# Patient Record
Sex: MALE | Race: OTHER | Employment: UNEMPLOYED | URBAN - METROPOLITAN AREA
[De-identification: names, ages, dates, MRNs, and addresses within clinical notes are randomized per-mention and may not be internally consistent; named-entity substitution may affect disease eponyms.]

---

## 2019-09-03 ENCOUNTER — PATIENT OUTREACH (OUTPATIENT)
Dept: ADMINISTRATIVE | Facility: HOSPITAL | Age: 84
End: 2019-09-03

## 2019-09-03 NOTE — PROGRESS NOTES
Pre-visit chart review completed. Pt eligible/ due for   Lipid Panel     TETANUS VACCINE     Pneumococcal Vaccine (65+ Low/Medium Risk) (2 of 2 - PPSV23)

## 2019-09-17 ENCOUNTER — OFFICE VISIT (OUTPATIENT)
Dept: PRIMARY CARE CLINIC | Facility: CLINIC | Age: 84
End: 2019-09-17
Payer: MEDICARE

## 2019-09-17 VITALS
BODY MASS INDEX: 22.28 KG/M2 | HEART RATE: 58 BPM | HEIGHT: 64 IN | OXYGEN SATURATION: 97 % | WEIGHT: 130.5 LBS | TEMPERATURE: 98 F | SYSTOLIC BLOOD PRESSURE: 120 MMHG | DIASTOLIC BLOOD PRESSURE: 60 MMHG

## 2019-09-17 DIAGNOSIS — R79.9 ABNORMAL FINDING OF BLOOD CHEMISTRY: ICD-10-CM

## 2019-09-17 DIAGNOSIS — Z00.00 ROUTINE GENERAL MEDICAL EXAMINATION AT A HEALTH CARE FACILITY: Primary | ICD-10-CM

## 2019-09-17 DIAGNOSIS — N40.0 BENIGN PROSTATIC HYPERPLASIA, UNSPECIFIED WHETHER LOWER URINARY TRACT SYMPTOMS PRESENT: ICD-10-CM

## 2019-09-17 DIAGNOSIS — E79.0 HYPERURICEMIA: ICD-10-CM

## 2019-09-17 DIAGNOSIS — G20.A1 PARKINSONS: ICD-10-CM

## 2019-09-17 PROCEDURE — 99202 PR OFFICE/OUTPT VISIT, NEW, LEVL II, 15-29 MIN: ICD-10-PCS | Mod: S$PBB,ICN,, | Performed by: FAMILY MEDICINE

## 2019-09-17 PROCEDURE — 99202 OFFICE O/P NEW SF 15 MIN: CPT | Mod: S$PBB,ICN,, | Performed by: FAMILY MEDICINE

## 2019-09-17 PROCEDURE — 99999 PR PBB SHADOW E&M-EST. PATIENT-LVL III: CPT | Mod: PBBFAC,,, | Performed by: FAMILY MEDICINE

## 2019-09-17 PROCEDURE — 99999 PR PBB SHADOW E&M-EST. PATIENT-LVL III: ICD-10-PCS | Mod: PBBFAC,,, | Performed by: FAMILY MEDICINE

## 2019-09-17 PROCEDURE — 99213 OFFICE O/P EST LOW 20 MIN: CPT | Mod: PBBFAC,PN | Performed by: FAMILY MEDICINE

## 2019-09-17 RX ORDER — CARBIDOPA AND LEVODOPA 25; 100 MG/1; MG/1
1 TABLET ORAL 4 TIMES DAILY
Refills: 5 | COMMUNITY
Start: 2019-09-10 | End: 2021-04-27 | Stop reason: DRUGHIGH

## 2019-09-17 RX ORDER — FINASTERIDE 5 MG
1 TABLET ORAL DAILY
Refills: 0 | Status: ON HOLD | COMMUNITY
Start: 2019-08-23 | End: 2022-05-11

## 2019-09-17 RX ORDER — LOPERAMIDE HCL 2 MG
2 TABLET ORAL 4 TIMES DAILY
Qty: 360 TABLET | Refills: 3 | Status: SHIPPED | OUTPATIENT
Start: 2019-09-17 | End: 2019-10-17

## 2019-09-17 RX ORDER — LOPERAMIDE HYDROCHLORIDE 2 MG/1
CAPSULE ORAL
Status: ON HOLD | COMMUNITY
Start: 2019-06-21 | End: 2022-05-12 | Stop reason: HOSPADM

## 2019-09-18 ENCOUNTER — LAB VISIT (OUTPATIENT)
Dept: LAB | Facility: HOSPITAL | Age: 84
End: 2019-09-18
Attending: FAMILY MEDICINE
Payer: MEDICARE

## 2019-09-18 DIAGNOSIS — Z00.00 ROUTINE GENERAL MEDICAL EXAMINATION AT A HEALTH CARE FACILITY: ICD-10-CM

## 2019-09-18 DIAGNOSIS — R73.03 PREDIABETES: ICD-10-CM

## 2019-09-18 DIAGNOSIS — R79.9 ABNORMAL FINDING OF BLOOD CHEMISTRY: ICD-10-CM

## 2019-09-18 DIAGNOSIS — E79.0 HYPERURICEMIA: ICD-10-CM

## 2019-09-18 LAB
ALBUMIN SERPL BCP-MCNC: 3.6 G/DL (ref 3.5–5.2)
ALP SERPL-CCNC: 71 U/L (ref 55–135)
ALT SERPL W/O P-5'-P-CCNC: 6 U/L (ref 10–44)
ANION GAP SERPL CALC-SCNC: 7 MMOL/L (ref 8–16)
AST SERPL-CCNC: 18 U/L (ref 10–40)
BASOPHILS # BLD AUTO: 0.08 K/UL (ref 0–0.2)
BASOPHILS NFR BLD: 0.9 % (ref 0–1.9)
BILIRUB SERPL-MCNC: 0.8 MG/DL (ref 0.1–1)
BUN SERPL-MCNC: 20 MG/DL (ref 8–23)
CALCIUM SERPL-MCNC: 9.2 MG/DL (ref 8.7–10.5)
CHLORIDE SERPL-SCNC: 104 MMOL/L (ref 95–110)
CHOLEST SERPL-MCNC: 166 MG/DL (ref 120–199)
CHOLEST/HDLC SERPL: 2.4 {RATIO} (ref 2–5)
CO2 SERPL-SCNC: 28 MMOL/L (ref 23–29)
CREAT SERPL-MCNC: 1.1 MG/DL (ref 0.5–1.4)
DIFFERENTIAL METHOD: ABNORMAL
EOSINOPHIL # BLD AUTO: 0.3 K/UL (ref 0–0.5)
EOSINOPHIL NFR BLD: 3.1 % (ref 0–8)
ERYTHROCYTE [DISTWIDTH] IN BLOOD BY AUTOMATED COUNT: 14.8 % (ref 11.5–14.5)
EST. GFR  (AFRICAN AMERICAN): >60 ML/MIN/1.73 M^2
EST. GFR  (NON AFRICAN AMERICAN): >60 ML/MIN/1.73 M^2
GLUCOSE SERPL-MCNC: 91 MG/DL (ref 70–110)
HCT VFR BLD AUTO: 41.7 % (ref 40–54)
HDLC SERPL-MCNC: 68 MG/DL (ref 40–75)
HDLC SERPL: 41 % (ref 20–50)
HGB BLD-MCNC: 12.9 G/DL (ref 14–18)
IMM GRANULOCYTES # BLD AUTO: 0.03 K/UL (ref 0–0.04)
IMM GRANULOCYTES NFR BLD AUTO: 0.3 % (ref 0–0.5)
LDLC SERPL CALC-MCNC: 87.8 MG/DL (ref 63–159)
LYMPHOCYTES # BLD AUTO: 3.2 K/UL (ref 1–4.8)
LYMPHOCYTES NFR BLD: 36.9 % (ref 18–48)
MCH RBC QN AUTO: 29.9 PG (ref 27–31)
MCHC RBC AUTO-ENTMCNC: 30.9 G/DL (ref 32–36)
MCV RBC AUTO: 97 FL (ref 82–98)
MONOCYTES # BLD AUTO: 0.9 K/UL (ref 0.3–1)
MONOCYTES NFR BLD: 9.9 % (ref 4–15)
NEUTROPHILS # BLD AUTO: 4.2 K/UL (ref 1.8–7.7)
NEUTROPHILS NFR BLD: 48.9 % (ref 38–73)
NONHDLC SERPL-MCNC: 98 MG/DL
NRBC BLD-RTO: 0 /100 WBC
PLATELET # BLD AUTO: 190 K/UL (ref 150–350)
PMV BLD AUTO: 10.4 FL (ref 9.2–12.9)
POTASSIUM SERPL-SCNC: 4.2 MMOL/L (ref 3.5–5.1)
PROT SERPL-MCNC: 7.7 G/DL (ref 6–8.4)
RBC # BLD AUTO: 4.32 M/UL (ref 4.6–6.2)
SODIUM SERPL-SCNC: 139 MMOL/L (ref 136–145)
TRIGL SERPL-MCNC: 51 MG/DL (ref 30–150)
TSH SERPL DL<=0.005 MIU/L-ACNC: 1.41 UIU/ML (ref 0.4–4)
URATE SERPL-MCNC: 7.3 MG/DL (ref 3.4–7)
WBC # BLD AUTO: 8.58 K/UL (ref 3.9–12.7)

## 2019-09-18 PROCEDURE — 80053 COMPREHEN METABOLIC PANEL: CPT

## 2019-09-18 PROCEDURE — 84550 ASSAY OF BLOOD/URIC ACID: CPT

## 2019-09-18 PROCEDURE — 85025 COMPLETE CBC W/AUTO DIFF WBC: CPT

## 2019-09-18 PROCEDURE — 80061 LIPID PANEL: CPT

## 2019-09-18 PROCEDURE — 36415 COLL VENOUS BLD VENIPUNCTURE: CPT

## 2019-09-18 PROCEDURE — 84443 ASSAY THYROID STIM HORMONE: CPT

## 2019-09-20 ENCOUNTER — TELEPHONE (OUTPATIENT)
Dept: PRIMARY CARE CLINIC | Facility: CLINIC | Age: 84
End: 2019-09-20

## 2019-09-20 NOTE — TELEPHONE ENCOUNTER
----- Message from Tl Torres MD sent at 9/20/2019 12:31 PM CDT -----  Pt's uric acid is high. Ask him to follow a low-purine diet (he is a retired ) and to hydrate well. The uric acid isn't super high; we could start a Rx med (allopurinol) now to help lower it and decrease the risk of gout, or we could repeat the test in 3-6 mo. The med will not affect the kidneys. His kidney fx, liver fx etc good. His lipid panel is outstanding! Thank you

## 2019-09-20 NOTE — TELEPHONE ENCOUNTER
Spoke with wife, informed of lab results and recommendations.  She relayed information to  while on the phone with me.  He would like to think about the recommendation to start allopurinol or repeat lab in 3-6 months.  They ask if labs with message to be mailed to them for review.  Sent to POB per patient.

## 2019-09-22 PROBLEM — G20.A1 PARKINSONS: Status: ACTIVE | Noted: 2019-09-22

## 2019-09-22 PROBLEM — E79.0 HYPERURICEMIA: Status: ACTIVE | Noted: 2019-09-22

## 2019-09-22 PROBLEM — N40.0 BENIGN PROSTATIC HYPERPLASIA: Status: ACTIVE | Noted: 2019-09-22

## 2019-09-23 NOTE — PROGRESS NOTES
Subjective:   Patient ID: Giselle Lemus is a 86 y.o. male.    Chief Complaint: Annual Exam and Establish Care      HPI  81 yo here for annual    Need to get old records hernando ro  Retired  from [employer]    Patient queried and denies any further complaints    PREVENTIVE MED  Diet  Exercise  Colorectal Ca  Alcohol use  Tobacco  BP  Depression  Type 2 DM  Hep C  STD  Vision  ALL REVIEWED      PAST MEDICAL HISTORY:  History reviewed. No pertinent past medical history.    PAST SURGICAL HISTORY:  History reviewed. No pertinent surgical history.    SOCIAL HISTORY:  Social History     Socioeconomic History    Marital status:      Spouse name: Not on file    Number of children: Not on file    Years of education: Not on file    Highest education level: Not on file   Occupational History    Not on file   Social Needs    Financial resource strain: Not on file    Food insecurity:     Worry: Not on file     Inability: Not on file    Transportation needs:     Medical: Not on file     Non-medical: Not on file   Tobacco Use    Smoking status: Never Smoker   Substance and Sexual Activity    Alcohol use: Not on file    Drug use: Not on file    Sexual activity: Not on file   Lifestyle    Physical activity:     Days per week: Not on file     Minutes per session: Not on file    Stress: Not on file   Relationships    Social connections:     Talks on phone: Not on file     Gets together: Not on file     Attends Oriental orthodox service: Not on file     Active member of club or organization: Not on file     Attends meetings of clubs or organizations: Not on file     Relationship status: Not on file   Other Topics Concern    Not on file   Social History Narrative    Not on file       FAMILY HISTORY:  History reviewed. No pertinent family history.    ALLERGIES AND MEDICATIONS: updated and reviewed.  Review of patient's allergies indicates:  No Known Allergies    Current Outpatient Medications:     carbidopa-levodopa  mg  (SINEMET)  mg per tablet, Take 1 tablet by mouth 3 (three) times daily., Disp: , Rfl: 5    loperamide (IMODIUM) 2 mg capsule, Take 2 mg by mouth., Disp: , Rfl:     PROSCAR 5 mg tablet, Take 1 tablet by mouth once daily., Disp: , Rfl: 0    loperamide (IMODIUM A-D) 2 mg Tab, Take 1 tablet (2 mg total) by mouth 4 (four) times daily. Dx [employer] if available per pt request, Disp: 360 tablet, Rfl: 3    Review of Systems   Constitutional: Negative for activity change, appetite change, chills, diaphoresis, fatigue, fever and unexpected weight change.   HENT: Negative for congestion, ear discharge, ear pain, facial swelling, hearing loss, nosebleeds, postnasal drip, rhinorrhea, sinus pressure, sneezing, sore throat, tinnitus, trouble swallowing and voice change.    Eyes: Negative for photophobia, pain, discharge, redness, itching and visual disturbance.   Respiratory: Negative for cough, chest tightness, shortness of breath and wheezing.    Cardiovascular: Negative for chest pain, palpitations and leg swelling.   Gastrointestinal: Negative for abdominal distention, abdominal pain, anal bleeding, blood in stool, constipation, diarrhea, nausea, rectal pain and vomiting.   Endocrine: Negative for cold intolerance, heat intolerance, polydipsia, polyphagia and polyuria.   Genitourinary: Negative for difficulty urinating, dysuria and flank pain.   Musculoskeletal: Negative for arthralgias, back pain, joint swelling, myalgias and neck pain.   Skin: Negative for rash.   Neurological: Negative for dizziness, tremors, seizures, syncope, speech difficulty, weakness, light-headedness, numbness and headaches.   Psychiatric/Behavioral: Negative for behavioral problems, confusion, decreased concentration, dysphoric mood, sleep disturbance and suicidal ideas. The patient is not nervous/anxious and is not hyperactive.        Objective:     Vitals:    09/17/19 1408   BP: 120/60   Pulse: (!) 58   Temp: 97.8 °F (36.6 °C)  "  TempSrc: Oral   SpO2: 97%   Weight: 59.2 kg (130 lb 8.2 oz)   Height: 5' 4" (1.626 m)   PainSc: 0-No pain     Body mass index is 22.4 kg/m².    Physical Exam   Constitutional: He is oriented to person, place, and time. He appears well-developed and well-nourished. He is cooperative. He does not have a sickly appearance. No distress.   HENT:   Head: Normocephalic and atraumatic.   Right Ear: Hearing, tympanic membrane, external ear and ear canal normal. No tenderness.   Left Ear: Hearing, tympanic membrane, external ear and ear canal normal. No tenderness.   Nose: Nose normal.   Mouth/Throat: Oropharynx is clear and moist.   Eyes: Pupils are equal, round, and reactive to light. Conjunctivae and lids are normal. Right eye exhibits no discharge. Left eye exhibits no discharge. Right conjunctiva is not injected. Left conjunctiva is not injected. No scleral icterus. Right eye exhibits normal extraocular motion. Left eye exhibits normal extraocular motion.   Neck: Normal range of motion. Neck supple. No JVD present. Carotid bruit is not present. No tracheal deviation and no edema present. No thyromegaly present.   Cardiovascular: Normal rate, regular rhythm, normal heart sounds and normal pulses. Exam reveals no friction rub.   No murmur heard.  Pulmonary/Chest: Effort normal and breath sounds normal. No accessory muscle usage. No respiratory distress. He has no wheezes. He has no rhonchi. He has no rales.   Abdominal: Soft. Bowel sounds are normal. He exhibits no distension, no abdominal bruit, no pulsatile midline mass and no mass. There is no hepatosplenomegaly. There is no tenderness. There is no rebound, no guarding, no CVA tenderness, no tenderness at McBurney's point and negative Hernandez's sign.   Musculoskeletal: He exhibits no edema.   Lymphadenopathy:        Head (right side): No submandibular, no preauricular and no posterior auricular adenopathy present.        Head (left side): No submandibular, no " preauricular and no posterior auricular adenopathy present.     He has no cervical adenopathy.   Neurological: He is alert and oriented to person, place, and time. GCS eye subscore is 4. GCS verbal subscore is 5. GCS motor subscore is 6.   Skin: Skin is warm and dry. No ecchymosis and no rash noted. Rash is not maculopapular and not urticarial. He is not diaphoretic. No cyanosis or erythema. Nails show no clubbing.   Psychiatric: He has a normal mood and affect. His speech is normal and behavior is normal. Thought content normal. His mood appears not anxious. His affect is not angry and not inappropriate. He does not exhibit a depressed mood.   Nursing note and vitals reviewed.      Assessment and Plan:   Eng was seen today for annual exam and establish care.    Diagnoses and all orders for this visit:    Routine general medical examination at a health care facility  -     CBC auto differential; Future  -     Comprehensive metabolic panel; Future  -     Cancel: Hemoglobin A1c; Future  -     Lipid panel; Future  -     TSH; Future    Parkinsons    Hyperuricemia  -     Uric acid; Future    Abnormal finding of blood chemistry   -     CBC auto differential; Future  -     Cancel: Hemoglobin A1c; Future  -     Lipid panel; Future    Benign prostatic hyperplasia, unspecified whether lower urinary tract symptoms present    Other orders  -     loperamide (IMODIUM A-D) 2 mg Tab; Take 1 tablet (2 mg total) by mouth 4 (four) times daily. Dx [employer] if available per pt request        Follow up in about 6 months (around 3/17/2020).        THIS NOTE WILL BE SHARED WITH THE PATIENT.

## 2020-03-27 ENCOUNTER — PATIENT OUTREACH (OUTPATIENT)
Dept: ADMINISTRATIVE | Facility: HOSPITAL | Age: 85
End: 2020-03-27

## 2021-01-10 ENCOUNTER — IMMUNIZATION (OUTPATIENT)
Dept: INTERNAL MEDICINE | Facility: CLINIC | Age: 86
End: 2021-01-10
Payer: MEDICARE

## 2021-01-10 DIAGNOSIS — Z23 NEED FOR VACCINATION: ICD-10-CM

## 2021-01-10 PROCEDURE — 91300 COVID-19, MRNA, LNP-S, PF, 30 MCG/0.3 ML DOSE VACCINE: CPT | Mod: PBBFAC

## 2021-01-31 ENCOUNTER — IMMUNIZATION (OUTPATIENT)
Dept: INTERNAL MEDICINE | Facility: CLINIC | Age: 86
End: 2021-01-31
Payer: MEDICARE

## 2021-01-31 DIAGNOSIS — Z23 NEED FOR VACCINATION: Primary | ICD-10-CM

## 2021-01-31 PROCEDURE — 0002A PR IMMUNIZ ADMIN, SARS-COV-2 COVID-19 VACC, 30MCG/0.3ML, 2ND DOSE: CPT | Mod: PBBFAC

## 2021-01-31 PROCEDURE — 91300 PR SARS-COV- 2 COVID-19 VACCINE, NO PRSV, 30MCG/0.3ML, IM: CPT | Mod: PBBFAC

## 2021-01-31 RX ADMIN — RNA INGREDIENT BNT-162B2 0.3 ML: 0.23 INJECTION, SUSPENSION INTRAMUSCULAR at 01:01

## 2021-02-20 ENCOUNTER — OFFICE VISIT (OUTPATIENT)
Dept: URGENT CARE | Facility: CLINIC | Age: 86
End: 2021-02-20
Payer: MEDICARE

## 2021-02-20 VITALS
SYSTOLIC BLOOD PRESSURE: 119 MMHG | DIASTOLIC BLOOD PRESSURE: 59 MMHG | WEIGHT: 130 LBS | HEART RATE: 87 BPM | OXYGEN SATURATION: 93 % | BODY MASS INDEX: 22.2 KG/M2 | RESPIRATION RATE: 16 BRPM | HEIGHT: 64 IN | TEMPERATURE: 100 F

## 2021-02-20 DIAGNOSIS — M25.552 LEFT HIP PAIN: ICD-10-CM

## 2021-02-20 DIAGNOSIS — Z87.898 HISTORY OF CHRONIC COUGH: ICD-10-CM
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
## 2021-02-20 DIAGNOSIS — B96.89 ACUTE BACTERIAL BRONCHITIS: Primary | ICD-10-CM

## 2021-02-20 DIAGNOSIS — J20.8 ACUTE BACTERIAL BRONCHITIS: Primary | ICD-10-CM

## 2021-02-20 LAB
CTP QC/QA: YES
SARS-COV-2 RDRP RESP QL NAA+PROBE: NEGATIVE

## 2021-02-20 PROCEDURE — 73502 XR HIP 2 VIEW LEFT: ICD-10-PCS | Mod: FY,LT,S$GLB, | Performed by: RADIOLOGY

## 2021-02-20 PROCEDURE — 99214 OFFICE O/P EST MOD 30 MIN: CPT | Mod: 25,S$GLB,CS, | Performed by: PHYSICIAN ASSISTANT

## 2021-02-20 PROCEDURE — 99214 PR OFFICE/OUTPT VISIT, EST, LEVL IV, 30-39 MIN: ICD-10-PCS | Mod: 25,S$GLB,CS, | Performed by: PHYSICIAN ASSISTANT

## 2021-02-20 PROCEDURE — 96372 PR INJECTION,THERAP/PROPH/DIAG2ST, IM OR SUBCUT: ICD-10-PCS | Mod: S$GLB,,, | Performed by: EMERGENCY MEDICINE

## 2021-02-20 PROCEDURE — 71046 XR CHEST PA AND LATERAL: ICD-10-PCS | Mod: FY,S$GLB,, | Performed by: RADIOLOGY

## 2021-02-20 PROCEDURE — 96372 THER/PROPH/DIAG INJ SC/IM: CPT | Mod: S$GLB,,, | Performed by: EMERGENCY MEDICINE

## 2021-02-20 PROCEDURE — 71046 X-RAY EXAM CHEST 2 VIEWS: CPT | Mod: FY,S$GLB,, | Performed by: RADIOLOGY

## 2021-02-20 PROCEDURE — 73502 X-RAY EXAM HIP UNI 2-3 VIEWS: CPT | Mod: FY,LT,S$GLB, | Performed by: RADIOLOGY

## 2021-02-20 PROCEDURE — U0002 COVID-19 LAB TEST NON-CDC: HCPCS | Mod: QW,CR,S$GLB, | Performed by: PHYSICIAN ASSISTANT

## 2021-02-20 PROCEDURE — U0002: ICD-10-PCS | Mod: QW,CR,S$GLB, | Performed by: PHYSICIAN ASSISTANT

## 2021-02-20 RX ORDER — DOXYCYCLINE 100 MG/1
100 CAPSULE ORAL 2 TIMES DAILY
Qty: 14 CAPSULE | Refills: 0 | Status: SHIPPED | OUTPATIENT
Start: 2021-02-20 | End: 2021-02-27

## 2021-02-20 RX ORDER — ALBUTEROL SULFATE 90 UG/1
2 AEROSOL, METERED RESPIRATORY (INHALATION) EVERY 6 HOURS PRN
Qty: 18 G | Refills: 0 | Status: SHIPPED | OUTPATIENT
Start: 2021-02-20 | End: 2021-03-13

## 2021-02-20 RX ORDER — CEFTRIAXONE 1 G/1
1 INJECTION, POWDER, FOR SOLUTION INTRAMUSCULAR; INTRAVENOUS
Status: COMPLETED | OUTPATIENT
Start: 2021-02-20 | End: 2021-02-20

## 2021-02-20 RX ORDER — GUAIFENESIN AND DEXTROMETHORPHAN HYDROBROMIDE 600; 30 MG/1; MG/1
1 TABLET, EXTENDED RELEASE ORAL 2 TIMES DAILY PRN
COMMUNITY
Start: 2021-02-20 | End: 2021-03-02

## 2021-02-20 RX ADMIN — CEFTRIAXONE 1 G: 1 INJECTION, POWDER, FOR SOLUTION INTRAMUSCULAR; INTRAVENOUS at 01:02

## 2021-02-24 ENCOUNTER — LAB VISIT (OUTPATIENT)
Dept: LAB | Facility: HOSPITAL | Age: 86
End: 2021-02-24
Attending: STUDENT IN AN ORGANIZED HEALTH CARE EDUCATION/TRAINING PROGRAM
Payer: MEDICARE

## 2021-02-24 ENCOUNTER — OFFICE VISIT (OUTPATIENT)
Dept: INTERNAL MEDICINE | Facility: CLINIC | Age: 86
End: 2021-02-24
Payer: MEDICARE

## 2021-02-24 ENCOUNTER — TELEPHONE (OUTPATIENT)
Dept: INTERNAL MEDICINE | Facility: CLINIC | Age: 86
End: 2021-02-24

## 2021-02-24 VITALS
HEIGHT: 64 IN | WEIGHT: 125.88 LBS | TEMPERATURE: 98 F | SYSTOLIC BLOOD PRESSURE: 154 MMHG | OXYGEN SATURATION: 96 % | RESPIRATION RATE: 12 BRPM | DIASTOLIC BLOOD PRESSURE: 66 MMHG | BODY MASS INDEX: 21.49 KG/M2 | HEART RATE: 98 BPM

## 2021-02-24 DIAGNOSIS — R93.89 ABNORMAL CHEST X-RAY: ICD-10-CM

## 2021-02-24 DIAGNOSIS — R60.0 BILATERAL LOWER EXTREMITY EDEMA: Primary | ICD-10-CM

## 2021-02-24 DIAGNOSIS — R01.1 CARDIAC MURMUR: ICD-10-CM

## 2021-02-24 DIAGNOSIS — R60.0 BILATERAL LOWER EXTREMITY EDEMA: ICD-10-CM

## 2021-02-24 LAB
BASOPHILS # BLD AUTO: 0.05 K/UL (ref 0–0.2)
BASOPHILS NFR BLD: 0.5 % (ref 0–1.9)
DIFFERENTIAL METHOD: ABNORMAL
EOSINOPHIL # BLD AUTO: 0.2 K/UL (ref 0–0.5)
EOSINOPHIL NFR BLD: 1.9 % (ref 0–8)
ERYTHROCYTE [DISTWIDTH] IN BLOOD BY AUTOMATED COUNT: 15.3 % (ref 11.5–14.5)
HCT VFR BLD AUTO: 37.2 % (ref 40–54)
HGB BLD-MCNC: 11.5 G/DL (ref 14–18)
IMM GRANULOCYTES # BLD AUTO: 0.06 K/UL (ref 0–0.04)
IMM GRANULOCYTES NFR BLD AUTO: 0.6 % (ref 0–0.5)
LYMPHOCYTES # BLD AUTO: 1.9 K/UL (ref 1–4.8)
LYMPHOCYTES NFR BLD: 20.2 % (ref 18–48)
MCH RBC QN AUTO: 30.6 PG (ref 27–31)
MCHC RBC AUTO-ENTMCNC: 30.9 G/DL (ref 32–36)
MCV RBC AUTO: 99 FL (ref 82–98)
MONOCYTES # BLD AUTO: 1.2 K/UL (ref 0.3–1)
MONOCYTES NFR BLD: 12.5 % (ref 4–15)
NEUTROPHILS # BLD AUTO: 6.1 K/UL (ref 1.8–7.7)
NEUTROPHILS NFR BLD: 64.3 % (ref 38–73)
NRBC BLD-RTO: 0 /100 WBC
PLATELET # BLD AUTO: 236 K/UL (ref 150–350)
PMV BLD AUTO: 10.9 FL (ref 9.2–12.9)
RBC # BLD AUTO: 3.76 M/UL (ref 4.6–6.2)
WBC # BLD AUTO: 9.45 K/UL (ref 3.9–12.7)

## 2021-02-24 PROCEDURE — 83880 ASSAY OF NATRIURETIC PEPTIDE: CPT

## 2021-02-24 PROCEDURE — 99999 PR PBB SHADOW E&M-EST. PATIENT-LVL IV: ICD-10-PCS | Mod: PBBFAC,,, | Performed by: STUDENT IN AN ORGANIZED HEALTH CARE EDUCATION/TRAINING PROGRAM

## 2021-02-24 PROCEDURE — 99214 PR OFFICE/OUTPT VISIT, EST, LEVL IV, 30-39 MIN: ICD-10-PCS | Mod: S$PBB,,, | Performed by: STUDENT IN AN ORGANIZED HEALTH CARE EDUCATION/TRAINING PROGRAM

## 2021-02-24 PROCEDURE — 85025 COMPLETE CBC W/AUTO DIFF WBC: CPT

## 2021-02-24 PROCEDURE — 36415 COLL VENOUS BLD VENIPUNCTURE: CPT | Mod: PO

## 2021-02-24 PROCEDURE — 99214 OFFICE O/P EST MOD 30 MIN: CPT | Mod: PBBFAC,PO | Performed by: STUDENT IN AN ORGANIZED HEALTH CARE EDUCATION/TRAINING PROGRAM

## 2021-02-24 PROCEDURE — 99999 PR PBB SHADOW E&M-EST. PATIENT-LVL IV: CPT | Mod: PBBFAC,,, | Performed by: STUDENT IN AN ORGANIZED HEALTH CARE EDUCATION/TRAINING PROGRAM

## 2021-02-24 PROCEDURE — 80053 COMPREHEN METABOLIC PANEL: CPT

## 2021-02-24 PROCEDURE — 99214 OFFICE O/P EST MOD 30 MIN: CPT | Mod: S$PBB,,, | Performed by: STUDENT IN AN ORGANIZED HEALTH CARE EDUCATION/TRAINING PROGRAM

## 2021-02-24 RX ORDER — BUMETANIDE 0.5 MG/1
2 TABLET ORAL DAILY PRN
Qty: 30 TABLET | Refills: 3 | Status: ON HOLD | OUTPATIENT
Start: 2021-02-24 | End: 2022-05-11

## 2021-02-25 ENCOUNTER — SPECIALTY PHARMACY (OUTPATIENT)
Dept: PHARMACY | Facility: CLINIC | Age: 86
End: 2021-02-25

## 2021-02-25 ENCOUNTER — TELEPHONE (OUTPATIENT)
Dept: INTERNAL MEDICINE | Facility: CLINIC | Age: 86
End: 2021-02-25

## 2021-02-25 DIAGNOSIS — N17.9 AKI (ACUTE KIDNEY INJURY): ICD-10-CM

## 2021-02-25 DIAGNOSIS — D64.9 ANEMIA, UNSPECIFIED TYPE: ICD-10-CM

## 2021-02-25 DIAGNOSIS — E87.5 HYPERKALEMIA: Primary | ICD-10-CM

## 2021-02-25 LAB
ALBUMIN SERPL BCP-MCNC: 3.2 G/DL (ref 3.5–5.2)
ALP SERPL-CCNC: 77 U/L (ref 55–135)
ALT SERPL W/O P-5'-P-CCNC: 8 U/L (ref 10–44)
ANION GAP SERPL CALC-SCNC: 5 MMOL/L (ref 8–16)
AST SERPL-CCNC: 23 U/L (ref 10–40)
BILIRUB SERPL-MCNC: 0.3 MG/DL (ref 0.1–1)
BNP SERPL-MCNC: 102 PG/ML (ref 0–99)
BUN SERPL-MCNC: 35 MG/DL (ref 8–23)
CALCIUM SERPL-MCNC: 8.7 MG/DL (ref 8.7–10.5)
CHLORIDE SERPL-SCNC: 105 MMOL/L (ref 95–110)
CO2 SERPL-SCNC: 26 MMOL/L (ref 23–29)
CREAT SERPL-MCNC: 1.5 MG/DL (ref 0.5–1.4)
EST. GFR  (AFRICAN AMERICAN): 47.7 ML/MIN/1.73 M^2
EST. GFR  (NON AFRICAN AMERICAN): 41.3 ML/MIN/1.73 M^2
GLUCOSE SERPL-MCNC: 131 MG/DL (ref 70–110)
POTASSIUM SERPL-SCNC: 5.5 MMOL/L (ref 3.5–5.1)
PROT SERPL-MCNC: 7.3 G/DL (ref 6–8.4)
SODIUM SERPL-SCNC: 136 MMOL/L (ref 136–145)

## 2021-02-26 ENCOUNTER — TELEPHONE (OUTPATIENT)
Dept: INTERNAL MEDICINE | Facility: CLINIC | Age: 86
End: 2021-02-26

## 2021-02-26 ENCOUNTER — TELEPHONE (OUTPATIENT)
Dept: PRIMARY CARE CLINIC | Facility: CLINIC | Age: 86
End: 2021-02-26

## 2021-02-26 DIAGNOSIS — M10.9 URIC ACID ARTHROPATHY: Primary | ICD-10-CM

## 2021-03-01 ENCOUNTER — TELEPHONE (OUTPATIENT)
Dept: INTERNAL MEDICINE | Facility: CLINIC | Age: 86
End: 2021-03-01

## 2021-03-02 ENCOUNTER — LAB VISIT (OUTPATIENT)
Dept: LAB | Facility: HOSPITAL | Age: 86
End: 2021-03-02
Attending: STUDENT IN AN ORGANIZED HEALTH CARE EDUCATION/TRAINING PROGRAM
Payer: MEDICARE

## 2021-03-02 DIAGNOSIS — M10.9 URIC ACID ARTHROPATHY: ICD-10-CM

## 2021-03-02 DIAGNOSIS — E87.5 HYPERKALEMIA: ICD-10-CM

## 2021-03-02 DIAGNOSIS — D64.9 ANEMIA, UNSPECIFIED TYPE: ICD-10-CM

## 2021-03-02 DIAGNOSIS — N17.9 AKI (ACUTE KIDNEY INJURY): ICD-10-CM

## 2021-03-02 LAB
ANION GAP SERPL CALC-SCNC: 5 MMOL/L (ref 8–16)
BASOPHILS # BLD AUTO: 0.09 K/UL (ref 0–0.2)
BASOPHILS NFR BLD: 1 % (ref 0–1.9)
BUN SERPL-MCNC: 27 MG/DL (ref 8–23)
CALCIUM SERPL-MCNC: 8.8 MG/DL (ref 8.7–10.5)
CHLORIDE SERPL-SCNC: 106 MMOL/L (ref 95–110)
CO2 SERPL-SCNC: 29 MMOL/L (ref 23–29)
CREAT SERPL-MCNC: 1.2 MG/DL (ref 0.5–1.4)
DIFFERENTIAL METHOD: ABNORMAL
EOSINOPHIL # BLD AUTO: 0.2 K/UL (ref 0–0.5)
EOSINOPHIL NFR BLD: 2.6 % (ref 0–8)
ERYTHROCYTE [DISTWIDTH] IN BLOOD BY AUTOMATED COUNT: 15 % (ref 11.5–14.5)
EST. GFR  (AFRICAN AMERICAN): >60 ML/MIN/1.73 M^2
EST. GFR  (NON AFRICAN AMERICAN): 54 ML/MIN/1.73 M^2
GLUCOSE SERPL-MCNC: 92 MG/DL (ref 70–110)
HCT VFR BLD AUTO: 36.8 % (ref 40–54)
HGB BLD-MCNC: 11.5 G/DL (ref 14–18)
IMM GRANULOCYTES # BLD AUTO: 0.04 K/UL (ref 0–0.04)
IMM GRANULOCYTES NFR BLD AUTO: 0.4 % (ref 0–0.5)
LYMPHOCYTES # BLD AUTO: 1.9 K/UL (ref 1–4.8)
LYMPHOCYTES NFR BLD: 20.8 % (ref 18–48)
MCH RBC QN AUTO: 30.6 PG (ref 27–31)
MCHC RBC AUTO-ENTMCNC: 31.3 G/DL (ref 32–36)
MCV RBC AUTO: 98 FL (ref 82–98)
MONOCYTES # BLD AUTO: 0.9 K/UL (ref 0.3–1)
MONOCYTES NFR BLD: 9.7 % (ref 4–15)
NEUTROPHILS # BLD AUTO: 5.9 K/UL (ref 1.8–7.7)
NEUTROPHILS NFR BLD: 65.5 % (ref 38–73)
NRBC BLD-RTO: 0 /100 WBC
PLATELET # BLD AUTO: 265 K/UL (ref 150–350)
PMV BLD AUTO: 10.3 FL (ref 9.2–12.9)
POTASSIUM SERPL-SCNC: 4.9 MMOL/L (ref 3.5–5.1)
RBC # BLD AUTO: 3.76 M/UL (ref 4.6–6.2)
SODIUM SERPL-SCNC: 140 MMOL/L (ref 136–145)
URATE SERPL-MCNC: 7.4 MG/DL (ref 3.4–7)
WBC # BLD AUTO: 8.95 K/UL (ref 3.9–12.7)

## 2021-03-02 PROCEDURE — 85025 COMPLETE CBC W/AUTO DIFF WBC: CPT

## 2021-03-02 PROCEDURE — 80048 BASIC METABOLIC PNL TOTAL CA: CPT

## 2021-03-02 PROCEDURE — 36415 COLL VENOUS BLD VENIPUNCTURE: CPT | Mod: PO

## 2021-03-02 PROCEDURE — 84550 ASSAY OF BLOOD/URIC ACID: CPT

## 2021-03-04 ENCOUNTER — PATIENT OUTREACH (OUTPATIENT)
Dept: ADMINISTRATIVE | Facility: OTHER | Age: 86
End: 2021-03-04

## 2021-03-04 ENCOUNTER — OFFICE VISIT (OUTPATIENT)
Dept: INTERNAL MEDICINE | Facility: CLINIC | Age: 86
End: 2021-03-04
Payer: MEDICARE

## 2021-03-04 ENCOUNTER — TELEPHONE (OUTPATIENT)
Dept: INTERNAL MEDICINE | Facility: CLINIC | Age: 86
End: 2021-03-04

## 2021-03-04 VITALS
BODY MASS INDEX: 20.66 KG/M2 | HEIGHT: 64 IN | OXYGEN SATURATION: 98 % | TEMPERATURE: 98 F | WEIGHT: 121 LBS | RESPIRATION RATE: 16 BRPM | SYSTOLIC BLOOD PRESSURE: 132 MMHG | DIASTOLIC BLOOD PRESSURE: 70 MMHG | HEART RATE: 82 BPM

## 2021-03-04 DIAGNOSIS — N17.9 AKI (ACUTE KIDNEY INJURY): ICD-10-CM

## 2021-03-04 DIAGNOSIS — R60.0 BILATERAL LOWER EXTREMITY EDEMA: Primary | ICD-10-CM

## 2021-03-04 DIAGNOSIS — E87.5 HYPERKALEMIA: ICD-10-CM

## 2021-03-04 PROCEDURE — 99213 OFFICE O/P EST LOW 20 MIN: CPT | Mod: PBBFAC,PO | Performed by: STUDENT IN AN ORGANIZED HEALTH CARE EDUCATION/TRAINING PROGRAM

## 2021-03-04 PROCEDURE — 99999 PR PBB SHADOW E&M-EST. PATIENT-LVL III: ICD-10-PCS | Mod: PBBFAC,,, | Performed by: STUDENT IN AN ORGANIZED HEALTH CARE EDUCATION/TRAINING PROGRAM

## 2021-03-04 PROCEDURE — 99214 OFFICE O/P EST MOD 30 MIN: CPT | Mod: S$PBB,,, | Performed by: STUDENT IN AN ORGANIZED HEALTH CARE EDUCATION/TRAINING PROGRAM

## 2021-03-04 PROCEDURE — 99214 PR OFFICE/OUTPT VISIT, EST, LEVL IV, 30-39 MIN: ICD-10-PCS | Mod: S$PBB,,, | Performed by: STUDENT IN AN ORGANIZED HEALTH CARE EDUCATION/TRAINING PROGRAM

## 2021-03-04 PROCEDURE — 99999 PR PBB SHADOW E&M-EST. PATIENT-LVL III: CPT | Mod: PBBFAC,,, | Performed by: STUDENT IN AN ORGANIZED HEALTH CARE EDUCATION/TRAINING PROGRAM

## 2021-03-05 ENCOUNTER — SPECIALTY PHARMACY (OUTPATIENT)
Dept: PHARMACY | Facility: CLINIC | Age: 86
End: 2021-03-05

## 2021-03-05 ENCOUNTER — TELEPHONE (OUTPATIENT)
Dept: CARDIOLOGY | Facility: CLINIC | Age: 86
End: 2021-03-05

## 2021-03-05 DIAGNOSIS — R06.02 SOB (SHORTNESS OF BREATH) ON EXERTION: Primary | ICD-10-CM

## 2021-03-08 ENCOUNTER — HOSPITAL ENCOUNTER (OUTPATIENT)
Dept: CARDIOLOGY | Facility: CLINIC | Age: 86
Discharge: HOME | End: 2021-03-08
Payer: MEDICARE

## 2021-03-08 ENCOUNTER — OFFICE VISIT (OUTPATIENT)
Dept: CARDIOLOGY | Facility: CLINIC | Age: 86
End: 2021-03-08
Payer: MEDICARE

## 2021-03-08 VITALS
SYSTOLIC BLOOD PRESSURE: 145 MMHG | DIASTOLIC BLOOD PRESSURE: 65 MMHG | BODY MASS INDEX: 20.46 KG/M2 | HEIGHT: 65 IN | HEART RATE: 82 BPM | WEIGHT: 122.81 LBS

## 2021-03-08 DIAGNOSIS — R60.0 BILATERAL LOWER EXTREMITY EDEMA: ICD-10-CM

## 2021-03-08 DIAGNOSIS — I45.10 RBBB: ICD-10-CM

## 2021-03-08 DIAGNOSIS — R06.02 SOB (SHORTNESS OF BREATH) ON EXERTION: ICD-10-CM

## 2021-03-08 DIAGNOSIS — R60.0 PERIPHERAL EDEMA: ICD-10-CM

## 2021-03-08 PROCEDURE — 93005 ELECTROCARDIOGRAM TRACING: CPT | Mod: PBBFAC | Performed by: INTERNAL MEDICINE

## 2021-03-08 PROCEDURE — 99999 PR PBB SHADOW E&M-EST. PATIENT-LVL IV: CPT | Mod: PBBFAC,GC,, | Performed by: STUDENT IN AN ORGANIZED HEALTH CARE EDUCATION/TRAINING PROGRAM

## 2021-03-08 PROCEDURE — 99203 PR OFFICE/OUTPT VISIT, NEW, LEVL III, 30-44 MIN: ICD-10-PCS | Mod: S$PBB,25,GC, | Performed by: STUDENT IN AN ORGANIZED HEALTH CARE EDUCATION/TRAINING PROGRAM

## 2021-03-08 PROCEDURE — 99214 OFFICE O/P EST MOD 30 MIN: CPT | Mod: PBBFAC,25 | Performed by: STUDENT IN AN ORGANIZED HEALTH CARE EDUCATION/TRAINING PROGRAM

## 2021-03-08 PROCEDURE — 99203 OFFICE O/P NEW LOW 30 MIN: CPT | Mod: S$PBB,25,GC, | Performed by: STUDENT IN AN ORGANIZED HEALTH CARE EDUCATION/TRAINING PROGRAM

## 2021-03-08 PROCEDURE — 93010 ELECTROCARDIOGRAM REPORT: CPT | Mod: S$PBB,,, | Performed by: INTERNAL MEDICINE

## 2021-03-08 PROCEDURE — 93010 EKG 12-LEAD: ICD-10-PCS | Mod: S$PBB,,, | Performed by: INTERNAL MEDICINE

## 2021-03-08 PROCEDURE — 99999 PR PBB SHADOW E&M-EST. PATIENT-LVL IV: ICD-10-PCS | Mod: PBBFAC,GC,, | Performed by: STUDENT IN AN ORGANIZED HEALTH CARE EDUCATION/TRAINING PROGRAM

## 2021-03-18 ENCOUNTER — HOSPITAL ENCOUNTER (OUTPATIENT)
Dept: CARDIOLOGY | Facility: HOSPITAL | Age: 86
Discharge: HOME | End: 2021-03-18
Attending: STUDENT IN AN ORGANIZED HEALTH CARE EDUCATION/TRAINING PROGRAM
Payer: MEDICARE

## 2021-03-18 VITALS
DIASTOLIC BLOOD PRESSURE: 60 MMHG | HEART RATE: 62 BPM | SYSTOLIC BLOOD PRESSURE: 118 MMHG | WEIGHT: 122 LBS | BODY MASS INDEX: 20.33 KG/M2 | HEIGHT: 65 IN

## 2021-03-18 DIAGNOSIS — R60.0 BILATERAL LOWER EXTREMITY EDEMA: ICD-10-CM

## 2021-03-18 LAB
ASCENDING AORTA: 3.19 CM
AV INDEX (PROSTH): 0.75
AV MEAN GRADIENT: 6 MMHG
AV PEAK GRADIENT: 10 MMHG
AV VALVE AREA: 2.13 CM2
AV VELOCITY RATIO: 0.7
BSA FOR ECHO PROCEDURE: 1.59 M2
CV ECHO LV RWT: 0.39 CM
DOP CALC AO PEAK VEL: 1.55 M/S
DOP CALC AO VTI: 37.36 CM
DOP CALC LVOT AREA: 2.8 CM2
DOP CALC LVOT DIAMETER: 1.9 CM
DOP CALC LVOT PEAK VEL: 1.09 M/S
DOP CALC LVOT STROKE VOLUME: 79.43 CM3
DOP CALCLVOT PEAK VEL VTI: 28.03 CM
E WAVE DECELERATION TIME: 267.67 MSEC
E/A RATIO: 0.99
E/E' RATIO: 8.78 M/S
ECHO LV POSTERIOR WALL: 0.83 CM (ref 0.6–1.1)
FRACTIONAL SHORTENING: 46 % (ref 28–44)
INTERVENTRICULAR SEPTUM: 0.93 CM (ref 0.6–1.1)
IVRT: 105.61 MSEC
LA MAJOR: 5.05 CM
LA MINOR: 4.81 CM
LA WIDTH: 3.57 CM
LEFT ATRIUM SIZE: 3.42 CM
LEFT ATRIUM VOLUME INDEX MOD: 26.5 ML/M2
LEFT ATRIUM VOLUME INDEX: 32 ML/M2
LEFT ATRIUM VOLUME MOD: 42.37 CM3
LEFT ATRIUM VOLUME: 51.13 CM3
LEFT INTERNAL DIMENSION IN SYSTOLE: 2.33 CM (ref 2.1–4)
LEFT VENTRICLE DIASTOLIC VOLUME INDEX: 51.73 ML/M2
LEFT VENTRICLE DIASTOLIC VOLUME: 82.76 ML
LEFT VENTRICLE MASS INDEX: 74 G/M2
LEFT VENTRICLE SYSTOLIC VOLUME INDEX: 11.7 ML/M2
LEFT VENTRICLE SYSTOLIC VOLUME: 18.75 ML
LEFT VENTRICULAR INTERNAL DIMENSION IN DIASTOLE: 4.29 CM (ref 3.5–6)
LEFT VENTRICULAR MASS: 119.15 G
LV LATERAL E/E' RATIO: 8.78 M/S
LV SEPTAL E/E' RATIO: 8.78 M/S
MV PEAK A VEL: 0.8 M/S
MV PEAK E VEL: 0.79 M/S
MV STENOSIS PRESSURE HALF TIME: 77.62 MS
MV VALVE AREA P 1/2 METHOD: 2.83 CM2
PISA TR MAX VEL: 2.43 M/S
PULM VEIN S/D RATIO: 1.07
PV PEAK D VEL: 0.56 M/S
PV PEAK S VEL: 0.6 M/S
RA MAJOR: 5.05 CM
RA PRESSURE: 8 MMHG
RA WIDTH: 3.42 CM
RIGHT VENTRICULAR END-DIASTOLIC DIMENSION: 3.42 CM
SINUS: 3.12 CM
STJ: 2.79 CM
TDI LATERAL: 0.09 M/S
TDI SEPTAL: 0.09 M/S
TDI: 0.09 M/S
TR MAX PG: 24 MMHG
TRICUSPID ANNULAR PLANE SYSTOLIC EXCURSION: 2.36 CM
TV REST PULMONARY ARTERY PRESSURE: 32 MMHG

## 2021-03-18 PROCEDURE — 93306 TTE W/DOPPLER COMPLETE: CPT

## 2021-03-18 PROCEDURE — 93306 TTE W/DOPPLER COMPLETE: CPT | Mod: 26,,, | Performed by: INTERNAL MEDICINE

## 2021-03-18 PROCEDURE — 93306 ECHO (CUPID ONLY): ICD-10-PCS | Mod: 26,,, | Performed by: INTERNAL MEDICINE

## 2021-04-06 ENCOUNTER — TELEPHONE (OUTPATIENT)
Dept: NEUROLOGY | Facility: CLINIC | Age: 86
End: 2021-04-06

## 2021-04-07 ENCOUNTER — TELEPHONE (OUTPATIENT)
Dept: NEUROLOGY | Facility: CLINIC | Age: 86
End: 2021-04-07

## 2021-04-27 ENCOUNTER — OFFICE VISIT (OUTPATIENT)
Dept: NEUROLOGY | Facility: CLINIC | Age: 86
End: 2021-04-27
Payer: MEDICARE

## 2021-04-27 VITALS
SYSTOLIC BLOOD PRESSURE: 120 MMHG | DIASTOLIC BLOOD PRESSURE: 67 MMHG | HEART RATE: 64 BPM | BODY MASS INDEX: 20.29 KG/M2 | WEIGHT: 121.94 LBS

## 2021-04-27 DIAGNOSIS — G20.A1 PARKINSONS: Primary | ICD-10-CM

## 2021-04-27 PROCEDURE — 99999 PR PBB SHADOW E&M-EST. PATIENT-LVL III: CPT | Mod: PBBFAC,,, | Performed by: PHYSICIAN ASSISTANT

## 2021-04-27 PROCEDURE — 99215 PR OFFICE/OUTPT VISIT, EST, LEVL V, 40-54 MIN: ICD-10-PCS | Mod: S$PBB,,, | Performed by: PHYSICIAN ASSISTANT

## 2021-04-27 PROCEDURE — 99999 PR PBB SHADOW E&M-EST. PATIENT-LVL III: ICD-10-PCS | Mod: PBBFAC,,, | Performed by: PHYSICIAN ASSISTANT

## 2021-04-27 PROCEDURE — 99213 OFFICE O/P EST LOW 20 MIN: CPT | Mod: PBBFAC | Performed by: PHYSICIAN ASSISTANT

## 2021-04-27 PROCEDURE — 99215 OFFICE O/P EST HI 40 MIN: CPT | Mod: S$PBB,,, | Performed by: PHYSICIAN ASSISTANT

## 2021-04-27 RX ORDER — CARBIDOPA AND LEVODOPA 25; 100 MG/1; MG/1
1.5 TABLET ORAL 4 TIMES DAILY
Qty: 540 TABLET | Refills: 3 | Status: ON HOLD | OUTPATIENT
Start: 2021-04-27 | End: 2022-05-12 | Stop reason: HOSPADM

## 2021-06-18 ENCOUNTER — TELEPHONE (OUTPATIENT)
Dept: NEUROLOGY | Facility: CLINIC | Age: 86
End: 2021-06-18

## 2021-06-22 ENCOUNTER — OFFICE VISIT (OUTPATIENT)
Dept: NEUROLOGY | Facility: CLINIC | Age: 86
End: 2021-06-22
Payer: MEDICARE

## 2021-06-22 DIAGNOSIS — Z71.89 COUNSELING REGARDING GOALS OF CARE: ICD-10-CM

## 2021-06-22 DIAGNOSIS — G20.A1 PARKINSONS: Primary | ICD-10-CM

## 2021-06-22 PROCEDURE — 99215 PR OFFICE/OUTPT VISIT, EST, LEVL V, 40-54 MIN: ICD-10-PCS | Mod: S$PBB,,, | Performed by: PHYSICIAN ASSISTANT

## 2021-06-22 PROCEDURE — 99999 PR PBB SHADOW E&M-EST. PATIENT-LVL II: ICD-10-PCS | Mod: PBBFAC,,, | Performed by: PHYSICIAN ASSISTANT

## 2021-06-22 PROCEDURE — 99215 OFFICE O/P EST HI 40 MIN: CPT | Mod: S$PBB,,, | Performed by: PHYSICIAN ASSISTANT

## 2021-06-22 PROCEDURE — 99999 PR PBB SHADOW E&M-EST. PATIENT-LVL II: CPT | Mod: PBBFAC,,, | Performed by: PHYSICIAN ASSISTANT

## 2021-06-22 PROCEDURE — 99212 OFFICE O/P EST SF 10 MIN: CPT | Mod: PBBFAC | Performed by: PHYSICIAN ASSISTANT

## 2021-08-25 ENCOUNTER — OFFICE VISIT (OUTPATIENT)
Dept: NEUROLOGY | Facility: CLINIC | Age: 86
End: 2021-08-25
Payer: MEDICARE

## 2021-08-25 VITALS
DIASTOLIC BLOOD PRESSURE: 66 MMHG | WEIGHT: 125.31 LBS | HEART RATE: 63 BPM | SYSTOLIC BLOOD PRESSURE: 158 MMHG | HEIGHT: 65 IN | BODY MASS INDEX: 20.88 KG/M2

## 2021-08-25 DIAGNOSIS — Z71.89 COUNSELING REGARDING GOALS OF CARE: ICD-10-CM

## 2021-08-25 DIAGNOSIS — G20.A1 PARKINSONS: Primary | ICD-10-CM

## 2021-08-25 PROCEDURE — 99999 PR PBB SHADOW E&M-EST. PATIENT-LVL II: ICD-10-PCS | Mod: PBBFAC,,, | Performed by: PSYCHIATRY & NEUROLOGY

## 2021-08-25 PROCEDURE — 99212 OFFICE O/P EST SF 10 MIN: CPT | Mod: PBBFAC | Performed by: PSYCHIATRY & NEUROLOGY

## 2021-08-25 PROCEDURE — 99999 PR PBB SHADOW E&M-EST. PATIENT-LVL II: CPT | Mod: PBBFAC,,, | Performed by: PSYCHIATRY & NEUROLOGY

## 2021-08-25 PROCEDURE — 99215 OFFICE O/P EST HI 40 MIN: CPT | Mod: S$PBB,,, | Performed by: PSYCHIATRY & NEUROLOGY

## 2021-08-25 PROCEDURE — 99215 PR OFFICE/OUTPT VISIT, EST, LEVL V, 40-54 MIN: ICD-10-PCS | Mod: S$PBB,,, | Performed by: PSYCHIATRY & NEUROLOGY

## 2021-11-29 ENCOUNTER — LAB VISIT (OUTPATIENT)
Dept: LAB | Facility: HOSPITAL | Age: 86
End: 2021-11-29
Payer: MEDICARE

## 2021-11-29 ENCOUNTER — OFFICE VISIT (OUTPATIENT)
Dept: NEUROLOGY | Facility: CLINIC | Age: 86
End: 2021-11-29
Payer: MEDICARE

## 2021-11-29 VITALS
SYSTOLIC BLOOD PRESSURE: 148 MMHG | HEIGHT: 65 IN | WEIGHT: 125.25 LBS | DIASTOLIC BLOOD PRESSURE: 72 MMHG | HEART RATE: 56 BPM | BODY MASS INDEX: 20.87 KG/M2

## 2021-11-29 DIAGNOSIS — R41.3 MEMORY CHANGE: ICD-10-CM

## 2021-11-29 DIAGNOSIS — G60.9 HEREDITARY AND IDIOPATHIC NEUROPATHY, UNSPECIFIED: ICD-10-CM

## 2021-11-29 DIAGNOSIS — R41.89 COGNITIVE CHANGE: ICD-10-CM

## 2021-11-29 DIAGNOSIS — R41.89 COGNITIVE CHANGE: Primary | ICD-10-CM

## 2021-11-29 LAB
FOLATE SERPL-MCNC: 12.6 NG/ML (ref 4–24)
TSH SERPL DL<=0.005 MIU/L-ACNC: 2.02 UIU/ML (ref 0.4–4)
VIT B12 SERPL-MCNC: 912 PG/ML (ref 210–950)

## 2021-11-29 PROCEDURE — 82746 ASSAY OF FOLIC ACID SERUM: CPT | Performed by: PHYSICIAN ASSISTANT

## 2021-11-29 PROCEDURE — 84207 ASSAY OF VITAMIN B-6: CPT | Performed by: PHYSICIAN ASSISTANT

## 2021-11-29 PROCEDURE — 99215 PR OFFICE/OUTPT VISIT, EST, LEVL V, 40-54 MIN: ICD-10-PCS | Mod: S$PBB,,, | Performed by: PHYSICIAN ASSISTANT

## 2021-11-29 PROCEDURE — 84443 ASSAY THYROID STIM HORMONE: CPT | Performed by: PHYSICIAN ASSISTANT

## 2021-11-29 PROCEDURE — 99215 OFFICE O/P EST HI 40 MIN: CPT | Mod: S$PBB,,, | Performed by: PHYSICIAN ASSISTANT

## 2021-11-29 PROCEDURE — 99999 PR PBB SHADOW E&M-EST. PATIENT-LVL III: CPT | Mod: PBBFAC,,, | Performed by: PHYSICIAN ASSISTANT

## 2021-11-29 PROCEDURE — 84425 ASSAY OF VITAMIN B-1: CPT | Performed by: PHYSICIAN ASSISTANT

## 2021-11-29 PROCEDURE — 82607 VITAMIN B-12: CPT | Performed by: PHYSICIAN ASSISTANT

## 2021-11-29 PROCEDURE — 36415 COLL VENOUS BLD VENIPUNCTURE: CPT | Performed by: PHYSICIAN ASSISTANT

## 2021-11-29 PROCEDURE — 99999 PR PBB SHADOW E&M-EST. PATIENT-LVL III: ICD-10-PCS | Mod: PBBFAC,,, | Performed by: PHYSICIAN ASSISTANT

## 2021-11-29 PROCEDURE — 86592 SYPHILIS TEST NON-TREP QUAL: CPT | Performed by: PHYSICIAN ASSISTANT

## 2021-11-29 PROCEDURE — 99213 OFFICE O/P EST LOW 20 MIN: CPT | Mod: PBBFAC | Performed by: PHYSICIAN ASSISTANT

## 2021-11-30 LAB — RPR SER QL: NORMAL

## 2021-12-03 LAB
PYRIDOXAL SERPL-MCNC: 3 UG/L (ref 5–50)
VIT B1 BLD-MCNC: 89 UG/L (ref 38–122)

## 2022-01-01 ENCOUNTER — TELEPHONE (OUTPATIENT)
Dept: PRIMARY CARE CLINIC | Facility: CLINIC | Age: 87
End: 2022-01-01

## 2022-01-01 ENCOUNTER — EXTERNAL HOME HEALTH (OUTPATIENT)
Dept: HOME HEALTH SERVICES | Facility: HOSPITAL | Age: 87
End: 2022-01-01
Payer: MEDICARE

## 2022-01-01 ENCOUNTER — OFFICE VISIT (OUTPATIENT)
Dept: NEUROLOGY | Facility: CLINIC | Age: 87
End: 2022-01-01
Payer: MEDICARE

## 2022-01-01 ENCOUNTER — DOCUMENT SCAN (OUTPATIENT)
Dept: HOME HEALTH SERVICES | Facility: HOSPITAL | Age: 87
End: 2022-01-01
Payer: MEDICARE

## 2022-01-01 ENCOUNTER — LAB VISIT (OUTPATIENT)
Dept: LAB | Facility: HOSPITAL | Age: 87
End: 2022-01-01
Attending: FAMILY MEDICINE
Payer: MEDICARE

## 2022-01-01 ENCOUNTER — PATIENT OUTREACH (OUTPATIENT)
Dept: HOME HEALTH SERVICES | Facility: HOSPITAL | Age: 87
End: 2022-01-01
Payer: MEDICARE

## 2022-01-01 ENCOUNTER — OFFICE VISIT (OUTPATIENT)
Dept: PRIMARY CARE CLINIC | Facility: CLINIC | Age: 87
End: 2022-01-01
Payer: MEDICARE

## 2022-01-01 ENCOUNTER — TELEPHONE (OUTPATIENT)
Dept: NEUROLOGY | Facility: CLINIC | Age: 87
End: 2022-01-01
Payer: MEDICARE

## 2022-01-01 VITALS
TEMPERATURE: 98 F | WEIGHT: 123.44 LBS | HEIGHT: 65 IN | BODY MASS INDEX: 20.57 KG/M2 | DIASTOLIC BLOOD PRESSURE: 58 MMHG | OXYGEN SATURATION: 96 % | SYSTOLIC BLOOD PRESSURE: 110 MMHG | HEART RATE: 70 BPM

## 2022-01-01 VITALS
HEART RATE: 60 BPM | BODY MASS INDEX: 20.46 KG/M2 | WEIGHT: 122.81 LBS | DIASTOLIC BLOOD PRESSURE: 56 MMHG | SYSTOLIC BLOOD PRESSURE: 130 MMHG | HEIGHT: 65 IN

## 2022-01-01 DIAGNOSIS — G20.A1 PARKINSONS: Primary | ICD-10-CM

## 2022-01-01 DIAGNOSIS — R29.6 FREQUENT FALLS: ICD-10-CM

## 2022-01-01 DIAGNOSIS — D64.9 ANEMIA, UNSPECIFIED TYPE: ICD-10-CM

## 2022-01-01 DIAGNOSIS — R53.1 GENERAL WEAKNESS: ICD-10-CM

## 2022-01-01 DIAGNOSIS — R27.9 UNSPECIFIED LACK OF COORDINATION: ICD-10-CM

## 2022-01-01 DIAGNOSIS — R26.81 GAIT INSTABILITY: ICD-10-CM

## 2022-01-01 DIAGNOSIS — Z71.89 COUNSELING REGARDING GOALS OF CARE: ICD-10-CM

## 2022-01-01 LAB
ERYTHROCYTE [DISTWIDTH] IN BLOOD BY AUTOMATED COUNT: 16.7 % (ref 11.5–14.5)
HCT VFR BLD AUTO: 36 % (ref 40–54)
HGB BLD-MCNC: 11.5 G/DL (ref 14–18)
MCH RBC QN AUTO: 30.7 PG (ref 27–31)
MCHC RBC AUTO-ENTMCNC: 31.9 G/DL (ref 32–36)
MCV RBC AUTO: 96 FL (ref 82–98)
PLATELET # BLD AUTO: 195 K/UL (ref 150–450)
PMV BLD AUTO: 10.5 FL (ref 9.2–12.9)
RBC # BLD AUTO: 3.75 M/UL (ref 4.6–6.2)
WBC # BLD AUTO: 10.3 K/UL (ref 3.9–12.7)

## 2022-01-01 PROCEDURE — 99214 OFFICE O/P EST MOD 30 MIN: CPT | Mod: PBBFAC,PN | Performed by: FAMILY MEDICINE

## 2022-01-01 PROCEDURE — 99999 PR PBB SHADOW E&M-EST. PATIENT-LVL IV: CPT | Mod: PBBFAC,,, | Performed by: FAMILY MEDICINE

## 2022-01-01 PROCEDURE — 99999 PR PBB SHADOW E&M-EST. PATIENT-LVL III: CPT | Mod: PBBFAC,,, | Performed by: PSYCHIATRY & NEUROLOGY

## 2022-01-01 PROCEDURE — 99214 PR OFFICE/OUTPT VISIT, EST, LEVL IV, 30-39 MIN: ICD-10-PCS | Mod: S$PBB,,, | Performed by: FAMILY MEDICINE

## 2022-01-01 PROCEDURE — 99213 OFFICE O/P EST LOW 20 MIN: CPT | Mod: PBBFAC | Performed by: PSYCHIATRY & NEUROLOGY

## 2022-01-01 PROCEDURE — 99214 OFFICE O/P EST MOD 30 MIN: CPT | Mod: S$PBB,,, | Performed by: FAMILY MEDICINE

## 2022-01-01 PROCEDURE — 99999 PR PBB SHADOW E&M-EST. PATIENT-LVL III: ICD-10-PCS | Mod: PBBFAC,,, | Performed by: PSYCHIATRY & NEUROLOGY

## 2022-01-01 PROCEDURE — 85027 COMPLETE CBC AUTOMATED: CPT | Performed by: FAMILY MEDICINE

## 2022-01-01 PROCEDURE — 36415 COLL VENOUS BLD VENIPUNCTURE: CPT | Mod: PN | Performed by: FAMILY MEDICINE

## 2022-01-01 PROCEDURE — 99999 PR PBB SHADOW E&M-EST. PATIENT-LVL IV: ICD-10-PCS | Mod: PBBFAC,,, | Performed by: FAMILY MEDICINE

## 2022-01-01 PROCEDURE — 99214 PR OFFICE/OUTPT VISIT, EST, LEVL IV, 30-39 MIN: ICD-10-PCS | Mod: S$PBB,,, | Performed by: PSYCHIATRY & NEUROLOGY

## 2022-01-01 PROCEDURE — 99214 OFFICE O/P EST MOD 30 MIN: CPT | Mod: S$PBB,,, | Performed by: PSYCHIATRY & NEUROLOGY

## 2022-01-01 PROCEDURE — G0179 PR HOME HEALTH MD RECERTIFICATION: ICD-10-PCS | Mod: ,,, | Performed by: FAMILY MEDICINE

## 2022-01-01 PROCEDURE — G0179 MD RECERTIFICATION HHA PT: HCPCS | Mod: ,,, | Performed by: FAMILY MEDICINE

## 2022-01-01 RX ORDER — CARBIDOPA AND LEVODOPA 25; 100 MG/1; MG/1
1 TABLET ORAL 4 TIMES DAILY
Qty: 120 TABLET | Refills: 11
Start: 2022-01-01 | End: 2023-09-26

## 2022-01-01 RX ORDER — LEVOTHYROXINE SODIUM 0.12 MG/1
1 TABLET ORAL 4 TIMES DAILY
Status: ON HOLD | COMMUNITY
Start: 2022-01-01 | End: 2023-01-01 | Stop reason: HOSPADM

## 2022-01-03 ENCOUNTER — TELEPHONE (OUTPATIENT)
Dept: NEUROLOGY | Facility: CLINIC | Age: 87
End: 2022-01-03
Payer: MEDICARE

## 2022-01-03 NOTE — TELEPHONE ENCOUNTER
----- Message from Janina Richardson PA-C sent at 1/3/2022  1:02 PM CST -----  Can you call this patient and let him know to start taking an oral b-complex (b1-b12-b6)?

## 2022-01-03 NOTE — TELEPHONE ENCOUNTER
Spoke to pt's wife. Advised to take a b complex daily.   Verbalizes understanding.   Asked for labs to be faxed to: [phone].   Ovidio brother in law.     Labs sent by right fax.

## 2022-03-12 ENCOUNTER — OFFICE VISIT (OUTPATIENT)
Dept: URGENT CARE | Facility: CLINIC | Age: 87
End: 2022-03-12
Payer: MEDICARE

## 2022-03-12 ENCOUNTER — NURSE TRIAGE (OUTPATIENT)
Dept: ADMINISTRATIVE | Facility: CLINIC | Age: 87
End: 2022-03-12
Payer: MEDICARE

## 2022-03-12 VITALS
OXYGEN SATURATION: 95 % | DIASTOLIC BLOOD PRESSURE: 65 MMHG | RESPIRATION RATE: 17 BRPM | BODY MASS INDEX: 20.83 KG/M2 | SYSTOLIC BLOOD PRESSURE: 119 MMHG | TEMPERATURE: 98 F | WEIGHT: 125 LBS | HEIGHT: 65 IN | HEART RATE: 74 BPM

## 2022-03-12 DIAGNOSIS — L02.31 CELLULITIS AND ABSCESS OF BUTTOCK: Primary | ICD-10-CM

## 2022-03-12 DIAGNOSIS — R60.0 PERIPHERAL EDEMA: ICD-10-CM

## 2022-03-12 DIAGNOSIS — L03.317 CELLULITIS AND ABSCESS OF BUTTOCK: Primary | ICD-10-CM

## 2022-03-12 PROCEDURE — 99213 OFFICE O/P EST LOW 20 MIN: CPT | Mod: S$GLB,,,

## 2022-03-12 PROCEDURE — 99213 PR OFFICE/OUTPT VISIT, EST, LEVL III, 20-29 MIN: ICD-10-PCS | Mod: S$GLB,,,

## 2022-03-12 RX ORDER — DOXYCYCLINE 100 MG/1
100 CAPSULE ORAL EVERY 12 HOURS
Qty: 14 CAPSULE | Refills: 0 | Status: SHIPPED | OUTPATIENT
Start: 2022-03-12 | End: 2022-03-19

## 2022-03-12 RX ORDER — MUPIROCIN 20 MG/G
OINTMENT TOPICAL 3 TIMES DAILY
Qty: 22 G | Refills: 0 | Status: SHIPPED | OUTPATIENT
Start: 2022-03-12 | End: 2022-03-22

## 2022-03-12 NOTE — TELEPHONE ENCOUNTER
Wife, Obi states pt has levar anal ulcer. Pain 5/10 for 4-5 days. White pus noticed last evening. Has been taking tylenol for 4-5 days, but she does not feel it has been effective. Unable to assess temp at this time.   Colon sx 10 years ago.   Wife states she feels pt needs to go to ED.   Care advice provided per protocol, with recommendation for pt to be seen within 24 hrs of call. Wife verbalizes understanding. Call completed, but it seems as though caller forgot to hang up phone. LoÃ­za speaking to pt in the background. Attempt to get caller's attention unsuccessful. Call terminated at that time.     Reason for Disposition   MODERATE-SEVERE rectal pain (i.e., interferes with school, work, or sleep)    Additional Information   Negative: Sexual assault   Negative: Injury to rectum   Negative: Large mass protruding out of rectum   Negative: Patient sounds very sick or weak to the triager   Negative: SEVERE rectal pain (e.g., excruciating, unable to have a bowel movement)   Negative: [1] Rectal pain or redness AND [2] fever   Negative: [1] Sudden onset rectal pain AND [2] constipated (straining, rectal pressure or fullness) AND [3] NOT better after SITZ bath, suppository or enema    Protocols used: RECTAL SYMPTOMS-A-AH

## 2022-03-12 NOTE — PATIENT INSTRUCTIONS
- Wear compression stockings daily.   - Make sure to clean rectum after bowel movements with warm water and mild soap.  - Take antibiotic to completion. Take probiotic with antibiotic to prevent stomach upset. Wait 1-2 hours apart before taking probiotic and antibiotic.   - apply antibiotic ointment to affected area 2 x per day.  - Make sure to switch positions often when laying down.   - Follow up with primary care to make sure wound is healing appropriately.     - Rest.    - Drink plenty of fluids.    - Acetaminophen (tylenol) or Ibuprofen (advil,motrin) as directed as needed for fever/pain. Avoid tylenol if you have a history of liver disease. Do not take ibuprofen if you have a history of GI bleeding, kidney disease, or if you take blood thinners.     - You must understand that you have received an Urgent Care treatment only and that you may be released before all of your medical problems are known or treated.   - You, the patient, will arrange for follow up care as instructed.   - If your condition worsens or fails to improve we recommend that you receive another evaluation at the ER immediately or contact your PCP to discuss your concerns or return here.   - Follow up with your PCP or specialty clinic as directed in the next 1-2 weeks if not improved or as needed.  You can call [phone] to schedule an appointment with the appropriate provider.    If your symptoms do not improve or worsen, go to the emergency room immediately.

## 2022-03-12 NOTE — PROGRESS NOTES
"Subjective:       Patient ID: Giselle Lemus is a 88 y.o. male.    Vitals:  height is 5' 5" (1.651 m) and weight is 56.7 kg (125 lb). His temperature is 97.9 °F (36.6 °C). His blood pressure is 119/65 and his pulse is 74. His respiration is 17 and oxygen saturation is 95%.     Chief Complaint: Rectal Pain    This is a 88 y.o. male who presents today with a chief complaint of   Pt has a nodule near rectum that he first noticed 3 weeks ago. He has been using Vaseline to the area. Painful to touch and when he sitting down. Pain worse with bowel movement. Denies straining with bowel movements. Patient has hx of colon cancer 40 years ago. Last colonoscopy was 3 years ago, it was normal.     Also c/o swollen feet 2 years ago when he was diagnosed with parkinsons and started taking medication for it. Patient was seen by cardiology for chronic leg swelling and was diagnosed with venous insufficiency. Patient denies wearing compression stockings.     Other  This is a new problem. The current episode started 1 to 4 weeks ago. The problem occurs intermittently. The problem has been waxing and waning. Pertinent negatives include no abdominal pain, arthralgias, chills, congestion, diaphoresis, fatigue, fever, headaches, nausea, neck pain, rash or vomiting. Exacerbated by: sitting  He has tried nothing for the symptoms. The treatment provided no relief.       Constitution: Negative for chills, sweating, fatigue and fever.   HENT: Negative for ear pain and congestion.    Neck: Negative for neck pain and neck stiffness.   Eyes: Negative for eye pain and eye redness.   Gastrointestinal: Positive for history of abdominal surgery, rectal bleeding and rectal pain. Negative for abdominal trauma, abdominal pain, abdominal bloating, nausea, vomiting, constipation, diarrhea, bright red blood in stool and dark colored stools.   Musculoskeletal: Positive for pain. Negative for trauma and joint pain.   Skin: Positive for wound. Negative for pale, " rash and erythema.   Allergic/Immunologic: Negative for itching and sneezing.   Neurological: Negative for headaches.       Objective:      Physical Exam   Constitutional: He is oriented to person, place, and time. He appears well-developed.   HENT:   Head: Normocephalic and atraumatic. Head is without abrasion, without contusion and without laceration.   Ears:   Right Ear: External ear normal.   Left Ear: External ear normal.   Nose: Nose normal.   Mouth/Throat: Oropharynx is clear and moist and mucous membranes are normal.   Eyes: Conjunctivae, EOM and lids are normal. Pupils are equal, round, and reactive to light.   Neck: Trachea normal and phonation normal. Neck supple.   Cardiovascular: Normal rate, regular rhythm and normal heart sounds.  No extrasystoles are present. PMI is not displaced.   No murmur heard.Exam reveals no gallop, no distant heart sounds and no friction rub. No thrill  Pulmonary/Chest: Effort normal and breath sounds normal. No stridor. No respiratory distress.   Genitourinary: Rectum:      Tenderness present.      No rectal mass, anal fissure, external hemorrhoid, internal hemorrhoid or abnormal anal tone.                 Comments: Ulcer noted in the area above. There was a moderate amount of stool over the rectum and over the ulcer. There is redness and tenderness over the ulcer. There is no fluctuant mass present. No anal fissures or hemorrhoids noted.      Musculoskeletal: Normal range of motion.         General: Normal range of motion.      Right lower le+ Pitting Edema present.      Left lower le+ Pitting Edema present.   Neurological: He is alert and oriented to person, place, and time.   Skin: Skin is warm, dry, intact and no rash. Capillary refill takes less than 2 seconds. No abrasion, No burn, No bruising, No erythema and No ecchymosis   Psychiatric: His speech is normal and behavior is normal. Judgment and thought content normal.   Nursing note and vitals  reviewed.chaperone present           Assessment:       1. Cellulitis and abscess of buttock    2. Peripheral edema          Plan:         Cellulitis and abscess of buttock  -     doxycycline (MONODOX) 100 MG capsule; Take 1 capsule (100 mg total) by mouth every 12 (twelve) hours. for 7 days  Dispense: 14 capsule; Refill: 0  -     mupirocin (BACTROBAN) 2 % ointment; Apply topically 3 (three) times daily. for 10 days  Dispense: 22 g; Refill: 0    Peripheral edema  -     Cancel: COMPRESSION STOCKINGS                 Patient Instructions   - Wear compression stockings daily.   - Make sure to clean rectum after bowel movements with warm water and mild soap.  - Take antibiotic to completion. Take probiotic with antibiotic to prevent stomach upset. Wait 1-2 hours apart before taking probiotic and antibiotic.   - apply antibiotic ointment to affected area 2 x per day.  - Make sure to switch positions often when laying down.   - Follow up with primary care to make sure wound is healing appropriately.     - Rest.    - Drink plenty of fluids.    - Acetaminophen (tylenol) or Ibuprofen (advil,motrin) as directed as needed for fever/pain. Avoid tylenol if you have a history of liver disease. Do not take ibuprofen if you have a history of GI bleeding, kidney disease, or if you take blood thinners.     - You must understand that you have received an Urgent Care treatment only and that you may be released before all of your medical problems are known or treated.   - You, the patient, will arrange for follow up care as instructed.   - If your condition worsens or fails to improve we recommend that you receive another evaluation at the ER immediately or contact your PCP to discuss your concerns or return here.   - Follow up with your PCP or specialty clinic as directed in the next 1-2 weeks if not improved or as needed.  You can call [phone] to schedule an appointment with the appropriate provider.    If your symptoms do not  improve or worsen, go to the emergency room immediately.

## 2022-03-30 ENCOUNTER — OFFICE VISIT (OUTPATIENT)
Dept: NEUROLOGY | Facility: CLINIC | Age: 87
End: 2022-03-30
Payer: MEDICARE

## 2022-03-30 VITALS
BODY MASS INDEX: 20.83 KG/M2 | HEIGHT: 65 IN | WEIGHT: 125 LBS | DIASTOLIC BLOOD PRESSURE: 70 MMHG | SYSTOLIC BLOOD PRESSURE: 156 MMHG | HEART RATE: 59 BPM

## 2022-03-30 DIAGNOSIS — G20.A1 PARKINSONS: Primary | ICD-10-CM

## 2022-03-30 PROCEDURE — 99214 OFFICE O/P EST MOD 30 MIN: CPT | Mod: S$PBB,,, | Performed by: PSYCHIATRY & NEUROLOGY

## 2022-03-30 PROCEDURE — 99999 PR PBB SHADOW E&M-EST. PATIENT-LVL III: ICD-10-PCS | Mod: PBBFAC,,, | Performed by: PSYCHIATRY & NEUROLOGY

## 2022-03-30 PROCEDURE — 99213 OFFICE O/P EST LOW 20 MIN: CPT | Mod: PBBFAC | Performed by: PSYCHIATRY & NEUROLOGY

## 2022-03-30 PROCEDURE — 99214 PR OFFICE/OUTPT VISIT, EST, LEVL IV, 30-39 MIN: ICD-10-PCS | Mod: S$PBB,,, | Performed by: PSYCHIATRY & NEUROLOGY

## 2022-03-30 PROCEDURE — 99999 PR PBB SHADOW E&M-EST. PATIENT-LVL III: CPT | Mod: PBBFAC,,, | Performed by: PSYCHIATRY & NEUROLOGY

## 2022-03-30 RX ORDER — RASAGILINE 1 MG/1
1 TABLET ORAL DAILY
Qty: 90 TABLET | Refills: 3 | Status: SHIPPED | OUTPATIENT
Start: 2022-03-30 | End: 2023-01-01

## 2022-03-30 NOTE — PROGRESS NOTES
Name: Giselle Lemus  MRN: [id number]   CSN: [id number]      Date: 03/30/2022    Referring physician:  No referring provider defined for this encounter.    Chief Complaint: PD     Interval History:  - not better or worse, but notes the medication is not acting completely  - if he delays the medication, he notices wearing off with ldopa  - an average day, takes first dose at 7a, 1p, 6p, bedtime  - but also having dose and meal-related sleepiness, also still leg swelling which is not better - swelling of legs is the same to worse  - willing to take once daily, or change ldopa (or both)  - feels slower on his left side, but evidence more on the R side  - dysarhtria is improved, but still some swallow issues with solids and quick thin liquids, no reported clinical aspiration    From Nov 2021 with [facility]:  - hearing aids not working well today, accompanied by brother in law today   - tremor is stable   - main issue is the swelling in the bilateral LE, not on amantadine or dopamine agonist  - currently on cd/ld 1.5 tab QID   - feels like he has some loss of sensation in his toes   - did not have leg swelling prior to starting cd/ld  - last dose prior to office visit, takes doses 4-6 hours apart   - some issues with short term memory   - some mild issues with dysphagia     From August 2021  - last seen by [facility], new to me   - diagnosed with PD by dr. gerber in 2019  - tremor and gait are his major issues.  - taking cd/ld 1.5 tab 4-6 times a day; prescribed QID but takes the extra doses when his tremors are worse. Tolerating the medication well  - has been more 'shaky'. Rarely needs assitive devices around the house. Needs a walker when he leaves his house.  - last fall was a few months ago. Happened when he 'turned too soon'.  - helps take care of his wife.   - mood, sleep and appetite are good.  - swelling of both his lower extremities. Cardiac workup has been unremarkable.      From June 2021  - increased cd/ld to 1.5 tab QID last  visit   - feels tired   - right shoulder feels stiff   - more edema in b/l LE   - saw cardiologist, did echo   - increase is helping a little bit   - sometimes shakes more than other times  - tremor has improved some   - feels like gait is about the same   - no falls   - accompanied by son in law   - last dose was at 7 am   - falls asleep more easily now, sits down to watch TV and falls asleep  - having some double vision, told son-in-law on the way here, also asks if he can drive   - has not scheduled to follow up with ophthalmologist   - waiting to get prescription filled   - most falls happened previously with turns   - has to take his time when getting up   - has to get one of his hearing aids fixed        From 4/2021: Giselle Lemus is a R HANDED 88 y.o. male with a medical issues significant for PD and BPH who presents to establish care for PD. Previously followed by Dr. Angeles at . Accompanied by brother in law. First noticed tremor 3-4 years ago in the R hand. Not much tremor in the L hand. In 2019, diagnosed by Dr. Angeles for parkinsons. Wants to establish PD care with Dr. Britton. Started him on cd/ld 25/100 1 tab TID, now on 1 tab 4 times a day (10 am, 2pm, 6p, 10p). Thinks that he responds to the medicine. Last dose of cd/ld was at 10 am. Balance issues more when he turns. Starting using a walker 3-4 months ago. Several months ago, had two falls and then started using the walker. + Shuffling. No hallucinations.   Slow gets up because endorses some dizziness/feeling lightheaded -- very seldom.   Did big and loud therapy at . Patient plans to do exercises at home.     Originally from [facility]. Retired .       Family History: no family history of PD or tremors that he knows of     Neuroleptic Exposure: none     Nonmotor/Premotor ROS:  Anosmia: poor   Dysarthria/Hypophonia: yes hypophonia   Dysphagia/Sialorrhea: occasional -- no choking   Cognitive slowing: long term issues   Hallucinations: none    Urinary changes: none  Constipation: none   Orthostasis: very seldom   Falls: as above   Micrographia: yes    Sleep issues:  -LEONEL: none  -RBD: none   Vision Changes:       Review of Systems:   Review of Systems   Constitutional: Negative for chills, fever and malaise/fatigue.   HENT: Negative for hearing loss.    Eyes: Negative for blurred vision and double vision.   Respiratory: Negative for cough, shortness of breath and stridor.    Cardiovascular: Negative for chest pain and leg swelling.   Gastrointestinal: Negative for constipation, diarrhea and nausea.   Genitourinary: Negative for frequency and urgency.   Musculoskeletal: Positive for falls.   Skin: Negative for itching and rash.   Neurological: Positive for tremors. Negative for dizziness, loss of consciousness and weakness.   Psychiatric/Behavioral: Negative for hallucinations and memory loss.           Past Medical History: The patient  has no past medical history on file.    Social History: The patient  reports that he has never smoked. He has never used smokeless tobacco. He reports that he does not drink alcohol.    Family History: Their family history is not on file.    Allergies: Patient has no known allergies.     Meds:   Current Outpatient Medications on File Prior to Visit   Medication Sig Dispense Refill    albuterol (PROVENTIL/VENTOLIN HFA) 90 mcg/actuation inhaler INHALE 2 PUFFS INTO THE LUNGS EVERY 6 (SIX) HOURS AS NEEDED FOR WHEEZING OR SHORTNESS OF BREATH. 18 g 0    carbidopa-levodopa  mg (SINEMET)  mg per tablet Take 1.5 tablets by mouth 4 (four) times daily. 540 tablet 3    loperamide (IMODIUM) 2 mg capsule Take 2 mg by mouth.      PROSCAR 5 mg tablet Take 1 tablet by mouth once daily.  0    bumetanide (BUMEX) 0.5 MG Tab Take 4 tablets (2 mg total) by mouth daily as needed (Lower extremity edema). 30 tablet 3     No current facility-administered medications on file prior to visit.       Exam:  BP (!) 156/70   Pulse (!)  "59   Ht 5' 5" (1.651 m)   Wt 56.7 kg (125 lb)   BMI 20.80 kg/m²     Constitutional  Well-developed, well-nourished, appears stated age   Ophthalmoscopic  No papilledema with no hemorrhages or exudates bilaterally   Cardiovascular  Radial pulses 2+ and symmetric   Pitting LE edema bilaterally+   Neurological    * Mental status  MOCA =      - Orientation  Oriented to person, place, time, and situation     - Memory   Intact recent and remote     - Attention/concentration  Attentive, vigilant during exam     - Language  Naming & repetition intact, +2-step commands     - Fund of knowledge  Aware of current events     - Executive  Well-organized thoughts     - Other     * Cranial nerves       - CN II  PERRL, visual fields full to confrontation     - CN III, IV, VI  Extraocular movements full, normal pursuits and saccades     - CN V  Sensation V1 - V3 intact     - CN VII  Face strong and symmetric bilaterally     - CN VIII  Hearing intact bilaterally     - CN IX, X  Palate raises midline and symmetric     - CN XI  SCM and trapezius 5/5 bilaterally     - CN XII  Tongue midline   * Motor  Muscle bulk normal, strength 5/5 throughout   * Sensory   Intact to temperature and vibration throughout   * Coordination  No dysmetria with finger-to-nose or heel-to-shin   * Gait  See below.   * Deep tendon reflexes  NOt tested today   Babinski downgoing bilaterally   * Specialized movement exam  + hypophonic speech.    + facial masking.   R > L ++ cogwheel rigidity.     R > L bradykinesia.   resting tremor of R hand, regular and during most the exam   No other dystonia, chorea, athetosis, myoclonus, or tics.   No motor impersistence.   narrow-based, shuffling gait.   ++ shortened stride length.   Able to walk without a wqalker, but has sig PI.   moderate anterior stoop      Laboratory/Radiological:  - Results:  No visits with results within 3 Month(s) from this visit.   Latest known visit with results is:   Lab Visit on 11/29/2021 "   Component Date Value Ref Range Status    Vitamin B-12 2021 912  210 - 950 pg/mL Final    Thiamine 2021 89  38 - 122 ug/L Final    TSH 2021 2.024  0.400 - 4.000 uIU/mL Final    RPR 2021 Non-reactive  Non-reactive Final    Folate 2021 12.6  4.0 - 24.0 ng/mL Final    Vitamin B6 2021 3 (A) 5 - 50 ug/L Final       - Independent review of images: none new, but reviewed cxr results, no aspiration    Diagnoses:          1. PD, HY 3.  2. Cognitive change, stable   3. Gait instability 2/ #1  4. Dysphagia, stable, but will watch closely    Medical Decision Makin) Keep cd/ld to 1 tab QID, but more off time coupled with likely swelling worse  2) ADD RASAGILINE 1 MG NOW.  3) ADDING PT/OT NOW  4) watch swallow, stable for now, counseled on double-swallow          Toro Britton MD, MPH  Division of Movement and Memory Disorders  [facility]

## 2022-04-06 ENCOUNTER — TELEPHONE (OUTPATIENT)
Dept: NEUROLOGY | Facility: CLINIC | Age: 87
End: 2022-04-06
Payer: MEDICARE

## 2022-04-06 DIAGNOSIS — R60.0 PERIPHERAL EDEMA: ICD-10-CM

## 2022-04-06 DIAGNOSIS — G20.A1 PARKINSONS: Primary | ICD-10-CM

## 2022-04-08 PROCEDURE — G0180 MD CERTIFICATION HHA PATIENT: HCPCS | Mod: ,,, | Performed by: PSYCHIATRY & NEUROLOGY

## 2022-04-08 PROCEDURE — G0180 PR HOME HEALTH MD CERTIFICATION: ICD-10-PCS | Mod: ,,, | Performed by: PSYCHIATRY & NEUROLOGY

## 2022-05-02 ENCOUNTER — EXTERNAL HOME HEALTH (OUTPATIENT)
Dept: HOME HEALTH SERVICES | Facility: HOSPITAL | Age: 87
End: 2022-05-02
Payer: MEDICARE

## 2022-05-07 ENCOUNTER — NURSE TRIAGE (OUTPATIENT)
Dept: ADMINISTRATIVE | Facility: CLINIC | Age: 87
End: 2022-05-07
Payer: MEDICARE

## 2022-05-08 ENCOUNTER — HOSPITAL ENCOUNTER (INPATIENT)
Facility: HOSPITAL | Age: 87
LOS: 10 days | Discharge: NURSING, REHAB OR LONG-TERM CARE | DRG: 389 | End: 2022-05-18
Attending: EMERGENCY MEDICINE | Admitting: SURGERY
Payer: MEDICARE

## 2022-05-08 DIAGNOSIS — K56.600 PARTIAL SMALL BOWEL OBSTRUCTION: Primary | ICD-10-CM

## 2022-05-08 DIAGNOSIS — R11.0 NAUSEA: ICD-10-CM

## 2022-05-08 DIAGNOSIS — L25.8 INCONTINENCE ASSOCIATED DERMATITIS: ICD-10-CM

## 2022-05-08 DIAGNOSIS — G20.A1 PARKINSONS: ICD-10-CM

## 2022-05-08 DIAGNOSIS — R32 INCONTINENCE ASSOCIATED DERMATITIS: ICD-10-CM

## 2022-05-08 DIAGNOSIS — R00.1 BRADYCARDIA: ICD-10-CM

## 2022-05-08 DIAGNOSIS — J90 PLEURAL EFFUSION: ICD-10-CM

## 2022-05-08 PROBLEM — K56.609 SBO (SMALL BOWEL OBSTRUCTION): Status: ACTIVE | Noted: 2022-05-08

## 2022-05-08 LAB
ALBUMIN SERPL BCP-MCNC: 3.4 G/DL (ref 3.5–5.2)
ALP SERPL-CCNC: 74 U/L (ref 55–135)
ALT SERPL W/O P-5'-P-CCNC: 5 U/L (ref 10–44)
ANION GAP SERPL CALC-SCNC: 10 MMOL/L (ref 8–16)
ANION GAP SERPL CALC-SCNC: 8 MMOL/L (ref 8–16)
ANISOCYTOSIS BLD QL SMEAR: SLIGHT
AST SERPL-CCNC: 16 U/L (ref 10–40)
BASOPHILS # BLD AUTO: 0.03 K/UL (ref 0–0.2)
BASOPHILS # BLD AUTO: 0.04 K/UL (ref 0–0.2)
BASOPHILS NFR BLD: 0.1 % (ref 0–1.9)
BASOPHILS NFR BLD: 0.2 % (ref 0–1.9)
BILIRUB SERPL-MCNC: 0.6 MG/DL (ref 0.1–1)
BNP SERPL-MCNC: 132 PG/ML (ref 0–99)
BUN SERPL-MCNC: 17 MG/DL (ref 8–23)
BUN SERPL-MCNC: 26 MG/DL (ref 8–23)
CALCIUM SERPL-MCNC: 5.7 MG/DL (ref 8.7–10.5)
CALCIUM SERPL-MCNC: 9.2 MG/DL (ref 8.7–10.5)
CHLORIDE SERPL-SCNC: 103 MMOL/L (ref 95–110)
CHLORIDE SERPL-SCNC: 118 MMOL/L (ref 95–110)
CO2 SERPL-SCNC: 21 MMOL/L (ref 23–29)
CO2 SERPL-SCNC: 26 MMOL/L (ref 23–29)
CREAT SERPL-MCNC: 0.8 MG/DL (ref 0.5–1.4)
CREAT SERPL-MCNC: 1.4 MG/DL (ref 0.5–1.4)
CTP QC/QA: YES
CTP QC/QA: YES
DIFFERENTIAL METHOD: ABNORMAL
DIFFERENTIAL METHOD: ABNORMAL
EOSINOPHIL # BLD AUTO: 0 K/UL (ref 0–0.5)
EOSINOPHIL # BLD AUTO: 0.1 K/UL (ref 0–0.5)
EOSINOPHIL NFR BLD: 0 % (ref 0–8)
EOSINOPHIL NFR BLD: 0.5 % (ref 0–8)
ERYTHROCYTE [DISTWIDTH] IN BLOOD BY AUTOMATED COUNT: 15.7 % (ref 11.5–14.5)
ERYTHROCYTE [DISTWIDTH] IN BLOOD BY AUTOMATED COUNT: 16.2 % (ref 11.5–14.5)
EST. GFR  (AFRICAN AMERICAN): 51.5 ML/MIN/1.73 M^2
EST. GFR  (AFRICAN AMERICAN): >60 ML/MIN/1.73 M^2
EST. GFR  (NON AFRICAN AMERICAN): 44.5 ML/MIN/1.73 M^2
EST. GFR  (NON AFRICAN AMERICAN): >60 ML/MIN/1.73 M^2
GLUCOSE SERPL-MCNC: 134 MG/DL (ref 70–110)
GLUCOSE SERPL-MCNC: 60 MG/DL (ref 70–110)
HCT VFR BLD AUTO: 23.5 % (ref 40–54)
HCT VFR BLD AUTO: 34.9 % (ref 40–54)
HGB BLD-MCNC: 10.7 G/DL (ref 14–18)
HGB BLD-MCNC: 7.2 G/DL (ref 14–18)
HYPOCHROMIA BLD QL SMEAR: ABNORMAL
IMM GRANULOCYTES # BLD AUTO: 0.06 K/UL (ref 0–0.04)
IMM GRANULOCYTES # BLD AUTO: 0.15 K/UL (ref 0–0.04)
IMM GRANULOCYTES NFR BLD AUTO: 0.5 % (ref 0–0.5)
IMM GRANULOCYTES NFR BLD AUTO: 0.6 % (ref 0–0.5)
INR PPP: 1 (ref 0.8–1.2)
LACTATE SERPL-SCNC: 0.8 MMOL/L (ref 0.5–2.2)
LACTATE SERPL-SCNC: 1.8 MMOL/L (ref 0.5–2.2)
LIPASE SERPL-CCNC: 12 U/L (ref 4–60)
LYMPHOCYTES # BLD AUTO: 0.7 K/UL (ref 1–4.8)
LYMPHOCYTES # BLD AUTO: 0.9 K/UL (ref 1–4.8)
LYMPHOCYTES NFR BLD: 2.5 % (ref 18–48)
LYMPHOCYTES NFR BLD: 6.9 % (ref 18–48)
MAGNESIUM SERPL-MCNC: 2.1 MG/DL (ref 1.6–2.6)
MCH RBC QN AUTO: 30.1 PG (ref 27–31)
MCH RBC QN AUTO: 30.6 PG (ref 27–31)
MCHC RBC AUTO-ENTMCNC: 30.6 G/DL (ref 32–36)
MCHC RBC AUTO-ENTMCNC: 30.7 G/DL (ref 32–36)
MCV RBC AUTO: 100 FL (ref 82–98)
MCV RBC AUTO: 98 FL (ref 82–98)
MONOCYTES # BLD AUTO: 1.2 K/UL (ref 0.3–1)
MONOCYTES # BLD AUTO: 1.4 K/UL (ref 0.3–1)
MONOCYTES NFR BLD: 5.1 % (ref 4–15)
MONOCYTES NFR BLD: 9.3 % (ref 4–15)
NEUTROPHILS # BLD AUTO: 10.8 K/UL (ref 1.8–7.7)
NEUTROPHILS # BLD AUTO: 24.7 K/UL (ref 1.8–7.7)
NEUTROPHILS NFR BLD: 82.6 % (ref 38–73)
NEUTROPHILS NFR BLD: 91.7 % (ref 38–73)
NRBC BLD-RTO: 0 /100 WBC
NRBC BLD-RTO: 0 /100 WBC
PLATELET # BLD AUTO: 128 K/UL (ref 150–450)
PLATELET # BLD AUTO: 207 K/UL (ref 150–450)
PLATELET BLD QL SMEAR: ABNORMAL
PMV BLD AUTO: 10.1 FL (ref 9.2–12.9)
PMV BLD AUTO: 10.6 FL (ref 9.2–12.9)
POC MOLECULAR INFLUENZA A AGN: NEGATIVE
POC MOLECULAR INFLUENZA B AGN: NEGATIVE
POTASSIUM SERPL-SCNC: 3.4 MMOL/L (ref 3.5–5.1)
POTASSIUM SERPL-SCNC: 5.2 MMOL/L (ref 3.5–5.1)
PROT SERPL-MCNC: 7.4 G/DL (ref 6–8.4)
PROTHROMBIN TIME: 10.6 SEC (ref 9–12.5)
RBC # BLD AUTO: 2.35 M/UL (ref 4.6–6.2)
RBC # BLD AUTO: 3.56 M/UL (ref 4.6–6.2)
SARS-COV-2 RDRP RESP QL NAA+PROBE: NEGATIVE
SODIUM SERPL-SCNC: 139 MMOL/L (ref 136–145)
SODIUM SERPL-SCNC: 147 MMOL/L (ref 136–145)
TROPONIN I SERPL DL<=0.01 NG/ML-MCNC: <0.006 NG/ML (ref 0–0.03)
WBC # BLD AUTO: 13.02 K/UL (ref 3.9–12.7)
WBC # BLD AUTO: 26.97 K/UL (ref 3.9–12.7)

## 2022-05-08 PROCEDURE — 84484 ASSAY OF TROPONIN QUANT: CPT | Performed by: STUDENT IN AN ORGANIZED HEALTH CARE EDUCATION/TRAINING PROGRAM

## 2022-05-08 PROCEDURE — 99285 EMERGENCY DEPT VISIT HI MDM: CPT | Mod: 25

## 2022-05-08 PROCEDURE — 93010 ELECTROCARDIOGRAM REPORT: CPT | Mod: ,,, | Performed by: INTERNAL MEDICINE

## 2022-05-08 PROCEDURE — 25000003 PHARM REV CODE 250: Performed by: STUDENT IN AN ORGANIZED HEALTH CARE EDUCATION/TRAINING PROGRAM

## 2022-05-08 PROCEDURE — 80048 BASIC METABOLIC PNL TOTAL CA: CPT | Mod: XB | Performed by: STUDENT IN AN ORGANIZED HEALTH CARE EDUCATION/TRAINING PROGRAM

## 2022-05-08 PROCEDURE — U0002 COVID-19 LAB TEST NON-CDC: HCPCS | Performed by: STUDENT IN AN ORGANIZED HEALTH CARE EDUCATION/TRAINING PROGRAM

## 2022-05-08 PROCEDURE — 83690 ASSAY OF LIPASE: CPT | Performed by: STUDENT IN AN ORGANIZED HEALTH CARE EDUCATION/TRAINING PROGRAM

## 2022-05-08 PROCEDURE — 83735 ASSAY OF MAGNESIUM: CPT | Performed by: STUDENT IN AN ORGANIZED HEALTH CARE EDUCATION/TRAINING PROGRAM

## 2022-05-08 PROCEDURE — 85025 COMPLETE CBC W/AUTO DIFF WBC: CPT | Performed by: STUDENT IN AN ORGANIZED HEALTH CARE EDUCATION/TRAINING PROGRAM

## 2022-05-08 PROCEDURE — 63600175 PHARM REV CODE 636 W HCPCS: Performed by: STUDENT IN AN ORGANIZED HEALTH CARE EDUCATION/TRAINING PROGRAM

## 2022-05-08 PROCEDURE — 83605 ASSAY OF LACTIC ACID: CPT | Mod: 91 | Performed by: STUDENT IN AN ORGANIZED HEALTH CARE EDUCATION/TRAINING PROGRAM

## 2022-05-08 PROCEDURE — 83605 ASSAY OF LACTIC ACID: CPT | Performed by: EMERGENCY MEDICINE

## 2022-05-08 PROCEDURE — 85610 PROTHROMBIN TIME: CPT | Performed by: STUDENT IN AN ORGANIZED HEALTH CARE EDUCATION/TRAINING PROGRAM

## 2022-05-08 PROCEDURE — 27000221 HC OXYGEN, UP TO 24 HOURS

## 2022-05-08 PROCEDURE — 25000003 PHARM REV CODE 250

## 2022-05-08 PROCEDURE — 93005 ELECTROCARDIOGRAM TRACING: CPT

## 2022-05-08 PROCEDURE — 99285 EMERGENCY DEPT VISIT HI MDM: CPT | Mod: CS,,, | Performed by: EMERGENCY MEDICINE

## 2022-05-08 PROCEDURE — 63600175 PHARM REV CODE 636 W HCPCS

## 2022-05-08 PROCEDURE — 83880 ASSAY OF NATRIURETIC PEPTIDE: CPT | Performed by: STUDENT IN AN ORGANIZED HEALTH CARE EDUCATION/TRAINING PROGRAM

## 2022-05-08 PROCEDURE — 96374 THER/PROPH/DIAG INJ IV PUSH: CPT

## 2022-05-08 PROCEDURE — 96361 HYDRATE IV INFUSION ADD-ON: CPT

## 2022-05-08 PROCEDURE — 85025 COMPLETE CBC W/AUTO DIFF WBC: CPT | Mod: 91 | Performed by: STUDENT IN AN ORGANIZED HEALTH CARE EDUCATION/TRAINING PROGRAM

## 2022-05-08 PROCEDURE — 25500020 PHARM REV CODE 255: Performed by: EMERGENCY MEDICINE

## 2022-05-08 PROCEDURE — 36415 COLL VENOUS BLD VENIPUNCTURE: CPT | Performed by: STUDENT IN AN ORGANIZED HEALTH CARE EDUCATION/TRAINING PROGRAM

## 2022-05-08 PROCEDURE — 11000001 HC ACUTE MED/SURG PRIVATE ROOM

## 2022-05-08 PROCEDURE — 93010 EKG 12-LEAD: ICD-10-PCS | Mod: ,,, | Performed by: INTERNAL MEDICINE

## 2022-05-08 PROCEDURE — 99285 PR EMERGENCY DEPT VISIT,LEVEL V: ICD-10-PCS | Mod: CS,,, | Performed by: EMERGENCY MEDICINE

## 2022-05-08 PROCEDURE — 80053 COMPREHEN METABOLIC PANEL: CPT | Performed by: STUDENT IN AN ORGANIZED HEALTH CARE EDUCATION/TRAINING PROGRAM

## 2022-05-08 PROCEDURE — 94761 N-INVAS EAR/PLS OXIMETRY MLT: CPT

## 2022-05-08 PROCEDURE — C9113 INJ PANTOPRAZOLE SODIUM, VIA: HCPCS | Performed by: STUDENT IN AN ORGANIZED HEALTH CARE EDUCATION/TRAINING PROGRAM

## 2022-05-08 RX ORDER — SODIUM CHLORIDE 9 MG/ML
INJECTION, SOLUTION INTRAVENOUS CONTINUOUS
Status: DISCONTINUED | OUTPATIENT
Start: 2022-05-08 | End: 2022-05-10

## 2022-05-08 RX ORDER — NALOXONE HCL 0.4 MG/ML
0.2 VIAL (ML) INJECTION ONCE
Status: COMPLETED | OUTPATIENT
Start: 2022-05-08 | End: 2022-05-08

## 2022-05-08 RX ORDER — SODIUM CHLORIDE 0.9 % (FLUSH) 0.9 %
10 SYRINGE (ML) INJECTION
Status: DISCONTINUED | OUTPATIENT
Start: 2022-05-08 | End: 2022-05-18 | Stop reason: HOSPADM

## 2022-05-08 RX ORDER — PANTOPRAZOLE SODIUM 40 MG/10ML
40 INJECTION, POWDER, LYOPHILIZED, FOR SOLUTION INTRAVENOUS DAILY
Status: DISCONTINUED | OUTPATIENT
Start: 2022-05-08 | End: 2022-05-11

## 2022-05-08 RX ORDER — ONDANSETRON 2 MG/ML
8 INJECTION INTRAMUSCULAR; INTRAVENOUS
Status: COMPLETED | OUTPATIENT
Start: 2022-05-08 | End: 2022-05-08

## 2022-05-08 RX ORDER — NALOXONE HCL 0.4 MG/ML
VIAL (ML) INJECTION
Status: COMPLETED
Start: 2022-05-08 | End: 2022-05-08

## 2022-05-08 RX ORDER — LIDOCAINE HYDROCHLORIDE 10 MG/ML
1 INJECTION, SOLUTION EPIDURAL; INFILTRATION; INTRACAUDAL; PERINEURAL ONCE
Status: DISCONTINUED | OUTPATIENT
Start: 2022-05-08 | End: 2022-05-11

## 2022-05-08 RX ORDER — IPRATROPIUM BROMIDE AND ALBUTEROL SULFATE 2.5; .5 MG/3ML; MG/3ML
3 SOLUTION RESPIRATORY (INHALATION) EVERY 6 HOURS PRN
Status: DISCONTINUED | OUTPATIENT
Start: 2022-05-08 | End: 2022-05-18 | Stop reason: HOSPADM

## 2022-05-08 RX ORDER — HYDROMORPHONE HYDROCHLORIDE 1 MG/ML
0.5 INJECTION, SOLUTION INTRAMUSCULAR; INTRAVENOUS; SUBCUTANEOUS EVERY 6 HOURS PRN
Status: DISCONTINUED | OUTPATIENT
Start: 2022-05-08 | End: 2022-05-08

## 2022-05-08 RX ORDER — CALCIUM GLUCONATE 20 MG/ML
1 INJECTION, SOLUTION INTRAVENOUS ONCE
Status: COMPLETED | OUTPATIENT
Start: 2022-05-08 | End: 2022-05-08

## 2022-05-08 RX ORDER — MORPHINE SULFATE 2 MG/ML
2 INJECTION, SOLUTION INTRAMUSCULAR; INTRAVENOUS EVERY 4 HOURS PRN
Status: DISCONTINUED | OUTPATIENT
Start: 2022-05-08 | End: 2022-05-09

## 2022-05-08 RX ORDER — HEPARIN SODIUM 5000 [USP'U]/ML
5000 INJECTION, SOLUTION INTRAVENOUS; SUBCUTANEOUS EVERY 8 HOURS
Status: DISCONTINUED | OUTPATIENT
Start: 2022-05-08 | End: 2022-05-13

## 2022-05-08 RX ORDER — ONDANSETRON 2 MG/ML
4 INJECTION INTRAMUSCULAR; INTRAVENOUS EVERY 6 HOURS PRN
Status: DISCONTINUED | OUTPATIENT
Start: 2022-05-08 | End: 2022-05-18 | Stop reason: HOSPADM

## 2022-05-08 RX ORDER — HYDROMORPHONE HYDROCHLORIDE 1 MG/ML
0.5 INJECTION, SOLUTION INTRAMUSCULAR; INTRAVENOUS; SUBCUTANEOUS
Status: COMPLETED | OUTPATIENT
Start: 2022-05-08 | End: 2022-05-08

## 2022-05-08 RX ADMIN — PIPERACILLIN SODIUM AND TAZOBACTAM SODIUM 4.5 G: 4; .5 INJECTION, POWDER, LYOPHILIZED, FOR SOLUTION INTRAVENOUS at 05:05

## 2022-05-08 RX ADMIN — ONDANSETRON 8 MG: 2 INJECTION INTRAMUSCULAR; INTRAVENOUS at 02:05

## 2022-05-08 RX ADMIN — CALCIUM GLUCONATE 1 G: 20 INJECTION, SOLUTION INTRAVENOUS at 05:05

## 2022-05-08 RX ADMIN — HEPARIN SODIUM 5000 UNITS: 5000 INJECTION INTRAVENOUS; SUBCUTANEOUS at 09:05

## 2022-05-08 RX ADMIN — PIPERACILLIN SODIUM AND TAZOBACTAM SODIUM 4.5 G: 4; .5 INJECTION, POWDER, LYOPHILIZED, FOR SOLUTION INTRAVENOUS at 12:05

## 2022-05-08 RX ADMIN — SODIUM CHLORIDE: 0.9 INJECTION, SOLUTION INTRAVENOUS at 05:05

## 2022-05-08 RX ADMIN — HEPARIN SODIUM 5000 UNITS: 5000 INJECTION INTRAVENOUS; SUBCUTANEOUS at 05:05

## 2022-05-08 RX ADMIN — SODIUM CHLORIDE, SODIUM LACTATE, POTASSIUM CHLORIDE, AND CALCIUM CHLORIDE 1000 ML: .6; .31; .03; .02 INJECTION, SOLUTION INTRAVENOUS at 08:05

## 2022-05-08 RX ADMIN — IOHEXOL 75 ML: 350 INJECTION, SOLUTION INTRAVENOUS at 02:05

## 2022-05-08 RX ADMIN — SODIUM CHLORIDE: 0.9 INJECTION, SOLUTION INTRAVENOUS at 09:05

## 2022-05-08 RX ADMIN — SODIUM CHLORIDE, SODIUM LACTATE, POTASSIUM CHLORIDE, AND CALCIUM CHLORIDE 1000 ML: .6; .31; .03; .02 INJECTION, SOLUTION INTRAVENOUS at 03:05

## 2022-05-08 RX ADMIN — NALXONE HYDROCHLORIDE 0.2 MG: 0.4 INJECTION INTRAMUSCULAR; INTRAVENOUS; SUBCUTANEOUS at 10:05

## 2022-05-08 RX ADMIN — HYDROMORPHONE HYDROCHLORIDE 0.5 MG: 1 INJECTION, SOLUTION INTRAMUSCULAR; INTRAVENOUS; SUBCUTANEOUS at 05:05

## 2022-05-08 RX ADMIN — PANTOPRAZOLE SODIUM 40 MG: 40 INJECTION, POWDER, FOR SOLUTION INTRAVENOUS at 09:05

## 2022-05-08 RX ADMIN — PIPERACILLIN SODIUM AND TAZOBACTAM SODIUM 4.5 G: 4; .5 INJECTION, POWDER, LYOPHILIZED, FOR SOLUTION INTRAVENOUS at 08:05

## 2022-05-08 NOTE — NURSING
Spoke with Dr. Daigle regarding order to use NG tube. Also made MD aware that patient has been difficult to arouse and bradycardic in the 50s since admit. Respiratory rate 16 with oxygen 96%. MD states to continue to monitor patient and notify if patient does not improve. Telemetry monitor remains in place. VSS. Will continue to monitor.

## 2022-05-08 NOTE — HPI
88M with history of total colectomy for UC in 2013 presents with SBO. He had mild abdominal pain and bloating. Last BM morning before admit. No noted prior episodes. Vomited in the ED.    In the ED, he had NGT placed with large emesis during initial attempts and then about 250 cc thick brown output in the can. Looks like gumbo. He is not having significant pain or tenderness.    He is AF, HDS.    Leukocytosis to 27 with left shift. Lactic is 1.8.    K is 5.2. Cr 1.4 (stable from about a year ago).    He has additional history of hearing loss, Parkinson's disease on multiple meds, BPH on proscar, chronic bronchitis.     EF on echo in 2021 was 60%. He has been seen by cardiology for lower extremity edema, left worse than right.    He is a retired . From [employer]. Understands and speaks English. Very engaged intellectually / sharp. Asks good questions if given time.

## 2022-05-08 NOTE — PLAN OF CARE
Problem: Fall Injury Risk  Goal: Absence of Fall and Fall-Related Injury  Outcome: Ongoing, Progressing     Problem: Skin Injury Risk Increased  Goal: Skin Health and Integrity  Outcome: Ongoing, Progressing     Problem: Fluid Deficit (Intestinal Obstruction)  Goal: Fluid Balance  Outcome: Ongoing, Progressing     Patient arousable to voice. NG tube in place to LIWS, 500 cc out today. Telemetry monitor  in place, patient HR 49-60s. Patient given Narcan today with good effect. CA 5.7, calcium gluconate infusing. IV fluids continued. Patient with no complaints. Bed in lowest locked position, side rails up x2. Bed alarm active. Will give report to oncoming nurse.

## 2022-05-08 NOTE — ED PROVIDER NOTES
Encounter Date: 5/8/2022       History     Chief Complaint   Patient presents with    Bloated     Pt c/o abdominal bloating and constipation that started today. PMH of Parkinson's and ulcerative colitis. Pt is extremely hard of hearing majority of information provided by brother in law.      The history is provided by the patient and medical records. No  was used.        Mr. Giselle Lemus is an 87-year-old man with Parkinson's, BPH, hyperuricemia, ulcerative colitis, ulcerative proctitis s/p surgical (colonic) resction, chronic mastoiditis, diplopia, and chronic bronchitis presenting with left-sided abdominal pain, nausea, bloating for 1 day.  Patient is very hard of hearing and needs to have questions written down to answer.  He states he has not had any blood in his stool.  Last bowel movement was this morning, denies diarrhea or constipation.  Endorses pain is 6/10. He has been urinating normally.  Denies shortness of breath, chest pain, vomiting, back pain or fevers.     Review of patient's allergies indicates:  No Known Allergies  No past medical history on file.  No past surgical history on file.  No family history on file.  Social History     Tobacco Use    Smoking status: Never Smoker    Smokeless tobacco: Never Used   Substance Use Topics    Alcohol use: Never     Review of Systems   Constitutional: Negative for fever.   HENT: Negative for sore throat.    Respiratory: Negative for shortness of breath.    Cardiovascular: Positive for leg swelling. Negative for chest pain and palpitations.   Gastrointestinal: Positive for abdominal pain and nausea. Negative for blood in stool, constipation, diarrhea and vomiting.   Genitourinary: Negative for dysuria.   Musculoskeletal: Negative for back pain.   Skin: Negative for rash and wound.   Neurological: Negative for dizziness and weakness.   Hematological: Does not bruise/bleed easily.       Physical Exam     Initial Vitals [05/08/22 0102]   BP  Pulse Resp Temp SpO2   (!) 152/64 (!) 50 16 98.1 °F (36.7 °C) 100 %      MAP       --         Physical Exam    Nursing note and vitals reviewed.  Constitutional: He appears well-developed.   Thin, frail male   HENT:   Head: Normocephalic and atraumatic.   Eyes: EOM are normal. Pupils are equal, round, and reactive to light.   Neck:   Normal range of motion.  Cardiovascular: Regular rhythm, normal heart sounds and intact distal pulses.   Bradycardic   Pulmonary/Chest: Breath sounds normal. No respiratory distress. He has no wheezes. He has no rales.   Abdominal: Abdomen is soft. Bowel sounds are normal. He exhibits distension. There is abdominal tenderness.   Musculoskeletal:         General: Normal range of motion.      Cervical back: Normal range of motion.      Comments: 2+ pitting edema bilaterally     Neurological: He is alert.   Skin: Skin is warm. Capillary refill takes less than 2 seconds.   Psychiatric: He has a normal mood and affect. Thought content normal.         ED Course   Procedures  Labs Reviewed   CBC W/ AUTO DIFFERENTIAL - Abnormal; Notable for the following components:       Result Value    WBC 26.97 (*)     RBC 3.56 (*)     Hemoglobin 10.7 (*)     Hematocrit 34.9 (*)     MCHC 30.7 (*)     RDW 15.7 (*)     Immature Granulocytes 0.6 (*)     Gran # (ANC) 24.7 (*)     Immature Grans (Abs) 0.15 (*)     Lymph # 0.7 (*)     Mono # 1.4 (*)     Gran % 91.7 (*)     Lymph % 2.5 (*)     All other components within normal limits   COMPREHENSIVE METABOLIC PANEL - Abnormal; Notable for the following components:    Potassium 5.2 (*)     Glucose 134 (*)     BUN 26 (*)     Albumin 3.4 (*)     ALT 5 (*)     eGFR if  51.5 (*)     eGFR if non  44.5 (*)     All other components within normal limits   B-TYPE NATRIURETIC PEPTIDE - Abnormal; Notable for the following components:     (*)     All other components within normal limits   INFLUENZA A & B BY MOLECULAR   TROPONIN I    PROTIME-INR   MAGNESIUM   LIPASE   LACTIC ACID, PLASMA   LACTIC ACID, PLASMA   URINALYSIS, REFLEX TO URINE CULTURE   SARS-COV-2 RDRP GENE   POCT INFLUENZA A/B MOLECULAR     EKG Readings: (Independently Interpreted)   Initial Reading: No STEMI. Rhythm: Sinus Bradycardia. Heart Rate: 50. Ectopy: No Ectopy. Conduction: RBBB. Clinical Impression: Sinus Bradycardia       Imaging Results           CT Abdomen Pelvis With Contrast (Final result)  Result time 05/08/22 03:49:23    Final result by Godwin Fishman MD (05/08/22 03:49:23)                 Impression:      Findings consistent with partial small bowel obstruction with possible transition point left lower quadrant.  Exact zone of transition is not identified.  Findings consistent with prior abdominal bowel surgeries including apparent total colectomy.  Consider possibly of adhesions.  No free intraperitoneal air.    Moderate left pleural effusion with associated atelectasis resulting in mild leftward shift of the mediastinum.    6.1 mm solid nodule in the right lower lobe.  For multiple solid nodules with any 6 mm or greater, Fleischner Society guidelines recommend follow up with non-contrast chest CT at 3-6 months and 18-24 months after discovery.    Intra and extrahepatic biliary ductal dilatation.    Numerous bilateral renal cysts.  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  Correlate clinically.    Severe prostatomegaly.  Suggest correlation with PSA.    Bilateral ankylosis of the SI joints.    Additional findings as above.    This report was flagged in Epic as abnormal.    Electronically signed by resident: Janina Goodman  Date:    05/08/2022  Time:    02:52    Electronically signed by: Godwin Fishman MD  Date:    05/08/2022  Time:    03:49             Narrative:    EXAMINATION:  CT ABDOMEN PELVIS WITH CONTRAST    CLINICAL HISTORY:  Abdominal pain, acute, nonlocalized;with hx of UC s/p resection;    TECHNIQUE:  Low dose axial images, sagittal and coronal  reformations were obtained from the lung bases to the pubic symphysis following the IV administration of 75 mL of Omnipaque 350.  Oral contrast was not administered.    COMPARISON:  Chest radiograph 05/08/2022.    FINDINGS:  Heart: Mediastinum is displaced to the left, likely due to volume loss in the left hemithorax.    Lung Bases: Moderate left pleural effusion with overlying atelectasis.  There is a 6.1 mm solid nodule in the anterior aspect of the right lower lobe (axial series 2, image 7).    Liver: No hepatomegaly.  No focal hepatic lesions    Gallbladder: No calcified gallstones.    Bile Ducts: Mild intrahepatic biliary ductal dilatation.  Common bile duct is dilated measuring up to 1.0 cm (axial series 2, image 60).    Pancreas: Fatty atrophy of the pancreatic head neck.  No peripancreatic stranding, ductal dilatation, or focal mass.    Spleen: Unremarkable    Adrenals: Nonspecific nodular thickening of the left adrenal gland.  Right adrenal is unremarkable.    Kidneys/Ureters: Numerous bilateral renal cysts.  Additional subcentimeter hypodensities in the bilateral kidneys, too small to characterize.  Prominent left peripelvic cyst.    Bladder: Mild circumferential bladder wall thickening, likely due to incomplete distension.    Reproductive organs: Prostate is severely enlarged measuring 7.1 cm with internal calcifications.    GI Tract/Mesentery: Small fluid containing hiatal hernia.  Stomach is significantly distended with air-fluid levels.  Multiple dilated loops of small bowel with air-fluid levels.  Probable transition point in the left lower quadrant.  No free intraperitoneal air.  Postsurgical changes of consistent with prior bowel surgeries including apparent total colectomy..    Peritoneal Space: Mild diffuse mesenteric stranding.  No free air.  Small volume free fluid layering dependently in the pelvis.    Retroperitoneum: No significant adenopathy.    Abdominal wall: Unremarkable    Vasculature:  No aneurysm.  Mild atherosclerosis of the descending aorta and its branches.    Bones: Severe degenerative changes throughout the osseous structures.  No acute fracture or dislocation.  Bilateral ankylosis of the SI joints.                               X-Ray Chest AP Portable (Final result)  Result time 05/08/22 02:55:12    Final result by Godwin Fishman MD (05/08/22 02:55:12)                 Impression:      Mild interval increase in left pleural effusion and associated atelectasis at the left lung base.    Electronically signed by resident: Janina Goodman  Date:    05/08/2022  Time:    02:17    Electronically signed by: Godwin Fishman MD  Date:    05/08/2022  Time:    02:55             Narrative:    EXAMINATION:  XR CHEST AP PORTABLE    CLINICAL HISTORY:  . Nausea    TECHNIQUE:  Single frontal portable view of the chest was performed.    COMPARISON:  Chest radiograph 02/02/2021.    FINDINGS:  Support devices: None    Mild interval increase in left pleural effusion and associated left basilar atelectasis.No pneumothorax.    The cardiac silhouette is normal in size.  Calcific atherosclerosis of the aortic arch.  Right lung appears unchanged.  No effusion on the right.    Bones are unchanged.                                 Medications   piperacillin-tazobactam 4.5 g in sodium chloride 0.9% 100 mL IVPB (ready to mix system) (has no administration in time range)   HYDROmorphone injection 0.5 mg (has no administration in time range)   ondansetron injection 8 mg (8 mg Intravenous Given 5/8/22 0224)   iohexoL (OMNIPAQUE 350) injection 75 mL (75 mLs Intravenous Given 5/8/22 0240)   lactated ringers bolus 1,000 mL (1,000 mLs Intravenous New Bag 5/8/22 9338)     Medical Decision Making:   History:   Old Medical Records: I decided to obtain old medical records.  Independently Interpreted Test(s):   I have ordered and independently interpreted EKG Reading(s) - see prior notes  Clinical Tests:   Lab Tests: Reviewed and  "Ordered  Radiological Study: Reviewed and Ordered  Medical Tests: Reviewed and Ordered  Sepsis Perfusion Assessment: "I attest a sepsis perfusion exam was performed within 6 hours of sepsis, severe sepsis, or septic shock presentation, following fluid resuscitation."  Other:   I have discussed this case with another health care provider.            Attending Attestation:   Physician Attestation Statement for Resident:  As the supervising MD   Physician Attestation Statement: I have personally seen and examined this patient.   I agree with the above history. -: 88-year-old male presenting with abdominal pain, distension, constipation.  Also endorses nausea.  States he fell asleep with the right side of his abdomen laying against the arm of a chair.   As the supervising MD I agree with the above PE.   -: No acute distress  Abdomen distended  Mild diffuse tenderness  Hypoactive bowel sounds  Diminished breath sounds LLL  RRR   As the supervising MD I agree with the above treatment, course, plan, and disposition.   -: Labs notable for leukocytosis.  Ordered for IV fluids.  SIRS:  Leukocytosis, not septic at this time.   CT with partial small-bowel obstruction.  Discussed with General Surgery.  NG tube, NPO.  Admitted to General Surgery service for further management.   I have reviewed and agree with the residents interpretation of the following: lab data, x-rays and EKG.  I have reviewed the following: old records at this facility.                ED Course as of 05/08/22 0457   Sun May 08, 2022   0229 WBC(!): 26.97 [NP]   0229 WBC(!): 26.97  Leukocytosis  [AB]   0242 Chest x-ray with left-sided pleural effusion [NP]   0451 CT Abdomen Pelvis With Contrast(!)  Impression:     Findings consistent with partial small bowel obstruction with possible transition point left lower quadrant.  Exact zone of transition is not identified.  Findings consistent with prior abdominal bowel surgeries including apparent total colectomy.  " Consider possibly of adhesions.  No free intraperitoneal air.     Moderate left pleural effusion with associated atelectasis resulting in mild leftward shift of the mediastinum.     6.1 mm solid nodule in the right lower lobe.  For multiple solid nodules with any 6 mm or greater, Fleischner Society guidelines recommend follow up with non-contrast chest CT at 3-6 months and 18-24 months after discovery.     Intra and extrahepatic biliary ductal dilatation.     Numerous bilateral renal cysts.  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  Correlate clinically.     Severe prostatomegaly.  Suggest correlation with PSA.     Bilateral ankylosis of the SI joints. [NP]   0452 NG tube placed by surgery [NP]      ED Course User Index  [AB] Pedro Luis Whittington MD  [NP] Emily Izquierdo MD             Mr. Giselle Lemus is an 87-year-old man with Parkinson's, BPH, hyperuricemia, ulcerative colitis, ulcerative proctitis s/p surgical (colonic) resction, chronic mastoiditis, diplopia, and chronic bronchitis presenting with left-sided abdominal pain, nausea, bloating for 1 day.  Vital signs hypertensive 152/64, heart rate is 50, satting 100% on room air, non tachypneic 16, heart rate 98.1.  Patient is tender on exam.  Differential diagnosis includes small-bowel obstruction, peptic ulcer disease, gastritis, duodenitis, colitis, GERD, pancreatitis, new heart failure with exacerbation, pleural effusion with radiating pain, pneumonia.  Ordered CT abdomen pelvis, EKG, chest x-ray, COVID screening, flu screening, lactate, UA, electrolytes, troponin and BNP.  Zofran and 0.5 mg Dilaudid for pain.  Dispo pending workup and imaging.    Emily Lehman MD PGY4  [facility]  1:56 AM 05/08/2022    Update:  CT shows small bowel obstruction partial.  Surgery called and they evaluated patient at bedside.  NG placed by surgery.  Patient also has a left-sided pleural effusion.  Given his volume overload status, will not order  additional fluids.  Currently satting 94-96% on room air.  He appears more comfortable at this time.  Ordered chest and abdomen x-rays.  Patient to be admitted to surgery.    Emily Lehman MD PGY4  [facility]  4:57 AM 05/08/2022                  Clinical Impression:   Final diagnoses:  [R11.0] Nausea  [J90] Pleural effusion  [K56.600] Partial small bowel obstruction (Primary)          ED Disposition Condition    Observation               Emily Izquierdo MD  Resident  05/08/22 0457       Pedro Luis Whittington MD  05/08/22 0594

## 2022-05-08 NOTE — CARE UPDATE
RAPID RESPONSE NURSE PROACTIVE ROUNDING NOTE       Time of Visit: 09:33    Admit Date: 2022  LOS: 0  Code Status: Full Code   Date of Visit: 2022  : 1933  Age: 88 y.o.  Sex: male  Race: Other  Bed: [address] [place] [address] A:   MRN: [id number]  Was the patient discharged from an ICU this admission? No   Was the patient discharged from a PACU within last 24 hours? No   Did the patient receive conscious sedation/general anesthesia in last 24 hours? No  Was the patient in the ED within the past 24 hours? Yes  Was the patient on NIPPV within the past 24 hours? No   Attending Physician: Justin Chauhan MD  Primary Service: General Surgery   Time spent at the bedside: < 15 min    SITUATION    Notified by charge RN during rounding.  Reason for alert: somnolent  Called to evaluate the patient for Neuro    BACKGROUND     Why is the patient in the hospital?: <principal problem not specified>    Patient has a past medical history of BPH (benign prostatic hyperplasia), Parkinson's disease, Ulcerative colitis, and Unspecified chronic bronchitis.    Last Vitals:  Temp: 97.5 °F (36.4 °C) ( 0751)  Pulse: 66 ( 1017)  Resp: 14 ( 1017)  BP: 117/61 ( 1017)  SpO2: 95 % ( 1017)    24 Hours Vitals Range:  Temp:  [97.5 °F (36.4 °C)-98.2 °F (36.8 °C)]   Pulse:  [50-80]   Resp:  [7-18]   BP: ()/(52-65)   SpO2:  [83 %-100 %]     Labs:  Recent Labs     22  0201   WBC 26.97*   HGB 10.7*   HCT 34.9*          Recent Labs     22  0201      K 5.2*      CO2 26   CREATININE 1.4   *   MG 2.1        No results for input(s): PH, PCO2, PO2, HCO3, POCSATURATED, BE in the last 72 hours.     ASSESSMENT    Physical Exam  Vitals and nursing note reviewed.   Cardiovascular:      Rate and Rhythm: Normal rate and regular rhythm.   Neurological:      Mental Status: He is lethargic.      GCS: GCS eye subscore is 2. GCS verbal subscore is 1. GCS motor subscore is 5.     Patient seen on proactive  rounds this morning. CN updated that patient more somnolent than what was reported from ED. RR 8-10. Received 0.5mg dilaudid, requiring difficultly to arouse, needing sternal rub. Dr. Daigle notified, 0.2 narcan ordered and administered with good response. Patient now with eyes open and following commands. VS: HR 61, BP 99/52, SpO2 95% room air. NGT to low intermittent suction, brown output noted. Abd firm but no guarding noted. VSS.     INTERVENTIONS    The patient was seen for a Neurological problem. Staff concerns included decreased responsiveness. The following interventions were performed: Naloxone 0.2 mg/ ml given IV.    RECOMMENDATIONS    Continue monitoring per unit protocol.   Discontinue narcotics or reduce dosing     PROVIDER ESCALATION    Yes/No  yes    Orders received and case discussed with Dr. Daigle per CN .    Disposition: Remain in room 522    FOLLOW-UP    Charge Pedro Luis [facility] updated on plan of care. Instructed to call the Rapid Response Nurse, Lucero Pathak RN at [phone] for additional questions or concerns.

## 2022-05-08 NOTE — ED NOTES
Telemetry Verification   Patient placed on Telemetry Box  Verified with War Room  Box # [id number]   Monitor Tech    Rate 59   Rhythm Sinus Farhan

## 2022-05-08 NOTE — TELEPHONE ENCOUNTER
"Severe abdominal pain above the naval. Nausea present as well  Reason for Disposition   Pain is mainly in upper abdomen  (if needed ask: "is it mainly above the belly button?")   [1] SEVERE pain (e.g., excruciating) AND [2] present > 1 hour    Additional Information   Negative: Shock suspected (e.g., cold/pale/clammy skin, too weak to stand, low BP, rapid pulse)   Negative: Difficult to awaken or acting confused (e.g., disoriented, slurred speech)   Negative: Passed out (i.e., lost consciousness, collapsed and was not responding)   Negative: Sounds like a life-threatening emergency to the triager   Negative: Chest pain   Negative: SEVERE difficulty breathing (e.g., struggling for each breath, speaks in single words)   Negative: Shock suspected (e.g., cold/pale/clammy skin, too weak to stand, low BP, rapid pulse)   Negative: Passed out (i.e., lost consciousness, collapsed and was not responding)   Negative: Difficult to awaken or acting confused (e.g., disoriented, slurred speech)   Negative: Visible sweat on face or sweat dripping down face   Negative: Sounds like a life-threatening emergency to the triager   Negative: Followed an abdomen (stomach) injury   Negative: Chest pain    Protocols used: ABDOMINAL PAIN - MALE-A-AH, ABDOMINAL PAIN - UPPER-A-AH    "

## 2022-05-08 NOTE — NURSING
Pt very difficult to arouse and will only respond to pain. VSS but bradycardic in the 50's since arriving on the floor. DR. Daigle and Rapid Response on the floor to evaluate pt. Order given for Narcan 0.2mg IVP. Narcan given and pt now awake and alert.

## 2022-05-08 NOTE — ED TRIAGE NOTES
Giselle Lemus, an 88 y.o. male presents to the ED with complaints of abdominal bloating pain, nausea and gas. Pt states he had BM this morning. Pt denies diarrhea and constipation. Pt also states he has increased swelling in both legs.     Review of patient's allergies indicates:  No Known Allergies  Chief Complaint   Patient presents with    Bloated     Pt c/o abdominal bloating and constipation that started today. PMH of Parkinson's and ulcerative colitis. Pt is extremely hard of hearing majority of information provided by brother in law.      No past medical history on file.

## 2022-05-08 NOTE — AI DETERIORATION ALERT
"RAPID RESPONSE NURSE AI ALERT       AI alert received.    Chart Reviewed: 05/08/2022, 2:48 PM    MRN: [id number]  Bed: 522/522 A    Dx: <principal problem not specified>    Giselle Lemus has a past medical history of BPH (benign prostatic hyperplasia), Parkinson's disease, Ulcerative colitis, and Unspecified chronic bronchitis.    Last VS: /61   Pulse 61   Temp 97.5 °F (36.4 °C) (Axillary)   Resp 12   Ht 5' 5" (1.651 m)   Wt 65.5 kg (144 lb 6.4 oz)   SpO2 95%   BMI 24.03 kg/m²     24H Vital Sign Range:  Temp:  [97.5 °F (36.4 °C)-98.2 °F (36.8 °C)]   Pulse:  [50-80]   Resp:  [7-18]   BP: ()/(52-65)   SpO2:  [83 %-100 %]     Level of Consciousness (AVPU): responds to pain    Recent Labs     05/08/22  0201   WBC 26.97*   HGB 10.7*   HCT 34.9*          Recent Labs     05/08/22  0201      K 5.2*      CO2 26   CREATININE 1.4   *   MG 2.1        No results for input(s): PH, PCO2, PO2, HCO3, POCSATURATED, BE in the last 72 hours.     OXYGEN:        O2 Device (Oxygen Therapy): room air    MEWS score: 2    bedside RN, Ish contacted. No concerns verbalized at this time. Instructed to call [phone] for further concerns or assistance.    Kush Malhotra RN        "

## 2022-05-08 NOTE — SUBJECTIVE & OBJECTIVE
No current facility-administered medications on file prior to encounter.     Current Outpatient Medications on File Prior to Encounter   Medication Sig    albuterol (PROVENTIL/VENTOLIN HFA) 90 mcg/actuation inhaler INHALE 2 PUFFS INTO THE LUNGS EVERY 6 (SIX) HOURS AS NEEDED FOR WHEEZING OR SHORTNESS OF BREATH.    bumetanide (BUMEX) 0.5 MG Tab Take 4 tablets (2 mg total) by mouth daily as needed (Lower extremity edema).    carbidopa-levodopa  mg (SINEMET)  mg per tablet Take 1.5 tablets by mouth 4 (four) times daily.    loperamide (IMODIUM) 2 mg capsule Take 2 mg by mouth.    PROSCAR 5 mg tablet Take 1 tablet by mouth once daily.    rasagiline (AZILECT) 1 mg Tab Take 1 tablet (1 mg total) by mouth once daily at 6am.       Review of patient's allergies indicates:  No Known Allergies    Past Medical History:   Diagnosis Date    BPH (benign prostatic hyperplasia)     Parkinson's disease     Ulcerative colitis     Unspecified chronic bronchitis      Past Surgical History:   Procedure Laterality Date    TOTAL COLECTOMY       Family History    None       Tobacco Use    Smoking status: Never Smoker    Smokeless tobacco: Never Used   Substance and Sexual Activity    Alcohol use: Never    Drug use: Not on file    Sexual activity: Not on file     Review of Systems   Constitutional:  Positive for appetite change and fatigue. Negative for fever.   Eyes: Negative.    Respiratory: Negative.     Cardiovascular:  Positive for leg swelling.   Gastrointestinal:  Positive for abdominal distention, abdominal pain, constipation, nausea and vomiting. Negative for diarrhea.   Endocrine: Negative.    Genitourinary: Negative.    Musculoskeletal: Negative.    Skin: Negative.    Allergic/Immunologic: Negative.    Neurological:  Positive for tremors, speech difficulty and weakness.   Hematological: Negative.    Psychiatric/Behavioral: Negative.     Objective:     Vital Signs (Most Recent):  Temp: 98.2 °F (36.8 °C) (05/08/22  0338)  Pulse: 80 (05/08/22 0457)  Resp: 18 (05/08/22 0457)  BP: (!) 146/65 (05/08/22 0457)  SpO2: 95 % (05/08/22 0457)   Vital Signs (24h Range):  Temp:  [98.1 °F (36.7 °C)-98.2 °F (36.8 °C)] 98.2 °F (36.8 °C)  Pulse:  [50-80] 80  Resp:  [16-18] 18  SpO2:  [95 %-100 %] 95 %  BP: (127-152)/(60-65) 146/65     Weight: 56.7 kg (125 lb)  Body mass index is 20.8 kg/m².    Physical Exam  Vitals and nursing note reviewed.   Constitutional:       General: He is not in acute distress.     Appearance: He is not ill-appearing or toxic-appearing.      Comments: frail   Cardiovascular:      Rate and Rhythm: Normal rate and regular rhythm.   Pulmonary:      Effort: Pulmonary effort is normal.   Abdominal:      Comments: Moderate distension  No significant tenderness appreciated  NGT in place   Musculoskeletal:      Left lower leg: Edema present.   Skin:     General: Skin is warm and dry.   Neurological:      Mental Status: He is alert and oriented to person, place, and time.      Motor: Weakness present.      Gait: Gait abnormal.       Significant Labs:  I have reviewed all pertinent lab results within the past 24 hours.  CBC:   Recent Labs   Lab 05/08/22 0201   WBC 26.97*   RBC 3.56*   HGB 10.7*   HCT 34.9*      MCV 98   MCH 30.1   MCHC 30.7*     CMP:   Recent Labs   Lab 05/08/22 0201   *   CALCIUM 9.2   ALBUMIN 3.4*   PROT 7.4      K 5.2*   CO2 26      BUN 26*   CREATININE 1.4   ALKPHOS 74   ALT 5*   AST 16   BILITOT 0.6       Significant Diagnostics:  I have reviewed all pertinent imaging results/findings within the past 24 hours.  CT: I have reviewed all pertinent results/findings within the past 24 hours and my personal findings are:  large volume of fluid in small bowel and stomach

## 2022-05-08 NOTE — PROGRESS NOTES
05/08/22 0751   Vital Signs   Temp 97.5 °F (36.4 °C)   Temp src Axillary   Pulse (!) 50   Heart Rate Source Continuous   Resp 16   SpO2 (!) 94 %   O2 Device (Oxygen Therapy) room air   BP (!) 100/52   MAP (mmHg) 73   Height and Weight   Weight 62.5 kg (137 lb 12.6 oz)   Weight Method Bed Scale   Assessments (Pre/Post)   Level of Consciousness (AVPU) responds to pain       Patient up to floor from ED. Patient difficult to arouse, arousable to pain. Vital signs as listed above. Telemetry monitor in place. NG tube noted to right nare with brown/green output and connected to LIWS. Unable to completed admission at this time. Bed in lowest locked position, side rails up x2, and bed alarm active. Will continue to monitor.

## 2022-05-08 NOTE — CONSULTS
[facility] - Emergency Dept  General Surgery  Consult Note    Patient Name: Giselle Lemus  MRN: [id number]  Code Status: Full Code  Admission Date: 5/8/2022  Hospital Length of Stay: 0 days  Attending Physician: Pedro Luis Whittington MD  Primary Care Provider: Tl Torres MD    Patient information was obtained from patient, past medical records and ER records.     Inpatient consult to General surgery  Consult performed by: BOWEN Yoder MD  Consult ordered by: Pedro Luis Whittington MD  Reason for consult: SBO        Subjective:     Principal Problem: <principal problem not specified>    History of Present Illness: 88M with history of total colectomy for UC in 2013 presents with SBO. He had mild abdominal pain and bloating. Last BM morning before admit. No noted prior episodes. Vomited in the ED.    In the ED, he had NGT placed with large emesis during initial attempts and then about 250 cc thick brown output in the can. Looks like gumbo. He is not having significant pain or tenderness.    He is AF, HDS.    Leukocytosis to 27 with left shift. Lactic is 1.8.    K is 5.2. Cr 1.4 (stable from about a year ago).    He has additional history of hearing loss, Parkinson's disease on multiple meds, BPH on proscar, chronic bronchitis.     EF on echo in 2021 was 60%. He has been seen by cardiology for lower extremity edema, left worse than right.    He is a retired . From [employer]. Understands and speaks English. Very engaged intellectually / sharp. Asks good questions if given time.      No current facility-administered medications on file prior to encounter.     Current Outpatient Medications on File Prior to Encounter   Medication Sig    albuterol (PROVENTIL/VENTOLIN HFA) 90 mcg/actuation inhaler INHALE 2 PUFFS INTO THE LUNGS EVERY 6 (SIX) HOURS AS NEEDED FOR WHEEZING OR SHORTNESS OF BREATH.    bumetanide (BUMEX) 0.5 MG Tab Take 4 tablets (2 mg total) by mouth daily as needed (Lower extremity edema).    carbidopa-levodopa   mg (SINEMET)  mg per tablet Take 1.5 tablets by mouth 4 (four) times daily.    loperamide (IMODIUM) 2 mg capsule Take 2 mg by mouth.    PROSCAR 5 mg tablet Take 1 tablet by mouth once daily.    rasagiline (AZILECT) 1 mg Tab Take 1 tablet (1 mg total) by mouth once daily at 6am.       Review of patient's allergies indicates:  No Known Allergies    Past Medical History:   Diagnosis Date    BPH (benign prostatic hyperplasia)     Parkinson's disease     Ulcerative colitis     Unspecified chronic bronchitis      Past Surgical History:   Procedure Laterality Date    TOTAL COLECTOMY       Family History    None       Tobacco Use    Smoking status: Never Smoker    Smokeless tobacco: Never Used   Substance and Sexual Activity    Alcohol use: Never    Drug use: Not on file    Sexual activity: Not on file     Review of Systems   Constitutional:  Positive for appetite change and fatigue. Negative for fever.   Eyes: Negative.    Respiratory: Negative.     Cardiovascular:  Positive for leg swelling.   Gastrointestinal:  Positive for abdominal distention, abdominal pain, constipation, nausea and vomiting. Negative for diarrhea.   Endocrine: Negative.    Genitourinary: Negative.    Musculoskeletal: Negative.    Skin: Negative.    Allergic/Immunologic: Negative.    Neurological:  Positive for tremors, speech difficulty and weakness.   Hematological: Negative.    Psychiatric/Behavioral: Negative.     Objective:     Vital Signs (Most Recent):  Temp: 98.2 °F (36.8 °C) (05/08/22 0338)  Pulse: 80 (05/08/22 0457)  Resp: 18 (05/08/22 0457)  BP: (!) 146/65 (05/08/22 0457)  SpO2: 95 % (05/08/22 0457)   Vital Signs (24h Range):  Temp:  [98.1 °F (36.7 °C)-98.2 °F (36.8 °C)] 98.2 °F (36.8 °C)  Pulse:  [50-80] 80  Resp:  [16-18] 18  SpO2:  [95 %-100 %] 95 %  BP: (127-152)/(60-65) 146/65     Weight: 56.7 kg (125 lb)  Body mass index is 20.8 kg/m².    Physical Exam  Vitals and nursing note reviewed.   Constitutional:        General: He is not in acute distress.     Appearance: He is not ill-appearing or toxic-appearing.      Comments: frail   Cardiovascular:      Rate and Rhythm: Normal rate and regular rhythm.   Pulmonary:      Effort: Pulmonary effort is normal.   Abdominal:      Comments: Moderate distension  No significant tenderness appreciated  NGT in place   Musculoskeletal:      Left lower leg: Edema present.   Skin:     General: Skin is warm and dry.   Neurological:      Mental Status: He is alert and oriented to person, place, and time.      Motor: Weakness present.      Gait: Gait abnormal.       Significant Labs:  I have reviewed all pertinent lab results within the past 24 hours.  CBC:   Recent Labs   Lab 05/08/22 0201   WBC 26.97*   RBC 3.56*   HGB 10.7*   HCT 34.9*      MCV 98   MCH 30.1   MCHC 30.7*     CMP:   Recent Labs   Lab 05/08/22 0201   *   CALCIUM 9.2   ALBUMIN 3.4*   PROT 7.4      K 5.2*   CO2 26      BUN 26*   CREATININE 1.4   ALKPHOS 74   ALT 5*   AST 16   BILITOT 0.6       Significant Diagnostics:  I have reviewed all pertinent imaging results/findings within the past 24 hours.  CT: I have reviewed all pertinent results/findings within the past 24 hours and my personal findings are:  large volume of fluid in small bowel and stomach      Assessment/Plan:     SBO (small bowel obstruction)  88M with Parkinsons, BPH, and history of total colectomy in 2013 for UC presents with SBO. Has leukocytosis to 27, but lactic is normal, exam is benign and no systemic signs of sepsis. I think there is relatively low likelihood of ischemic bowel and will resuscitate with serial abdominal exams. Would prefer to avoid surgery in this frail patient with Parkinsons.    - NPO  - NGT  - 2L LR in ED and then  / hr  - zosyn for leukocytosis; low threshold for discontinuation if leukocytosis improves with resuscitation  - holding Parkinsons meds and BPH med until can tolerate PO  - repeat labs this  afternoon for lactic and WBC  - decompress for 24 hours and then gastrograffin challenge    Dispo: pending return of bowel function vs OR          VTE Risk Mitigation (From admission, onward)         Ordered     heparin (porcine) injection 5,000 Units  Every 8 hours         05/08/22 0512     IP VTE HIGH RISK PATIENT  Once         05/08/22 0512     Place sequential compression device  Until discontinued         05/08/22 0512                Thank you for your consult. I will follow-up with patient. Please contact us if you have any additional questions.    YESENIA Yoder MD  General Surgery  [facility] - Emergency Dept

## 2022-05-08 NOTE — ASSESSMENT & PLAN NOTE
88M with Parkinsons, BPH, and history of total colectomy in 2013 for UC presents with SBO. Has leukocytosis to 27, but lactic is normal, exam is benign and no systemic signs of sepsis. I think there is relatively low likelihood of ischemic bowel and will resuscitate with serial abdominal exams. Would prefer to avoid surgery in this frail patient with Parkinsons.    - NPO  - NGT  - 2L LR in ED and then  / hr  - zosyn for leukocytosis; low threshold for discontinuation if leukocytosis improves with resuscitation  - holding Parkinsons meds and BPH med until can tolerate PO  - repeat labs this afternoon for lactic and WBC  - decompress for 24 hours and then gastrograffin challenge    Dispo: pending return of bowel function vs OR

## 2022-05-08 NOTE — CARE UPDATE
RAPID RESPONSE NURSE FOLLOW-UP NOTE       Followed up with patient for proactive rounding.  No acute issues at this time. VSS.   More alert this afternoon with periods of lethargy.  Reviewed plan of care with bedside RNIsh.   Team will continue to follow.  Please call Rapid Response RN, Lucero Pathak RN with any questions or concerns at [phone].

## 2022-05-09 LAB
ALBUMIN SERPL BCP-MCNC: 2.3 G/DL (ref 3.5–5.2)
ALP SERPL-CCNC: 55 U/L (ref 55–135)
ALT SERPL W/O P-5'-P-CCNC: 8 U/L (ref 10–44)
ANION GAP SERPL CALC-SCNC: 6 MMOL/L (ref 8–16)
AST SERPL-CCNC: 17 U/L (ref 10–40)
BACTERIA #/AREA URNS AUTO: NORMAL /HPF
BASOPHILS # BLD AUTO: 0.06 K/UL (ref 0–0.2)
BASOPHILS NFR BLD: 0.4 % (ref 0–1.9)
BILIRUB SERPL-MCNC: 0.7 MG/DL (ref 0.1–1)
BILIRUB UR QL STRIP: NEGATIVE
BUN SERPL-MCNC: 24 MG/DL (ref 8–23)
CALCIUM SERPL-MCNC: 7.9 MG/DL (ref 8.7–10.5)
CHLORIDE SERPL-SCNC: 111 MMOL/L (ref 95–110)
CLARITY UR REFRACT.AUTO: CLEAR
CO2 SERPL-SCNC: 24 MMOL/L (ref 23–29)
COLOR UR AUTO: YELLOW
CREAT SERPL-MCNC: 1.2 MG/DL (ref 0.5–1.4)
DIFFERENTIAL METHOD: ABNORMAL
EOSINOPHIL # BLD AUTO: 0.2 K/UL (ref 0–0.5)
EOSINOPHIL NFR BLD: 1.2 % (ref 0–8)
ERYTHROCYTE [DISTWIDTH] IN BLOOD BY AUTOMATED COUNT: 15.9 % (ref 11.5–14.5)
EST. GFR  (AFRICAN AMERICAN): >60 ML/MIN/1.73 M^2
EST. GFR  (NON AFRICAN AMERICAN): 53.7 ML/MIN/1.73 M^2
GLUCOSE SERPL-MCNC: 68 MG/DL (ref 70–110)
GLUCOSE UR QL STRIP: NEGATIVE
HCT VFR BLD AUTO: 30.6 % (ref 40–54)
HGB BLD-MCNC: 9.5 G/DL (ref 14–18)
HGB UR QL STRIP: NEGATIVE
IMM GRANULOCYTES # BLD AUTO: 0.15 K/UL (ref 0–0.04)
IMM GRANULOCYTES NFR BLD AUTO: 1 % (ref 0–0.5)
KETONES UR QL STRIP: ABNORMAL
LEUKOCYTE ESTERASE UR QL STRIP: ABNORMAL
LYMPHOCYTES # BLD AUTO: 1.3 K/UL (ref 1–4.8)
LYMPHOCYTES NFR BLD: 8.1 % (ref 18–48)
MAGNESIUM SERPL-MCNC: 1.9 MG/DL (ref 1.6–2.6)
MCH RBC QN AUTO: 30 PG (ref 27–31)
MCHC RBC AUTO-ENTMCNC: 31 G/DL (ref 32–36)
MCV RBC AUTO: 97 FL (ref 82–98)
MICROSCOPIC COMMENT: NORMAL
MONOCYTES # BLD AUTO: 0.9 K/UL (ref 0.3–1)
MONOCYTES NFR BLD: 5.8 % (ref 4–15)
NEUTROPHILS # BLD AUTO: 13 K/UL (ref 1.8–7.7)
NEUTROPHILS NFR BLD: 83.5 % (ref 38–73)
NITRITE UR QL STRIP: NEGATIVE
NRBC BLD-RTO: 0 /100 WBC
PH UR STRIP: 5 [PH] (ref 5–8)
PHOSPHATE SERPL-MCNC: 2.9 MG/DL (ref 2.7–4.5)
PLATELET # BLD AUTO: 156 K/UL (ref 150–450)
PMV BLD AUTO: 11.1 FL (ref 9.2–12.9)
POCT GLUCOSE: 69 MG/DL (ref 70–110)
POCT GLUCOSE: 91 MG/DL (ref 70–110)
POTASSIUM SERPL-SCNC: 4.6 MMOL/L (ref 3.5–5.1)
PROT SERPL-MCNC: 5.3 G/DL (ref 6–8.4)
PROT UR QL STRIP: NEGATIVE
RBC # BLD AUTO: 3.17 M/UL (ref 4.6–6.2)
RBC #/AREA URNS AUTO: 2 /HPF (ref 0–4)
SODIUM SERPL-SCNC: 141 MMOL/L (ref 136–145)
SP GR UR STRIP: 1.03 (ref 1–1.03)
SQUAMOUS #/AREA URNS AUTO: 0 /HPF
URN SPEC COLLECT METH UR: ABNORMAL
WBC # BLD AUTO: 15.61 K/UL (ref 3.9–12.7)
WBC #/AREA URNS AUTO: 5 /HPF (ref 0–5)

## 2022-05-09 PROCEDURE — 93005 ELECTROCARDIOGRAM TRACING: CPT

## 2022-05-09 PROCEDURE — 36415 COLL VENOUS BLD VENIPUNCTURE: CPT | Performed by: STUDENT IN AN ORGANIZED HEALTH CARE EDUCATION/TRAINING PROGRAM

## 2022-05-09 PROCEDURE — C9113 INJ PANTOPRAZOLE SODIUM, VIA: HCPCS | Performed by: STUDENT IN AN ORGANIZED HEALTH CARE EDUCATION/TRAINING PROGRAM

## 2022-05-09 PROCEDURE — 25000003 PHARM REV CODE 250: Performed by: STUDENT IN AN ORGANIZED HEALTH CARE EDUCATION/TRAINING PROGRAM

## 2022-05-09 PROCEDURE — 94761 N-INVAS EAR/PLS OXIMETRY MLT: CPT

## 2022-05-09 PROCEDURE — 63600175 PHARM REV CODE 636 W HCPCS: Performed by: STUDENT IN AN ORGANIZED HEALTH CARE EDUCATION/TRAINING PROGRAM

## 2022-05-09 PROCEDURE — 11000001 HC ACUTE MED/SURG PRIVATE ROOM

## 2022-05-09 PROCEDURE — 93010 EKG 12-LEAD: ICD-10-PCS | Mod: ,,, | Performed by: INTERNAL MEDICINE

## 2022-05-09 PROCEDURE — 81001 URINALYSIS AUTO W/SCOPE: CPT | Performed by: STUDENT IN AN ORGANIZED HEALTH CARE EDUCATION/TRAINING PROGRAM

## 2022-05-09 PROCEDURE — 85025 COMPLETE CBC W/AUTO DIFF WBC: CPT | Performed by: STUDENT IN AN ORGANIZED HEALTH CARE EDUCATION/TRAINING PROGRAM

## 2022-05-09 PROCEDURE — 83735 ASSAY OF MAGNESIUM: CPT | Performed by: STUDENT IN AN ORGANIZED HEALTH CARE EDUCATION/TRAINING PROGRAM

## 2022-05-09 PROCEDURE — 80053 COMPREHEN METABOLIC PANEL: CPT | Performed by: STUDENT IN AN ORGANIZED HEALTH CARE EDUCATION/TRAINING PROGRAM

## 2022-05-09 PROCEDURE — 84100 ASSAY OF PHOSPHORUS: CPT | Performed by: STUDENT IN AN ORGANIZED HEALTH CARE EDUCATION/TRAINING PROGRAM

## 2022-05-09 PROCEDURE — 93010 ELECTROCARDIOGRAM REPORT: CPT | Mod: ,,, | Performed by: INTERNAL MEDICINE

## 2022-05-09 RX ORDER — IBUPROFEN 200 MG
24 TABLET ORAL
Status: DISCONTINUED | OUTPATIENT
Start: 2022-05-09 | End: 2022-05-18 | Stop reason: HOSPADM

## 2022-05-09 RX ORDER — IBUPROFEN 200 MG
16 TABLET ORAL
Status: DISCONTINUED | OUTPATIENT
Start: 2022-05-09 | End: 2022-05-18 | Stop reason: HOSPADM

## 2022-05-09 RX ORDER — GLUCAGON 1 MG
1 KIT INJECTION
Status: DISCONTINUED | OUTPATIENT
Start: 2022-05-09 | End: 2022-05-18 | Stop reason: HOSPADM

## 2022-05-09 RX ADMIN — SODIUM CHLORIDE: 0.9 INJECTION, SOLUTION INTRAVENOUS at 04:05

## 2022-05-09 RX ADMIN — PIPERACILLIN SODIUM AND TAZOBACTAM SODIUM 4.5 G: 4; .5 INJECTION, POWDER, LYOPHILIZED, FOR SOLUTION INTRAVENOUS at 01:05

## 2022-05-09 RX ADMIN — HEPARIN SODIUM 5000 UNITS: 5000 INJECTION INTRAVENOUS; SUBCUTANEOUS at 07:05

## 2022-05-09 RX ADMIN — DEXTROSE 125 ML: 10 SOLUTION INTRAVENOUS at 09:05

## 2022-05-09 RX ADMIN — PIPERACILLIN SODIUM AND TAZOBACTAM SODIUM 4.5 G: 4; .5 INJECTION, POWDER, LYOPHILIZED, FOR SOLUTION INTRAVENOUS at 04:05

## 2022-05-09 RX ADMIN — HEPARIN SODIUM 5000 UNITS: 5000 INJECTION INTRAVENOUS; SUBCUTANEOUS at 09:05

## 2022-05-09 RX ADMIN — PANTOPRAZOLE SODIUM 40 MG: 40 INJECTION, POWDER, FOR SOLUTION INTRAVENOUS at 09:05

## 2022-05-09 RX ADMIN — PIPERACILLIN SODIUM AND TAZOBACTAM SODIUM 4.5 G: 4; .5 INJECTION, POWDER, LYOPHILIZED, FOR SOLUTION INTRAVENOUS at 08:05

## 2022-05-09 RX ADMIN — SODIUM CHLORIDE: 0.9 INJECTION, SOLUTION INTRAVENOUS at 08:05

## 2022-05-09 RX ADMIN — HEPARIN SODIUM 5000 UNITS: 5000 INJECTION INTRAVENOUS; SUBCUTANEOUS at 12:05

## 2022-05-09 NOTE — PLAN OF CARE
[facility] - Surgery  Initial Discharge Assessment       Primary Care Provider: Tl Torres MD    Admission Diagnosis: Nausea [R11.0]  Pleural effusion [J90]  Partial small bowel obstruction [K56.600]    Admission Date: 5/8/2022  Expected Discharge Date: 5/11/2022         Payor: MEDICARE / Plan: MEDICARE PART A & B / Product Type: Government /     Extended Emergency Contact Information  Primary Emergency Contact: Obi Lemus  Mobile Phone: [phone]  Relation: Spouse  Preferred language: English   needed? No    Discharge Plan A: Home with family, Home Health  Discharge Plan B: Skilled Nursing Facility      [id number] - [place] - [address]  [address]  [place] [address]  Phone: [phone] Fax: [phone]      Initial Assessment (most recent)     Adult Discharge Assessment - 05/09/22 1211        Discharge Assessment    Assessment Type Discharge Planning Assessment     Confirmed/corrected address, phone number and insurance Yes     Confirmed Demographics Correct on Facesheet     Source of Information family   Spouse- Obi    Communicated JOHAN with patient/caregiver Yes     Lives With spouse     Do you expect to return to your current living situation? Yes     Do you have help at home or someone to help you manage your care at home? No     Prior to hospitilization cognitive status: Alert/Oriented     Current cognitive status: Alert/Oriented     Walking or Climbing Stairs Difficulty none     Dressing/Bathing Difficulty none     Equipment Currently Used at Home walker, rolling     Readmission within 30 days? No     Patient currently being followed by outpatient case management? No     Do you currently have service(s) that help you manage your care at home? No     Do you have prescription coverage? Yes     Coverage medicare     How do you get to doctors appointments? family or friend will provide     Are you on dialysis? No     Do you take coumadin? No     Discharge Plan A  Home with family;Home Health     Discharge Plan B Skilled Nursing Facility     DME Needed Upon Discharge  none     Discharge Plan discussed with: Spouse/sig other                  SW completed discharge planning assessment with the patient's Spouse Obi via phone call. SW verified demographic information listed on the pt.'s Face sheet. Pt's wife speaks english, requires slow speaking and repeated sentences. Pt lives with his spouse. Pt utilizes a walker. Pt's wife reports having stable transport. Pt's wife agreeable to HH/SNF upon discharge.      Connie Currie LMSW  Case Management   [facility]   Ext. [phone]

## 2022-05-09 NOTE — CARE UPDATE
RAPID RESPONSE NURSE ROUND       Rounding completed with charge Lindsay PACE. No concerns verbalized at this time. Charge to recheck POCT glucose. Instructed to call [phone] for further concerns or assistance.

## 2022-05-09 NOTE — SUBJECTIVE & OBJECTIVE
Interval History: Narcan yesterday morning after receiving dilaudid. Pulled NGT out overnight. Somnolent this morning during rounds. Does arouse but appears altered. No narcotics since yesterday AM. Stat ABG.     Medications:  Continuous Infusions:   sodium chloride 0.9% 125 mL/hr at 05/09/22 0447     Scheduled Meds:   heparin (porcine)  5,000 Units Subcutaneous Q8H    LIDOcaine (PF) 10 mg/ml (1%)  1 mL Other Once    pantoprazole  40 mg Intravenous Daily    piperacillin-tazobactam (ZOSYN) IVPB  4.5 g Intravenous Q8H     PRN Meds:albuterol-ipratropium, morphine, ondansetron, sodium chloride 0.9%     Review of patient's allergies indicates:  No Known Allergies  Objective:     Vital Signs (Most Recent):  Temp: 97.4 °F (36.3 °C) (05/09/22 0707)  Pulse: (!) 44 (05/09/22 0707)  Resp: 18 (05/09/22 0707)  BP: (!) 108/52 (05/09/22 0707)  SpO2: 97 % (05/09/22 0707)   Vital Signs (24h Range):  Temp:  [96.7 °F (35.9 °C)-97.7 °F (36.5 °C)] 97.4 °F (36.3 °C)  Pulse:  [44-67] 44  Resp:  [7-18] 18  SpO2:  [83 %-99 %] 97 %  BP: ()/(50-61) 108/52     Weight: 65.5 kg (144 lb 6.4 oz)  Body mass index is 24.03 kg/m².    Intake/Output - Last 3 Shifts         05/07 0700 05/08 0659 05/08 0700 05/09 0659 05/09 0700  05/10 0659    P.O.  0     I.V. (mL/kg)  962.8 (14.7)     NG/GT  50     IV Piggyback 1000 1000     Total Intake(mL/kg) 1000 (17.6) 2012.8 (30.7)     Urine (mL/kg/hr)  0 (0)     Drains 250 550     Stool  0     Total Output 250 550     Net +750 +1462.8            Urine Occurrence  5 x     Stool Occurrence  2 x             Physical Exam  Vitals and nursing note reviewed.   Constitutional:       General: He is sleeping. He is not in acute distress.     Appearance: Normal appearance. He is not ill-appearing or toxic-appearing.      Comments: Frail  Not easily aroused   Cardiovascular:      Rate and Rhythm: Normal rate and regular rhythm.   Pulmonary:      Effort: Pulmonary effort is normal.   Abdominal:      Palpations:  Abdomen is soft.      Comments: Moderate distension  No significant tenderness appreciated   Musculoskeletal:      Left lower leg: Edema present.   Skin:     General: Skin is warm and dry.      Capillary Refill: Capillary refill takes less than 2 seconds.   Neurological:      Mental Status: He is oriented to person, place, and time.      Motor: Weakness present.      Gait: Gait abnormal.       Significant Labs:  I have reviewed all pertinent lab results within the past 24 hours.  CBC:   Recent Labs   Lab 05/09/22 0434   WBC 15.61*   RBC 3.17*   HGB 9.5*   HCT 30.6*      MCV 97   MCH 30.0   MCHC 31.0*     CMP:   Recent Labs   Lab 05/09/22  0434   GLU 68*   CALCIUM 7.9*   ALBUMIN 2.3*   PROT 5.3*      K 4.6   CO2 24   *   BUN 24*   CREATININE 1.2   ALKPHOS 55   ALT 8*   AST 17   BILITOT 0.7       Significant Diagnostics:  I have reviewed all pertinent imaging results/findings within the past 24 hours.

## 2022-05-09 NOTE — NURSING
Patient remains difficult to arouse, HR 42. Spoke with Dr. Oviedo. MD states to put in STAT EKG.    Respiratory called regarding STAT ABG orders and STAT EKG. Respiratory to come to bedside

## 2022-05-09 NOTE — PLAN OF CARE
Patient oriented to self, place and situation. Patient somnolent, ABGs performed x2. UA and urine culture performed. VSS, HR 40s-50s. Telemetry monitor in place. EKG performed showed SB with RBBB. NG tube remains out, no episodes of vomiting. Multiple bowel movements noted throughout shift. Abd and CXR performed. LR continued at 125 cc/hr. IV antibiotics continued. Bed in lowest locked position, call bell in reach and bed alarm active. Will continue to monitor.

## 2022-05-09 NOTE — ASSESSMENT & PLAN NOTE
88M with Parkinsons, BPH, and history of total colectomy in 2013 for UC presents with SBO. Has leukocytosis to 27, but lactic is normal, exam is benign and no systemic signs of sepsis. I think there is relatively low likelihood of ischemic bowel and will resuscitate with serial abdominal exams. Would prefer to avoid surgery in this frail patient with Parkinsons.    - Having bowel function  - NPO  - AMS this AM, f/u ABG, CXR, upright KUB, UA  - Continue Zosyn, still with leukocytosis of 15  - holding Parkinsons meds and BPH med until can tolerate PO  - no need to GGC, having bowel function    Dispo: w/u AMS and leukocystosis.

## 2022-05-09 NOTE — NURSING
Dr. Oviedo notified of glucose. Patient awake and talking but goes back to sleep. MD states to wait for ABG results and will reassess whether patient okay for diet today.

## 2022-05-09 NOTE — PROGRESS NOTES
[facility] - [facility]  General Surgery  Progress Note    Subjective:     History of Present Illness:  88M with history of total colectomy for UC in 2013 presents with SBO. He had mild abdominal pain and bloating. Last BM morning before admit. No noted prior episodes. Vomited in the ED.    In the ED, he had NGT placed with large emesis during initial attempts and then about 250 cc thick brown output in the can. Looks like gumbo. He is not having significant pain or tenderness.    He is AF, HDS.    Leukocytosis to 27 with left shift. Lactic is 1.8.    K is 5.2. Cr 1.4 (stable from about a year ago).    He has additional history of hearing loss, Parkinson's disease on multiple meds, BPH on proscar, chronic bronchitis.     EF on echo in 2021 was 60%. He has been seen by cardiology for lower extremity edema, left worse than right.    He is a retired . From [employer]. Understands and speaks English. Very engaged intellectually / sharp. Asks good questions if given time.      Post-Op Info:  * No surgery found *         Interval History: Narcan yesterday morning after receiving dilaudid. Pulled NGT out overnight. Somnolent this morning during rounds. Does arouse but appears altered. No narcotics since yesterday AM. Stat ABG.     Medications:  Continuous Infusions:   sodium chloride 0.9% 125 mL/hr at 05/09/22 0447     Scheduled Meds:   heparin (porcine)  5,000 Units Subcutaneous Q8H    LIDOcaine (PF) 10 mg/ml (1%)  1 mL Other Once    pantoprazole  40 mg Intravenous Daily    piperacillin-tazobactam (ZOSYN) IVPB  4.5 g Intravenous Q8H     PRN Meds:albuterol-ipratropium, morphine, ondansetron, sodium chloride 0.9%     Review of patient's allergies indicates:  No Known Allergies  Objective:     Vital Signs (Most Recent):  Temp: 97.4 °F (36.3 °C) (05/09/22 0707)  Pulse: (!) 44 (05/09/22 0707)  Resp: 18 (05/09/22 0707)  BP: (!) 108/52 (05/09/22 0707)  SpO2: 97 % (05/09/22 0707)   Vital Signs (24h Range):  Temp:  [96.7  °F (35.9 °C)-97.7 °F (36.5 °C)] 97.4 °F (36.3 °C)  Pulse:  [44-67] 44  Resp:  [7-18] 18  SpO2:  [83 %-99 %] 97 %  BP: ()/(50-61) 108/52     Weight: 65.5 kg (144 lb 6.4 oz)  Body mass index is 24.03 kg/m².    Intake/Output - Last 3 Shifts         05/07 0700 05/08 0659 05/08 0700  05/09 0659 05/09 0700  05/10 0659    P.O.  0     I.V. (mL/kg)  962.8 (14.7)     NG/GT  50     IV Piggyback 1000 1000     Total Intake(mL/kg) 1000 (17.6) 2012.8 (30.7)     Urine (mL/kg/hr)  0 (0)     Drains 250 550     Stool  0     Total Output 250 550     Net +750 +1462.8            Urine Occurrence  5 x     Stool Occurrence  2 x             Physical Exam  Vitals and nursing note reviewed.   Constitutional:       General: He is sleeping. He is not in acute distress.     Appearance: Normal appearance. He is not ill-appearing or toxic-appearing.      Comments: Frail  Not easily aroused   Cardiovascular:      Rate and Rhythm: Normal rate and regular rhythm.   Pulmonary:      Effort: Pulmonary effort is normal.   Abdominal:      Palpations: Abdomen is soft.      Comments: Moderate distension  No significant tenderness appreciated   Musculoskeletal:      Left lower leg: Edema present.   Skin:     General: Skin is warm and dry.      Capillary Refill: Capillary refill takes less than 2 seconds.   Neurological:      Mental Status: He is oriented to person, place, and time.      Motor: Weakness present.      Gait: Gait abnormal.       Significant Labs:  I have reviewed all pertinent lab results within the past 24 hours.  CBC:   Recent Labs   Lab 05/09/22  0434   WBC 15.61*   RBC 3.17*   HGB 9.5*   HCT 30.6*      MCV 97   MCH 30.0   MCHC 31.0*     CMP:   Recent Labs   Lab 05/09/22  0434   GLU 68*   CALCIUM 7.9*   ALBUMIN 2.3*   PROT 5.3*      K 4.6   CO2 24   *   BUN 24*   CREATININE 1.2   ALKPHOS 55   ALT 8*   AST 17   BILITOT 0.7       Significant Diagnostics:  I have reviewed all pertinent imaging results/findings within  the past 24 hours.    Assessment/Plan:     * SBO (small bowel obstruction)  88M with Parkinsons, BPH, and history of total colectomy in 2013 for UC presents with SBO. Has leukocytosis to 27, but lactic is normal, exam is benign and no systemic signs of sepsis. I think there is relatively low likelihood of ischemic bowel and will resuscitate with serial abdominal exams. Would prefer to avoid surgery in this frail patient with Parkinsons.    - Having bowel function  - NPO  - AMS this AM, f/u ABG, CXR, upright KUB, UA  - Continue Zosyn, still with leukocytosis of 15  - holding Parkinsons meds and BPH med until can tolerate PO  - no need to GGC, having bowel function    Dispo: w/u AMS and leukocystosis.            Kelsie Oviedo MD  General Surgery  [facility] - Surgery

## 2022-05-09 NOTE — NURSING
1900 Handoff report received. Patient in bed sleeping.    2100 Patient noted to have a BM, did not call for assistance, sleepy but easily arouse. Incontinent care done.    2340 Patient had another large loose BM, incontinent care done.    0300 received call from tele room, leads are off. In room noted patient pulled out NG tube, IV, leads and gown. Small hematoma noted to IV site and bleeding, area cleaned and dressing applied. Clean gown, sheet and blanket placed. New IV inserted. ON call resident notified, stated to not reinsert NG tube will discuss with team in AM, but to notify him if patient's vomits. Patient is awake and alert, stated he was sleeping and was unaware of what he was doing.

## 2022-05-09 NOTE — NURSING
Rapid rounded and chart review on patient. Pt's glucose on BMP was 60. Rechecked pt's glucose at bedside was 91.

## 2022-05-10 PROBLEM — R00.1 SINUS BRADYCARDIA: Status: ACTIVE | Noted: 2022-05-10

## 2022-05-10 PROBLEM — E83.39 HYPOPHOSPHATEMIA: Status: ACTIVE | Noted: 2022-05-10

## 2022-05-10 PROBLEM — J90 PLEURAL EFFUSION: Status: ACTIVE | Noted: 2022-05-10

## 2022-05-10 LAB
ANION GAP SERPL CALC-SCNC: 5 MMOL/L (ref 8–16)
APPEARANCE FLD: NORMAL
BASOPHILS # BLD AUTO: 0.04 K/UL (ref 0–0.2)
BASOPHILS NFR BLD: 0.3 % (ref 0–1.9)
BNP SERPL-MCNC: 1008 PG/ML (ref 0–99)
BODY FLD TYPE: NORMAL
BUN SERPL-MCNC: 19 MG/DL (ref 8–23)
CALCIUM SERPL-MCNC: 7.7 MG/DL (ref 8.7–10.5)
CHLORIDE SERPL-SCNC: 112 MMOL/L (ref 95–110)
CO2 SERPL-SCNC: 25 MMOL/L (ref 23–29)
COLOR FLD: YELLOW
CREAT SERPL-MCNC: 1.1 MG/DL (ref 0.5–1.4)
DIFFERENTIAL METHOD: ABNORMAL
EOSINOPHIL # BLD AUTO: 0.3 K/UL (ref 0–0.5)
EOSINOPHIL NFR BLD: 2.8 % (ref 0–8)
ERYTHROCYTE [DISTWIDTH] IN BLOOD BY AUTOMATED COUNT: 16.3 % (ref 11.5–14.5)
EST. GFR  (AFRICAN AMERICAN): >60 ML/MIN/1.73 M^2
EST. GFR  (NON AFRICAN AMERICAN): 59.6 ML/MIN/1.73 M^2
GLUCOSE SERPL-MCNC: 86 MG/DL (ref 70–110)
GRAM STN SPEC: NORMAL
GRAM STN SPEC: NORMAL
HCT VFR BLD AUTO: 29.4 % (ref 40–54)
HGB BLD-MCNC: 9.1 G/DL (ref 14–18)
IMM GRANULOCYTES # BLD AUTO: 0.04 K/UL (ref 0–0.04)
IMM GRANULOCYTES NFR BLD AUTO: 0.3 % (ref 0–0.5)
LYMPHOCYTES # BLD AUTO: 1.3 K/UL (ref 1–4.8)
LYMPHOCYTES NFR BLD: 11.3 % (ref 18–48)
LYMPHOCYTES NFR FLD MANUAL: 90 %
MAGNESIUM SERPL-MCNC: 1.8 MG/DL (ref 1.6–2.6)
MCH RBC QN AUTO: 29.9 PG (ref 27–31)
MCHC RBC AUTO-ENTMCNC: 31 G/DL (ref 32–36)
MCV RBC AUTO: 97 FL (ref 82–98)
MONOCYTES # BLD AUTO: 1.1 K/UL (ref 0.3–1)
MONOCYTES NFR BLD: 9.9 % (ref 4–15)
MONOS+MACROS NFR FLD MANUAL: 3 %
NEUTROPHILS # BLD AUTO: 8.7 K/UL (ref 1.8–7.7)
NEUTROPHILS NFR BLD: 75.4 % (ref 38–73)
NEUTROPHILS NFR FLD MANUAL: 7 %
NRBC BLD-RTO: 0 /100 WBC
PHOSPHATE SERPL-MCNC: 1.9 MG/DL (ref 2.7–4.5)
PLATELET # BLD AUTO: 156 K/UL (ref 150–450)
PMV BLD AUTO: 11.3 FL (ref 9.2–12.9)
POCT GLUCOSE: 116 MG/DL (ref 70–110)
POCT GLUCOSE: 85 MG/DL (ref 70–110)
POCT GLUCOSE: 96 MG/DL (ref 70–110)
POCT GLUCOSE: 96 MG/DL (ref 70–110)
POTASSIUM SERPL-SCNC: 3.9 MMOL/L (ref 3.5–5.1)
RBC # BLD AUTO: 3.04 M/UL (ref 4.6–6.2)
SODIUM SERPL-SCNC: 142 MMOL/L (ref 136–145)
WBC # BLD AUTO: 11.53 K/UL (ref 3.9–12.7)
WBC # FLD: 433 /CU MM

## 2022-05-10 PROCEDURE — 84157 ASSAY OF PROTEIN OTHER: CPT | Performed by: SURGERY

## 2022-05-10 PROCEDURE — 25000003 PHARM REV CODE 250: Performed by: STUDENT IN AN ORGANIZED HEALTH CARE EDUCATION/TRAINING PROGRAM

## 2022-05-10 PROCEDURE — 93010 ELECTROCARDIOGRAM REPORT: CPT | Mod: ,,, | Performed by: INTERNAL MEDICINE

## 2022-05-10 PROCEDURE — 84100 ASSAY OF PHOSPHORUS: CPT | Performed by: STUDENT IN AN ORGANIZED HEALTH CARE EDUCATION/TRAINING PROGRAM

## 2022-05-10 PROCEDURE — 93005 ELECTROCARDIOGRAM TRACING: CPT

## 2022-05-10 PROCEDURE — 94761 N-INVAS EAR/PLS OXIMETRY MLT: CPT

## 2022-05-10 PROCEDURE — 36415 COLL VENOUS BLD VENIPUNCTURE: CPT | Performed by: STUDENT IN AN ORGANIZED HEALTH CARE EDUCATION/TRAINING PROGRAM

## 2022-05-10 PROCEDURE — 11000001 HC ACUTE MED/SURG PRIVATE ROOM

## 2022-05-10 PROCEDURE — 87102 FUNGUS ISOLATION CULTURE: CPT | Performed by: SURGERY

## 2022-05-10 PROCEDURE — 83880 ASSAY OF NATRIURETIC PEPTIDE: CPT | Performed by: STUDENT IN AN ORGANIZED HEALTH CARE EDUCATION/TRAINING PROGRAM

## 2022-05-10 PROCEDURE — 63600175 PHARM REV CODE 636 W HCPCS: Performed by: STUDENT IN AN ORGANIZED HEALTH CARE EDUCATION/TRAINING PROGRAM

## 2022-05-10 PROCEDURE — 25000003 PHARM REV CODE 250: Performed by: RADIOLOGY

## 2022-05-10 PROCEDURE — 85025 COMPLETE CBC W/AUTO DIFF WBC: CPT | Performed by: STUDENT IN AN ORGANIZED HEALTH CARE EDUCATION/TRAINING PROGRAM

## 2022-05-10 PROCEDURE — 87075 CULTR BACTERIA EXCEPT BLOOD: CPT | Performed by: SURGERY

## 2022-05-10 PROCEDURE — C9113 INJ PANTOPRAZOLE SODIUM, VIA: HCPCS | Performed by: STUDENT IN AN ORGANIZED HEALTH CARE EDUCATION/TRAINING PROGRAM

## 2022-05-10 PROCEDURE — 99223 PR INITIAL HOSPITAL CARE,LEVL III: ICD-10-PCS | Mod: GC,,, | Performed by: HOSPITALIST

## 2022-05-10 PROCEDURE — 80048 BASIC METABOLIC PNL TOTAL CA: CPT | Performed by: STUDENT IN AN ORGANIZED HEALTH CARE EDUCATION/TRAINING PROGRAM

## 2022-05-10 PROCEDURE — 99223 1ST HOSP IP/OBS HIGH 75: CPT | Mod: GC,,, | Performed by: HOSPITALIST

## 2022-05-10 PROCEDURE — 89051 BODY FLUID CELL COUNT: CPT | Performed by: SURGERY

## 2022-05-10 PROCEDURE — 87205 SMEAR GRAM STAIN: CPT | Performed by: SURGERY

## 2022-05-10 PROCEDURE — 93010 EKG 12-LEAD: ICD-10-PCS | Mod: ,,, | Performed by: INTERNAL MEDICINE

## 2022-05-10 PROCEDURE — 87070 CULTURE OTHR SPECIMN AEROBIC: CPT | Performed by: SURGERY

## 2022-05-10 PROCEDURE — 83735 ASSAY OF MAGNESIUM: CPT | Performed by: STUDENT IN AN ORGANIZED HEALTH CARE EDUCATION/TRAINING PROGRAM

## 2022-05-10 PROCEDURE — 27000221 HC OXYGEN, UP TO 24 HOURS

## 2022-05-10 PROCEDURE — 83615 LACTATE (LD) (LDH) ENZYME: CPT | Performed by: SURGERY

## 2022-05-10 RX ORDER — LIDOCAINE HYDROCHLORIDE 10 MG/ML
INJECTION INFILTRATION; PERINEURAL
Status: DISPENSED
Start: 2022-05-10 | End: 2022-05-11

## 2022-05-10 RX ORDER — ATROPINE SULFATE 1 MG/ML
0.5 INJECTION, SOLUTION INTRAMUSCULAR; INTRAVENOUS; SUBCUTANEOUS EVERY 8 HOURS PRN
Status: DISCONTINUED | OUTPATIENT
Start: 2022-05-10 | End: 2022-05-11

## 2022-05-10 RX ORDER — LIDOCAINE HYDROCHLORIDE 10 MG/ML
INJECTION INFILTRATION; PERINEURAL CODE/TRAUMA/SEDATION MEDICATION
Status: COMPLETED | OUTPATIENT
Start: 2022-05-10 | End: 2022-05-10

## 2022-05-10 RX ADMIN — PIPERACILLIN SODIUM AND TAZOBACTAM SODIUM 4.5 G: 4; .5 INJECTION, POWDER, LYOPHILIZED, FOR SOLUTION INTRAVENOUS at 02:05

## 2022-05-10 RX ADMIN — HEPARIN SODIUM 5000 UNITS: 5000 INJECTION INTRAVENOUS; SUBCUTANEOUS at 01:05

## 2022-05-10 RX ADMIN — SODIUM CHLORIDE: 0.9 INJECTION, SOLUTION INTRAVENOUS at 04:05

## 2022-05-10 RX ADMIN — PIPERACILLIN SODIUM AND TAZOBACTAM SODIUM 4.5 G: 4; .5 INJECTION, POWDER, LYOPHILIZED, FOR SOLUTION INTRAVENOUS at 04:05

## 2022-05-10 RX ADMIN — PANTOPRAZOLE SODIUM 40 MG: 40 INJECTION, POWDER, FOR SOLUTION INTRAVENOUS at 09:05

## 2022-05-10 RX ADMIN — SODIUM PHOSPHATE, MONOBASIC, MONOHYDRATE 30 MMOL: 276; 142 INJECTION, SOLUTION INTRAVENOUS at 09:05

## 2022-05-10 RX ADMIN — HEPARIN SODIUM 5000 UNITS: 5000 INJECTION INTRAVENOUS; SUBCUTANEOUS at 07:05

## 2022-05-10 RX ADMIN — PIPERACILLIN SODIUM AND TAZOBACTAM SODIUM 4.5 G: 4; .5 INJECTION, POWDER, LYOPHILIZED, FOR SOLUTION INTRAVENOUS at 09:05

## 2022-05-10 RX ADMIN — LIDOCAINE HYDROCHLORIDE 5 ML: 10 INJECTION, SOLUTION INFILTRATION; PERINEURAL at 04:05

## 2022-05-10 NOTE — PLAN OF CARE
Patient tolerated thoracentesis well. 800 ml serous fluid drained. Specimens sent for analysis. Chest xray pending.

## 2022-05-10 NOTE — SUBJECTIVE & OBJECTIVE
Interval History:   NAEO  Bradycardic to 38 overnight.  More interactive this morning that yesterday  WBC downtrending  GEE lemus planned for today pending availability   BM x2    Medications:  Continuous Infusions:   sodium chloride 0.9% 75 mL/hr at 05/10/22 0511     Scheduled Meds:   heparin (porcine)  5,000 Units Subcutaneous Q8H    LIDOcaine (PF) 10 mg/ml (1%)  1 mL Other Once    pantoprazole  40 mg Intravenous Daily    piperacillin-tazobactam (ZOSYN) IVPB  4.5 g Intravenous Q8H     PRN Meds:albuterol-ipratropium, dextrose 10%, dextrose 10%, glucagon (human recombinant), glucose, glucose, ondansetron, sodium chloride 0.9%     Review of patient's allergies indicates:  No Known Allergies  Objective:     Vital Signs (Most Recent):  Temp: 96 °F (35.6 °C) (05/10/22 0751)  Pulse: (!) 38 (05/10/22 0751)  Resp: 18 (05/10/22 0530)  BP: (!) 117/58 (05/10/22 0751)  SpO2: 95 % (05/10/22 0751)   Vital Signs (24h Range):  Temp:  [96 °F (35.6 °C)-98.2 °F (36.8 °C)] 96 °F (35.6 °C)  Pulse:  [38-59] 38  Resp:  [14-18] 18  SpO2:  [92 %-97 %] 95 %  BP: (102-127)/(51-74) 117/58     Weight: 65.5 kg (144 lb 6.4 oz)  Body mass index is 24.03 kg/m².    Intake/Output - Last 3 Shifts         05/08 0700  05/09 0659 05/09 0700  05/10 0659 05/10 0700 05/11 0659    P.O. 0 240     I.V. (mL/kg) 962.8 (14.7)      NG/GT 50      IV Piggyback 1000      Total Intake(mL/kg) 2012.8 (30.7) 240 (3.7)     Urine (mL/kg/hr) 0 (0) 200 (0.1)     Drains 550      Stool 0 0     Total Output 550 200     Net +1462.8 +40            Urine Occurrence 5 x 4 x     Stool Occurrence 2 x 2 x             Physical Exam  Vitals and nursing note reviewed.   Constitutional:       General: He is sleeping. He is not in acute distress.     Appearance: Normal appearance. He is not ill-appearing or toxic-appearing.      Comments: Frail  Not easily aroused   Cardiovascular:      Rate and Rhythm: Normal rate and regular rhythm.   Pulmonary:      Effort: Pulmonary effort is normal.  yes   Abdominal:      Palpations: Abdomen is soft.      Comments: Moderate distension  No significant tenderness appreciated   Musculoskeletal:      Left lower leg: Edema present.   Skin:     General: Skin is warm and dry.      Capillary Refill: Capillary refill takes less than 2 seconds.   Neurological:      Mental Status: He is oriented to person, place, and time.      Motor: Weakness present.      Gait: Gait abnormal.       Significant Labs:  I have reviewed all pertinent lab results within the past 24 hours.  CBC:   Recent Labs   Lab 05/10/22  0513   WBC 11.53   RBC 3.04*   HGB 9.1*   HCT 29.4*      MCV 97   MCH 29.9   MCHC 31.0*     BMP:   Recent Labs   Lab 05/10/22  0513   GLU 86      K 3.9   *   CO2 25   BUN 19   CREATININE 1.1   CALCIUM 7.7*   MG 1.8       Significant Diagnostics:  I have reviewed all pertinent imaging results/findings within the past 24 hours.

## 2022-05-10 NOTE — ASSESSMENT & PLAN NOTE
-- SB with rate 30-40s.  -- Unclear if symptomatic. Patient extremely hard of hearing and unable to hear /answer interview  -- Suspect possible SSS  -- Cardiology consulted. F/u recs

## 2022-05-10 NOTE — PLAN OF CARE
Chest xray without evidence of pneumothorax. Okay for patient to return to room per Dr. Wiggins. Report called to [facility].

## 2022-05-10 NOTE — ASSESSMENT & PLAN NOTE
-- Presents with SBO  -- Hx of UC s/p total colectomy  -- NGT was placed initially and since d/c. Patient with + BM. Per general surgery, SBO resolved.  -- Mgmt per general surgery  -- Avoid loperamide home med

## 2022-05-10 NOTE — PLAN OF CARE
Patient awakens easily, no distress noted, respirations even and unlabored. Allergies reviewed. Waiting for eval and consent.  Vitals:    05/10/22 1528   BP: (!) 144/80   Pulse: (!) 40   Resp: 15   Temp:

## 2022-05-10 NOTE — CARE UPDATE
RAPID RESPONSE NURSE PROACTIVE ROUNDING NOTE       Time of Visit: 1215    Admit Date: 2022  LOS: 2  Code Status: Full Code   Date of Visit: 05/10/2022  : 1933  Age: 88 y.o.  Sex: male  Race: Other  Bed: [address] [place] [address] A:   MRN: [id number]  Was the patient discharged from an ICU this admission? No   Was the patient discharged from a PACU within last 24 hours? No   Did the patient receive conscious sedation/general anesthesia in last 24 hours? No  Was the patient in the ED within the past 24 hours? No  Was the patient on NIPPV within the past 24 hours? No   Attending Physician: Justin Chauhan MD  Primary Service: General Surgery   Time spent at the bedside: < 15 min    SITUATION    Notified by bedside RN via phone call.  Reason for alert: Bradycardia  Called to evaluate the patient for Dysrythmia    BACKGROUND     Why is the patient in the hospital?: SBO (small bowel obstruction)    Patient has a past medical history of BPH (benign prostatic hyperplasia), Parkinson's disease, Ulcerative colitis, and Unspecified chronic bronchitis.    Last Vitals:  Temp: 97.1 °F (36.2 °C) (05/10 1135)  Pulse: 45 (05/10 1205)  Resp: 17 (05/10 1205)  BP: 137/64 (05/10 1153)  SpO2: 94 % (05/10 1205)    24 Hours Vitals Range:  Temp:  [96 °F (35.6 °C)-98.2 °F (36.8 °C)]   Pulse:  [38-52]   Resp:  [15-18]   BP: (104-137)/(51-74)   SpO2:  [92 %-95 %]     Labs:  Recent Labs     22  1549 22  0434 05/10/22  0513   WBC 13.02* 15.61* 11.53   HGB 7.2* 9.5* 9.1*   HCT 23.5* 30.6* 29.4*   * 156 156       Recent Labs     22  0201 22  1549 22  0434 05/10/22  0513    147* 141 142   K 5.2* 3.4* 4.6 3.9    118* 111* 112*   CO2 26 21* 24 25   CREATININE 1.4 0.8 1.2 1.1   * 60* 68* 86   PHOS  --   --  2.9 1.9*   MG 2.1  --  1.9 1.8        No results for input(s): PH, PCO2, PO2, HCO3, POCSATURATED, BE in the last 72 hours.     ASSESSMENT    Physical Exam  Cardiovascular:      Rate and Rhythm:  Bradycardia present.   Neurological:      Mental Status: He is alert and oriented to person, place, and time.     Received a call from bedside RN with concerns for Bradycardia. Pt is asymptomatic lying in bed. He is Alert and oriented on exam. Vital signs obtained HR 38 bpm, , O2 94% on 2L NC.    INTERVENTIONS    Cardiology Consult placed.     RECOMMENDATIONS    Cardiology Consult.  Monitor HR and VS closely.       PROVIDER ESCALATION    Yes/No  yes    Orders received and case discussed with Dr Hernandez    Disposition: Remain in room 522.    FOLLOW-UP    Bedside RNIsh updated on plan of care. Instructed to call the Rapid Response Nurse, Daiana Milan, RN at [phone] for additional questions or concerns.

## 2022-05-10 NOTE — NURSING
Mr. Rodriguezg aware of pending thoracentesis and prefers not to have residents perform thoracentesis scheduled for today. IR made aware and states that regular practitioners will be here tomorrow to perform. Dr. Mauro with general surgery made aware.

## 2022-05-10 NOTE — SUBJECTIVE & OBJECTIVE
Past Medical History:   Diagnosis Date    BPH (benign prostatic hyperplasia)     Parkinson's disease     Ulcerative colitis     Unspecified chronic bronchitis        Past Surgical History:   Procedure Laterality Date    TOTAL COLECTOMY         Review of patient's allergies indicates:  No Known Allergies    No current facility-administered medications on file prior to encounter.     Current Outpatient Medications on File Prior to Encounter   Medication Sig    albuterol (PROVENTIL/VENTOLIN HFA) 90 mcg/actuation inhaler INHALE 2 PUFFS INTO THE LUNGS EVERY 6 (SIX) HOURS AS NEEDED FOR WHEEZING OR SHORTNESS OF BREATH.    bumetanide (BUMEX) 0.5 MG Tab Take 4 tablets (2 mg total) by mouth daily as needed (Lower extremity edema).    carbidopa-levodopa  mg (SINEMET)  mg per tablet Take 1.5 tablets by mouth 4 (four) times daily.    loperamide (IMODIUM) 2 mg capsule Take 2 mg by mouth.    PROSCAR 5 mg tablet Take 1 tablet by mouth once daily.    rasagiline (AZILECT) 1 mg Tab Take 1 tablet (1 mg total) by mouth once daily at 6am.     Family History    None       Tobacco Use    Smoking status: Never Smoker    Smokeless tobacco: Never Used   Substance and Sexual Activity    Alcohol use: Never    Drug use: Not on file    Sexual activity: Not on file     Review of Systems   Unable to perform ROS: Other (Patient extremely hard of hearing, unable to hear/ answer ROS)   Objective:     Vital Signs (Most Recent):  Temp: 97.1 °F (36.2 °C) (05/10/22 1135)  Pulse: (!) 44 (05/10/22 1300)  Resp: 17 (05/10/22 1205)  BP: 137/64 (05/10/22 1153)  SpO2: 95 % (05/10/22 1300)   Vital Signs (24h Range):  Temp:  [96 °F (35.6 °C)-98.2 °F (36.8 °C)] 97.1 °F (36.2 °C)  Pulse:  [38-52] 44  Resp:  [15-18] 17  SpO2:  [92 %-95 %] 95 %  BP: (104-137)/(51-74) 137/64     Weight: 65.5 kg (144 lb 6.4 oz)  Body mass index is 24.03 kg/m².    Physical Exam  Constitutional:       Appearance: Normal appearance.   HENT:      Head: Normocephalic and  atraumatic.      Mouth/Throat:      Mouth: Mucous membranes are moist.      Pharynx: Oropharynx is clear.   Eyes:      Extraocular Movements: Extraocular movements intact.      Pupils: Pupils are equal, round, and reactive to light.   Cardiovascular:      Rate and Rhythm: Regular rhythm. Bradycardia present.      Pulses: Normal pulses.      Heart sounds: Normal heart sounds.   Pulmonary:      Effort: Pulmonary effort is normal. No respiratory distress.      Breath sounds: Normal breath sounds.      Comments: 2L O2 via NC  Abdominal:      General: Abdomen is flat. Bowel sounds are normal.      Palpations: Abdomen is soft.   Musculoskeletal:         General: Swelling (Mild BLE edema) present.      Cervical back: Neck supple.   Skin:     General: Skin is warm and dry.      Capillary Refill: Capillary refill takes less than 2 seconds.   Neurological:      General: No focal deficit present.      Mental Status: He is alert and oriented to person, place, and time. Mental status is at baseline.   Psychiatric:         Mood and Affect: Mood normal.         Behavior: Behavior normal.         Thought Content: Thought content normal.         Judgment: Judgment normal.       Significant Labs: All pertinent labs within the past 24 hours have been reviewed.  BMP:   Recent Labs   Lab 05/10/22  0513   GLU 86      K 3.9   *   CO2 25   BUN 19   CREATININE 1.1   CALCIUM 7.7*   MG 1.8     CBC:   Recent Labs   Lab 05/08/22  1549 05/09/22  0434 05/10/22  0513   WBC 13.02* 15.61* 11.53   HGB 7.2* 9.5* 9.1*   HCT 23.5* 30.6* 29.4*   * 156 156     CMP:   Recent Labs   Lab 05/08/22  1549 05/09/22  0434 05/10/22  0513   * 141 142   K 3.4* 4.6 3.9   * 111* 112*   CO2 21* 24 25   GLU 60* 68* 86   BUN 17 24* 19   CREATININE 0.8 1.2 1.1   CALCIUM 5.7* 7.9* 7.7*   PROT  --  5.3*  --    ALBUMIN  --  2.3*  --    BILITOT  --  0.7  --    ALKPHOS  --  55  --    AST  --  17  --    ALT  --  8*  --    ANIONGAP 8 6* 5*    EGFRNONAA >60.0 53.7* 59.6*     Lactic Acid:   Recent Labs   Lab 05/08/22  1549   LACTATE 0.8     Urine Culture: No results for input(s): LABURIN in the last 48 hours.  Urine Studies:   Recent Labs   Lab 05/09/22  0939   COLORU Yellow   APPEARANCEUA Clear   PHUR 5.0   SPECGRAV 1.030   PROTEINUA Negative   GLUCUA Negative   KETONESU Trace*   BILIRUBINUA Negative   OCCULTUA Negative   NITRITE Negative   LEUKOCYTESUR Trace*   RBCUA 2   WBCUA 5   BACTERIA Occasional   SQUAMEPITHEL 0     Recent Lab Results         05/10/22  1301   05/10/22  0513   05/10/22  0020        Anion Gap   5         Baso #   0.04         Basophil %   0.3         BUN   19         Calcium   7.7         Chloride   112         CO2   25         Creatinine   1.1         Differential Method   Automated         eGFR if    >60.0         eGFR if non    59.6  Comment: Calculation used to obtain the estimated glomerular filtration  rate (eGFR) is the CKD-EPI equation.            Eos #   0.3         Eosinophil %   2.8         Glucose   86         Gran # (ANC)   8.7         Gran %   75.4         Hematocrit   29.4         Hemoglobin   9.1         Immature Grans (Abs)   0.04  Comment: Mild elevation in immature granulocytes is non specific and   can be seen in a variety of conditions including stress response,   acute inflammation, trauma and pregnancy. Correlation with other   laboratory and clinical findings is essential.           Immature Granulocytes   0.3         Lymph #   1.3         Lymph %   11.3         Magnesium   1.8         MCH   29.9         MCHC   31.0         MCV   97         Mono #   1.1         Mono %   9.9         MPV   11.3         nRBC   0         Phosphorus   1.9         Platelets   156         POCT Glucose 96     96       Potassium   3.9         RBC   3.04         RDW   16.3         Sodium   142         WBC   11.53                 Significant Imaging:   EXAMINATION:  XR CHEST AP PORTABLE     CLINICAL  HISTORY:  Leukocytosis;     TECHNIQUE:  Single frontal view of the chest was performed.     COMPARISON:  05/08/2022.     FINDINGS:  There are moderate left and small right pleural effusion with adjacent atelectasis or consolidation.  There is perihilar interstitial prominence, similar to prior.  There is no pneumothorax.     The cardiac silhouette is partially obscured.     Visualized osseous structures demonstrate osteopenia and degenerative changes.     Impression:     Moderate left small right pleural effusion with adjacent atelectasis or consolidations.        Electronically signed by: Antonio Valderrama  Date:                                            05/09/2022  Time:                                           08:59

## 2022-05-10 NOTE — ASSESSMENT & PLAN NOTE
88M with Parkinsons, BPH, and history of total colectomy in 2013 for UC presents with SBO. Has leukocytosis to 27, but lactic is normal, exam is benign and no systemic signs of sepsis. I think there is relatively low likelihood of ischemic bowel and will resuscitate with serial abdominal exams. Would prefer to avoid surgery in this frail patient with Parkinsons.    - IR consulted for L effusion with consolidation and leukocytosis. Planning on thora today pending availability   - Having bowel function  - NPO   - Continue Zosyn  - holding Parkinsons meds and BPH med until can tolerate PO  - no need to GGC, having bowel function    Dispo: w/u AMS and leukocystosis.

## 2022-05-10 NOTE — HPI
Giselle Lemus is an 88 y.o. M. With history of Parkinson's, BPH, UC s/p total colectomy, and chronic bronchitis who presents with abdominal pain. Found to have SBO. NGT was placed initially and since d/c. Patient with + BM. Per general surgery, SBO resolved. Noted leukocytosis 27 on presentation, since improved to 12 today. Normotensive, afebrile, and normal LA. CXR revealed small to moderate left pleural effusion. Zosyn initialed. IR consulted and plan for thoracentesis. NPO now with IV fluids. Oxygenation slightly worsening. Currently on 2L O2 via NC. Developed SB with rate 30-40s. Cardiology consulted and recs currently pending. Medicine consulted for possible transfer to medicine given resolved SBO without surgical intervention, pleural effusion, and bradycardia.

## 2022-05-10 NOTE — ASSESSMENT & PLAN NOTE
-- CXR revealed small to moderate left pleural effusion.   -- Suspect likely transudative. Worsening oxygenation with continued IV fluids. Some BLE edema, prescribed bumex prn, and noted elevated CVP on TTE last year concerning for volume overload  -- Less likely infectious. Noted leukocytosis 27 on presentation, since improved to 12 today. Otherwise, normotensive, afebrile, and normal LA.   -- IR consulted and plan for thoracentesis. F/u results  -- IV fluids d/c  -- Recommend d/c Zosyn and monitor  -- Repeat TTE to evaluate for possible ADHF. BNP ordered  -- Transfer to medicine primary team 5/11 0700

## 2022-05-10 NOTE — PROGRESS NOTES
[facility]  General Surgery  Progress Note    Subjective:     History of Present Illness:  88M with history of total colectomy for UC in 2013 presents with SBO. He had mild abdominal pain and bloating. Last BM morning before admit. No noted prior episodes. Vomited in the ED.    In the ED, he had NGT placed with large emesis during initial attempts and then about 250 cc thick brown output in the can. Looks like gumbo. He is not having significant pain or tenderness.    He is AF, HDS.    Leukocytosis to 27 with left shift. Lactic is 1.8.    K is 5.2. Cr 1.4 (stable from about a year ago).    He has additional history of hearing loss, Parkinson's disease on multiple meds, BPH on proscar, chronic bronchitis.     EF on echo in 2021 was 60%. He has been seen by cardiology for lower extremity edema, left worse than right.    He is a retired . From [employer]. Understands and speaks English. Very engaged intellectually / sharp. Asks good questions if given time.      Post-Op Info:  * No surgery found *         Interval History:   NAEO  Bradycardic to 38 overnight.  More interactive this morning that yesterday  WBC downtrending  IR hannaha planned for today pending availability   BM x2    Medications:  Continuous Infusions:   sodium chloride 0.9% 75 mL/hr at 05/10/22 0511     Scheduled Meds:   heparin (porcine)  5,000 Units Subcutaneous Q8H    LIDOcaine (PF) 10 mg/ml (1%)  1 mL Other Once    pantoprazole  40 mg Intravenous Daily    piperacillin-tazobactam (ZOSYN) IVPB  4.5 g Intravenous Q8H     PRN Meds:albuterol-ipratropium, dextrose 10%, dextrose 10%, glucagon (human recombinant), glucose, glucose, ondansetron, sodium chloride 0.9%     Review of patient's allergies indicates:  No Known Allergies  Objective:     Vital Signs (Most Recent):  Temp: 96 °F (35.6 °C) (05/10/22 0751)  Pulse: (!) 38 (05/10/22 0751)  Resp: 18 (05/10/22 0530)  BP: (!) 117/58 (05/10/22 0751)  SpO2: 95 % (05/10/22 0751)   Vital Signs  (24h Range):  Temp:  [96 °F (35.6 °C)-98.2 °F (36.8 °C)] 96 °F (35.6 °C)  Pulse:  [38-59] 38  Resp:  [14-18] 18  SpO2:  [92 %-97 %] 95 %  BP: (102-127)/(51-74) 117/58     Weight: 65.5 kg (144 lb 6.4 oz)  Body mass index is 24.03 kg/m².    Intake/Output - Last 3 Shifts         05/08 0700  05/09 0659 05/09 0700  05/10 0659 05/10 0700 05/11 0659    P.O. 0 240     I.V. (mL/kg) 962.8 (14.7)      NG/GT 50      IV Piggyback 1000      Total Intake(mL/kg) 2012.8 (30.7) 240 (3.7)     Urine (mL/kg/hr) 0 (0) 200 (0.1)     Drains 550      Stool 0 0     Total Output 550 200     Net +1462.8 +40            Urine Occurrence 5 x 4 x     Stool Occurrence 2 x 2 x             Physical Exam  Vitals and nursing note reviewed.   Constitutional:       General: He is sleeping. He is not in acute distress.     Appearance: Normal appearance. He is not ill-appearing or toxic-appearing.      Comments: Frail  Not easily aroused   Cardiovascular:      Rate and Rhythm: Normal rate and regular rhythm.   Pulmonary:      Effort: Pulmonary effort is normal.   Abdominal:      Palpations: Abdomen is soft.      Comments: Moderate distension  No significant tenderness appreciated   Musculoskeletal:      Left lower leg: Edema present.   Skin:     General: Skin is warm and dry.      Capillary Refill: Capillary refill takes less than 2 seconds.   Neurological:      Mental Status: He is oriented to person, place, and time.      Motor: Weakness present.      Gait: Gait abnormal.       Significant Labs:  I have reviewed all pertinent lab results within the past 24 hours.  CBC:   Recent Labs   Lab 05/10/22  0513   WBC 11.53   RBC 3.04*   HGB 9.1*   HCT 29.4*      MCV 97   MCH 29.9   MCHC 31.0*     BMP:   Recent Labs   Lab 05/10/22  0513   GLU 86      K 3.9   *   CO2 25   BUN 19   CREATININE 1.1   CALCIUM 7.7*   MG 1.8       Significant Diagnostics:  I have reviewed all pertinent imaging results/findings within the past 24  hours.    Assessment/Plan:     * SBO (small bowel obstruction)  88M with Parkinsons, BPH, and history of total colectomy in 2013 for UC presents with SBO. Has leukocytosis to 27, but lactic is normal, exam is benign and no systemic signs of sepsis. I think there is relatively low likelihood of ischemic bowel and will resuscitate with serial abdominal exams. Would prefer to avoid surgery in this frail patient with Parkinsons.    - IR consulted for L effusion with consolidation and leukocytosis. Planning on thora today pending availability   - Having bowel function  - NPO   - Continue Zosyn  - holding Parkinsons meds and BPH med until can tolerate PO  - no need to GGC, having bowel function    Dispo: w/u AMS and leukocystosis.            Patrick Mauro MD  General Surgery  [facility]

## 2022-05-10 NOTE — PHYSICIAN QUERY
PT Name: Giselle Lemus  MR #: [id number]     DOCUMENTATION CLARIFICATION     CDS: Dorie CHRISTIAN RN  Contact information: [email or web address]    This form is a permanent document in the medical record.     Query Date: May 10, 2022    By submitting this query, we are merely seeking further clarification of documentation to reflect the severity of illness of your patient. Please utilize your independent clinical judgment when addressing the question(s) below.    The medical record reflects the following:     Indicators   Supporting Clinical Findings Location in Medical Record   X Bowel obstruction, intestinal obstruction, LBO or SBO documented CT shows small bowel obstruction partial.  Surgery called and they evaluated patient at bedside.  NG placed by surgery.      SBO (small bowel obstruction)  88M with Parkinsons, BPH, and history of total colectomy in 2013 for UC presents with SBO. Has leukocytosis to 27, but lactic is normal, exam is benign and no systemic signs of sepsis. I think there is relatively low likelihood of ischemic bowel and will resuscitate with serial abdominal exams. Would prefer to avoid surgery in this frail patient with Parkinsons. ED Prov Notes 5/8/2022      Consults Gen Surg 5/8/2022   X Radiology findings Findings consistent with partial small bowel obstruction with possible transition point left lower quadrant.  Exact zone of transition is not identified.  Findings consistent with prior abdominal bowel surgeries including apparent total colectomy.  Consider possibly of adhesions.  No free intraperitoneal air. CT Abd Pelvis 5/8/2022   X Treatment/Medication - NPO  - NGT  - 2L LR in ED and then  / hr  - decompress for 24 hours and then gastrograffin challenge Consults Gen Surg 5/8/2022    Procedure/Surgery      Other       Provider, please further specify the bowel obstruction diagnosis:    [ x  ] Partial or incomplete intestinal obstruction, due to adhesions   [   ] Partial or  incomplete intestinal obstruction, due to other (please specify): ____________   [   ] Partial or incomplete intestinal obstruction, unknown or unspecified etiology   [   ] Other intestinal condition (please specify): _____________________   [   ]  Clinically Undetermined         Please document in your progress notes daily for the duration of treatment until resolved, and include in your discharge summary.

## 2022-05-10 NOTE — NURSING
Notified by IR that regular practitioner is willing to perform thoracentesis today. Mr. Lemus made aware and okay to proceed with procedure.

## 2022-05-10 NOTE — NURSING
Patient currently on telemetry with HR 31-42. Patient remains AAOX4, asymptomatic. Other VSS. Notified Dr. Mauro. Order placed for Atropine q8h. Notified Rapid Response nurse KATELYNN Ahmadi. States she will place cardiology consult before Atropine to be given and will notify the team.

## 2022-05-10 NOTE — CONSULTS
[facility] - Surgery  [facility] Medicine  Consult Note    Patient Name: Giselle Lemus  MRN: [id number]  Admission Date: 5/8/2022  Hospital Length of Stay: 2 days  Attending Physician: Justin Chauhan MD   Primary Care Provider: Tl Torres MD           Patient information was obtained from past medical records and ER records.     Inpatient consult to Hospital Medicine-General  Consult performed by: Joanna Zambrano DO  Consult ordered by: Saeed Villa PA-C        Subjective:     Principal Problem: SBO (small bowel obstruction)    Chief Complaint:   Chief Complaint   Patient presents with    Bloated     Pt c/o abdominal bloating and constipation that started today. PMH of Parkinson's and ulcerative colitis. Pt is extremely hard of hearing majority of information provided by brother in law.         HPI: Giselle Lemus is an 88 y.o. M. With history of Parkinson's, BPH, UC s/p total colectomy, and chronic bronchitis who presents with abdominal pain. Found to have SBO. NGT was placed initially and since d/c. Patient with + BM. Per general surgery, SBO resolved. Noted leukocytosis 27 on presentation, since improved to 12 today. Normotensive, afebrile, and normal LA. CXR revealed small to moderate left pleural effusion. Zosyn initialed. IR consulted and plan for thoracentesis. NPO now with IV fluids. Oxygenation slightly worsening. Currently on 2L O2 via NC. Developed SB with rate 30-40s. Cardiology consulted and recs currently pending. Medicine consulted for possible transfer to medicine given resolved SBO without surgical intervention, pleural effusion, and bradycardia.      Past Medical History:   Diagnosis Date    BPH (benign prostatic hyperplasia)     Parkinson's disease     Ulcerative colitis     Unspecified chronic bronchitis        Past Surgical History:   Procedure Laterality Date    TOTAL COLECTOMY         Review of patient's allergies indicates:  No Known Allergies    No current facility-administered medications  on file prior to encounter.     Current Outpatient Medications on File Prior to Encounter   Medication Sig    albuterol (PROVENTIL/VENTOLIN HFA) 90 mcg/actuation inhaler INHALE 2 PUFFS INTO THE LUNGS EVERY 6 (SIX) HOURS AS NEEDED FOR WHEEZING OR SHORTNESS OF BREATH.    bumetanide (BUMEX) 0.5 MG Tab Take 4 tablets (2 mg total) by mouth daily as needed (Lower extremity edema).    carbidopa-levodopa  mg (SINEMET)  mg per tablet Take 1.5 tablets by mouth 4 (four) times daily.    loperamide (IMODIUM) 2 mg capsule Take 2 mg by mouth.    PROSCAR 5 mg tablet Take 1 tablet by mouth once daily.    rasagiline (AZILECT) 1 mg Tab Take 1 tablet (1 mg total) by mouth once daily at 6am.     Family History    None       Tobacco Use    Smoking status: Never Smoker    Smokeless tobacco: Never Used   Substance and Sexual Activity    Alcohol use: Never    Drug use: Not on file    Sexual activity: Not on file     Review of Systems   Unable to perform ROS: Other (Patient extremely hard of hearing, unable to hear/ answer ROS)   Objective:     Vital Signs (Most Recent):  Temp: 97.1 °F (36.2 °C) (05/10/22 1135)  Pulse: (!) 44 (05/10/22 1300)  Resp: 17 (05/10/22 1205)  BP: 137/64 (05/10/22 1153)  SpO2: 95 % (05/10/22 1300)   Vital Signs (24h Range):  Temp:  [96 °F (35.6 °C)-98.2 °F (36.8 °C)] 97.1 °F (36.2 °C)  Pulse:  [38-52] 44  Resp:  [15-18] 17  SpO2:  [92 %-95 %] 95 %  BP: (104-137)/(51-74) 137/64     Weight: 65.5 kg (144 lb 6.4 oz)  Body mass index is 24.03 kg/m².    Physical Exam  Constitutional:       Appearance: Normal appearance.   HENT:      Head: Normocephalic and atraumatic.      Mouth/Throat:      Mouth: Mucous membranes are moist.      Pharynx: Oropharynx is clear.   Eyes:      Extraocular Movements: Extraocular movements intact.      Pupils: Pupils are equal, round, and reactive to light.   Cardiovascular:      Rate and Rhythm: Regular rhythm. Bradycardia present.      Pulses: Normal pulses.      Heart  sounds: Normal heart sounds.   Pulmonary:      Effort: Pulmonary effort is normal. No respiratory distress.      Breath sounds: Normal breath sounds.      Comments: 2L O2 via NC  Abdominal:      General: Abdomen is flat. Bowel sounds are normal.      Palpations: Abdomen is soft.   Musculoskeletal:         General: Swelling (Mild BLE edema) present.      Cervical back: Neck supple.   Skin:     General: Skin is warm and dry.      Capillary Refill: Capillary refill takes less than 2 seconds.   Neurological:      General: No focal deficit present.      Mental Status: He is alert and oriented to person, place, and time. Mental status is at baseline.   Psychiatric:         Mood and Affect: Mood normal.         Behavior: Behavior normal.         Thought Content: Thought content normal.         Judgment: Judgment normal.       Significant Labs: All pertinent labs within the past 24 hours have been reviewed.  BMP:   Recent Labs   Lab 05/10/22  0513   GLU 86      K 3.9   *   CO2 25   BUN 19   CREATININE 1.1   CALCIUM 7.7*   MG 1.8     CBC:   Recent Labs   Lab 05/08/22  1549 05/09/22  0434 05/10/22  0513   WBC 13.02* 15.61* 11.53   HGB 7.2* 9.5* 9.1*   HCT 23.5* 30.6* 29.4*   * 156 156     CMP:   Recent Labs   Lab 05/08/22  1549 05/09/22  0434 05/10/22  0513   * 141 142   K 3.4* 4.6 3.9   * 111* 112*   CO2 21* 24 25   GLU 60* 68* 86   BUN 17 24* 19   CREATININE 0.8 1.2 1.1   CALCIUM 5.7* 7.9* 7.7*   PROT  --  5.3*  --    ALBUMIN  --  2.3*  --    BILITOT  --  0.7  --    ALKPHOS  --  55  --    AST  --  17  --    ALT  --  8*  --    ANIONGAP 8 6* 5*   EGFRNONAA >60.0 53.7* 59.6*     Lactic Acid:   Recent Labs   Lab 05/08/22  1549   LACTATE 0.8     Urine Culture: No results for input(s): LABURIN in the last 48 hours.  Urine Studies:   Recent Labs   Lab 05/09/22  0939   COLORU Yellow   APPEARANCEUA Clear   PHUR 5.0   SPECGRAV 1.030   PROTEINUA Negative   GLUCUA Negative   KETONESU Trace*   BILIRUBINUA  Negative   OCCULTUA Negative   NITRITE Negative   LEUKOCYTESUR Trace*   RBCUA 2   WBCUA 5   BACTERIA Occasional   SQUAMEPITHEL 0     Recent Lab Results         05/10/22  1301   05/10/22  0513   05/10/22  0020        Anion Gap   5         Baso #   0.04         Basophil %   0.3         BUN   19         Calcium   7.7         Chloride   112         CO2   25         Creatinine   1.1         Differential Method   Automated         eGFR if    >60.0         eGFR if non    59.6  Comment: Calculation used to obtain the estimated glomerular filtration  rate (eGFR) is the CKD-EPI equation.            Eos #   0.3         Eosinophil %   2.8         Glucose   86         Gran # (ANC)   8.7         Gran %   75.4         Hematocrit   29.4         Hemoglobin   9.1         Immature Grans (Abs)   0.04  Comment: Mild elevation in immature granulocytes is non specific and   can be seen in a variety of conditions including stress response,   acute inflammation, trauma and pregnancy. Correlation with other   laboratory and clinical findings is essential.           Immature Granulocytes   0.3         Lymph #   1.3         Lymph %   11.3         Magnesium   1.8         MCH   29.9         MCHC   31.0         MCV   97         Mono #   1.1         Mono %   9.9         MPV   11.3         nRBC   0         Phosphorus   1.9         Platelets   156         POCT Glucose 96     96       Potassium   3.9         RBC   3.04         RDW   16.3         Sodium   142         WBC   11.53                 Significant Imaging:   EXAMINATION:  XR CHEST AP PORTABLE     CLINICAL HISTORY:  Leukocytosis;     TECHNIQUE:  Single frontal view of the chest was performed.     COMPARISON:  05/08/2022.     FINDINGS:  There are moderate left and small right pleural effusion with adjacent atelectasis or consolidation.  There is perihilar interstitial prominence, similar to prior.  There is no pneumothorax.     The cardiac silhouette is partially  obscured.     Visualized osseous structures demonstrate osteopenia and degenerative changes.     Impression:     Moderate left small right pleural effusion with adjacent atelectasis or consolidations.        Electronically signed by: Antonio Valderrama  Date:                                            05/09/2022  Time:                                           08:59    Assessment/Plan:     * SBO (small bowel obstruction)  -- Presents with SBO  -- Hx of UC s/p total colectomy  -- NGT was placed initially and since d/c. Patient with + BM. Per general surgery, SBO resolved.  -- Mgmt per general surgery  -- Avoid loperamide home med      Hypophosphatemia  -- PO4 1.9  -- Replete as indicated      Sinus bradycardia  -- SB with rate 30-40s.  -- Unclear if symptomatic. Patient extremely hard of hearing and unable to hear /answer interview  -- Suspect possible SSS  -- Cardiology consulted. F/u recs    Pleural effusion  -- CXR revealed small to moderate left pleural effusion.   -- Suspect likely transudative. Worsening oxygenation with continued IV fluids. Some BLE edema, prescribed bumex prn, and noted elevated CVP on TTE last year concerning for volume overload  -- Less likely infectious. Noted leukocytosis 27 on presentation, since improved to 12 today. Otherwise, normotensive, afebrile, and normal LA.   -- IR consulted and plan for thoracentesis. F/u results  -- IV fluids d/c  -- Recommend d/c Zosyn and monitor  -- Repeat TTE to evaluate for possible ADHF. BNP ordered  -- Transfer to medicine primary team 5/11 0700        Benign prostatic hyperplasia  -- Resume home proscar when not NPO      Parkinsons  -- Holding home sinemet and rasagiline while NPO  -- Resume when diet resumed        VTE Risk Mitigation (From admission, onward)         Ordered     heparin (porcine) injection 5,000 Units  Every 8 hours         05/08/22 0512     IP VTE HIGH RISK PATIENT  Once         05/08/22 0512     Place sequential compression device   Until discontinued         05/08/22 0512                    Thank you for your consult. I will follow-up with patient. Please contact us if you have any additional questions.    Joanna Zambrano DO  Department of Hospital Medicine   [facility] - Surgery

## 2022-05-10 NOTE — NURSING
Patient transported to IR via stretcher. AAOX4, 2 L per NC and telemetry monitor in place. War room made aware.

## 2022-05-10 NOTE — NURSING
1900 Patient alert and awake in bed, family at bedside. No acute distress noted.    2100 Patient informed nurse that he is soiled, incontinent care rendered, is positioned on his side with the use of a wedge and fell asleep right after.    0628 Patient slept, HR will remains maycol. Safety maintained, call bell within reach, also wants phone within reach. Oxygen in use, no respiratory distress noted.

## 2022-05-10 NOTE — CARE UPDATE
RAPID RESPONSE NURSE ROUND       Rounding completed with charge RNGerardo. No concerns verbalized at this time. Instructed to call [phone] for further concerns or assistance.

## 2022-05-11 LAB
ALBUMIN SERPL BCP-MCNC: 2.1 G/DL (ref 3.5–5.2)
ALBUMIN SERPL BCP-MCNC: 2.1 G/DL (ref 3.5–5.2)
ALP SERPL-CCNC: 57 U/L (ref 55–135)
ALP SERPL-CCNC: 65 U/L (ref 55–135)
ALT SERPL W/O P-5'-P-CCNC: 11 U/L (ref 10–44)
ALT SERPL W/O P-5'-P-CCNC: 9 U/L (ref 10–44)
ANION GAP SERPL CALC-SCNC: 5 MMOL/L (ref 8–16)
ANION GAP SERPL CALC-SCNC: 6 MMOL/L (ref 8–16)
AST SERPL-CCNC: 20 U/L (ref 10–40)
AST SERPL-CCNC: 22 U/L (ref 10–40)
AV INDEX (PROSTH): 0.9
AV MEAN GRADIENT: 3 MMHG
AV PEAK GRADIENT: 6 MMHG
AV VALVE AREA: 2.86 CM2
AV VELOCITY RATIO: 0.93
BASOPHILS # BLD AUTO: 0.04 K/UL (ref 0–0.2)
BASOPHILS NFR BLD: 0.3 % (ref 0–1.9)
BILIRUB SERPL-MCNC: 0.4 MG/DL (ref 0.1–1)
BILIRUB SERPL-MCNC: 0.4 MG/DL (ref 0.1–1)
BODY FLUID SOURCE, LDH: NORMAL
BSA FOR ECHO PROCEDURE: 1.73 M2
BUN SERPL-MCNC: 18 MG/DL (ref 8–23)
BUN SERPL-MCNC: 19 MG/DL (ref 8–23)
CALCIUM SERPL-MCNC: 7.7 MG/DL (ref 8.7–10.5)
CALCIUM SERPL-MCNC: 7.8 MG/DL (ref 8.7–10.5)
CHLORIDE SERPL-SCNC: 113 MMOL/L (ref 95–110)
CHLORIDE SERPL-SCNC: 113 MMOL/L (ref 95–110)
CO2 SERPL-SCNC: 22 MMOL/L (ref 23–29)
CO2 SERPL-SCNC: 24 MMOL/L (ref 23–29)
CREAT SERPL-MCNC: 1 MG/DL (ref 0.5–1.4)
CREAT SERPL-MCNC: 1 MG/DL (ref 0.5–1.4)
CV ECHO LV RWT: 0.26 CM
DIFFERENTIAL METHOD: ABNORMAL
DOP CALC AO PEAK VEL: 1.23 M/S
DOP CALC AO VTI: 32.52 CM
DOP CALC LVOT AREA: 3.2 CM2
DOP CALC LVOT DIAMETER: 2.01 CM
DOP CALC LVOT PEAK VEL: 1.14 M/S
DOP CALC LVOT STROKE VOLUME: 92.86 CM3
DOP CALCLVOT PEAK VEL VTI: 29.28 CM
E WAVE DECELERATION TIME: 227.22 MSEC
E/A RATIO: 1.48
E/E' RATIO: 11.18 M/S
ECHO LV POSTERIOR WALL: 0.7 CM (ref 0.6–1.1)
EJECTION FRACTION: 65 %
EOSINOPHIL # BLD AUTO: 0.2 K/UL (ref 0–0.5)
EOSINOPHIL NFR BLD: 1.8 % (ref 0–8)
ERYTHROCYTE [DISTWIDTH] IN BLOOD BY AUTOMATED COUNT: 16.1 % (ref 11.5–14.5)
EST. GFR  (AFRICAN AMERICAN): >60 ML/MIN/1.73 M^2
EST. GFR  (AFRICAN AMERICAN): >60 ML/MIN/1.73 M^2
EST. GFR  (NON AFRICAN AMERICAN): >60 ML/MIN/1.73 M^2
EST. GFR  (NON AFRICAN AMERICAN): >60 ML/MIN/1.73 M^2
FRACTIONAL SHORTENING: 35 % (ref 28–44)
GLUCOSE SERPL-MCNC: 98 MG/DL (ref 70–110)
GLUCOSE SERPL-MCNC: 98 MG/DL (ref 70–110)
HCT VFR BLD AUTO: 29.8 % (ref 40–54)
HGB BLD-MCNC: 9.5 G/DL (ref 14–18)
IMM GRANULOCYTES # BLD AUTO: 0.06 K/UL (ref 0–0.04)
IMM GRANULOCYTES NFR BLD AUTO: 0.5 % (ref 0–0.5)
INTERVENTRICULAR SEPTUM: 0.68 CM (ref 0.6–1.1)
LA MAJOR: 4.86 CM
LA MINOR: 4.85 CM
LA WIDTH: 4.09 CM
LDH FLD L TO P-CCNC: 104 U/L
LEFT ATRIUM SIZE: 3.58 CM
LEFT ATRIUM VOLUME INDEX MOD: 29.5 ML/M2
LEFT ATRIUM VOLUME INDEX: 35.1 ML/M2
LEFT ATRIUM VOLUME MOD: 50.77 CM3
LEFT ATRIUM VOLUME: 60.42 CM3
LEFT INTERNAL DIMENSION IN SYSTOLE: 3.54 CM (ref 2.1–4)
LEFT VENTRICLE DIASTOLIC VOLUME INDEX: 84.8 ML/M2
LEFT VENTRICLE DIASTOLIC VOLUME: 145.86 ML
LEFT VENTRICLE MASS INDEX: 77 G/M2
LEFT VENTRICLE SYSTOLIC VOLUME INDEX: 30.5 ML/M2
LEFT VENTRICLE SYSTOLIC VOLUME: 52.43 ML
LEFT VENTRICULAR INTERNAL DIMENSION IN DIASTOLE: 5.47 CM (ref 3.5–6)
LEFT VENTRICULAR MASS: 131.85 G
LV LATERAL E/E' RATIO: 9.5 M/S
LV SEPTAL E/E' RATIO: 13.57 M/S
LYMPHOCYTES # BLD AUTO: 1.2 K/UL (ref 1–4.8)
LYMPHOCYTES NFR BLD: 9.1 % (ref 18–48)
MAGNESIUM SERPL-MCNC: 1.7 MG/DL (ref 1.6–2.6)
MCH RBC QN AUTO: 30.6 PG (ref 27–31)
MCHC RBC AUTO-ENTMCNC: 31.9 G/DL (ref 32–36)
MCV RBC AUTO: 96 FL (ref 82–98)
MONOCYTES # BLD AUTO: 1.3 K/UL (ref 0.3–1)
MONOCYTES NFR BLD: 10.1 % (ref 4–15)
MV A" WAVE DURATION": 7.14 MSEC
MV PEAK A VEL: 0.64 M/S
MV PEAK E VEL: 0.95 M/S
MV STENOSIS PRESSURE HALF TIME: 65.89 MS
MV VALVE AREA P 1/2 METHOD: 3.34 CM2
NEUTROPHILS # BLD AUTO: 10.3 K/UL (ref 1.8–7.7)
NEUTROPHILS NFR BLD: 78.2 % (ref 38–73)
NRBC BLD-RTO: 0 /100 WBC
PHOSPHATE SERPL-MCNC: 1.8 MG/DL (ref 2.7–4.5)
PISA TR MAX VEL: 2.67 M/S
PLATELET # BLD AUTO: 156 K/UL (ref 150–450)
PMV BLD AUTO: 11.8 FL (ref 9.2–12.9)
POCT GLUCOSE: 100 MG/DL (ref 70–110)
POCT GLUCOSE: 114 MG/DL (ref 70–110)
POCT GLUCOSE: 116 MG/DL (ref 70–110)
POCT GLUCOSE: 127 MG/DL (ref 70–110)
POTASSIUM SERPL-SCNC: 3.7 MMOL/L (ref 3.5–5.1)
POTASSIUM SERPL-SCNC: 3.7 MMOL/L (ref 3.5–5.1)
PROT FLD-MCNC: 2.2 G/DL
PROT SERPL-MCNC: 5 G/DL (ref 6–8.4)
PROT SERPL-MCNC: 5 G/DL (ref 6–8.4)
PULM VEIN S/D RATIO: 0.95
PV PEAK D VEL: 0.42 M/S
PV PEAK S VEL: 0.4 M/S
RA MAJOR: 4.87 CM
RA PRESSURE: 3 MMHG
RA WIDTH: 4.29 CM
RBC # BLD AUTO: 3.1 M/UL (ref 4.6–6.2)
RIGHT VENTRICULAR END-DIASTOLIC DIMENSION: 3.67 CM
RV TISSUE DOPPLER FREE WALL SYSTOLIC VELOCITY 1 (APICAL 4 CHAMBER VIEW): 13.33 CM/S
SINUS: 3.39 CM
SODIUM SERPL-SCNC: 141 MMOL/L (ref 136–145)
SODIUM SERPL-SCNC: 142 MMOL/L (ref 136–145)
SPECIMEN SOURCE: NORMAL
STJ: 2.61 CM
TDI LATERAL: 0.1 M/S
TDI SEPTAL: 0.07 M/S
TDI: 0.09 M/S
TR MAX PG: 29 MMHG
TRICUSPID ANNULAR PLANE SYSTOLIC EXCURSION: 1.93 CM
TV REST PULMONARY ARTERY PRESSURE: 32 MMHG
WBC # BLD AUTO: 13.12 K/UL (ref 3.9–12.7)

## 2022-05-11 PROCEDURE — 11000001 HC ACUTE MED/SURG PRIVATE ROOM

## 2022-05-11 PROCEDURE — 99233 SBSQ HOSP IP/OBS HIGH 50: CPT | Mod: GC,,, | Performed by: HOSPITALIST

## 2022-05-11 PROCEDURE — 63600175 PHARM REV CODE 636 W HCPCS: Performed by: HOSPITALIST

## 2022-05-11 PROCEDURE — 80053 COMPREHEN METABOLIC PANEL: CPT | Mod: 91 | Performed by: SURGERY

## 2022-05-11 PROCEDURE — 36415 COLL VENOUS BLD VENIPUNCTURE: CPT | Performed by: SURGERY

## 2022-05-11 PROCEDURE — 25000003 PHARM REV CODE 250: Performed by: STUDENT IN AN ORGANIZED HEALTH CARE EDUCATION/TRAINING PROGRAM

## 2022-05-11 PROCEDURE — C9113 INJ PANTOPRAZOLE SODIUM, VIA: HCPCS | Performed by: STUDENT IN AN ORGANIZED HEALTH CARE EDUCATION/TRAINING PROGRAM

## 2022-05-11 PROCEDURE — 84100 ASSAY OF PHOSPHORUS: CPT | Performed by: STUDENT IN AN ORGANIZED HEALTH CARE EDUCATION/TRAINING PROGRAM

## 2022-05-11 PROCEDURE — 63600175 PHARM REV CODE 636 W HCPCS: Performed by: STUDENT IN AN ORGANIZED HEALTH CARE EDUCATION/TRAINING PROGRAM

## 2022-05-11 PROCEDURE — 91305 PHARM REV CODE 636 W HCPCS: CPT | Performed by: HOSPITALIST

## 2022-05-11 PROCEDURE — 0054A HC IMMUNIZ ADMIN, SARS-COV-2 COVID-19 VACC, TRI SUCROSE, 30MCG/0.3ML, BOOSTER DOSE: CPT | Performed by: HOSPITALIST

## 2022-05-11 PROCEDURE — 83735 ASSAY OF MAGNESIUM: CPT | Performed by: STUDENT IN AN ORGANIZED HEALTH CARE EDUCATION/TRAINING PROGRAM

## 2022-05-11 PROCEDURE — 99233 PR SUBSEQUENT HOSPITAL CARE,LEVL III: ICD-10-PCS | Mod: GC,,, | Performed by: HOSPITALIST

## 2022-05-11 PROCEDURE — 85025 COMPLETE CBC W/AUTO DIFF WBC: CPT | Performed by: STUDENT IN AN ORGANIZED HEALTH CARE EDUCATION/TRAINING PROGRAM

## 2022-05-11 PROCEDURE — 27000221 HC OXYGEN, UP TO 24 HOURS

## 2022-05-11 RX ORDER — PANTOPRAZOLE SODIUM 40 MG/1
40 TABLET, DELAYED RELEASE ORAL DAILY
Status: DISCONTINUED | OUTPATIENT
Start: 2022-05-12 | End: 2022-05-13

## 2022-05-11 RX ORDER — CARBIDOPA AND LEVODOPA 25; 100 MG/1; MG/1
1 TABLET ORAL 4 TIMES DAILY
Status: DISCONTINUED | OUTPATIENT
Start: 2022-05-11 | End: 2022-05-18 | Stop reason: HOSPADM

## 2022-05-11 RX ORDER — CARBIDOPA AND LEVODOPA 25; 100 MG/1; MG/1
1 TABLET ORAL 4 TIMES DAILY
Status: CANCELLED | OUTPATIENT
Start: 2022-05-11

## 2022-05-11 RX ORDER — RASAGILINE 1 MG/1
1 TABLET ORAL DAILY
Status: DISCONTINUED | OUTPATIENT
Start: 2022-05-12 | End: 2022-05-18 | Stop reason: HOSPADM

## 2022-05-11 RX ORDER — ACETAMINOPHEN 325 MG/1
650 TABLET ORAL EVERY 6 HOURS PRN
COMMUNITY
End: 2022-06-21

## 2022-05-11 RX ORDER — FINASTERIDE 5 MG/1
5 TABLET, FILM COATED ORAL DAILY
COMMUNITY
End: 2023-01-01 | Stop reason: SDUPTHER

## 2022-05-11 RX ADMIN — HEPARIN SODIUM 5000 UNITS: 5000 INJECTION INTRAVENOUS; SUBCUTANEOUS at 05:05

## 2022-05-11 RX ADMIN — POTASSIUM PHOSPHATE, MONOBASIC AND POTASSIUM PHOSPHATE, DIBASIC 15 MMOL: 224; 236 INJECTION, SOLUTION, CONCENTRATE INTRAVENOUS at 10:05

## 2022-05-11 RX ADMIN — HEPARIN SODIUM 5000 UNITS: 5000 INJECTION INTRAVENOUS; SUBCUTANEOUS at 02:05

## 2022-05-11 RX ADMIN — PIPERACILLIN SODIUM AND TAZOBACTAM SODIUM 4.5 G: 4; .5 INJECTION, POWDER, LYOPHILIZED, FOR SOLUTION INTRAVENOUS at 05:05

## 2022-05-11 RX ADMIN — BNT162B2 0.3 ML: 0.23 INJECTION, SUSPENSION INTRAMUSCULAR at 02:05

## 2022-05-11 RX ADMIN — HEPARIN SODIUM 5000 UNITS: 5000 INJECTION INTRAVENOUS; SUBCUTANEOUS at 08:05

## 2022-05-11 RX ADMIN — CARBIDOPA AND LEVODOPA 1 TABLET: 25; 100 TABLET ORAL at 05:05

## 2022-05-11 RX ADMIN — PANTOPRAZOLE SODIUM 40 MG: 40 INJECTION, POWDER, FOR SOLUTION INTRAVENOUS at 08:05

## 2022-05-11 RX ADMIN — CARBIDOPA AND LEVODOPA 1 TABLET: 25; 100 TABLET ORAL at 08:05

## 2022-05-11 RX ADMIN — CARBIDOPA AND LEVODOPA 1 TABLET: 25; 100 TABLET ORAL at 02:05

## 2022-05-11 NOTE — PLAN OF CARE
Pt resting in bed comfortably. PIV line intact and free of infection and irritation. Fall precautions maintained, no falls noted. Call light within reach, bed locked and in lowest position. Patient instructed to call for assistance. Frequent weight shift assistance and incontinence care provided. Voiding without difficulty, pt had BM this shift. No complaints of pain or N/V. Tolerating prescribed diet. Pt remains sinus maycol on monitor, but asymptomatic. Progressing towards goals. Will continue to monitor and follow plan of care.   '

## 2022-05-11 NOTE — ASSESSMENT & PLAN NOTE
EKG reviewed w/ sinus bradycardia with rate 40s.  Asymptomatic  Hemodynamically stable  Suspect possible SSS    -- Cardiology reviewed EKG, no recommendations/interventions for asymptomatic sinus bradycardia  -- Avoid daniel blocking agents/medications

## 2022-05-11 NOTE — PLAN OF CARE
Pt is Aox4 with intermittent confusion, VSS. Pain assessed and managed. Communicates with writing notes to pt due to hard of hearing. Pt is progressing towards goals. Telemetry in place, pt running bradycardia. SCD's in place with frequent checks for skin breakdown. Fall precautions in place, no reported falls. IV site CDI. Incontinence care complete.  Safety precautions in place bed in lowest position,wheels locked, call light within reach, bed alarm on, id band on and clutter free environment.

## 2022-05-11 NOTE — ASSESSMENT & PLAN NOTE
Presents with SBO  Hx of UC s/p total colectomy  NGT was placed initially and since d/c. Patient with + BM. Per general surgery, SBO resolved.    -- Avoid loperamide home med  -- Start regular diet  -- Monitor for bowel movements, abdominal pain

## 2022-05-11 NOTE — PHARMACY MED REC
"Admission Medication History     The home medication history was taken by Diamond Paula.    You may go to "Admission" then "Reconcile Home Medications" tabs to review and/or act upon these items.      The home medication list has been updated by the Pharmacy department.    Please read ALL comments highlighted in yellow.    Please address this information as you see fit.     Feel free to contact us if you have any questions or require assistance.      The medications listed below were removed from the home medication list. Please reorder if appropriate:  Patient reports no longer taking the following medication(s):   ALBUTEROL 90MCG/ACTUATION   BUMETANIDE 0.5MG    Medications listed below were obtained from: Patient/family  PTA Medications   Medication Sig    acetaminophen (TYLENOL) 325 MG tablet   Take 650 mg by mouth every 6 (six) hours as needed for Pain.    carbidopa-levodopa  mg (SINEMET)  mg per tablet   Take 1.5 tablets by mouth 4 (four) times daily.    finasteride (PROSCAR) 5 mg tablet   Take 5 mg by mouth once daily.    loperamide (IMODIUM) 2 mg capsule   Take 1 capsule by mouth 3-4 times daily as needed for diarrhea    rasagiline (AZILECT) 1 mg Tab Take 1 tablet (1 mg total) by mouth once daily at 6am.           Diamond Paula  EXT [id number]              .          "

## 2022-05-11 NOTE — ASSESSMENT & PLAN NOTE
CXR revealed small to moderate left pleural effusion.   Suspect likely transudative. Worsening oxygenation with continued IV fluids. Some BLE edema, prescribed bumex prn, and noted elevated CVP on TTE last year concerning for volume overload  Less likely infectious. Noted leukocytosis 27 on presentation, since improved to 12 today. Otherwise, normotensive, afebrile, and normal LA.   TTE nml EF, unremarkable  S/p thoracentesis w/ IR    -- F/u lab results from thora  -- Discontinued Zosyn

## 2022-05-11 NOTE — NURSING
Patient back to floor via stretcher. Patient AAOX4 in NAD. VSS, telemetry monitor in place. Tray set up for patient to eat dinner. Bed in lowest locked position, call bell in reach, bed alarm active. Will continue to monitor.

## 2022-05-11 NOTE — CARE UPDATE
RAPID RESPONSE NURSE ROUND       Rounding completed with charge RN, Luci. No concerns verbalized at this time. Confirmed patient on remote telemetry monitoring. Instructed to call [phone] for further concerns or assistance.

## 2022-05-11 NOTE — SUBJECTIVE & OBJECTIVE
Interval History: NAEON. HDS. Bradycardia continues, asymptomatic. Transferred from surgery primary team to medicine. SBO resolved, advancing diet. TTE done w/ normal EF.    Review of Systems   Unable to perform ROS: Other (Patient extremely hard of hearing, unable to hear/ answer ROS)   Objective:     Vital Signs (Most Recent):  Temp: 97.3 °F (36.3 °C) (05/11/22 1216)  Pulse: (!) 49 (05/11/22 1216)  Resp: 18 (05/11/22 1216)  BP: 135/61 (05/11/22 1216)  SpO2: (!) 92 % (05/11/22 1216)   Vital Signs (24h Range):  Temp:  [96.8 °F (36 °C)-98.6 °F (37 °C)] 97.3 °F (36.3 °C)  Pulse:  [37-85] 49  Resp:  [15-21] 18  SpO2:  [92 %-97 %] 92 %  BP: (117-146)/(56-80) 135/61     Weight: 65.3 kg (144 lb)  Body mass index is 23.96 kg/m².    Intake/Output Summary (Last 24 hours) at 5/11/2022 1430  Last data filed at 5/11/2022 1230  Gross per 24 hour   Intake 640 ml   Output 800 ml   Net -160 ml      Physical Exam  Vitals and nursing note reviewed.   Constitutional:       General: He is not in acute distress.     Appearance: Normal appearance. He is ill-appearing. He is not toxic-appearing.   HENT:      Head: Normocephalic and atraumatic.      Mouth/Throat:      Mouth: Mucous membranes are moist.      Pharynx: Oropharynx is clear.   Eyes:      Extraocular Movements: Extraocular movements intact.   Cardiovascular:      Rate and Rhythm: Regular rhythm. Bradycardia present.      Pulses: Normal pulses.      Heart sounds: Normal heart sounds.   Pulmonary:      Effort: Pulmonary effort is normal. No respiratory distress.      Breath sounds: Normal breath sounds.      Comments: 2L O2 via NC  Abdominal:      General: Abdomen is flat. Bowel sounds are normal. There is no distension.      Palpations: Abdomen is soft.   Musculoskeletal:         General: No swelling, tenderness or deformity.   Skin:     General: Skin is warm and dry.      Capillary Refill: Capillary refill takes less than 2 seconds.   Neurological:      Mental Status: He is  alert and oriented to person, place, and time.      GCS: GCS eye subscore is 4. GCS verbal subscore is 5. GCS motor subscore is 6.      Motor: No weakness.      Comments: Hard of hearing   Psychiatric:         Mood and Affect: Mood normal.         Behavior: Behavior normal. Behavior is not agitated or aggressive. Behavior is cooperative.       Significant Labs: All pertinent labs within the past 24 hours have been reviewed.  CBC:   Recent Labs   Lab 05/10/22  0513 05/11/22  0353   WBC 11.53 13.12*   HGB 9.1* 9.5*   HCT 29.4* 29.8*    156     CMP:   Recent Labs   Lab 05/10/22  0513 05/11/22  0352 05/11/22  0353    141 142   K 3.9 3.7 3.7   * 113* 113*   CO2 25 22* 24   GLU 86 98 98   BUN 19 18 19   CREATININE 1.1 1.0 1.0   CALCIUM 7.7* 7.8* 7.7*   PROT  --  5.0* 5.0*   ALBUMIN  --  2.1* 2.1*   BILITOT  --  0.4 0.4   ALKPHOS  --  57 65   AST  --  20 22   ALT  --  9* 11   ANIONGAP 5* 6* 5*   EGFRNONAA 59.6* >60.0 >60.0     Magnesium:   Recent Labs   Lab 05/10/22  0513 05/11/22  0352   MG 1.8 1.7     POCT Glucose:   Recent Labs   Lab 05/10/22  1846 05/10/22  2357 05/11/22  0615   POCTGLUCOSE 116* 114* 100       Significant Imaging: I have reviewed all pertinent imaging results/findings within the past 24 hours.

## 2022-05-11 NOTE — PROGRESS NOTES
[facility] Medicine  Progress Note    Patient Name: Giselle Lemus  MRN: [id number]  Patient Class: IP- Inpatient   Admission Date: 5/8/2022  Length of Stay: 3 days  Attending Physician: Suzy Nunez MD  Primary Care Provider: Tl Torres MD        Subjective:     Principal Problem:SBO (small bowel obstruction)        HPI:  Giselle Lemus is an 88 y.o. M. With history of Parkinson's, BPH, UC s/p total colectomy, and chronic bronchitis who presents with abdominal pain. Found to have SBO. NGT was placed initially and since d/c. Patient with + BM. Per general surgery, SBO resolved. Noted leukocytosis 27 on presentation, since improved to 12 today. Normotensive, afebrile, and normal LA. CXR revealed small to moderate left pleural effusion. Zosyn initialed. IR consulted and plan for thoracentesis. NPO now with IV fluids. Oxygenation slightly worsening. Currently on 2L O2 via NC. Developed SB with rate 30-40s. Cardiology consulted and recs currently pending. Medicine consulted for possible transfer to medicine given resolved SBO without surgical intervention, pleural effusion, and bradycardia.      Overview/Hospital Course:  No notes on file    Interval History: NAEON. HDS. Bradycardia continues, asymptomatic. Transferred from surgery primary team to medicine. SBO resolved, advancing diet. TTE done w/ normal EF.    Review of Systems   Unable to perform ROS: Other (Patient extremely hard of hearing, unable to hear/ answer ROS)   Objective:     Vital Signs (Most Recent):  Temp: 97.3 °F (36.3 °C) (05/11/22 1216)  Pulse: (!) 49 (05/11/22 1216)  Resp: 18 (05/11/22 1216)  BP: 135/61 (05/11/22 1216)  SpO2: (!) 92 % (05/11/22 1216)   Vital Signs (24h Range):  Temp:  [96.8 °F (36 °C)-98.6 °F (37 °C)] 97.3 °F (36.3 °C)  Pulse:  [37-85] 49  Resp:  [15-21] 18  SpO2:  [92 %-97 %] 92 %  BP: (117-146)/(56-80) 135/61     Weight: 65.3 kg (144 lb)  Body mass index is 23.96 kg/m².    Intake/Output Summary (Last 24 hours) at  5/11/2022 1430  Last data filed at 5/11/2022 1230  Gross per 24 hour   Intake 640 ml   Output 800 ml   Net -160 ml      Physical Exam  Vitals and nursing note reviewed.   Constitutional:       General: He is not in acute distress.     Appearance: Normal appearance. He is ill-appearing. He is not toxic-appearing.   HENT:      Head: Normocephalic and atraumatic.      Mouth/Throat:      Mouth: Mucous membranes are moist.      Pharynx: Oropharynx is clear.   Eyes:      Extraocular Movements: Extraocular movements intact.   Cardiovascular:      Rate and Rhythm: Regular rhythm. Bradycardia present.      Pulses: Normal pulses.      Heart sounds: Normal heart sounds.   Pulmonary:      Effort: Pulmonary effort is normal. No respiratory distress.      Breath sounds: Normal breath sounds.      Comments: 2L O2 via NC  Abdominal:      General: Abdomen is flat. Bowel sounds are normal. There is no distension.      Palpations: Abdomen is soft.   Musculoskeletal:         General: No swelling, tenderness or deformity.   Skin:     General: Skin is warm and dry.      Capillary Refill: Capillary refill takes less than 2 seconds.   Neurological:      Mental Status: He is alert and oriented to person, place, and time.      GCS: GCS eye subscore is 4. GCS verbal subscore is 5. GCS motor subscore is 6.      Motor: No weakness.      Comments: Hard of hearing   Psychiatric:         Mood and Affect: Mood normal.         Behavior: Behavior normal. Behavior is not agitated or aggressive. Behavior is cooperative.       Significant Labs: All pertinent labs within the past 24 hours have been reviewed.  CBC:   Recent Labs   Lab 05/10/22  0513 05/11/22  0353   WBC 11.53 13.12*   HGB 9.1* 9.5*   HCT 29.4* 29.8*    156     CMP:   Recent Labs   Lab 05/10/22  0513 05/11/22  0352 05/11/22  0353    141 142   K 3.9 3.7 3.7   * 113* 113*   CO2 25 22* 24   GLU 86 98 98   BUN 19 18 19   CREATININE 1.1 1.0 1.0   CALCIUM 7.7* 7.8* 7.7*   PROT   --  5.0* 5.0*   ALBUMIN  --  2.1* 2.1*   BILITOT  --  0.4 0.4   ALKPHOS  --  57 65   AST  --  20 22   ALT  --  9* 11   ANIONGAP 5* 6* 5*   EGFRNONAA 59.6* >60.0 >60.0     Magnesium:   Recent Labs   Lab 05/10/22  0513 05/11/22  0352   MG 1.8 1.7     POCT Glucose:   Recent Labs   Lab 05/10/22  1846 05/10/22  2357 05/11/22  0615   POCTGLUCOSE 116* 114* 100       Significant Imaging: I have reviewed all pertinent imaging results/findings within the past 24 hours.      Assessment/Plan:      * SBO (small bowel obstruction)  Presents with SBO  Hx of UC s/p total colectomy  NGT was placed initially and since d/c. Patient with + BM. Per general surgery, SBO resolved.    -- Avoid loperamide home med  -- Start regular diet  -- Monitor for bowel movements, abdominal pain      Hypophosphatemia  -- Monitor on morning labs  -- Encourage po intake  -- Replenish as indicated    Sinus bradycardia  EKG reviewed w/ sinus bradycardia with rate 40s.  Asymptomatic  Hemodynamically stable  Suspect possible SSS    -- Cardiology reviewed EKG, no recommendations/interventions for asymptomatic sinus bradycardia  -- Avoid daniel blocking agents/medications    Pleural effusion  CXR revealed small to moderate left pleural effusion.   Suspect likely transudative. Worsening oxygenation with continued IV fluids. Some BLE edema, prescribed bumex prn, and noted elevated CVP on TTE last year concerning for volume overload  Less likely infectious. Noted leukocytosis 27 on presentation, since improved to 12 today. Otherwise, normotensive, afebrile, and normal LA.   TTE nml EF, unremarkable  S/p thoracentesis w/ IR    -- F/u lab results from [facility]  -- Discontinued Zosyn    Benign prostatic hyperplasia  -- Resume home proscar      Parkinsons  -- Resuming home sinemet and rasagiline        VTE Risk Mitigation (From admission, onward)         Ordered     heparin (porcine) injection 5,000 Units  Every 8 hours         05/08/22 0512     IP VTE HIGH RISK PATIENT   Once         05/08/22 0512     Place sequential compression device  Until discontinued         05/08/22 0512                Discharge Planning   JOHAN: 5/11/2022     Code Status: Full Code   Is the patient medically ready for discharge?: No    Reason for patient still in hospital (select all that apply): Patient trending condition  Discharge Plan A: Home with family, Home Health        Jono Wagner DO  Department of Hospital Medicine   [facility] - Surgery

## 2022-05-12 PROBLEM — Z74.09 IMPAIRED MOBILITY AND ACTIVITIES OF DAILY LIVING: Status: ACTIVE | Noted: 2022-05-12

## 2022-05-12 PROBLEM — Z78.9 IMPAIRED MOBILITY AND ACTIVITIES OF DAILY LIVING: Status: ACTIVE | Noted: 2022-05-12

## 2022-05-12 LAB
ALBUMIN SERPL BCP-MCNC: 2.3 G/DL (ref 3.5–5.2)
ALP SERPL-CCNC: 61 U/L (ref 55–135)
ALT SERPL W/O P-5'-P-CCNC: <5 U/L (ref 10–44)
ANION GAP SERPL CALC-SCNC: 5 MMOL/L (ref 8–16)
AST SERPL-CCNC: 21 U/L (ref 10–40)
BASOPHILS # BLD AUTO: 0.03 K/UL (ref 0–0.2)
BASOPHILS NFR BLD: 0.2 % (ref 0–1.9)
BILIRUB SERPL-MCNC: 0.4 MG/DL (ref 0.1–1)
BUN SERPL-MCNC: 17 MG/DL (ref 8–23)
CALCIUM SERPL-MCNC: 8.1 MG/DL (ref 8.7–10.5)
CHLORIDE SERPL-SCNC: 109 MMOL/L (ref 95–110)
CO2 SERPL-SCNC: 27 MMOL/L (ref 23–29)
CREAT SERPL-MCNC: 0.9 MG/DL (ref 0.5–1.4)
DIFFERENTIAL METHOD: ABNORMAL
EOSINOPHIL # BLD AUTO: 0.2 K/UL (ref 0–0.5)
EOSINOPHIL NFR BLD: 1.9 % (ref 0–8)
ERYTHROCYTE [DISTWIDTH] IN BLOOD BY AUTOMATED COUNT: 16.2 % (ref 11.5–14.5)
EST. GFR  (AFRICAN AMERICAN): >60 ML/MIN/1.73 M^2
EST. GFR  (NON AFRICAN AMERICAN): >60 ML/MIN/1.73 M^2
GLUCOSE SERPL-MCNC: 102 MG/DL (ref 70–110)
HCT VFR BLD AUTO: 34 % (ref 40–54)
HGB BLD-MCNC: 11 G/DL (ref 14–18)
IMM GRANULOCYTES # BLD AUTO: 0.06 K/UL (ref 0–0.04)
IMM GRANULOCYTES NFR BLD AUTO: 0.5 % (ref 0–0.5)
LDH SERPL L TO P-CCNC: 259 U/L (ref 110–260)
LYMPHOCYTES # BLD AUTO: 1.4 K/UL (ref 1–4.8)
LYMPHOCYTES NFR BLD: 11 % (ref 18–48)
MAGNESIUM SERPL-MCNC: 1.7 MG/DL (ref 1.6–2.6)
MCH RBC QN AUTO: 30.2 PG (ref 27–31)
MCHC RBC AUTO-ENTMCNC: 32.4 G/DL (ref 32–36)
MCV RBC AUTO: 93 FL (ref 82–98)
MONOCYTES # BLD AUTO: 1.3 K/UL (ref 0.3–1)
MONOCYTES NFR BLD: 9.8 % (ref 4–15)
NEUTROPHILS # BLD AUTO: 9.9 K/UL (ref 1.8–7.7)
NEUTROPHILS NFR BLD: 76.6 % (ref 38–73)
NRBC BLD-RTO: 0 /100 WBC
PHOSPHATE SERPL-MCNC: 1.6 MG/DL (ref 2.7–4.5)
PLATELET # BLD AUTO: 160 K/UL (ref 150–450)
PMV BLD AUTO: 10.4 FL (ref 9.2–12.9)
POCT GLUCOSE: 122 MG/DL (ref 70–110)
POCT GLUCOSE: 89 MG/DL (ref 70–110)
POTASSIUM SERPL-SCNC: 3.4 MMOL/L (ref 3.5–5.1)
PROT SERPL-MCNC: 5.8 G/DL (ref 6–8.4)
RBC # BLD AUTO: 3.64 M/UL (ref 4.6–6.2)
SODIUM SERPL-SCNC: 141 MMOL/L (ref 136–145)
WBC # BLD AUTO: 12.91 K/UL (ref 3.9–12.7)

## 2022-05-12 PROCEDURE — 84100 ASSAY OF PHOSPHORUS: CPT | Performed by: STUDENT IN AN ORGANIZED HEALTH CARE EDUCATION/TRAINING PROGRAM

## 2022-05-12 PROCEDURE — 99232 PR SUBSEQUENT HOSPITAL CARE,LEVL II: ICD-10-PCS | Mod: GC,,, | Performed by: HOSPITALIST

## 2022-05-12 PROCEDURE — 97530 THERAPEUTIC ACTIVITIES: CPT

## 2022-05-12 PROCEDURE — 63600175 PHARM REV CODE 636 W HCPCS: Performed by: STUDENT IN AN ORGANIZED HEALTH CARE EDUCATION/TRAINING PROGRAM

## 2022-05-12 PROCEDURE — 83615 LACTATE (LD) (LDH) ENZYME: CPT | Performed by: STUDENT IN AN ORGANIZED HEALTH CARE EDUCATION/TRAINING PROGRAM

## 2022-05-12 PROCEDURE — 97116 GAIT TRAINING THERAPY: CPT

## 2022-05-12 PROCEDURE — 83735 ASSAY OF MAGNESIUM: CPT | Performed by: STUDENT IN AN ORGANIZED HEALTH CARE EDUCATION/TRAINING PROGRAM

## 2022-05-12 PROCEDURE — 25000003 PHARM REV CODE 250: Performed by: STUDENT IN AN ORGANIZED HEALTH CARE EDUCATION/TRAINING PROGRAM

## 2022-05-12 PROCEDURE — 97165 OT EVAL LOW COMPLEX 30 MIN: CPT

## 2022-05-12 PROCEDURE — 99222 PR INITIAL HOSPITAL CARE,LEVL II: ICD-10-PCS | Mod: ,,, | Performed by: NURSE PRACTITIONER

## 2022-05-12 PROCEDURE — 94761 N-INVAS EAR/PLS OXIMETRY MLT: CPT

## 2022-05-12 PROCEDURE — 99232 SBSQ HOSP IP/OBS MODERATE 35: CPT | Mod: GC,,, | Performed by: HOSPITALIST

## 2022-05-12 PROCEDURE — 99900035 HC TECH TIME PER 15 MIN (STAT)

## 2022-05-12 PROCEDURE — 11000001 HC ACUTE MED/SURG PRIVATE ROOM

## 2022-05-12 PROCEDURE — 99222 1ST HOSP IP/OBS MODERATE 55: CPT | Mod: ,,, | Performed by: NURSE PRACTITIONER

## 2022-05-12 PROCEDURE — 80053 COMPREHEN METABOLIC PANEL: CPT | Performed by: SURGERY

## 2022-05-12 PROCEDURE — 85025 COMPLETE CBC W/AUTO DIFF WBC: CPT | Performed by: STUDENT IN AN ORGANIZED HEALTH CARE EDUCATION/TRAINING PROGRAM

## 2022-05-12 PROCEDURE — 97162 PT EVAL MOD COMPLEX 30 MIN: CPT

## 2022-05-12 PROCEDURE — 36415 COLL VENOUS BLD VENIPUNCTURE: CPT | Performed by: STUDENT IN AN ORGANIZED HEALTH CARE EDUCATION/TRAINING PROGRAM

## 2022-05-12 PROCEDURE — 97535 SELF CARE MNGMENT TRAINING: CPT

## 2022-05-12 PROCEDURE — 27000221 HC OXYGEN, UP TO 24 HOURS

## 2022-05-12 RX ORDER — CARBIDOPA AND LEVODOPA 25; 100 MG/1; MG/1
1 TABLET ORAL 4 TIMES DAILY
Qty: 120 TABLET | Refills: 11
Start: 2022-05-12 | End: 2022-01-01

## 2022-05-12 RX ORDER — LOPERAMIDE HYDROCHLORIDE 2 MG/1
2 CAPSULE ORAL 3 TIMES DAILY PRN
Refills: 0
Start: 2022-05-12 | End: 2022-05-22

## 2022-05-12 RX ORDER — FUROSEMIDE 10 MG/ML
40 INJECTION INTRAMUSCULAR; INTRAVENOUS ONCE
Status: COMPLETED | OUTPATIENT
Start: 2022-05-12 | End: 2022-05-12

## 2022-05-12 RX ORDER — LOPERAMIDE HYDROCHLORIDE 2 MG/1
2 CAPSULE ORAL 3 TIMES DAILY PRN
Status: DISCONTINUED | OUTPATIENT
Start: 2022-05-12 | End: 2022-05-18 | Stop reason: HOSPADM

## 2022-05-12 RX ORDER — SODIUM,POTASSIUM PHOSPHATES 280-250MG
2 POWDER IN PACKET (EA) ORAL EVERY 4 HOURS
Status: COMPLETED | OUTPATIENT
Start: 2022-05-12 | End: 2022-05-12

## 2022-05-12 RX ADMIN — CARBIDOPA AND LEVODOPA 1 TABLET: 25; 100 TABLET ORAL at 01:05

## 2022-05-12 RX ADMIN — CARBIDOPA AND LEVODOPA 1 TABLET: 25; 100 TABLET ORAL at 08:05

## 2022-05-12 RX ADMIN — RASAGILINE 1 MG: 1 TABLET ORAL at 05:05

## 2022-05-12 RX ADMIN — CARBIDOPA AND LEVODOPA 1 TABLET: 25; 100 TABLET ORAL at 05:05

## 2022-05-12 RX ADMIN — FUROSEMIDE 40 MG: 10 INJECTION, SOLUTION INTRAMUSCULAR; INTRAVENOUS at 01:05

## 2022-05-12 RX ADMIN — POTASSIUM & SODIUM PHOSPHATES POWDER PACK 280-160-250 MG 2 PACKET: 280-160-250 PACK at 10:05

## 2022-05-12 RX ADMIN — LOPERAMIDE HYDROCHLORIDE 2 MG: 2 CAPSULE ORAL at 05:05

## 2022-05-12 RX ADMIN — CARBIDOPA AND LEVODOPA 1 TABLET: 25; 100 TABLET ORAL at 10:05

## 2022-05-12 RX ADMIN — PANTOPRAZOLE SODIUM 40 MG: 40 TABLET, DELAYED RELEASE ORAL at 08:05

## 2022-05-12 RX ADMIN — HEPARIN SODIUM 5000 UNITS: 5000 INJECTION INTRAVENOUS; SUBCUTANEOUS at 10:05

## 2022-05-12 RX ADMIN — POTASSIUM BICARBONATE 40 MEQ: 391 TABLET, EFFERVESCENT ORAL at 08:05

## 2022-05-12 RX ADMIN — HEPARIN SODIUM 5000 UNITS: 5000 INJECTION INTRAVENOUS; SUBCUTANEOUS at 01:05

## 2022-05-12 RX ADMIN — POTASSIUM & SODIUM PHOSPHATES POWDER PACK 280-160-250 MG 2 PACKET: 280-160-250 PACK at 08:05

## 2022-05-12 RX ADMIN — HEPARIN SODIUM 5000 UNITS: 5000 INJECTION INTRAVENOUS; SUBCUTANEOUS at 05:05

## 2022-05-12 RX ADMIN — POTASSIUM & SODIUM PHOSPHATES POWDER PACK 280-160-250 MG 2 PACKET: 280-160-250 PACK at 01:05

## 2022-05-12 NOTE — PT/OT/SLP EVAL
"Occupational Therapy   Evaluation    Name: Giselle Lemus  MRN: [id number]  Admitting Diagnosis:  SBO (small bowel obstruction)  Recent Surgery: * No surgery found *      Recommendations:     Discharge Recommendations: rehabilitation facility  Discharge Equipment Recommendations:   (TBD)  Barriers to discharge:  Decreased caregiver support    Assessment:     Giselle Lemus is a 88 y.o. male with a medical diagnosis of SBO (small bowel obstruction).  He presents with Elim IRA questions presented in writing, poor insight into deficits, brother in law arrived towards end of eval. Performance deficits affecting function: weakness, gait instability, impaired cardiopulmonary response to activity, impaired endurance, impaired balance, impaired functional mobilty, impaired self care skills, impaired cognition, decreased safety awareness.  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 He has poor insight into deficits.  He states he lives in four bedroom home with wife and he was independent in home with RW at times with no steps, tub for bathing. He required mod (A) for supine to sit with poor trunk control. EOB sit with mod (A) for posterior lean and poor self correction. Sit to stand at EOB with mod (A), four steps for/back with assist for balance mod (A). Sit to supine with min (A).     Rehab Prognosis: Good; patient would benefit from acute skilled OT services to address these deficits and reach maximum level of function.       Plan:     Patient to be seen 4 x/week to address the above listed problems via self-care/home management, therapeutic activities, therapeutic exercises  · Plan of Care Expires: 06/12/22  · Plan of Care Reviewed with: patient    Subjective     Chief Complaint: "Before I got sick I took care of both of us."   Patient/Family Comments/goals: in agreement with discharge to [facility]    Occupational Profile:  Living Environment: lives with wife in large home, no steps, tub for bathing. He report care giver " for wife.   Equipment Used at Home:  walker, rolling  Assistance upon Discharge: none, family states they maybe able to hire help.     Pain/Comfort:  · Pain Rating 1: 3/10  · Location - Side 1: Left  · Location 1: abdomen  · Pain Addressed 1: Reposition  · Pain Rating Post-Intervention 1: 3/10    Patients cultural, spiritual, [employer] conflicts given the current situation: no    Objective:     Communicated with: RN prior to session.  Patient found HOB elevated with bed alarm, telemetry, FCD upon OT entry to room.    General Precautions: Standard, fall   Orthopedic Precautions:N/A   Braces: N/A  Respiratory Status: Room air    Occupational Performance:    Bed Mobility:    · Patient completed Rolling/Turning to Left with  moderate assistance  · Patient completed Supine to Sit with moderate assistance  · Patient completed Sit to Supine with minimum assistance    Functional Mobility/Transfers:  · Patient completed Sit <> Stand Transfer with moderate assistance  with  rolling walker   · Functional Mobility: poor balance, poor self correction RW mod (A)    Activities of Daily Living:  · Feeding:  minimum assistance with HOB elevated  · Grooming: minimum assistance for teeth care   · Upper Body Dressing: moderate assistance at EOB  · Lower Body Dressing: maximal assistance with socks  · Toileting: maximal assistance incontinent    Cognitive/Visual Perceptual:  Cognitive/Psychosocial Skills:     -       Oriented to: Person and Place   -       Follows Commands/attention:Follows one-step commands  -       Memory: Impaired STM  -       Safety awareness/insight to disability: impaired     Physical Exam:  Upper Extremity Range of Motion:     -       Right Upper Extremity: WFL  -       Left Upper Extremity: WFL  Upper Extremity Strength:    -       Right Upper Extremity: WFL  -       Left Upper Extremity: WFL    AMPAC 6 Click ADL:  AMPAC Total Score: 9    Treatment & Education:  Education on POC communication limited by  [facility]  Education:    Patient left HOB elevated with all lines intact, call button in reach and brother in law present    GOALS:   Multidisciplinary Problems     Occupational Therapy Goals        Problem: Occupational Therapy    Goal Priority Disciplines Outcome Interventions   Occupational Therapy Goal     OT, PT/OT Ongoing, Progressing    Description: Goals to be met by: 06/12/22    Patient will increase functional independence with ADLs by performing:    UE Dressing with Contact Guard Assistance at EOB .  LE Dressing with Minimal Assistance at EOB.  Grooming while standing at sink with Minimal Assistance.  Toileting from bedside commode with Minimal Assistance for hygiene and clothing management.   Toilet transfer to bedside commode with Minimal Assistance with RW.                     History:     Past Medical History:   Diagnosis Date    BPH (benign prostatic hyperplasia)     Parkinsons 09/2019    R hand tremor starting in 2017, diagnosed Sept 2019 by Dr. Angeles at     Ulcerative colitis     s/p colectomy    Unspecified chronic bronchitis        Past Surgical History:   Procedure Laterality Date    TOTAL COLECTOMY         Time Tracking:     OT Date of Treatment: 05/12/22  OT Start Time: 1019  OT Stop Time: 1050  OT Total Time (min): 31 min    Billable Minutes:Evaluation 5  Self Care/Home Management 13  Therapeutic Activity 13    5/12/2022

## 2022-05-12 NOTE — HOSPITAL COURSE
Admitted to general surgery for treatment of SBO, which resolved with conservative care and bowel rest. Patient takes frequent loperamide after his colectomy at [facility] due to diarrhea after meals but per surgery, would discourage loperamide as it may have predisposed the SBO. Began having BM and tolerating PO well. However, subsequently found to have L pleural effusion, which was present on original abdominal CT but became more enlarged after IV fluids given during SBO. TTE showed normal cardiac function. IR thoracentesis revealed transudative effusion without signs of infection. Attempting lasix to relieve small O2 requirement. Course also complicated by asymptomatic sinus bradycardia to 40s. Cardiology contacted and reviewed EKG which was sinus maycol. PT recommending rehab, patient and family in agreement. Accepted to [facility]. Resumed on home loperamide PRN for baseline diarrhea following meals since patient is s/p colectomy and is chronically dependent on this. 2 episodes of epistaxis, coughing up blood. Cancelled discharge to [facility]hab. CXR w/ worsening in pleural effusions. CT Chest w/ pulmonary edema and partially visualized L psoas hematoma. Discontinued DVT ppx. Overnight continued epistaxis, coughing up blood, and tarry stool. ENT consulted, placed rhino rocket/nasal packings. Drop in Hgb, continuing to monitor. Tarry stool likely secondary to swallowed blood. Plan to evaluate L psoas hematoma w/ CT noncontrast abdomen. Hgb continued trending down <7, transfused 1 unit pRBC, w/ appropriate response. CTA abdomen done w/ concern for active bleeding psoas hematoma and was consented for embolization w/ IR. IR Dr. Ashley re-evaluated imaging and given hemodynamic stability, he had less concern for bleed, no intervention currently. Nasal packings removed w/o further epistaxis. Hgb remained stable over next couple days. Hematology consulted and felt unlikely that patient has developed an acquired or hereditary  bleeding disorder in the setting of normal platelets and coagulation studies and would not pursue further diagnostics at this time.  Pulmonology consulted for further workup of pleural effusions (which were present prior to patient being admitted). Pulm recommending further diuresis. Tolerated lasix 40mg qd inpatient. Medically stabilized to discharge to Inpatient Rehab after no further bleeding and Hgb stable for 2-3 days. Discharaged w/ 20mg Lasix PO for the pleural effusions w/ 10mEq potassium. Of note, a RLL pulmonary nodule was incidentally seen on CT imaging, and followup within 3-6 months was recommended. Heparin analogues added to allergies list and family/pt instructed to let future providers know about hx of bleeding. Recommend avoidance if possible and mechanical DVT ppx.

## 2022-05-12 NOTE — ASSESSMENT & PLAN NOTE
CXR revealed small to moderate left pleural effusion.   Suspect likely transudative. Worsening oxygenation with continued IV fluids. Some BLE edema, prescribed bumex prn, and noted elevated CVP on TTE last year concerning for volume overload  Less likely infectious. Noted leukocytosis 27 on presentation, since improved to 12 today. Otherwise, normotensive, afebrile, and normal LA.   TTE nml EF, unremarkable  S/p thoracentesis w/ IR    -- Pleural fluid studies consistent with TRANSUDATIVE effusion. Etiology thought to be somewhat chronic, as was present on initial CT< but aggravated by large volume IV fluid resuscitation during SBO.  - attempting lasix 40mg IV x1 to get patient off 2L O2  -- Discontinued Zosyn as infection not suspected.

## 2022-05-12 NOTE — PLAN OF CARE
Pt is Aox4 with intermittent confusion, VSS. Pain assessed and managed.  Pt is progressing towards goals. SCD's in place with frequent checks for skin breakdown. Fall precautions in place, no reported falls. IV site CDI. Elimination schedule in place. Safety precautions in place bed in lowest position,wheels locked, call light within reach, bed alarm on, id band and allergy band on, and clutter free environment.

## 2022-05-12 NOTE — SUBJECTIVE & OBJECTIVE
Interval History: NAEON. Patient felling ok this morning. Pending PT eval for discharge recs.    Review of Systems   Unable to perform ROS: Other   Objective:     Vital Signs (Most Recent):  Temp: 97.2 °F (36.2 °C) (05/12/22 1130)  Pulse: (!) 47 (05/12/22 1130)  Resp: 17 (05/12/22 0850)  BP: 131/61 (05/12/22 1130)  SpO2: (!) 93 % (05/12/22 1130)   Vital Signs (24h Range):  Temp:  [96.1 °F (35.6 °C)-97.6 °F (36.4 °C)] 97.2 °F (36.2 °C)  Pulse:  [42-60] 47  Resp:  [16-18] 17  SpO2:  [92 %-99 %] 93 %  BP: (131-164)/(61-68) 131/61     Weight: 65.3 kg (144 lb)  Body mass index is 23.96 kg/m².    Intake/Output Summary (Last 24 hours) at 5/12/2022 1313  Last data filed at 5/11/2022 1800  Gross per 24 hour   Intake 250 ml   Output --   Net 250 ml      Physical Exam  Vitals and nursing note reviewed.   Constitutional:       General: He is not in acute distress.     Appearance: He is not toxic-appearing or diaphoretic.   HENT:      Head: Normocephalic.   Pulmonary:      Effort: Pulmonary effort is normal. No respiratory distress.   Neurological:      Mental Status: He is alert. Mental status is at baseline.      Cranial Nerves: No dysarthria.   Psychiatric:         Mood and Affect: Mood normal.         Behavior: Behavior normal.       Significant Labs: All pertinent labs within the past 24 hours have been reviewed.    Significant Imaging: I have reviewed all pertinent imaging results/findings within the past 24 hours.

## 2022-05-12 NOTE — PLAN OF CARE
Eval and POC in place  Problem: Occupational Therapy  Goal: Occupational Therapy Goal  Description: Goals to be met by: 06/12/22    Patient will increase functional independence with ADLs by performing:    UE Dressing with Contact Guard Assistance at EOB .  LE Dressing with Minimal Assistance at EOB.  Grooming while standing at sink with Minimal Assistance.  Toileting from bedside commode with Minimal Assistance for hygiene and clothing management.   Toilet transfer to bedside commode with Minimal Assistance with RW.    Outcome: Ongoing, Progressing

## 2022-05-12 NOTE — PLAN OF CARE
Spoke with PT/OT regarding eval. Therapy to recommend Inpatient rehab for post-acute needs. Spoke with Patient's wife Obi who is agreeable with d/c to rehab pending discussion with patient and their children. If not patient will d/c home with  care and 24 hour supervision of sitters/family on 5/13/22. PMR consult placed and SW to send referrals to [facility] and [facility]. Will continue to follow for needs.

## 2022-05-12 NOTE — PROGRESS NOTES
[facility] Medicine  Progress Note    Patient Name: Giselle Lemus  MRN: [id number]  Patient Class: IP- Inpatient   Admission Date: 5/8/2022  Length of Stay: 4 days  Attending Physician: John Alexander MD  Primary Care Provider: Tl Torres MD        Subjective:     Principal Problem:SBO (small bowel obstruction)        HPI:  Giselle Lmeus is an 88 y.o. M. With history of Parkinson's, BPH, UC s/p total colectomy, and chronic bronchitis who presents with abdominal pain. Found to have SBO. NGT was placed initially and since d/c. Patient with + BM. Per general surgery, SBO resolved. Noted leukocytosis 27 on presentation, since improved to 12 today. Normotensive, afebrile, and normal LA. CXR revealed small to moderate left pleural effusion. Zosyn initialed. IR consulted and plan for thoracentesis. NPO now with IV fluids. Oxygenation slightly worsening. Currently on 2L O2 via NC. Developed SB with rate 30-40s. Cardiology consulted and recs currently pending. Medicine consulted for possible transfer to medicine given resolved SBO without surgical intervention, pleural effusion, and bradycardia.      Overview/Hospital Course:  Admitted to general surgery for treatment of SBO, which resolved with conservative care and bowel rest. Began having Bms and tolerating PO well. However, subsequently found to have L pleural effusion, which was present on original abdominal CT but became more enlarged after IV fluids given during SBO. TTE showed normal cardiac function. IR thoracentesis revealed transudative effusion without signs of infection. Attempting lasix to relieve small O2 requirement. Course also complicated by asymptomatic sinus bradycardia to 40s. Cardiology contacted and reviewed EKG which was sinus maycol. PT recommending rehab, patient and family prefer to go home with HH.       Interval History: NAEON. Patient felling ok this morning. Pending PT eval for discharge recs.    Review of Systems   Unable to  perform ROS: Other   Objective:     Vital Signs (Most Recent):  Temp: 97.2 °F (36.2 °C) (05/12/22 1130)  Pulse: (!) 47 (05/12/22 1130)  Resp: 17 (05/12/22 0850)  BP: 131/61 (05/12/22 1130)  SpO2: (!) 93 % (05/12/22 1130)   Vital Signs (24h Range):  Temp:  [96.1 °F (35.6 °C)-97.6 °F (36.4 °C)] 97.2 °F (36.2 °C)  Pulse:  [42-60] 47  Resp:  [16-18] 17  SpO2:  [92 %-99 %] 93 %  BP: (131-164)/(61-68) 131/61     Weight: 65.3 kg (144 lb)  Body mass index is 23.96 kg/m².    Intake/Output Summary (Last 24 hours) at 5/12/2022 1313  Last data filed at 5/11/2022 1800  Gross per 24 hour   Intake 250 ml   Output --   Net 250 ml      Physical Exam  Vitals and nursing note reviewed.   Constitutional:       General: He is not in acute distress.     Appearance: He is not toxic-appearing or diaphoretic.   HENT:      Head: Normocephalic.   Pulmonary:      Effort: Pulmonary effort is normal. No respiratory distress.   Neurological:      Mental Status: He is alert. Mental status is at baseline.      Cranial Nerves: No dysarthria.   Psychiatric:         Mood and Affect: Mood normal.         Behavior: Behavior normal.       Significant Labs: All pertinent labs within the past 24 hours have been reviewed.    Significant Imaging: I have reviewed all pertinent imaging results/findings within the past 24 hours.      Assessment/Plan:      * SBO (small bowel obstruction)  Presents with SBO  Hx of UC s/p total colectomy  NGT was placed initially and since d/c. Patient with + BM. Per general surgery, SBO resolved.    -- Avoid loperamide home med  -- Start regular diet  -- Monitor for bowel movements, abdominal pain      Hypophosphatemia  -- Monitor on morning labs  -- Encourage po intake  -- Replenish as indicated    Sinus bradycardia  EKG reviewed w/ sinus bradycardia with rate 40s.  Asymptomatic  Hemodynamically stable  Suspect possible SSS    -- Cardiology reviewed EKG, no recommendations/interventions for asymptomatic sinus bradycardia  --  Avoid daniel blocking agents/medications    Pleural effusion  CXR revealed small to moderate left pleural effusion.   Suspect likely transudative. Worsening oxygenation with continued IV fluids. Some BLE edema, prescribed bumex prn, and noted elevated CVP on TTE last year concerning for volume overload  Less likely infectious. Noted leukocytosis 27 on presentation, since improved to 12 today. Otherwise, normotensive, afebrile, and normal LA.   TTE nml EF, unremarkable  S/p thoracentesis w/ IR    -- Pleural fluid studies consistent with TRANSUDATIVE effusion. Etiology thought to be somewhat chronic, as was present on initial CT< but aggravated by large volume IV fluid resuscitation during SBO.  - attempting lasix 40mg IV x1 to get patient off 2L O2  -- Discontinued Zosyn as infection not suspected.     Benign prostatic hyperplasia  -- Resume home proscar      Parkinsons  -- Resuming home sinemet and rasagiline        VTE Risk Mitigation (From admission, onward)         Ordered     heparin (porcine) injection 5,000 Units  Every 8 hours         05/08/22 0512     IP VTE HIGH RISK PATIENT  Once         05/08/22 0512     Place sequential compression device  Until discontinued         05/08/22 0512                Discharge Planning   JOHAN: 5/13/2022     Code Status: Full Code   Is the patient medically ready for discharge?: Yes    Reason for patient still in hospital (select all that apply): Pending disposition  Discharge Plan A: Home with family, Home Health                  Guadalupe Mcguire MD  Department of Hospital Medicine   [facility] - Surgery

## 2022-05-12 NOTE — PLAN OF CARE
[facility]    FACILITY TRANSFER ORDERS      Patient Name: Giselle Lemus  YOB: 1933    PCP: Tl Torres MD   PCP Address: [address] / [facility] [address]  PCP Phone Number: [phone]  PCP Fax: [phone]    Encounter Date: 05/12/2022    Admit to: [facility]    Vital Signs:  Routine    Diagnoses:   Active Hospital Problems    Diagnosis  POA    *SBO (small bowel obstruction) [K56.609]  Yes    Pleural effusion [J90]  Yes    Sinus bradycardia [R00.1]  Yes    Hypophosphatemia [E83.39]  Yes    Parkinsons [G20]  Yes     R hand tremor starting in 2017, diagnosed Sept 2019 by Dr. Angeles at       Benign prostatic hyperplasia [N40.0]  Yes      Resolved Hospital Problems   No resolved problems to display.       Allergies:Review of patient's allergies indicates:  No Known Allergies    Diet: regular diet    Activities: Activity as tolerated    Nursing: Fall Precautions     Labs: na na     CONSULTS:    Physical Therapy to evaluate and treat. , Occupational Therapy to evaluate and treat. and  to evaluate for community resources/long-range planning.    MISCELLANEOUS CARE:  Routine Skin for Bedridden Patients: Apply moisture barrier cream to all skin folds and wet areas in perineal area daily and after baths and all bowel movements.    WOUND CARE ORDERS  None    Medications: Review discharge medications with patient and family and provide education.      Current Discharge Medication List      CONTINUE these medications which have CHANGED    Details   carbidopa-levodopa  mg (SINEMET)  mg per tablet Take 1 tablet by mouth 4 (four) times daily.  Qty: 120 tablet, Refills: 11      loperamide (IMODIUM) 2 mg capsule Take 1 capsule (2 mg total) by mouth 3 (three) times daily as needed for Diarrhea.  Refills: 0         CONTINUE these medications which have NOT CHANGED    Details   acetaminophen (TYLENOL) 325 MG tablet Take 650 mg by mouth every 6 (six)  hours as needed for Pain.      finasteride (PROSCAR) 5 mg tablet Take 5 mg by mouth once daily.      rasagiline (AZILECT) 1 mg Tab Take 1 tablet (1 mg total) by mouth once daily at 6am.  Qty: 90 tablet, Refills: 3                Immunizations Administered as of 5/12/2022     Name Date Dose VIS Date Route Exp Date    COVID-19, MRNA, LN-S, PF (Pfizer) (Purple Cap) 1/31/2021  1:14 PM 0.3 mL 12/12/2020 Intramuscular 5/31/2021    Site: Left deltoid     Given By: Angy Choi RN     : [employer]     Lot: [id number]     COVID-19, MRNA, LN-S, PF (Pfizer) (Purple Cap) 1/10/2021  1:24 PM 0.3 mL 12/12/2020 Intramuscular 4/30/2021    Site: Left deltoid     Given By: Olya Mcfadden RN     : [employer]     Lot: [id number]           This patient has had both covid vaccinations    Some patients may experience side effects after vaccination.  These may include fever, headache, muscle or joint aches.  Most symptoms resolve with 24-48 hours and do not require urgent medical evaluation unless they persist for more than 72 hours or symptoms are concerning for an unrelated medical condition.          _________________________________  Guadalupe Mcguire MD  05/12/2022

## 2022-05-12 NOTE — PT/OT/SLP EVAL
Physical Therapy Co-Evaluation with OT and Treatment     Patient Name:  Giselle Lemus  MRN: [id number]    Admit Date: 5/8/2022  Admitting Diagnosis:  SBO (small bowel obstruction)  Length of Stay: 4 days  Recent Surgery: * No surgery found *      Recommendations:     Discharge Recommendations: Inpatient Rehabilitation Facility   Equipment recommendations: TBD pending progress  Barriers to discharge: Increased level of skilled assistance required    Assessment:     Giselle Lemus is a 88 y.o. male admitted to [facility] on 5/8/2022 with medical diagnosis of SBO (small bowel obstruction). Pt presents with weakness, impaired endurance, impaired self care skills, impaired functional mobilty, gait instability, impaired balance, impaired cognition, decreased safety awareness. These deficits effect their roles and responsibilities in which they were able to complete prior to admit. PTA, pt was ambulating with RW. Pt is primary caregiver for wife, who is w/c bound and per brother-in-law, sometimes requires 2 people to assist to t/f into car. They currently have no aides or assistance at home. Pt has Parkinson's and is hard of hearing (states hearing aid is broken), requiring increased time by therapists to write down information/commands. Pt is ModA for sitting balance, standing, and gait due to increased posterior lean and inability to correct. Giselle Lemus would benefit from acute PT intervention to improve quality of life, focus on recovery of impairments, provide patient/caregiver education, reduce fall risk, and maximize (I) and safety with functional mobility. Once medically stable, recommending pt discharge to Inpatient Rehabilitation Facility  as pt would benefit from increased therapy to address decreased functional mobility deficits.    Rehab Prognosis: Good    Plan:     During this hospitalization, patient to be seen 4 x/week to address the identified rehab impairments via gait training, therapeutic activities, therapeutic exercises,  neuromuscular re-education and progress towards stated goals.     Plan of Care Expires:  06/11/22  Plan of Care reviewed with: patient, other (see comments) (brother-in-law arrived group home during session)    This plan of care has been discussed with the patient/caregiver, who was included in its development and is in agreement with the identified goals and treatment plan.     Subjective     Communicated with RN prior to session.  Patient found supine upon PT entry to room, agreeable to evaluation. Pt alone during beginning of session, brother-in-law arrived group home through session    Chief Complaint: concerned about discharge plan      Patient/Family Comments/goals: none stated    Pain/Comfort:  · Pain Rating 1: 3/10  · Location - Side 1: Left  · Location 1: abdomen  · Pain Addressed 1: Reposition, Distraction, Cessation of Activity  · Pain Rating Post-Intervention 1: 3/10    Patients cultural, spiritual, Ep[employer]opalian conflicts given the current situation: None identified     Patient History: information obtained from pt and brother-in-law     Living Environment: Pt lives with wife in single level home  with 0 BRENDA.  Prior Level of Function: modified (I) for mobility and ADLs using RW as AD   DME owned: rolling walker  Support Available/Caregiver Assistance: Pt is primary caregiver for wife. Brother-in-law and 2 other siblings involved in care but do not live with pt    Objective:   OT present for coeval due to pt's multiple medical comorbidities and functional/cognition deficits requiring two skilled therapists to appropriately progress pt's musculoskeletal strength, neuromuscular control, and endurance while taking into consideration medical acuity and pt safety.    Patient found with: bed alarm, telemetry, SCD, oxygen, Condom Catheter (Condom cath off)    Recent Surgery: * No surgery found *    General Precautions: Standard, fall   Orthopedic Precautions:N/A   Braces: N/A   Oxygen Device: nasal  cannula      Exams:     Cognition:  · Oriented X 4  · Command following: Follows multistep verbal commands with increased time due to [place] and need to write information down on paper  · Communication: clear/fluent     Sensation:   o Light touch sensation: Pt reported generalized numbness/tingling of byron feet, L worse than R     Gross Motor Coordination: No deficits noted during functional mobility tasks      Edema/Skin Integrity: None noted; Visible skin intact     Postural examination/scapula alignment: Rounded shoulder, Head forward and increased posterior lean in sitting and standing     Lower Extremity Range of Motion:  o Right Lower Extremity: WFL  o Left Lower Extremity: WFL     Lower Extremity Strength:  o Right Lower Extremity: WFL  o Left Lower Extremity: WFL    Functional Mobility:    Bed Mobility:  · Supine > Sit with moderate assistance  · Sit > Supine with moderate assistance    Transfers:   · Sit <> Stand Transfer: Moderate Assistance x 1 trials from eob with RW AD              Gait:  · Distance: 3 ft forward and backward  · Assistance level: Moderate Assistance  · Assistive Device: rolling walker  · Gait Assessment: decreased step length  and unsteady gait with increased posterior trunk lean    Balance:  · Dynamic Sitting: FAIR: Cannot move trunk without losing balance  · Standing:  · Static: POOR: Needs MODERATE assist to maintain   · Dynamic: POOR: Needs MOD (moderate) assist during gait    Outcome Measure: AM-PAC 6 CLICK MOBILITY  Total Score:12     Patient/Caregiver Education:     Therapist educated pt/caregiver regarding:    PT POC and goals for therapy    Safety with mobility and fall risk    Instruction on use of call button and importance of calling nursing staff for assistance with mobility    Time provided for therapeutic counseling and discussion of current health disposition. All questions answered to satisfaction, within scope of PT practice    Increased time spent with family  discussing different placement options and what inpatient rehab vs home health would provide for pt in regards to therapy and safety concerns    Patient/caregiver able to verbalize understanding and expressed no further questions this visit; will follow-up with pt/caregiver during current admit for additional questions/concerns within scope of practice.     White board updated.     Patient left supine with all lines intact and brother-in-law present.    GOALS:   Multidisciplinary Problems     Physical Therapy Goals        Problem: Physical Therapy    Goal Priority Disciplines Outcome Goal Variances Interventions   Physical Therapy Goal     PT, PT/OT Ongoing, Progressing     Description: Goals to be met by: 22     Patient will increase functional independence with mobility by performin. Supine to sit with Stand-by Assistance  2. Sit to stand transfer with Stand-by Assistance  3. Bed to chair transfer with Stand-by Assistance using LRAD  4. Gait  x 50 feet with Stand-by Assistance using LRAD  5. Ascend/Descend 6 inch curb step with Stand-by Assistance using LRAD  6. Lower extremity exercise program x30 reps per handout, with independence                       History:     Past Medical History:   Diagnosis Date    BPH (benign prostatic hyperplasia)     Parkinsons 2019    R hand tremor starting in , diagnosed 2019 by Dr. Angeles at     Ulcerative colitis     s/p colectomy    Unspecified chronic bronchitis        Past Surgical History:   Procedure Laterality Date    TOTAL COLECTOMY         Time Tracking:     PT Received On: 22  PT Start Time: 1018     PT Stop Time: 1056  PT Total Time (min): 38 min     Billable Minutes: Evaluation 15, Gait Training 8 and Therapeutic Activity 15    2022    Maranda Pires, PT

## 2022-05-12 NOTE — PLAN OF CARE
PT evaluation complete - see note for details. POC and goals established.    Problem: Physical Therapy  Goal: Physical Therapy Goal  Description: Goals to be met by: 22     Patient will increase functional independence with mobility by performin. Supine to sit with Stand-by Assistance  2. Sit to stand transfer with Stand-by Assistance  3. Bed to chair transfer with Stand-by Assistance using LRAD  4. Gait  x 50 feet with Stand-by Assistance using LRAD  5. Ascend/Descend 6 inch curb step with Stand-by Assistance using LRAD  6. Lower extremity exercise program x30 reps per handout, with independence    Outcome: Ongoing, Progressing     2022  Maranda Pires, PT

## 2022-05-12 NOTE — PLAN OF CARE
Spoke with patient's wife. Family in agreement with d/c to Rehab on 5/13/22. Patient accepted by [facility]. No Auth needed as patient has medicare. Will continue to follow.

## 2022-05-13 ENCOUNTER — DOCUMENT SCAN (OUTPATIENT)
Dept: HOME HEALTH SERVICES | Facility: HOSPITAL | Age: 87
End: 2022-05-13
Payer: MEDICARE

## 2022-05-13 PROBLEM — R04.2 HEMOPTYSIS: Status: ACTIVE | Noted: 2022-05-13

## 2022-05-13 LAB
ABO + RH BLD: NORMAL
ANISOCYTOSIS BLD QL SMEAR: SLIGHT
APTT BLDCRRT: 44.1 SEC (ref 21–32)
BACTERIA SPEC AEROBE CULT: NO GROWTH
BASOPHILS # BLD AUTO: 0.04 K/UL (ref 0–0.2)
BASOPHILS # BLD AUTO: 0.13 K/UL (ref 0–0.2)
BASOPHILS NFR BLD: 0.3 % (ref 0–1.9)
BASOPHILS NFR BLD: 1.1 % (ref 0–1.9)
BLD GP AB SCN CELLS X3 SERPL QL: NORMAL
BURR CELLS BLD QL SMEAR: ABNORMAL
D DIMER PPP IA.FEU-MCNC: 2.53 MG/L FEU
DIFFERENTIAL METHOD: ABNORMAL
DIFFERENTIAL METHOD: ABNORMAL
EOSINOPHIL # BLD AUTO: 0.1 K/UL (ref 0–0.5)
EOSINOPHIL # BLD AUTO: 0.3 K/UL (ref 0–0.5)
EOSINOPHIL NFR BLD: 1.1 % (ref 0–8)
EOSINOPHIL NFR BLD: 2.6 % (ref 0–8)
ERYTHROCYTE [DISTWIDTH] IN BLOOD BY AUTOMATED COUNT: 16 % (ref 11.5–14.5)
ERYTHROCYTE [DISTWIDTH] IN BLOOD BY AUTOMATED COUNT: 16.1 % (ref 11.5–14.5)
FIBRINOGEN PPP-MCNC: 440 MG/DL (ref 182–400)
HCT VFR BLD AUTO: 30.1 % (ref 40–54)
HCT VFR BLD AUTO: 32.4 % (ref 40–54)
HGB BLD-MCNC: 10.5 G/DL (ref 14–18)
HGB BLD-MCNC: 9.9 G/DL (ref 14–18)
IMM GRANULOCYTES # BLD AUTO: 0.06 K/UL (ref 0–0.04)
IMM GRANULOCYTES # BLD AUTO: 0.46 K/UL (ref 0–0.04)
IMM GRANULOCYTES NFR BLD AUTO: 0.5 % (ref 0–0.5)
IMM GRANULOCYTES NFR BLD AUTO: 3.9 % (ref 0–0.5)
INR PPP: 1 (ref 0.8–1.2)
LYMPHOCYTES # BLD AUTO: 1.3 K/UL (ref 1–4.8)
LYMPHOCYTES # BLD AUTO: 1.9 K/UL (ref 1–4.8)
LYMPHOCYTES NFR BLD: 10.6 % (ref 18–48)
LYMPHOCYTES NFR BLD: 16.2 % (ref 18–48)
MAGNESIUM SERPL-MCNC: 1.7 MG/DL (ref 1.6–2.6)
MCH RBC QN AUTO: 30 PG (ref 27–31)
MCH RBC QN AUTO: 30.2 PG (ref 27–31)
MCHC RBC AUTO-ENTMCNC: 32.4 G/DL (ref 32–36)
MCHC RBC AUTO-ENTMCNC: 32.9 G/DL (ref 32–36)
MCV RBC AUTO: 92 FL (ref 82–98)
MCV RBC AUTO: 93 FL (ref 82–98)
MONOCYTES # BLD AUTO: 1.1 K/UL (ref 0.3–1)
MONOCYTES # BLD AUTO: 1.3 K/UL (ref 0.3–1)
MONOCYTES NFR BLD: 10.9 % (ref 4–15)
MONOCYTES NFR BLD: 8.4 % (ref 4–15)
NEUTROPHILS # BLD AUTO: 10 K/UL (ref 1.8–7.7)
NEUTROPHILS # BLD AUTO: 7.7 K/UL (ref 1.8–7.7)
NEUTROPHILS NFR BLD: 65.3 % (ref 38–73)
NEUTROPHILS NFR BLD: 79.1 % (ref 38–73)
NRBC BLD-RTO: 0 /100 WBC
NRBC BLD-RTO: 0 /100 WBC
OVALOCYTES BLD QL SMEAR: ABNORMAL
PHOSPHATE SERPL-MCNC: 2 MG/DL (ref 2.7–4.5)
PLATELET # BLD AUTO: 154 K/UL (ref 150–450)
PLATELET # BLD AUTO: 182 K/UL (ref 150–450)
PLATELET BLD QL SMEAR: ABNORMAL
PMV BLD AUTO: 10.6 FL (ref 9.2–12.9)
PMV BLD AUTO: 11.9 FL (ref 9.2–12.9)
POCT GLUCOSE: 115 MG/DL (ref 70–110)
POCT GLUCOSE: 125 MG/DL (ref 70–110)
POIKILOCYTOSIS BLD QL SMEAR: SLIGHT
PROTHROMBIN TIME: 10.7 SEC (ref 9–12.5)
RBC # BLD AUTO: 3.28 M/UL (ref 4.6–6.2)
RBC # BLD AUTO: 3.5 M/UL (ref 4.6–6.2)
WBC # BLD AUTO: 11.74 K/UL (ref 3.9–12.7)
WBC # BLD AUTO: 12.64 K/UL (ref 3.9–12.7)

## 2022-05-13 PROCEDURE — 85384 FIBRINOGEN ACTIVITY: CPT | Performed by: STUDENT IN AN ORGANIZED HEALTH CARE EDUCATION/TRAINING PROGRAM

## 2022-05-13 PROCEDURE — 36415 COLL VENOUS BLD VENIPUNCTURE: CPT | Performed by: STUDENT IN AN ORGANIZED HEALTH CARE EDUCATION/TRAINING PROGRAM

## 2022-05-13 PROCEDURE — 63600175 PHARM REV CODE 636 W HCPCS

## 2022-05-13 PROCEDURE — 99233 PR SUBSEQUENT HOSPITAL CARE,LEVL III: ICD-10-PCS | Mod: GC,,, | Performed by: HOSPITALIST

## 2022-05-13 PROCEDURE — 83735 ASSAY OF MAGNESIUM: CPT | Performed by: STUDENT IN AN ORGANIZED HEALTH CARE EDUCATION/TRAINING PROGRAM

## 2022-05-13 PROCEDURE — 86920 COMPATIBILITY TEST SPIN: CPT | Performed by: STUDENT IN AN ORGANIZED HEALTH CARE EDUCATION/TRAINING PROGRAM

## 2022-05-13 PROCEDURE — 97530 THERAPEUTIC ACTIVITIES: CPT

## 2022-05-13 PROCEDURE — 86901 BLOOD TYPING SEROLOGIC RH(D): CPT

## 2022-05-13 PROCEDURE — 86022 PLATELET ANTIBODIES: CPT | Performed by: STUDENT IN AN ORGANIZED HEALTH CARE EDUCATION/TRAINING PROGRAM

## 2022-05-13 PROCEDURE — 85610 PROTHROMBIN TIME: CPT

## 2022-05-13 PROCEDURE — 85730 THROMBOPLASTIN TIME PARTIAL: CPT | Performed by: STUDENT IN AN ORGANIZED HEALTH CARE EDUCATION/TRAINING PROGRAM

## 2022-05-13 PROCEDURE — 25000003 PHARM REV CODE 250: Performed by: HOSPITALIST

## 2022-05-13 PROCEDURE — 85025 COMPLETE CBC W/AUTO DIFF WBC: CPT | Performed by: STUDENT IN AN ORGANIZED HEALTH CARE EDUCATION/TRAINING PROGRAM

## 2022-05-13 PROCEDURE — 63600175 PHARM REV CODE 636 W HCPCS: Performed by: STUDENT IN AN ORGANIZED HEALTH CARE EDUCATION/TRAINING PROGRAM

## 2022-05-13 PROCEDURE — 25000003 PHARM REV CODE 250: Performed by: STUDENT IN AN ORGANIZED HEALTH CARE EDUCATION/TRAINING PROGRAM

## 2022-05-13 PROCEDURE — C9113 INJ PANTOPRAZOLE SODIUM, VIA: HCPCS | Performed by: STUDENT IN AN ORGANIZED HEALTH CARE EDUCATION/TRAINING PROGRAM

## 2022-05-13 PROCEDURE — 85240 CLOT FACTOR VIII AHG 1 STAGE: CPT | Performed by: STUDENT IN AN ORGANIZED HEALTH CARE EDUCATION/TRAINING PROGRAM

## 2022-05-13 PROCEDURE — 20600001 HC STEP DOWN PRIVATE ROOM

## 2022-05-13 PROCEDURE — 85379 FIBRIN DEGRADATION QUANT: CPT | Performed by: STUDENT IN AN ORGANIZED HEALTH CARE EDUCATION/TRAINING PROGRAM

## 2022-05-13 PROCEDURE — 84100 ASSAY OF PHOSPHORUS: CPT | Performed by: STUDENT IN AN ORGANIZED HEALTH CARE EDUCATION/TRAINING PROGRAM

## 2022-05-13 PROCEDURE — 85025 COMPLETE CBC W/AUTO DIFF WBC: CPT | Mod: 91

## 2022-05-13 PROCEDURE — 99233 SBSQ HOSP IP/OBS HIGH 50: CPT | Mod: GC,,, | Performed by: HOSPITALIST

## 2022-05-13 PROCEDURE — 97116 GAIT TRAINING THERAPY: CPT

## 2022-05-13 PROCEDURE — 94761 N-INVAS EAR/PLS OXIMETRY MLT: CPT

## 2022-05-13 RX ORDER — HEPARIN SODIUM 5000 [USP'U]/ML
5000 INJECTION, SOLUTION INTRAVENOUS; SUBCUTANEOUS EVERY 8 HOURS
Status: DISCONTINUED | OUTPATIENT
Start: 2022-05-14 | End: 2022-05-13

## 2022-05-13 RX ORDER — OXYMETAZOLINE HCL 0.05 %
2 SPRAY, NON-AEROSOL (ML) NASAL 2 TIMES DAILY PRN
Status: DISCONTINUED | OUTPATIENT
Start: 2022-05-13 | End: 2022-05-13

## 2022-05-13 RX ORDER — HYDROCODONE BITARTRATE AND ACETAMINOPHEN 500; 5 MG/1; MG/1
TABLET ORAL
Status: DISCONTINUED | OUTPATIENT
Start: 2022-05-13 | End: 2022-05-15

## 2022-05-13 RX ORDER — OXYCODONE HYDROCHLORIDE 5 MG/1
5 TABLET ORAL EVERY 6 HOURS PRN
Status: DISCONTINUED | OUTPATIENT
Start: 2022-05-13 | End: 2022-05-18 | Stop reason: HOSPADM

## 2022-05-13 RX ORDER — RASAGILINE 1 MG/1
1 TABLET ORAL DAILY
Status: CANCELLED | OUTPATIENT
Start: 2022-05-14

## 2022-05-13 RX ORDER — BALSAM PERU/CASTOR OIL
OINTMENT (GRAM) TOPICAL 2 TIMES DAILY
Status: DISCONTINUED | OUTPATIENT
Start: 2022-05-13 | End: 2022-05-18 | Stop reason: HOSPADM

## 2022-05-13 RX ORDER — OXYMETAZOLINE HCL 0.05 %
2 SPRAY, NON-AEROSOL (ML) NASAL 2 TIMES DAILY PRN
Refills: 0
Start: 2022-05-13 | End: 2022-05-18 | Stop reason: SDUPTHER

## 2022-05-13 RX ORDER — CARBIDOPA AND LEVODOPA 25; 100 MG/1; MG/1
1 TABLET ORAL 4 TIMES DAILY
Status: CANCELLED | OUTPATIENT
Start: 2022-05-13

## 2022-05-13 RX ORDER — FUROSEMIDE 10 MG/ML
40 INJECTION INTRAMUSCULAR; INTRAVENOUS ONCE
Status: COMPLETED | OUTPATIENT
Start: 2022-05-13 | End: 2022-05-13

## 2022-05-13 RX ORDER — SODIUM,POTASSIUM PHOSPHATES 280-250MG
2 POWDER IN PACKET (EA) ORAL ONCE
Status: COMPLETED | OUTPATIENT
Start: 2022-05-13 | End: 2022-05-13

## 2022-05-13 RX ORDER — SODIUM CHLORIDE 0.9 % (FLUSH) 0.9 %
10 SYRINGE (ML) INJECTION
Status: CANCELLED | OUTPATIENT
Start: 2022-05-13

## 2022-05-13 RX ORDER — PANTOPRAZOLE SODIUM 40 MG/10ML
80 INJECTION, POWDER, LYOPHILIZED, FOR SOLUTION INTRAVENOUS ONCE
Status: COMPLETED | OUTPATIENT
Start: 2022-05-13 | End: 2022-05-13

## 2022-05-13 RX ORDER — OXYMETAZOLINE HCL 0.05 %
2 SPRAY, NON-AEROSOL (ML) NASAL 2 TIMES DAILY
Status: DISCONTINUED | OUTPATIENT
Start: 2022-05-13 | End: 2022-05-14

## 2022-05-13 RX ORDER — ACETAMINOPHEN 500 MG
1000 TABLET ORAL EVERY 8 HOURS
Status: DISCONTINUED | OUTPATIENT
Start: 2022-05-13 | End: 2022-05-14

## 2022-05-13 RX ORDER — PANTOPRAZOLE SODIUM 40 MG/10ML
40 INJECTION, POWDER, LYOPHILIZED, FOR SOLUTION INTRAVENOUS EVERY 24 HOURS
Status: DISCONTINUED | OUTPATIENT
Start: 2022-05-14 | End: 2022-05-14

## 2022-05-13 RX ORDER — LOPERAMIDE HYDROCHLORIDE 2 MG/1
2 CAPSULE ORAL 3 TIMES DAILY PRN
Status: CANCELLED | OUTPATIENT
Start: 2022-05-13

## 2022-05-13 RX ORDER — OXYMETAZOLINE HCL 0.05 %
2 SPRAY, NON-AEROSOL (ML) NASAL 2 TIMES DAILY
Status: CANCELLED | OUTPATIENT
Start: 2022-05-13 | End: 2022-05-16

## 2022-05-13 RX ADMIN — NASAL 2 SPRAY: 6.5 SPRAY NASAL at 08:05

## 2022-05-13 RX ADMIN — CARBIDOPA AND LEVODOPA 1 TABLET: 25; 100 TABLET ORAL at 08:05

## 2022-05-13 RX ADMIN — POTASSIUM & SODIUM PHOSPHATES POWDER PACK 280-160-250 MG 2 PACKET: 280-160-250 PACK at 10:05

## 2022-05-13 RX ADMIN — LOPERAMIDE HYDROCHLORIDE 2 MG: 2 CAPSULE ORAL at 08:05

## 2022-05-13 RX ADMIN — HEPARIN SODIUM 5000 UNITS: 5000 INJECTION INTRAVENOUS; SUBCUTANEOUS at 05:05

## 2022-05-13 RX ADMIN — PANTOPRAZOLE SODIUM 40 MG: 40 TABLET, DELAYED RELEASE ORAL at 08:05

## 2022-05-13 RX ADMIN — NASAL 2 SPRAY: 6.5 SPRAY NASAL at 11:05

## 2022-05-13 RX ADMIN — OXYCODONE 5 MG: 5 TABLET ORAL at 11:05

## 2022-05-13 RX ADMIN — CARBIDOPA AND LEVODOPA 1 TABLET: 25; 100 TABLET ORAL at 01:05

## 2022-05-13 RX ADMIN — ACETAMINOPHEN 1000 MG: 500 TABLET ORAL at 08:05

## 2022-05-13 RX ADMIN — CARBIDOPA AND LEVODOPA 1 TABLET: 25; 100 TABLET ORAL at 04:05

## 2022-05-13 RX ADMIN — POTASSIUM & SODIUM PHOSPHATES POWDER PACK 280-160-250 MG 2 PACKET: 280-160-250 PACK at 03:05

## 2022-05-13 RX ADMIN — HEPARIN SODIUM 5000 UNITS: 5000 INJECTION INTRAVENOUS; SUBCUTANEOUS at 01:05

## 2022-05-13 RX ADMIN — FUROSEMIDE 40 MG: 10 INJECTION, SOLUTION INTRAMUSCULAR; INTRAVENOUS at 05:05

## 2022-05-13 RX ADMIN — PANTOPRAZOLE SODIUM 80 MG: 40 INJECTION, POWDER, FOR SOLUTION INTRAVENOUS at 08:05

## 2022-05-13 RX ADMIN — Medication: at 03:05

## 2022-05-13 RX ADMIN — RASAGILINE 1 MG: 1 TABLET ORAL at 05:05

## 2022-05-13 RX ADMIN — HEPARIN SODIUM 5000 UNITS: 5000 INJECTION INTRAVENOUS; SUBCUTANEOUS at 08:05

## 2022-05-13 NOTE — PLAN OF CARE
05/13/22 0956   Post-Acute Status   Post-Acute Authorization Placement   Post-Acute Placement Status Set-up Complete/Auth obtained     Patient set-up has been completed.CRISTINA scheduled d/c transportation to [facility] through [facility]. Patient is scheduled to be picked up at 11:00 am. CRISTINA provided patient's nurse with report number #[phone]; ask for the nurse. CRISTINA is in communication with patient's CM and patient's Care Team. Requested  time does not guarantee arrival time.      CM Magda contacted pt's wife.     ETA: 2:00 pm    12:07 PM  SW transport canceled, due to medical condition.     Connie Currie LMSW  Case Management   [facility]   Ext. [phone]

## 2022-05-13 NOTE — ASSESSMENT & PLAN NOTE
CXR revealed small to moderate left pleural effusion.   Suspect likely transudative. Worsening oxygenation with continued IV fluids. Some BLE edema, prescribed bumex prn, and noted elevated CVP on TTE last year concerning for volume overload  Less likely infectious. Noted leukocytosis 27 on presentation, since improved to 12 today. Otherwise, normotensive, afebrile, and normal LA.   TTE nml EF, unremarkable  S/p thoracentesis w/ IR  Doing well on room air, coughing up blood-tinged sputum bright red this morning  CXR w/ worsening R-sided pulm vascular congestion, pleural effusions still present      -- Pleural fluid studies consistent with TRANSUDATIVE effusion. Etiology thought to be somewhat chronic, as was present on initial CT< but aggravated by large volume IV fluid resuscitation during SBO.  -- Discontinued Zosyn as infection not suspected.   -- Pending CT Chest ordered better evaluate pleural effusions

## 2022-05-13 NOTE — NURSING
Patient is AAOX 4 having some blood tinge sputum coming up called MD awaiting call back to make aware. Vitals stable respirations even and unlabored. Can voice needs and pain no pain noted. [place] to gravity output 200. Bowel movement on this morning. Safety measures in place. Call light within reach. Bed to lowest position.

## 2022-05-13 NOTE — SUBJECTIVE & OBJECTIVE
Past Medical History:   Diagnosis Date    BPH (benign prostatic hyperplasia)     Parkinsons 09/2019    R hand tremor starting in 2017, diagnosed Sept 2019 by Dr. Angeles at     Ulcerative colitis     s/p colectomy    Unspecified chronic bronchitis      Past Surgical History:   Procedure Laterality Date    TOTAL COLECTOMY       Review of patient's allergies indicates:  No Known Allergies    Scheduled Medications:    carbidopa-levodopa  mg  1 tablet Oral QID    heparin (porcine)  5,000 Units Subcutaneous Q8H    pantoprazole  40 mg Oral Daily    rasagiline  1 mg Oral Daily       PRN Medications: albuterol-ipratropium, dextrose 10%, dextrose 10%, glucagon (human recombinant), glucose, glucose, loperamide, ondansetron, sodium chloride 0.9%    Family History    None       Tobacco Use    Smoking status: Never Smoker    Smokeless tobacco: Never Used   Substance and Sexual Activity    Alcohol use: Never    Drug use: Not on file    Sexual activity: Not on file     Review of Systems   Reason unable to perform ROS: Difficult to get ROS 2/2 hearing deficit.   HENT:  Positive for hearing loss.    Musculoskeletal:  Positive for gait problem.   Neurological:  Positive for weakness.   Objective:     Vital Signs (Most Recent):  Temp: 97.6 °F (36.4 °C) (05/12/22 1945)  Pulse: (!) 54 (05/12/22 1945)  Resp: 18 (05/12/22 1945)  BP: 135/60 (05/12/22 1945)  SpO2: (!) 94 % (05/12/22 1945)      Vital Signs (24h Range):  Temp:  [96.1 °F (35.6 °C)-97.6 °F (36.4 °C)] 97.6 °F (36.4 °C)  Pulse:  [42-60] 54  Resp:  [17-18] 18  SpO2:  [93 %-99 %] 94 %  BP: (131-164)/(60-68) 135/60     Body mass index is 23.96 kg/m².    Physical Exam  Vitals and nursing note reviewed.   Constitutional:       Appearance: He is well-developed.   HENT:      Head: Normocephalic and atraumatic.   Eyes:      General:         Right eye: No discharge.         Left eye: No discharge.      Pupils: Pupils are equal, round, and reactive to light.   Pulmonary:       Effort: Pulmonary effort is normal. No respiratory distress.   Abdominal:      General: There is no distension.      Palpations: Abdomen is soft.      Tenderness: There is no abdominal tenderness.   Musculoskeletal:         General: No deformity.      Comments: Deconditioned and generalized weakness   Skin:     General: Skin is warm and dry.   Neurological:      Mental Status: He is alert. Mental status is at baseline.      Sensory: No sensory deficit.      Motor: No abnormal muscle tone.   Psychiatric:         Behavior: Behavior normal.     NEUROLOGICAL EXAMINATION:     CRANIAL NERVES     CN III, IV, VI   Pupils are equal, round, and reactive to light.    Diagnostic Results: Labs: Reviewed  ECG: Reviewed  X-Ray: Reviewed

## 2022-05-13 NOTE — ASSESSMENT & PLAN NOTE
- C- xray revealed small to moderate left pleural effusion, s/p thoracentesis w/ IR, pleural fluid studies consistent with transudative effusion, per HM etiology thought to be chronic

## 2022-05-13 NOTE — PT/OT/SLP PROGRESS
"Occupational Therapy   Treatment    Name: Giselle Lemus  MRN: [id number]  Admitting Diagnosis:  SBO (small bowel obstruction)        Co-treat with PT to ensure pt's safety while performing functional activity    Recommendations:     Discharge Recommendations: rehabilitation facility  Discharge Equipment Recommendations:  other (see comments) (TBD)  Barriers to discharge:  Decreased caregiver support (increased assistance required)    Assessment:     Giselle Lemus is a 88 y.o. male with a medical diagnosis of SBO (small bowel obstruction).  Pt had good tolerance of session, ambulating ~25 ft in his room with Min A with RW.    He presents with the following. Performance deficits affecting function are weakness, impaired endurance, impaired self care skills, impaired functional mobilty, gait instability, impaired balance, pain, decreased lower extremity function, decreased upper extremity function, decreased coordination, other (comment), decreased safety awareness (hearing impaired).     Rehab Prognosis:  Good; patient would benefit from acute skilled OT services to address these deficits and reach maximum level of function.       Plan:     Patient to be seen 4 x/week to address the above listed problems via self-care/home management, therapeutic activities, therapeutic exercises  · Plan of Care Expires: 06/12/22  · Plan of Care Reviewed with: patient    Subjective   "What do I do if I need to have a bowel movement?" - pt was wearing an adult brief   Pain/Comfort:  Pain Rating 1: other (see comments) (not rated)  Location - Side 1: Left  Location - Orientation 1: generalized  Location 1: hip  Pain Addressed 1: Distraction, Reposition, Cessation of Activity  Pain Rating Post-Intervention 1: other (see comments) (not rated)    Objective:     Communicated with: nursing and PT prior to session.  Patient found HOB elevated with telemetry, SCD, monreal catheter with his brother present upon OT entry to room.    General Precautions: " Standard, fall, hearing impaired   Orthopedic Precautions:N/A   Braces: N/A  Respiratory Status: Room air     Occupational Performance:     Bed Mobility:    · Patient completed Rolling/Turning to Left with  minimum assistance  · Patient completed Scooting/Bridging to HOB via draw sheet while supine with maximal assistance and 2 persons  · Patient completed Supine to Sit with minimum assistance  · Patient completed Sit to Supine with minimum assistance     Functional Mobility/Transfers:  · Patient completed Sit <> Stand Transfer from EOB x 1 trial with minimum assistance of 2 persons with  rolling walker   · Functional Mobility: Pt ambulated ~25 ft from EOB to his bedroom door and back to EOB with Min A of 2 - Min A of 1 with RW, requiring frequent cueing to widen his BRANDON.      Activities of Daily Living:  · Toileting: Pt was wearing an adult brief   · Grooming: SBA to wash his face while HOB elevated       AMPA 6 Click ADL: 9    Treatment & Education:  Pt edu on role of OT, POC, safety when performing self care tasks, benefit of performing OOB activity, and safety when performing functional transfers and mobility.  - White board updated  - Self care tasks completed-- as noted above       Patient left HOB elevated with all lines intact and call button in reachEducation:      GOALS:   Multidisciplinary Problems     Occupational Therapy Goals        Problem: Occupational Therapy    Goal Priority Disciplines Outcome Interventions   Occupational Therapy Goal     OT, PT/OT Ongoing, Progressing    Description: Goals to be met by: 06/12/22    Patient will increase functional independence with ADLs by performing:    UE Dressing with Contact Guard Assistance at EOB .  LE Dressing with Minimal Assistance at EOB.  Grooming while standing at sink with Minimal Assistance.  Toileting from bedside commode with Minimal Assistance for hygiene and clothing management.   Toilet transfer to bedside commode with Minimal Assistance with  [place].                     Time Tracking:     OT Date of Treatment: 05/13/22  OT Start Time: 1050  OT Stop Time: 1111  OT Total Time (min): 21 min    Billable Minutes:Therapeutic Activity 21 minutes    OT/HAYLEY: OT          5/13/2022

## 2022-05-13 NOTE — ASSESSMENT & PLAN NOTE
-- PT/OT following, appreciate recommendations  -- Plan to discharge to [facility] for continued therapy when medically cleared

## 2022-05-13 NOTE — CONSULTS
[facility]  Physical Medicine & Rehab  Consult Note    Patient Name: Giselle Lemus  MRN: [id number]  Admission Date: 5/8/2022  Hospital Length of Stay: 4 days  Attending Physician: John Alexander MD    Inpatient consult to Physical Medicine & Rehabilitation  Consult performed by: Sabine Lopez NP  Consult requested by:  John Alexander MD    Collaborating Physician: Krystal Tinsley MD  Reason for Consult:  Assess rehabilitation needs     Consults  Subjective:     Principal Problem: SBO (small bowel obstruction)    HPI: Giselle Lemus is a 88-year-old male with PMHx of Parkinsons, BPH, UC s/p total colectomy, and chronic bronchitis [place] [facility] [place]. On arrival, found to have SBO now resolved. Hospital course complicated by pleural effusion (C- xray revealed small to moderate left pleural effusion, s/p thoracentesis w/ IR, pleural fluid studies consistent with transudative effusion, per HM etiology thought to be chronic).     Functional History: Patient lives with wife in a single story home with 0 steps to enter.  Prior to admission, [facility]. DME: [facility].       Hospital Course: 5/12/22: Participated w/ OT & PT.Bed [facility] [place] [facility] modA.  Ambulated 3 ft modA w/ RW.       Past Medical History:   Diagnosis Date    BPH (benign prostatic hyperplasia)     Parkinsons 09/2019    R hand tremor starting in 2017, diagnosed Sept 2019 by Dr. Angeles at     Ulcerative colitis     s/p colectomy    Unspecified chronic bronchitis      Past Surgical History:   Procedure Laterality Date    TOTAL COLECTOMY       Review of patient's allergies indicates:  No Known Allergies    Scheduled Medications:    carbidopa-levodopa  mg  1 tablet Oral QID    heparin (porcine)  5,000 Units Subcutaneous Q8H    pantoprazole  40 mg Oral Daily    rasagiline  1 mg Oral Daily       PRN Medications: albuterol-ipratropium, dextrose 10%, dextrose 10%, glucagon (human recombinant), glucose, glucose, loperamide, ondansetron,  sodium chloride 0.9%    Family History    None       Tobacco Use    Smoking status: Never Smoker    Smokeless tobacco: Never Used   Substance and Sexual Activity    Alcohol use: Never    Drug use: Not on file    Sexual activity: Not on file     Review of Systems   Reason unable to perform ROS: Difficult to get ROS 2/2 hearing deficit.   HENT:  Positive for hearing loss.    Musculoskeletal:  Positive for gait problem.   Neurological:  Positive for weakness.   Objective:     Vital Signs (Most Recent):  Temp: 97.6 °F (36.4 °C) (05/12/22 1945)  Pulse: (!) 54 (05/12/22 1945)  Resp: 18 (05/12/22 1945)  BP: 135/60 (05/12/22 1945)  SpO2: (!) 94 % (05/12/22 1945)      Vital Signs (24h Range):  Temp:  [96.1 °F (35.6 °C)-97.6 °F (36.4 °C)] 97.6 °F (36.4 °C)  Pulse:  [42-60] 54  Resp:  [17-18] 18  SpO2:  [93 %-99 %] 94 %  BP: (131-164)/(60-68) 135/60     Body mass index is 23.96 kg/m².    Physical Exam  Vitals and nursing note reviewed.   Constitutional:       Appearance: He is well-developed.   HENT:      Head: Normocephalic and atraumatic.   Eyes:      General:         Right eye: No discharge.         Left eye: No discharge.      Pupils: Pupils are equal, round, and reactive to light.   Pulmonary:      Effort: Pulmonary effort is normal. No respiratory distress.   Abdominal:      General: There is no distension.      Palpations: Abdomen is soft.      Tenderness: There is no abdominal tenderness.   Musculoskeletal:         General: No deformity.      Comments: Deconditioned and generalized weakness   Skin:     General: Skin is warm and dry.   Neurological:      Mental Status: He is alert. Mental status is at baseline.      Sensory: No sensory deficit.      Motor: No abnormal muscle tone.   Psychiatric:         Behavior: Behavior normal.       Diagnostic Results:   Labs: Reviewed  ECG: Reviewed  X-Ray: Reviewed    Assessment/Plan:     * SBO (small bowel obstruction)  - now resolved    Impaired mobility and activities of  daily living  - Related to prolonged/acute hospital course.     Recommendations  -  Encourage mobility, OOB in chair at least 3 hours per day, and early ambulation as appropriate  -  PT/OT evaluate and treat  -  Pain management  -  Monitor for and prevent skin breakdown and pressure ulcers  · Early mobility, repositioning/weight shifting every 20-30 minutes when sitting, turn patient every 2 hours, proper mattress/overlay and chair cushioning, pressure relief/heel protector boots  -  DVT prophylaxis    -  Reviewed discharge options (IP rehab, SNF, HH therapy, and OP therapy)    Pleural effusion  - C- xray revealed small to moderate left pleural effusion, s/p thoracentesis w/ IR, pleural fluid studies consistent with transudative effusion, per HM etiology thought to be chronic    PM&R Recommendation:     At this time, the PM&R team has reviewed this patient's ongoing medical case including inpatient diagnosis, medical history, clinical examination, labs, vitals, current social and functional history to provide the post-acute recommendation as follows:     RECOMMENDATIONS: inpatient rehabilitation due to good motivation/participation with therapies and good potential for recovery.    MEDICAL STABILITY:     At this time, no barriers for post-acute rehab admission     I will sign off with the transfer of the patient to [facility] liaison who will continue to follow going forward until admission to [facility].     Thank you for your consult.     Sabine Lopez NP  Department of Physical Medicine & Rehab  [facility]

## 2022-05-13 NOTE — PLAN OF CARE
[facility]  Discharge Reassessment    Primary Care Provider: lT Torres MD    Expected Discharge Date: 5/16/2022    Reassessment (most recent)     Discharge Reassessment - 05/13/22 1211        Discharge Reassessment    Assessment Type Discharge Planning Reassessment     Did the patient's condition or plan change since previous assessment? Yes     Discharge Plan discussed with: Patient;Spouse/sig other     Name(s) and Number(s) Obi Lemus     Communicated JOHAN with patient/caregiver Yes     Discharge Plan A Rehab     Discharge Plan B Rehab               Patient's discharge to [facility] canceled per team as patient had a worsening CXR and blood tinged sputum. Updated wife Obi. [facility] also updated and will follow over weekend for medical stability if beds are available.

## 2022-05-13 NOTE — CONSULTS
Inpatient consult to Physical Medicine Rehab  Consult performed by: Sabine Lopez NP  Consult ordered by: John Alexander MD  Reason for consult: Assess rehab needs      Reviewed patient history and current admission.  Rehab team following.  Full consult to follow.    RODERICK Baker, FNP-C  Physical Medicine & Rehabilitation   05/12/2022

## 2022-05-13 NOTE — NURSING
Paged Dr. Alexander to make aware patient is spitting up bloody sputum. Resident called back and acknowledge states they are working up patient.

## 2022-05-13 NOTE — PT/OT/SLP PROGRESS
Physical Therapy   Progress Note    Patient Name:  Giselle Lemus  MRN: [id number]    Admit Date: 5/8/2022  Admitting Diagnosis:  SBO (small bowel obstruction)  Length of Stay: 5 days  Recent Surgery: * No surgery found *      Recommendations:     Discharge Recommendations: Inpatient Rehabilitation Facility   Equipment recommendations: TBD at next level of care  Barriers to discharge: Decreased caregiver support available , Increased level of skilled assistance required and Fall risk     Assessment:     Giselle Lemus is a 88 y.o. male admitted to [facility] [place] on 5/8/2022 with medical diagnosis of SBO (small bowel obstruction). Pt presents with weakness, impaired endurance, impaired self care skills, impaired functional mobilty, gait instability, impaired balance, pain, decreased safety awareness. Pt is progressing towards goals, but has not yet reached prior level of function. Pt demonstrated improved bed and oob mobility today, requiring less assistance. He was wearing his hearing aids but is still very [place] and requires some things to be written down for clear communication. Giselle Lemus would benefit from continued acute PT intervention to improve quality of life, focus on recovery of impairments, provide patient/caregiver education, reduce fall risk, and maximize (I) and safety with functional mobility. Once medically stable, recommending pt discharge to Inpatient Rehabilitation Facility .      Rehab Prognosis: Good    Plan:     During this hospitalization, patient to be seen 4 x/week to address the identified rehab impairments via gait training, therapeutic activities, therapeutic exercises, neuromuscular re-education and progress towards stated goals.     Plan of Care Expires:  06/11/22  Plan of Care reviewed with: patient    This plan of care has been discussed with the patient/caregiver, who was included in its development and is in agreement with the identified goals and treatment plan.     Subjective     Communicated with RN prior to  "session.  Patient found supine upon PT entry to room, agreeable to therapy session. Pt alone during session.    Patient/Family Comments/goals: "How did I get weak so fast"    Pain/Comfort:  · Pain Rating 1: other (see comments) (no rating given)  · Location - Side 1: Left  · Location 1: hip  · Pain Addressed 1: Reposition, Distraction, Cessation of Activity    Patients cultural, spiritual, Jew conflicts given the current situation: None identified     Objective:   OT present for cotreat due to pt's multiple medical comorbidities and functional/cognition deficits requiring two skilled therapists to appropriately progress pt's musculoskeletal strength, neuromuscular control, and endurance while taking into consideration medical acuity and pt safety.    Patient found with: telemetry, SCD, monreal catheter    General Precautions: Standard, fall   Orthopedic Precautions:N/A   Braces: N/A   Oxygen Device: room air    Cognition:   Pt is Alert during session.    Therapist provided skilled verbal and tactile cueing to facilitate the following functional mobility tasks. Listed tasks are focused on recovery of impairments and improving pt's independence and efficiency with bed mobility, transfers and ambulation as able.     Bed Mobility:  · Supine > Sit: Minimal Assistance with HoB elevated  · Cueing for hand placement on bed rails to assist  · Sit > Supine: Minimal Assistance   · Scooting up in bed: MaxAx2    Transfers:   · Sit <> Stand Transfer: Minimal Assistance from eob with RW AD   · Tactile and v/c for safe stand with AD                 Gait:  · Distance: 25 ft  · Assistance level: Minimal Assistance  · Assistive Device: rolling walker  · Gait Assessment: decreased gait speed with narrow BRANDON; v/c and demonstration to widen BRANDON; posterior lean while walking; shuffling steps    Balance:  · Dynamic Sitting: FAIR+: Maintains balance through MINIMAL excursions of active trunk motion  · Standing:  · Static: POOR+: Needs " MINIMAL assist to maintain   · Dynamic: POOR+: Needs MIN (minimal ) assist during gait    Outcome Measure: AM-PAC 6 CLICK MOBILITY  Total Score:17     Patient/Caregiver Education and Additional Therapeutic Activities/Exercises     Provided pt/caregiver education regarding:    PT POC and goals for therapy    Safety with mobility and fall risk    Safe management of AD as needed    Energy conservation techniques    Instruction on use of call button and importance of calling nursing staff for assistance with mobility     Patient/caregiver able to verbalize understanding; will follow-up with pt/caregiver during current admit for additional questions/concerns within scope of practice.     White board updated.     Patient left supine with call button in reach.    Goals:     Multidisciplinary Problems     Physical Therapy Goals        Problem: Physical Therapy    Goal Priority Disciplines Outcome Goal Variances Interventions   Physical Therapy Goal     PT, PT/OT Ongoing, Progressing     Description: Goals to be met by: 22     Patient will increase functional independence with mobility by performin. Supine to sit with Stand-by Assistance  2. Sit to stand transfer with Stand-by Assistance  3. Bed to chair transfer with Stand-by Assistance using LRAD  4. Gait  x 50 feet with Stand-by Assistance using LRAD  5. Ascend/Descend 6 inch curb step with Stand-by Assistance using LRAD  6. Lower extremity exercise program x30 reps per handout, with independence                     Time Tracking:       PT Received On: 22  PT Start Time: 1050     PT Stop Time: 1112  PT Total Time (min): 22 min     Billable Minutes: Gait Training 2022  Maranda Pires, PT

## 2022-05-13 NOTE — NURSING
Spoke with Dr. Wagner acknowledge patient is spitting up blood tinge sputum will come up to assess patient.

## 2022-05-13 NOTE — HPI
Giselle Lemus is a 88-year-old male with PMHx of Parkinsons, BPH, UC s/p total colectomy, and chronic bronchitis Patient [place] [facility] [place] for abdominal pain. On arrival, found to have SBO now resolved. Hospital course complicated by pleural effusion (C- xray revealed small to moderate left pleural effusion, s/p thoracentesis w/ IR, pleural fluid studies consistent with transudative effusion, per HM etiology thought to be chronic).     Functional History: Patient lives with wife in a single story home with 0 steps to enter.  Prior to admission, [facility]. DME: [place].

## 2022-05-13 NOTE — ASSESSMENT & PLAN NOTE
Presents with SBO  Hx of UC s/p total colectomy  NGT was placed initially and since d/c. Patient with + BM. Per general surgery, SBO resolved.    -- Start regular diet  -- Monitor for bowel movements, abdominal pain

## 2022-05-13 NOTE — CONSULTS
[facility]  Wound Care    Patient Name:  Giselle Lemus   MRN:  [id number]  Date: 5/13/2022  Diagnosis: SBO (small bowel obstruction)    History:     Past Medical History:   Diagnosis Date    BPH (benign prostatic hyperplasia)     Parkinsons 09/2019    R hand tremor starting in 2017, diagnosed Sept 2019 by Dr. Angeles at     Ulcerative colitis     s/p colectomy    Unspecified chronic bronchitis        Social History     Socioeconomic History    Marital status:    Tobacco Use    Smoking status: Never Smoker    Smokeless tobacco: Never Used   Substance and Sexual Activity    Alcohol use: Never       Precautions:     Allergies as of 05/08/2022    (No Known Allergies)       [facility] Assessment Details/Treatment   Wound care consult for buttocks. Patient sitting up in bed with a cup of blood tinged tissues on his table. He stated he coughed and sneezed out the blood. The primary nurse was notified. Patient's buttocks are intact, purple, and moist. He is incontinent of stool at this time. He is able to turn from side to side and will need encouragement to do this every two hours.     Plan:  Nursing to apply BPCO to buttocks BID  Waffle overlay  Boots     Recommendations made to primary team and Magda Wooten [facility] NP, for above plan via secure chat . Orders placed. Wound care to follow-up PRN.    05/13/2022 05/13/22 0934        Altered Skin Integrity 05/11/22 Left medial Buttocks #1 Purple or maroon localized area of discolored intact skin or non-intact skin or a blood-filled blister.   Date First Assessed: 05/11/22   Side: Left  Orientation: medial  Location: Buttocks  Wound Number: #1  Description of Altered Skin Integrity: Purple or maroon localized area of discolored intact skin or non-intact skin or a blood-filled blister.   Wound Image    Description of Altered Skin Integrity Purple or maroon localized area of discolored intact skin or non-intact skin or a blood-filled blister.   Dressing  Appearance Open to air   Drainage Amount None   Appearance Intact;Pink;Purple;Moist   Tissue loss description Not applicable

## 2022-05-13 NOTE — PROGRESS NOTES
[facility] Medicine  Progress Note    Patient Name: Giselle Lemus  MRN: [id number]  Patient Class: IP- Inpatient   Admission Date: 5/8/2022  Length of Stay: 5 days  Attending Physician: John Alexander MD  Primary Care Provider: Tl Torres MD        Subjective:     Principal Problem:SBO (small bowel obstruction)        HPI:  Giselle Lemus is an 88 y.o. M. With history of Parkinson's, BPH, UC s/p total colectomy, and chronic bronchitis who presents with abdominal pain. Found to have SBO. NGT was placed initially and since d/c. Patient with + BM. Per general surgery, SBO resolved. Noted leukocytosis 27 on presentation, since improved to 12 today. Normotensive, afebrile, and normal LA. CXR revealed small to moderate left pleural effusion. Zosyn initialed. IR consulted and plan for thoracentesis. NPO now with IV fluids. Oxygenation slightly worsening. Currently on 2L O2 via NC. Developed SB with rate 30-40s. Cardiology consulted and recs currently pending. Medicine consulted for possible transfer to medicine given resolved SBO without surgical intervention, pleural effusion, and bradycardia.      Overview/Hospital Course:  Admitted to general surgery for treatment of SBO, which resolved with conservative care and bowel rest. Began having Bms and tolerating PO well. However, subsequently found to have L pleural effusion, which was present on original abdominal CT but became more enlarged after IV fluids given during SBO. TTE showed normal cardiac function. IR thoracentesis revealed transudative effusion without signs of infection. Attempting lasix to relieve small O2 requirement. Course also complicated by asymptomatic sinus bradycardia to 40s. Cardiology contacted and reviewed EKG which was sinus maycol. PT recommending rehab, patient and family in agreement. Accepted to O-rehab. Resumed on home loperamide PRN for baseline diarrhea following meals since patient is s/p colectomy and is chronically dependent  on this.       Interval History: NAEON. HDS. Patient reports some L hip pain today, worse with movement. Patient also reports that he would like to be able to get up and is going to work with therapy today. Pt's RN reported pt coughing up blood tinged sputum. Patient says he feels like it's coming from his nose and that he wasn't able to breathe through both nostril earlier. Remains on room air and in no acute respiratory distress. Trialing afrin for nose and possible nose bleed. CXR w/ worsening R-sided opacities compared to previous. Cancelled patient's discharge to O-Rehab for continued workup/monitoring of sputum. Pending CT Chest to evaluate.     Review of Systems   Unable to perform ROS: Other (Patient extremely hard of hearing, unable to hear/ answer ROS)   Objective:     Vital Signs (Most Recent):  Temp: 98 °F (36.7 °C) (05/13/22 1152)  Pulse: 60 (05/13/22 1152)  Resp: 18 (05/13/22 1152)  BP: (!) 112/55 (05/13/22 1152)  SpO2: 97 % (05/13/22 1314)   Vital Signs (24h Range):  Temp:  [97 °F (36.1 °C)-98 °F (36.7 °C)] 98 °F (36.7 °C)  Pulse:  [] 60  Resp:  [17-18] 18  SpO2:  [90 %-97 %] 97 %  BP: (112-174)/(55-76) 112/55     Weight: 65.3 kg (144 lb)  Body mass index is 23.96 kg/m².    Intake/Output Summary (Last 24 hours) at 5/13/2022 1456  Last data filed at 5/13/2022 0600  Gross per 24 hour   Intake --   Output 600 ml   Net -600 ml      Physical Exam  Vitals and nursing note reviewed.   Constitutional:       General: He is not in acute distress.     Appearance: Normal appearance. He is ill-appearing. He is not toxic-appearing.   HENT:      Head: Normocephalic and atraumatic.      Mouth/Throat:      Mouth: Mucous membranes are moist.      Pharynx: Oropharynx is clear.   Eyes:      Extraocular Movements: Extraocular movements intact.   Cardiovascular:      Rate and Rhythm: Regular rhythm. Bradycardia present.      Pulses: Normal pulses.      Heart sounds: Normal heart sounds.   Pulmonary:      Effort:  Pulmonary effort is normal. No respiratory distress.      Breath sounds: Normal breath sounds.      Comments: 2L O2 via NC  Abdominal:      General: Abdomen is flat. Bowel sounds are normal. There is no distension.      Palpations: Abdomen is soft.   Musculoskeletal:         General: No swelling, tenderness or deformity.   Skin:     General: Skin is warm and dry.      Capillary Refill: Capillary refill takes less than 2 seconds.   Neurological:      Mental Status: He is alert and oriented to person, place, and time.      GCS: GCS eye subscore is 4. GCS verbal subscore is 5. GCS motor subscore is 6.      Motor: No weakness.      Comments: Hard of hearing   Psychiatric:         Mood and Affect: Mood normal.         Behavior: Behavior normal. Behavior is not agitated or aggressive. Behavior is cooperative.       Significant Labs: All pertinent labs within the past 24 hours have been reviewed.  CBC:   Recent Labs   Lab 05/12/22  0439 05/13/22  0308   WBC 12.91* 11.74   HGB 11.0* 10.5*   HCT 34.0* 32.4*    154     CMP:   Recent Labs   Lab 05/12/22  0439      K 3.4*      CO2 27      BUN 17   CREATININE 0.9   CALCIUM 8.1*   PROT 5.8*   ALBUMIN 2.3*   BILITOT 0.4   ALKPHOS 61   AST 21   ALT <5*   ANIONGAP 5*   EGFRNONAA >60.0     Cardiac Markers: No results for input(s): CKMB, MYOGLOBIN, BNP, TROPISTAT in the last 48 hours.    Significant Imaging: I have reviewed all pertinent imaging results/findings within the past 24 hours.      Assessment/Plan:      * SBO (small bowel obstruction)  Presents with SBO  Hx of UC s/p total colectomy  NGT was placed initially and since d/c. Patient with + BM. Per general surgery, SBO resolved.    -- Start regular diet  -- Monitor for bowel movements, abdominal pain    Impaired mobility and activities of daily living  -- PT/OT following, appreciate recommendations  -- Plan to discharge to O-Rehab for continued therapy when medically cleared    Hypophosphatemia  --  Monitor on morning labs  -- Encourage po intake  -- Replenish as indicated    Sinus bradycardia  EKG reviewed w/ sinus bradycardia with rate 40s.  Asymptomatic  Hemodynamically stable  Suspect possible SSS    -- Cardiology reviewed EKG, no recommendations/interventions for asymptomatic sinus bradycardia  -- Avoid daniel blocking agents/medications    Pleural effusion  CXR revealed small to moderate left pleural effusion.   Suspect likely transudative. Worsening oxygenation with continued IV fluids. Some BLE edema, prescribed bumex prn, and noted elevated CVP on TTE last year concerning for volume overload  Less likely infectious. Noted leukocytosis 27 on presentation, since improved to 12 today. Otherwise, normotensive, afebrile, and normal LA.   TTE nml EF, unremarkable  S/p thoracentesis w/ IR  Doing well on room air, coughing up blood-tinged sputum bright red this morning  CXR w/ worsening R-sided pulm vascular congestion, pleural effusions still present      -- Pleural fluid studies consistent with TRANSUDATIVE effusion. Etiology thought to be somewhat chronic, as was present on initial CT< but aggravated by large volume IV fluid resuscitation during SBO.  -- Discontinued Zosyn as infection not suspected.   -- Pending CT Chest ordered better evaluate pleural effusions    Benign prostatic hyperplasia  -- Resume home proscar      Parkinsons  -- Continue home sinemet and rasagiline        VTE Risk Mitigation (From admission, onward)         Ordered     heparin (porcine) injection 5,000 Units  Every 8 hours         05/08/22 0512     IP VTE HIGH RISK PATIENT  Once         05/08/22 0512     Place sequential compression device  Until discontinued         05/08/22 0512                Discharge Planning   JOHAN: 5/16/2022     Code Status: Full Code   Is the patient medically ready for discharge?: Yes    Reason for patient still in hospital (select all that apply): Patient trending condition  Discharge Plan A: Rehab           Jono Wagner DO  Department of Hospital Medicine   [facility]

## 2022-05-14 PROBLEM — K68.3 RETROPERITONEAL HEMATOMA: Status: ACTIVE | Noted: 2022-05-14

## 2022-05-14 PROBLEM — M79.81 NONTRAUMATIC PSOAS HEMATOMA: Status: ACTIVE | Noted: 2022-05-14

## 2022-05-14 PROBLEM — R41.0 DELIRIUM: Status: ACTIVE | Noted: 2022-05-14

## 2022-05-14 PROBLEM — R04.0 EPISTAXIS: Status: ACTIVE | Noted: 2022-05-13

## 2022-05-14 PROBLEM — H91.90 HARD OF HEARING: Chronic | Status: ACTIVE | Noted: 2022-05-14

## 2022-05-14 LAB
ALBUMIN SERPL BCP-MCNC: 2.4 G/DL (ref 3.5–5.2)
ANION GAP SERPL CALC-SCNC: 10 MMOL/L (ref 8–16)
ANISOCYTOSIS BLD QL SMEAR: SLIGHT
BASOPHILS # BLD AUTO: 0.04 K/UL (ref 0–0.2)
BASOPHILS # BLD AUTO: 0.05 K/UL (ref 0–0.2)
BASOPHILS NFR BLD: 0.2 % (ref 0–1.9)
BASOPHILS NFR BLD: 0.3 % (ref 0–1.9)
BUN SERPL-MCNC: 32 MG/DL (ref 8–23)
CALCIUM SERPL-MCNC: 8 MG/DL (ref 8.7–10.5)
CHLORIDE SERPL-SCNC: 105 MMOL/L (ref 95–110)
CO2 SERPL-SCNC: 24 MMOL/L (ref 23–29)
CREAT SERPL-MCNC: 0.8 MG/DL (ref 0.5–1.4)
DIFFERENTIAL METHOD: ABNORMAL
DIFFERENTIAL METHOD: ABNORMAL
EOSINOPHIL # BLD AUTO: 0 K/UL (ref 0–0.5)
EOSINOPHIL # BLD AUTO: 0.1 K/UL (ref 0–0.5)
EOSINOPHIL NFR BLD: 0.1 % (ref 0–8)
EOSINOPHIL NFR BLD: 0.4 % (ref 0–8)
ERYTHROCYTE [DISTWIDTH] IN BLOOD BY AUTOMATED COUNT: 15.9 % (ref 11.5–14.5)
ERYTHROCYTE [DISTWIDTH] IN BLOOD BY AUTOMATED COUNT: 16.2 % (ref 11.5–14.5)
EST. GFR  (AFRICAN AMERICAN): >60 ML/MIN/1.73 M^2
EST. GFR  (NON AFRICAN AMERICAN): >60 ML/MIN/1.73 M^2
GLUCOSE SERPL-MCNC: 112 MG/DL (ref 70–110)
HCT VFR BLD AUTO: 26.1 % (ref 40–54)
HCT VFR BLD AUTO: 28.5 % (ref 40–54)
HGB BLD-MCNC: 8.4 G/DL (ref 14–18)
HGB BLD-MCNC: 9.2 G/DL (ref 14–18)
HYPOCHROMIA BLD QL SMEAR: ABNORMAL
IMM GRANULOCYTES # BLD AUTO: 0.11 K/UL (ref 0–0.04)
IMM GRANULOCYTES # BLD AUTO: 0.12 K/UL (ref 0–0.04)
IMM GRANULOCYTES NFR BLD AUTO: 0.6 % (ref 0–0.5)
IMM GRANULOCYTES NFR BLD AUTO: 0.7 % (ref 0–0.5)
LYMPHOCYTES # BLD AUTO: 1.2 K/UL (ref 1–4.8)
LYMPHOCYTES # BLD AUTO: 1.7 K/UL (ref 1–4.8)
LYMPHOCYTES NFR BLD: 6.7 % (ref 18–48)
LYMPHOCYTES NFR BLD: 9.1 % (ref 18–48)
MAGNESIUM SERPL-MCNC: 1.5 MG/DL (ref 1.6–2.6)
MCH RBC QN AUTO: 30.1 PG (ref 27–31)
MCH RBC QN AUTO: 30.1 PG (ref 27–31)
MCHC RBC AUTO-ENTMCNC: 32.2 G/DL (ref 32–36)
MCHC RBC AUTO-ENTMCNC: 32.3 G/DL (ref 32–36)
MCV RBC AUTO: 93 FL (ref 82–98)
MCV RBC AUTO: 94 FL (ref 82–98)
MONOCYTES # BLD AUTO: 1.4 K/UL (ref 0.3–1)
MONOCYTES # BLD AUTO: 1.7 K/UL (ref 0.3–1)
MONOCYTES NFR BLD: 7.3 % (ref 4–15)
MONOCYTES NFR BLD: 9.3 % (ref 4–15)
NEUTROPHILS # BLD AUTO: 14.8 K/UL (ref 1.8–7.7)
NEUTROPHILS # BLD AUTO: 15.6 K/UL (ref 1.8–7.7)
NEUTROPHILS NFR BLD: 80.3 % (ref 38–73)
NEUTROPHILS NFR BLD: 85 % (ref 38–73)
NRBC BLD-RTO: 0 /100 WBC
NRBC BLD-RTO: 0 /100 WBC
PHOSPHATE SERPL-MCNC: 2.7 MG/DL (ref 2.7–4.5)
PLATELET # BLD AUTO: 130 K/UL (ref 150–450)
PLATELET # BLD AUTO: 187 K/UL (ref 150–450)
PLATELET BLD QL SMEAR: ABNORMAL
PMV BLD AUTO: 11.2 FL (ref 9.2–12.9)
PMV BLD AUTO: 11.4 FL (ref 9.2–12.9)
POIKILOCYTOSIS BLD QL SMEAR: SLIGHT
POTASSIUM SERPL-SCNC: 3.8 MMOL/L (ref 3.5–5.1)
RBC # BLD AUTO: 2.79 M/UL (ref 4.6–6.2)
RBC # BLD AUTO: 3.06 M/UL (ref 4.6–6.2)
SODIUM SERPL-SCNC: 139 MMOL/L (ref 136–145)
WBC # BLD AUTO: 18.4 K/UL (ref 3.9–12.7)
WBC # BLD AUTO: 18.46 K/UL (ref 3.9–12.7)

## 2022-05-14 PROCEDURE — 20600001 HC STEP DOWN PRIVATE ROOM

## 2022-05-14 PROCEDURE — 99233 SBSQ HOSP IP/OBS HIGH 50: CPT | Mod: GC,,, | Performed by: HOSPITALIST

## 2022-05-14 PROCEDURE — 25000003 PHARM REV CODE 250: Performed by: STUDENT IN AN ORGANIZED HEALTH CARE EDUCATION/TRAINING PROGRAM

## 2022-05-14 PROCEDURE — 85025 COMPLETE CBC W/AUTO DIFF WBC: CPT | Performed by: STUDENT IN AN ORGANIZED HEALTH CARE EDUCATION/TRAINING PROGRAM

## 2022-05-14 PROCEDURE — 25000003 PHARM REV CODE 250

## 2022-05-14 PROCEDURE — 80069 RENAL FUNCTION PANEL: CPT

## 2022-05-14 PROCEDURE — 36415 COLL VENOUS BLD VENIPUNCTURE: CPT | Performed by: STUDENT IN AN ORGANIZED HEALTH CARE EDUCATION/TRAINING PROGRAM

## 2022-05-14 PROCEDURE — 83735 ASSAY OF MAGNESIUM: CPT | Performed by: STUDENT IN AN ORGANIZED HEALTH CARE EDUCATION/TRAINING PROGRAM

## 2022-05-14 PROCEDURE — 99233 PR SUBSEQUENT HOSPITAL CARE,LEVL III: ICD-10-PCS | Mod: GC,,, | Performed by: HOSPITALIST

## 2022-05-14 RX ORDER — ACETAMINOPHEN 500 MG
1000 TABLET ORAL EVERY 8 HOURS PRN
Status: DISCONTINUED | OUTPATIENT
Start: 2022-05-14 | End: 2022-05-18 | Stop reason: HOSPADM

## 2022-05-14 RX ORDER — OXYMETAZOLINE HCL 0.05 %
2 SPRAY, NON-AEROSOL (ML) NASAL 2 TIMES DAILY PRN
Status: DISCONTINUED | OUTPATIENT
Start: 2022-05-14 | End: 2022-05-18 | Stop reason: HOSPADM

## 2022-05-14 RX ORDER — AMOXICILLIN AND CLAVULANATE POTASSIUM 875; 125 MG/1; MG/1
1 TABLET, FILM COATED ORAL EVERY 12 HOURS
Status: DISCONTINUED | OUTPATIENT
Start: 2022-05-14 | End: 2022-05-17

## 2022-05-14 RX ADMIN — CARBIDOPA AND LEVODOPA 1 TABLET: 25; 100 TABLET ORAL at 02:05

## 2022-05-14 RX ADMIN — Medication: at 10:05

## 2022-05-14 RX ADMIN — CARBIDOPA AND LEVODOPA 1 TABLET: 25; 100 TABLET ORAL at 09:05

## 2022-05-14 RX ADMIN — CARBIDOPA AND LEVODOPA 1 TABLET: 25; 100 TABLET ORAL at 05:05

## 2022-05-14 RX ADMIN — Medication: at 09:05

## 2022-05-14 RX ADMIN — AMOXICILLIN AND CLAVULANATE POTASSIUM 1 TABLET: 875; 125 TABLET, FILM COATED ORAL at 09:05

## 2022-05-14 RX ADMIN — AMOXICILLIN AND CLAVULANATE POTASSIUM 1 TABLET: 875; 125 TABLET, FILM COATED ORAL at 10:05

## 2022-05-14 RX ADMIN — CARBIDOPA AND LEVODOPA 1 TABLET: 25; 100 TABLET ORAL at 10:05

## 2022-05-14 NOTE — ASSESSMENT & PLAN NOTE
High risk of delirium given hard of hearing and hospital environment    -- Delirium precautions  -- Telesitter  -- See plan for hard of hearing

## 2022-05-14 NOTE — PROGRESS NOTES
MD notified about pt refusing venipuncture lab draws and then refusing CT scan. MD will come to bedside to talk to pt and assess for confuson.    CITLALLI

## 2022-05-14 NOTE — ASSESSMENT & PLAN NOTE
88 y.o M with epistaxis contributing to hemoptysis. Fibrillar placed on L side, rapid rhino placed on R.     - Rapid rhino will stay in place for 3 days  - Antibiotic prophylaxis while packing in place  - Fibrillar is absorbable   - Will continue to monitor   - Call/page ENT with questions/concerns

## 2022-05-14 NOTE — AI DETERIORATION ALERT
"RAPID RESPONSE NURSE AI ALERT       AI alert received.    Chart Reviewed: 05/14/2022, 2:41 PM    MRN: [id number]  Bed: 1009/1009 A    Dx: SBO (small bowel obstruction)    Eng Albertson has a past medical history of BPH (benign prostatic hyperplasia), Parkinsons, Ulcerative colitis, and Unspecified chronic bronchitis.    Last VS: /65 (BP Location: Right arm, Patient Position: Lying)   Pulse 71   Temp 98.4 °F (36.9 °C) (Axillary)   Resp 16   Ht 5' 5" (1.651 m)   Wt 65.3 kg (144 lb)   SpO2 (!) 92%   BMI 23.96 kg/m²     24H Vital Sign Range:  Temp:  [96.4 °F (35.8 °C)-99.5 °F (37.5 °C)]   Pulse:  [67-94]   Resp:  [16-18]   BP: (118-165)/(65-80)   SpO2:  [92 %-98 %]     Level of Consciousness (AVPU): alert    Recent Labs     05/13/22  1636 05/14/22  0301 05/14/22  0917   WBC 12.64 18.46* 18.40*   HGB 9.9* 8.4* 9.2*   HCT 30.1* 26.1* 28.5*    187 130*       Recent Labs     05/12/22  0439 05/13/22  0308 05/14/22  0301     --  139   K 3.4*  --  3.8     --  105   CO2 27  --  24   CREATININE 0.9  --  0.8     --  112*   PHOS 1.6* 2.0* 2.7   MG 1.7 1.7 1.5*        No results for input(s): PH, PCO2, PO2, HCO3, POCSATURATED, BE in the last 72 hours.     OXYGEN:  Flow (L/min): 2     O2 Device (Oxygen Therapy): room air    MEWS score: 2    SEPSIS ALERT    charge RN, Nicci contacted. No concerns verbalized at this time. Instructed to call [phone] for further concerns or assistance.    Kush Malhotra RN        "

## 2022-05-14 NOTE — ASSESSMENT & PLAN NOTE
Has hearing aids    -- Signage placed on door  -- Speak slowly clearly and allow to see lips  -- Use pen and paper to communicate if unable to understand

## 2022-05-14 NOTE — HPI
88 y.o M with hx of parkinson's admitted for SBO with development of hemoptysis. Per primary, unclear source. ENT consulted to r/o epistaxis after clot was suctioned from patient's mouth. HPI is limited as patient appears confused as he does not answer questions appropriately. Has had 1 point drop in H/H since this AM.

## 2022-05-14 NOTE — SUBJECTIVE & OBJECTIVE
Interval History: Patient with confusion overnight. Unclear if delirium or hearing loss related. No significant bleeding since packing placed.     Medications:  Continuous Infusions:  Scheduled Meds:   balsam peru-castor oiL   Topical (Top) BID    carbidopa-levodopa  mg  1 tablet Oral QID    oxymetazoline  2 spray Each Nostril BID    pantoprozole (PROTONIX) IV  40 mg Intravenous Daily    rasagiline  1 mg Oral Daily    sodium chloride  2 spray Each Nostril BID     PRN Meds:sodium chloride, acetaminophen, albuterol-ipratropium, dextrose 10%, dextrose 10%, glucagon (human recombinant), glucose, glucose, loperamide, ondansetron, oxyCODONE, sodium chloride 0.9%     Review of patient's allergies indicates:  No Known Allergies  Objective:     Vital Signs (24h Range):  Temp:  [96.4 °F (35.8 °C)-99.5 °F (37.5 °C)] 97.1 °F (36.2 °C)  Pulse:  [60-94] 94  Resp:  [16-18] 18  SpO2:  [93 %-98 %] 93 %  BP: (112-165)/(55-80) 150/70       Lines/Drains/Airways       Peripheral Intravenous Line  Duration                  Peripheral IV - Single Lumen 05/09/22 20 G Anterior;Left;Proximal Forearm 5 days                    Physical Exam  Constitutional:       General: He is not in acute distress.     Appearance: He is ill-appearing.   HENT:      Head: Normocephalic and atraumatic.      Nose:      Comments: Fibrillar in place on L, rapid rhino in place on R     Mouth/Throat:      Comments: Dried blood on palate  Small clot in oropharynx, no active bleeding   Neurological:      Mental Status: He is alert.     Significant Labs:  CBC:   Recent Labs   Lab 05/14/22  0301   WBC 18.46*   RBC 2.79*   HGB 8.4*   HCT 26.1*      MCV 94   MCH 30.1   MCHC 32.2       Significant Diagnostics:  None

## 2022-05-14 NOTE — CONSULTS
[facility]  Otorhinolaryngology-Head & Neck Surgery  Consult Note    Patient Name: Giselle Lemus  MRN: [id number]  Code Status: Full Code  Admission Date: 5/8/2022  Hospital Length of Stay: 5 days  Attending Physician: John Alexander MD  Primary Care Provider: Tl Torres MD    Patient information was obtained from patient and ER records.     Inpatient consult to ENT  Consult performed by: Evy Horton MD  Consult ordered by: Isrrael Huang MD        Subjective:     Chief Complaint/Reason for Admission: R/O epistaxis     History of Present Illness: 88 y.o M with hx of parkinson's admitted for SBO with development of hemoptysis. Per primary, unclear source. ENT consulted to r/o epistaxis after clot was suctioned from patient's mouth. HPI is limited as patient appears confused as he does not answer questions appropriately. Has had 1 point drop in H/H since this AM.       Medications:  Continuous Infusions:  Scheduled Meds:   acetaminophen  1,000 mg Oral Q8H    balsam peru-castor oiL   Topical (Top) BID    carbidopa-levodopa  mg  1 tablet Oral QID    oxymetazoline  2 spray Each Nostril BID    [START ON 5/14/2022] pantoprozole (PROTONIX) IV  40 mg Intravenous Daily    rasagiline  1 mg Oral Daily    sodium chloride  2 spray Each Nostril BID     PRN Meds:sodium chloride, albuterol-ipratropium, dextrose 10%, dextrose 10%, glucagon (human recombinant), glucose, glucose, loperamide, ondansetron, oxyCODONE, sodium chloride 0.9%     No current facility-administered medications on file prior to encounter.     Current Outpatient Medications on File Prior to Encounter   Medication Sig    acetaminophen (TYLENOL) 325 MG tablet Take 650 mg by mouth every 6 (six) hours as needed for Pain.    finasteride (PROSCAR) 5 mg tablet Take 5 mg by mouth once daily.    rasagiline (AZILECT) 1 mg Tab Take 1 tablet (1 mg total) by mouth once daily at 6am.       Review of patient's allergies indicates:  No Known  Allergies    Past Medical History:   Diagnosis Date    BPH (benign prostatic hyperplasia)     Parkinsons 09/2019    R hand tremor starting in 2017, diagnosed Sept 2019 by Dr. Angeles at     Ulcerative colitis     s/p colectomy    Unspecified chronic bronchitis      Past Surgical History:   Procedure Laterality Date    TOTAL COLECTOMY       Family History    None       Tobacco Use    Smoking status: Never Smoker    Smokeless tobacco: Never Used   Substance and Sexual Activity    Alcohol use: Never    Drug use: Not on file    Sexual activity: Not on file     Review of Systems   Unable to perform ROS: Dementia   Objective:     Vital Signs (Most Recent):  Temp: 99.5 °F (37.5 °C) (05/13/22 2341)  Pulse: 82 (05/13/22 2341)  Resp: 16 (05/13/22 2341)  BP: (!) 161/78 (05/13/22 2341)  SpO2: 95 % (05/13/22 2341)   Vital Signs (24h Range):  Temp:  [97.4 °F (36.3 °C)-99.5 °F (37.5 °C)] 99.5 °F (37.5 °C)  Pulse:  [] 82  Resp:  [16-18] 16  SpO2:  [90 %-98 %] 95 %  BP: (112-174)/(55-80) 161/78     Weight: 65.3 kg (144 lb)  Body mass index is 23.96 kg/m².    Date 05/13/22 0700 - 05/14/22 0659   Shift 1136-2632 5977-6332 2064-6397 24 Hour Total   INTAKE   Shift Total(mL/kg)       OUTPUT   Urine(mL/kg/hr) 300(0.6)   300   Stool 1   1   Shift Total(mL/kg) 301(4.6)   301(4.6)   Weight (kg) 65.3 65.3 65.3 65.3       Physical Exam  Constitutional:       General: He is not in acute distress.     Appearance: He is ill-appearing.   HENT:      Head: Normocephalic and atraumatic.      Nose:      Comments: Severe left sided deviation, small crusting obscuring view of nasal cavity. Prominent vessel on visualized anterior septum with placement of fibrillar  Right nasal cavity with dried crust throughout, small active bleeding at head of middle turbinate. R rapid rhino placed      Mouth/Throat:      Comments: Dried blood on palate  Large clot in oropharynx, suctioned. Upon suctioning, patient had evidence of active bleeding in  oropharynx. This appeared to have stopped once packing was placed.   Neurological:      Mental Status: He is alert.       Significant Labs:  CBC:   Recent Labs   Lab 05/13/22  1636   WBC 12.64   RBC 3.28*   HGB 9.9*   HCT 30.1*      MCV 92   MCH 30.2   MCHC 32.9     Coagulation:   Recent Labs   Lab 05/13/22  1636 05/13/22 2021   LABPROT 10.7  --    INR 1.0  --    APTT  --  44.1*       Significant Diagnostics:  None      Assessment/Plan:     Hemoptysis  88 y.o M with epistaxis contributing to hemoptysis. Fibrillar placed on L side, rapid rhino placed on R.     - Rapid rhino will stay in place for 3 days  - Antibiotic prophylaxis while packing in place  - Fibrillar is absorbable   - Will continue to monitor   - Call/page ENT with questions/concerns       VTE Risk Mitigation (From admission, onward)         Ordered     IP VTE HIGH RISK PATIENT  Once         05/08/22 0512     Place sequential compression device  Until discontinued         05/08/22 0512                Evy Horton MD  Otorhinolaryngology-Head & Neck Surgery  [facility]

## 2022-05-14 NOTE — CARE UPDATE
Called to bedside as patient is refusing labs and imaging.    Patient is alert, and verbal with appropriate responses. However he refuses to respond to cues for evaluation of orientation (e.g. age, date). He does not recall meeting me earlier in the evening but recalls the name of his day team provider, Dr. Mcguire.    As he is currently hemodynamically stable, given the low diagnostic yield of the imaging without contrast, as well his appropriate verbal response, the circumstances do not meet criteria for urgent imaging that contradicts patient's stated preference to wait until the morning.    Patient has been seen by ENT provider, and nasal rockets were placed earlier in the shift. Physical exam unremarkable, no abdominal pain on palpation, and patient denies abdominal pain, headaches, lightheadedness, or palpitations.

## 2022-05-14 NOTE — ASSESSMENT & PLAN NOTE
Patient noted to be spitting up a small-moderate amount of valentina blood on 5/13. Initially thought to be a nosebleed, as patient complained of dry/itchy nose.There was then some concern patient was actually coughing up blood. Red tinge was noted in patient's posterior oropharynx, but still unclear of the source.   Significant epistaxis with decrease in Hgb. Patient swallowing blood and subsequent rise in BUN.   Currently stable s/p rapid rhino    -- ENT consulted, appreciate assistance  -- ENT placed nasal packing and rapid rhino will keep in place for 3 days  -- Amoxicillin-clavulanate for abx ppx while packings in place  -- Holding DVT ppx in setting of bleed

## 2022-05-14 NOTE — SIGNIFICANT EVENT
I was informed that patient has had tarry stools. Arrived at bedside within 5 minutes finding patient to be AAOx4, and hemodynamically stable.    Tarry stools confirmed. Patient also noted with blood in the pharynx and mouth. 50 cc of coagulated blood was removed from the patient's mouth manually by nursing staff (see below). There is concern for airway safety. It was reported that patient had already been given afrin, but at the time of writing this note, it was noted to be PRN. It has since been modified as scheduled and nursing staff informed to give a dose immediately.    Plan  -- patient transferred to step down unit for closer monitoring  -- pantoprazole IV  -- Oxymetazoline/Afrin BID  -- HIT assay, D dimer, VIII, APTT, Fibrinogen  -- F/U CT AP w/o contrast  -- aspiration precautions, suction  -- rapid response aware of patient  -- ENT notified and consult placed

## 2022-05-14 NOTE — ASSESSMENT & PLAN NOTE
CXR revealed small to moderate left pleural effusion.   Suspect likely transudative. Worsening oxygenation with continued IV fluids. Some BLE edema, prescribed bumex prn, and noted elevated CVP on TTE last year concerning for volume overload  Less likely infectious. Noted leukocytosis 27 on presentation, since improved to 12 today. Otherwise, normotensive, afebrile, and normal LA.   TTE nml EF, unremarkable  S/p thoracentesis w/ IR  On room air  CT Noncontrast yesterday w/ pleural effusions still present, worsening    -- Pleural fluid studies consistent with TRANSUDATIVE effusion. Etiology thought to be somewhat chronic, as was present on initial CT< but aggravated by large volume IV fluid resuscitation during SBO.  -- Hold off diuresis today given loss of blood in epistaxis  -- Plan to resume diuresis tomorrow to resolved pleural effusions  -- Will need follow-up in Pulmonology clinic at discharge   -- Pending CT Chest ordered better evaluate pleural effusions

## 2022-05-14 NOTE — PROGRESS NOTES
Pt arrived to unit 10 pm. VS obtained, skin checked, oriented to room, POC reviewed. Pt is very hard of hearing, can answer questions appropriately, but seems confused. Pt arrived to unit needing to be changed and also was given a chlorhexidine bed bath. Telesitter video monitor at bedside for pt safety. Boot heel protectors applied. Unable to apply waffle mattress due to pt not being able to get out of bed, as per pt.

## 2022-05-14 NOTE — CARE UPDATE
RAPID RESPONSE NURSE PROACTIVE ROUNDING NOTE       Time of Visit:     Admit Date: 2022  LOS: 5  Code Status: Full Code   Date of Visit: 2022  : 1933  Age: 88 y.o.  Sex: male  Race: Other  Bed: [address] [place] [address] A:   MRN: [id number]  Was the patient discharged from an ICU this admission? No   Was the patient discharged from a PACU within last 24 hours? No   Did the patient receive conscious sedation/general anesthesia in last 24 hours? No  Was the patient in the ED within the past 24 hours? No  Was the patient on NIPPV within the past 24 hours? No   Attending Physician: John Alexander MD  Primary Service: [facility]   Time spent at the bedside: < 15 min    SITUATION    Notified by charge RN via phone call.  Reason for alert: Spitting up blood  Called to evaluate the patient for Circulatory    BACKGROUND     Why is the patient in the hospital?: SBO (small bowel obstruction)    Patient has a past medical history of BPH (benign prostatic hyperplasia), Parkinsons, Ulcerative colitis, and Unspecified chronic bronchitis.    Last Vitals:  Temp: 98.2 °F (36.8 °C) (2159)  Pulse: 80 (2159)  Resp: 16 (2159)  BP: 121/71 (2159)  SpO2: 95 % (2159)    24 Hours Vitals Range:  Temp:  [97.4 °F (36.3 °C)-98.4 °F (36.9 °C)]   Pulse:  []   Resp:  [16-18]   BP: (112-174)/(55-80)   SpO2:  [90 %-98 %]     Labs:  Recent Labs     22  0439 22  0308 22  1636   WBC 12.91* 11.74 12.64   HGB 11.0* 10.5* 9.9*   HCT 34.0* 32.4* 30.1*    154 182       Recent Labs     22  0352 22  0353 22  0439 22  0308    142 141  --    K 3.7 3.7 3.4*  --    * 113* 109  --    CO2 22* 24 27  --    CREATININE 1.0 1.0 0.9  --    GLU 98 98 102  --    PHOS 1.8*  --  1.6* 2.0*   MG 1.7  --  1.7 1.7        No results for input(s): PH, PCO2, PO2, HCO3, POCSATURATED, BE in the last 72 hours.     ASSESSMENT    Physical Exam  Constitutional:       Comments:  Extremely [place].   HENT:      Mouth/Throat:      Mouth: Mucous membranes are moist.      Pharynx: Oropharynx is clear.      Comments: Bright red blood clot pulled from mouth by primary RN  Eyes:      Extraocular Movements: Extraocular movements intact.      Pupils: Pupils are equal, round, and reactive to light.   Cardiovascular:      Rate and Rhythm: Normal rate and regular rhythm.      Pulses: Normal pulses.   Pulmonary:      Effort: Pulmonary effort is normal.   Musculoskeletal:         General: Normal range of motion.   Skin:     General: Skin is warm and dry.      Capillary Refill: Capillary refill takes less than 2 seconds.   Neurological:      General: No focal deficit present.      Mental Status: He is alert and oriented to person, place, and time. Mental status is at baseline.   Psychiatric:         Mood and Affect: Mood normal.         Behavior: Behavior normal.         Thought Content: Thought content normal.         Judgment: Judgment normal.       INTERVENTIONS    Upon initial assessment, pt. Sitting up in hospital bed with primary RN and  MD at . Pt. Awake, alert, oriented x4, GCS 15, calm, cooperative, pleasant. At baseline, pt. Is extremely hard-of-hearing. Prior to RRN assessment, a bright-red blood clot had been manually extracted from patient's mouth by nursing staff.     Airway: patent, non obstructed, able to maintain secretions.  Breathing: non labored, speaking full sentences, equal chest rise and fall, negative for cough.  Circulation: without gross hemorrhage or bleeding noted externally, skin warm and dry to palpation.    Vital Signs:  HR: 80  BP: 157/80 (106)  SpO2: 98% RA  RR: 20  Afebrile    Currently maintaining patent and clear airway with no additional ongoing bleeding or clots noted in oropharynx. Hemodynamically stable and patient denies complaints at this time. Plan to consult ENT for assessment and transfer to s/d bed for HLOC and closer airway monitoring. Also plan to repeat  CBC for h/h value. Otherwise plan to monitor hemodynamic status closely. Spoke with primary RNGary and updated on POC. Please call RRN with questions/concerns/deterioration of patient.     PROVIDER ESCALATION    Yes/No  yes    Orders received and case discussed with Dr. Huang.    Disposition: Tx to [facility], bed 1009.    FOLLOW-UP    Bedside RNGary updated on plan of care. Instructed to call the Rapid Response Nurse, Vera Bowman RN at [phone] for additional questions or concerns.

## 2022-05-14 NOTE — ASSESSMENT & PLAN NOTE
----- Message from Beto Clarke MD sent at 1/9/2021  8:44 AM CST -----  Please schedule OB ultrasound   CXR revealed small to moderate left pleural effusion.   Suspect likely transudative. Worsening oxygenation with continued IV fluids. Some BLE edema, prescribed bumex prn, and noted elevated CVP on TTE last year concerning for volume overload  Less likely infectious. Noted leukocytosis 27 on presentation, since improved to 12 today. Otherwise, normotensive, afebrile, and normal LA.   TTE nml EF, unremarkable  S/p thoracentesis w/ IR  CT Noncontrast 5/13 w/ pleural effusions still present, worsening  On room air      -- Pleural fluid studies consistent with TRANSUDATIVE effusion. Etiology thought to be somewhat chronic, as was present on initial CT< but aggravated by large volume IV fluid resuscitation during SBO.  -- Hold off diuresis today given loss of blood in epistaxis and psoas hematoma  -- Plan to resume diuresis when bleeding stabilizes  -- Will need follow-up in Pulmonology clinic at discharge, can consider IP consult tomorrow

## 2022-05-14 NOTE — PROGRESS NOTES
POC reviewed w/ pt, verbalized understanding. Pt AAOx4. VS stable on RA. On TELE, NSR. IS bedside. Denies nausea, chest pain, & SOB.  Pt remains free of fall & injury. No acute events. Bed in lowest position, call light in reach, frequent rounds made for safety.    - pt  Had nasal packing b/l placed by ENT overnight  - pt is Not responding appropriate to situation, MD made aware of confusion and pt refusing labs & imaging.  - [employer] camera at bedside  - Pt refused all meds, wants to talk to the doctors.   - Pt is extremely hard of hearing  - Pt does not want to wear ankle booots for protection  - 2x tarry stool episode and 1x urine incontinence.  - Chlorhexadine bath given  - Pt has blood filled blisters in oral cavity, suction used for blood clots stuck in mouth from nasal bleeding.    Care transferred to oncoming nurse.

## 2022-05-14 NOTE — SUBJECTIVE & OBJECTIVE
Medications:  Continuous Infusions:  Scheduled Meds:   acetaminophen  1,000 mg Oral Q8H    balsam peru-castor oiL   Topical (Top) BID    carbidopa-levodopa  mg  1 tablet Oral QID    oxymetazoline  2 spray Each Nostril BID    [START ON 5/14/2022] pantoprozole (PROTONIX) IV  40 mg Intravenous Daily    rasagiline  1 mg Oral Daily    sodium chloride  2 spray Each Nostril BID     PRN Meds:sodium chloride, albuterol-ipratropium, dextrose 10%, dextrose 10%, glucagon (human recombinant), glucose, glucose, loperamide, ondansetron, oxyCODONE, sodium chloride 0.9%     No current facility-administered medications on file prior to encounter.     Current Outpatient Medications on File Prior to Encounter   Medication Sig    acetaminophen (TYLENOL) 325 MG tablet Take 650 mg by mouth every 6 (six) hours as needed for Pain.    finasteride (PROSCAR) 5 mg tablet Take 5 mg by mouth once daily.    rasagiline (AZILECT) 1 mg Tab Take 1 tablet (1 mg total) by mouth once daily at 6am.       Review of patient's allergies indicates:  No Known Allergies    Past Medical History:   Diagnosis Date    BPH (benign prostatic hyperplasia)     Parkinsons 09/2019    R hand tremor starting in 2017, diagnosed Sept 2019 by Dr. Angeles at     Ulcerative colitis     s/p colectomy    Unspecified chronic bronchitis      Past Surgical History:   Procedure Laterality Date    TOTAL COLECTOMY       Family History    None       Tobacco Use    Smoking status: Never Smoker    Smokeless tobacco: Never Used   Substance and Sexual Activity    Alcohol use: Never    Drug use: Not on file    Sexual activity: Not on file     Review of Systems   Unable to perform ROS: Dementia   Objective:     Vital Signs (Most Recent):  Temp: 99.5 °F (37.5 °C) (05/13/22 2341)  Pulse: 82 (05/13/22 2341)  Resp: 16 (05/13/22 2341)  BP: (!) 161/78 (05/13/22 2341)  SpO2: 95 % (05/13/22 2341)   Vital Signs (24h Range):  Temp:  [97.4 °F (36.3 °C)-99.5 °F (37.5 °C)] 99.5 °F (37.5 °C)  Pulse:   [] 82  Resp:  [16-18] 16  SpO2:  [90 %-98 %] 95 %  BP: (112-174)/(55-80) 161/78     Weight: 65.3 kg (144 lb)  Body mass index is 23.96 kg/m².    Date 05/13/22 0700 - 05/14/22 0659   Shift 3511-2555 4910-5564 5734-6716 24 Hour Total   INTAKE   Shift Total(mL/kg)       OUTPUT   Urine(mL/kg/hr) 300(0.6)   300   Stool 1   1   Shift Total(mL/kg) 301(4.6)   301(4.6)   Weight (kg) 65.3 65.3 65.3 65.3       Physical Exam  Constitutional:       General: He is not in acute distress.     Appearance: He is ill-appearing.   HENT:      Head: Normocephalic and atraumatic.      Nose:      Comments: Severe left sided deviation, small crusting obscuring view of nasal cavity. Prominent vessel on visualized anterior septum with placement of fibrillar  Right nasal cavity with dried crust throughout, small active bleeding at head of middle turbinate. R rapid rhino placed      Mouth/Throat:      Comments: Dried blood on palate  Large clot in oropharynx, suctioned. Upon suctioning, patient had evidence of active bleeding in oropharynx. This appeared to have stopped once packing was placed.   Neurological:      Mental Status: He is alert.       Significant Labs:  CBC:   Recent Labs   Lab 05/13/22 1636   WBC 12.64   RBC 3.28*   HGB 9.9*   HCT 30.1*      MCV 92   MCH 30.2   MCHC 32.9     Coagulation:   Recent Labs   Lab 05/13/22  1636 05/13/22 2021   LABPROT 10.7  --    INR 1.0  --    APTT  --  44.1*       Significant Diagnostics:  None

## 2022-05-14 NOTE — ASSESSMENT & PLAN NOTE
Psoas hematoma noted on CT Chest 5/13  Patient reporting new-onset hip pain particularly with hip flexion that he says started 5/12  HDS, does not appear to be actively hemorrhaging    -- Will evaluate and get baseline w/ CT noncontrast abdomen/pelvis today  -- Hold DVT ppx in bleed

## 2022-05-14 NOTE — SUBJECTIVE & OBJECTIVE
Interval History: Overnight continued epistaxis and coughing up blood. ENT consulted, placed rhino rocket. Drop in Hgb, continuing to monitor. Tarry stool last night likely secondary to swallowed blood. Plan to evaluate L psoas hematoma w/ CT noncontrast abdomen.       Review of Systems   Constitutional:  Negative for chills and fever.   HENT:  Positive for nosebleeds.    Respiratory:  Positive for cough. Negative for shortness of breath.    Cardiovascular:  Negative for chest pain and palpitations.   Gastrointestinal:  Negative for abdominal pain, nausea and vomiting.   Psychiatric/Behavioral:  Positive for sleep disturbance. The patient is nervous/anxious.    Objective:     Vital Signs (Most Recent):  Temp: 97.1 °F (36.2 °C) (05/14/22 0757)  Pulse: 94 (05/14/22 0757)  Resp: 18 (05/14/22 0757)  BP: (!) 150/70 (05/14/22 0757)  SpO2: (!) 93 % (05/14/22 0757)   Vital Signs (24h Range):  Temp:  [96.4 °F (35.8 °C)-99.5 °F (37.5 °C)] 97.1 °F (36.2 °C)  Pulse:  [60-94] 94  Resp:  [16-18] 18  SpO2:  [93 %-98 %] 93 %  BP: (112-165)/(55-80) 150/70     Weight: 65.3 kg (144 lb)  Body mass index is 23.96 kg/m².  No intake or output data in the 24 hours ending 05/14/22 1150     Physical Exam  Vitals and nursing note reviewed.   Constitutional:       General: He is not in acute distress.     Appearance: Normal appearance. He is ill-appearing. He is not toxic-appearing.   HENT:      Head: Normocephalic and atraumatic.      Mouth/Throat:      Mouth: Mucous membranes are moist.      Pharynx: Oropharynx is clear.   Eyes:      Extraocular Movements: Extraocular movements intact.   Cardiovascular:      Rate and Rhythm: Normal rate and regular rhythm.      Pulses: Normal pulses.      Heart sounds: Normal heart sounds.   Pulmonary:      Effort: Pulmonary effort is normal. No respiratory distress.      Breath sounds: Normal breath sounds.   Abdominal:      General: Abdomen is flat. Bowel sounds are normal. There is no distension.       Palpations: Abdomen is soft.   Musculoskeletal:         General: Tenderness present. No swelling or deformity.      Comments: L flank tenderness   Skin:     General: Skin is warm and dry.      Capillary Refill: Capillary refill takes less than 2 seconds.   Neurological:      Mental Status: He is alert and oriented to person, place, and time.      GCS: GCS eye subscore is 4. GCS verbal subscore is 5. GCS motor subscore is 6.      Motor: No weakness.      Comments: Hard of hearing   Psychiatric:         Mood and Affect: Mood normal.         Behavior: Behavior normal. Behavior is not agitated or aggressive. Behavior is cooperative.       Significant Labs: All pertinent labs within the past 24 hours have been reviewed.  CBC:   Recent Labs   Lab 05/13/22  0308 05/13/22  1636 05/14/22  0301 05/14/22  0917   WBC 11.74 12.64 18.46* 18.40*   HGB 10.5* 9.9* 8.4* 9.2*   HCT 32.4* 30.1* 26.1* 28.5*    182 187  --        CMP:   Recent Labs   Lab 05/14/22  0301      K 3.8      CO2 24   *   BUN 32*   CREATININE 0.8   CALCIUM 8.0*   ALBUMIN 2.4*   ANIONGAP 10   EGFRNONAA >60.0       Cardiac Markers: No results for input(s): CKMB, MYOGLOBIN, BNP, TROPISTAT in the last 48 hours.    Significant Imaging: I have reviewed all pertinent imaging results/findings within the past 24 hours.

## 2022-05-14 NOTE — PROGRESS NOTES
[facility] - [facility]  Otorhinolaryngology-Head & Neck Surgery  Progress Note    Subjective:     Post-Op Info:  * No surgery found *      Hospital Day: 7     Interval History: Patient with confusion overnight. Unclear if delirium or hearing loss related. No significant bleeding since packing placed.     Medications:  Continuous Infusions:  Scheduled Meds:   balsam peru-castor oiL   Topical (Top) BID    carbidopa-levodopa  mg  1 tablet Oral QID    oxymetazoline  2 spray Each Nostril BID    pantoprozole (PROTONIX) IV  40 mg Intravenous Daily    rasagiline  1 mg Oral Daily    sodium chloride  2 spray Each Nostril BID     PRN Meds:sodium chloride, acetaminophen, albuterol-ipratropium, dextrose 10%, dextrose 10%, glucagon (human recombinant), glucose, glucose, loperamide, ondansetron, oxyCODONE, sodium chloride 0.9%     Review of patient's allergies indicates:  No Known Allergies  Objective:     Vital Signs (24h Range):  Temp:  [96.4 °F (35.8 °C)-99.5 °F (37.5 °C)] 97.1 °F (36.2 °C)  Pulse:  [60-94] 94  Resp:  [16-18] 18  SpO2:  [93 %-98 %] 93 %  BP: (112-165)/(55-80) 150/70       Lines/Drains/Airways       Peripheral Intravenous Line  Duration                  Peripheral IV - Single Lumen 05/09/22 20 G Anterior;Left;Proximal Forearm 5 days                    Physical Exam  Constitutional:       General: He is not in acute distress.     Appearance: He is ill-appearing.   HENT:      Head: Normocephalic and atraumatic.      Nose:      Comments: Fibrillar in place on L, rapid rhino in place on R     Mouth/Throat:      Comments: Dried blood on palate  Small clot in oropharynx, no active bleeding   Neurological:      Mental Status: He is alert.     Significant Labs:  CBC:   Recent Labs   Lab 05/14/22  0301   WBC 18.46*   RBC 2.79*   HGB 8.4*   HCT 26.1*      MCV 94   MCH 30.1   MCHC 32.2       Significant Diagnostics:  None    Assessment/Plan:     Hemoptysis  88 y.o M with epistaxis contributing to hemoptysis.  Fibrillar placed on L side, rapid rhino placed on R.     - Rapid rhino will stay in place for 3 days  - Antibiotic prophylaxis while packing in place  - Fibrillar is absorbable   - Will continue to monitor   - Call/page ENT with questions/concerns         Evy Horton MD  Otorhinolaryngology-Head & Neck Surgery  [facility]

## 2022-05-15 PROBLEM — R91.1 PULMONARY NODULE: Status: ACTIVE | Noted: 2022-05-15

## 2022-05-15 PROBLEM — D62 ACUTE BLOOD LOSS ANEMIA: Status: ACTIVE | Noted: 2022-05-15

## 2022-05-15 LAB
ALBUMIN SERPL BCP-MCNC: 2.4 G/DL (ref 3.5–5.2)
ALP SERPL-CCNC: 49 U/L (ref 55–135)
ALT SERPL W/O P-5'-P-CCNC: 6 U/L (ref 10–44)
ANION GAP SERPL CALC-SCNC: 9 MMOL/L (ref 8–16)
AST SERPL-CCNC: 31 U/L (ref 10–40)
BASOPHILS # BLD AUTO: 0.05 K/UL (ref 0–0.2)
BASOPHILS # BLD AUTO: 0.11 K/UL (ref 0–0.2)
BASOPHILS NFR BLD: 0.2 % (ref 0–1.9)
BASOPHILS NFR BLD: 0.5 % (ref 0–1.9)
BILIRUB DIRECT SERPL-MCNC: 0.1 MG/DL (ref 0.1–0.3)
BILIRUB SERPL-MCNC: 0.5 MG/DL (ref 0.1–1)
BLD PROD TYP BPU: NORMAL
BLOOD UNIT EXPIRATION DATE: NORMAL
BLOOD UNIT TYPE CODE: 7300
BLOOD UNIT TYPE: NORMAL
BUN SERPL-MCNC: 31 MG/DL (ref 8–23)
CALCIUM SERPL-MCNC: 8.4 MG/DL (ref 8.7–10.5)
CHLORIDE SERPL-SCNC: 105 MMOL/L (ref 95–110)
CO2 SERPL-SCNC: 25 MMOL/L (ref 23–29)
CODING SYSTEM: NORMAL
CREAT SERPL-MCNC: 0.9 MG/DL (ref 0.5–1.4)
DIFFERENTIAL METHOD: ABNORMAL
DIFFERENTIAL METHOD: ABNORMAL
DISPENSE STATUS: NORMAL
EOSINOPHIL # BLD AUTO: 0.1 K/UL (ref 0–0.5)
EOSINOPHIL # BLD AUTO: 0.1 K/UL (ref 0–0.5)
EOSINOPHIL NFR BLD: 0.3 % (ref 0–8)
EOSINOPHIL NFR BLD: 0.5 % (ref 0–8)
ERYTHROCYTE [DISTWIDTH] IN BLOOD BY AUTOMATED COUNT: 16.5 % (ref 11.5–14.5)
ERYTHROCYTE [DISTWIDTH] IN BLOOD BY AUTOMATED COUNT: 16.5 % (ref 11.5–14.5)
ERYTHROCYTE [DISTWIDTH] IN BLOOD BY AUTOMATED COUNT: 16.9 % (ref 11.5–14.5)
EST. GFR  (AFRICAN AMERICAN): >60 ML/MIN/1.73 M^2
EST. GFR  (NON AFRICAN AMERICAN): >60 ML/MIN/1.73 M^2
GLUCOSE SERPL-MCNC: 109 MG/DL (ref 70–110)
HCT VFR BLD AUTO: 20.8 % (ref 40–54)
HCT VFR BLD AUTO: 20.8 % (ref 40–54)
HCT VFR BLD AUTO: 23.5 % (ref 40–54)
HGB BLD-MCNC: 6.8 G/DL (ref 14–18)
HGB BLD-MCNC: 6.8 G/DL (ref 14–18)
HGB BLD-MCNC: 7.5 G/DL (ref 14–18)
IMM GRANULOCYTES # BLD AUTO: 0.13 K/UL (ref 0–0.04)
IMM GRANULOCYTES # BLD AUTO: 0.21 K/UL (ref 0–0.04)
IMM GRANULOCYTES NFR BLD AUTO: 0.6 % (ref 0–0.5)
IMM GRANULOCYTES NFR BLD AUTO: 0.9 % (ref 0–0.5)
INR PPP: 1 (ref 0.8–1.2)
LYMPHOCYTES # BLD AUTO: 1.2 K/UL (ref 1–4.8)
LYMPHOCYTES # BLD AUTO: 1.4 K/UL (ref 1–4.8)
LYMPHOCYTES NFR BLD: 5.8 % (ref 18–48)
LYMPHOCYTES NFR BLD: 6.2 % (ref 18–48)
MAGNESIUM SERPL-MCNC: 1.7 MG/DL (ref 1.6–2.6)
MCH RBC QN AUTO: 30.7 PG (ref 27–31)
MCH RBC QN AUTO: 30.9 PG (ref 27–31)
MCH RBC QN AUTO: 30.9 PG (ref 27–31)
MCHC RBC AUTO-ENTMCNC: 31.9 G/DL (ref 32–36)
MCHC RBC AUTO-ENTMCNC: 32.7 G/DL (ref 32–36)
MCHC RBC AUTO-ENTMCNC: 32.7 G/DL (ref 32–36)
MCV RBC AUTO: 95 FL (ref 82–98)
MCV RBC AUTO: 95 FL (ref 82–98)
MCV RBC AUTO: 96 FL (ref 82–98)
MONOCYTES # BLD AUTO: 2.7 K/UL (ref 0.3–1)
MONOCYTES # BLD AUTO: 2.7 K/UL (ref 0.3–1)
MONOCYTES NFR BLD: 11.6 % (ref 4–15)
MONOCYTES NFR BLD: 13.2 % (ref 4–15)
NEUTROPHILS # BLD AUTO: 16.3 K/UL (ref 1.8–7.7)
NEUTROPHILS # BLD AUTO: 18.6 K/UL (ref 1.8–7.7)
NEUTROPHILS NFR BLD: 79.9 % (ref 38–73)
NEUTROPHILS NFR BLD: 80.3 % (ref 38–73)
NRBC BLD-RTO: 0 /100 WBC
NRBC BLD-RTO: 0 /100 WBC
PATH REV BLD -IMP: NORMAL
PHOSPHATE SERPL-MCNC: 2.4 MG/DL (ref 2.7–4.5)
PLATELET # BLD AUTO: 187 K/UL (ref 150–450)
PLATELET # BLD AUTO: 196 K/UL (ref 150–450)
PLATELET # BLD AUTO: 196 K/UL (ref 150–450)
PLATELET BLD QL SMEAR: ABNORMAL
PMV BLD AUTO: 11.2 FL (ref 9.2–12.9)
PMV BLD AUTO: 11.2 FL (ref 9.2–12.9)
PMV BLD AUTO: 11.9 FL (ref 9.2–12.9)
POCT GLUCOSE: 129 MG/DL (ref 70–110)
POTASSIUM SERPL-SCNC: 4 MMOL/L (ref 3.5–5.1)
PROT SERPL-MCNC: 6 G/DL (ref 6–8.4)
PROTHROMBIN TIME: 10.6 SEC (ref 9–12.5)
RBC # BLD AUTO: 2.2 M/UL (ref 4.6–6.2)
RBC # BLD AUTO: 2.2 M/UL (ref 4.6–6.2)
RBC # BLD AUTO: 2.44 M/UL (ref 4.6–6.2)
SODIUM SERPL-SCNC: 139 MMOL/L (ref 136–145)
TRANS ERYTHROCYTES VOL PATIENT: NORMAL ML
WBC # BLD AUTO: 20.4 K/UL (ref 3.9–12.7)
WBC # BLD AUTO: 20.4 K/UL (ref 3.9–12.7)
WBC # BLD AUTO: 23.18 K/UL (ref 3.9–12.7)

## 2022-05-15 PROCEDURE — 80076 HEPATIC FUNCTION PANEL: CPT | Performed by: HOSPITALIST

## 2022-05-15 PROCEDURE — 99233 PR SUBSEQUENT HOSPITAL CARE,LEVL III: ICD-10-PCS | Mod: GC,,, | Performed by: HOSPITALIST

## 2022-05-15 PROCEDURE — P9021 RED BLOOD CELLS UNIT: HCPCS | Performed by: STUDENT IN AN ORGANIZED HEALTH CARE EDUCATION/TRAINING PROGRAM

## 2022-05-15 PROCEDURE — 85060 PATHOLOGIST REVIEW: ICD-10-PCS | Mod: ,,, | Performed by: PATHOLOGY

## 2022-05-15 PROCEDURE — 36430 TRANSFUSION BLD/BLD COMPNT: CPT

## 2022-05-15 PROCEDURE — 85025 COMPLETE CBC W/AUTO DIFF WBC: CPT | Performed by: STUDENT IN AN ORGANIZED HEALTH CARE EDUCATION/TRAINING PROGRAM

## 2022-05-15 PROCEDURE — 25000003 PHARM REV CODE 250: Performed by: HOSPITALIST

## 2022-05-15 PROCEDURE — 80048 BASIC METABOLIC PNL TOTAL CA: CPT

## 2022-05-15 PROCEDURE — 83735 ASSAY OF MAGNESIUM: CPT | Performed by: STUDENT IN AN ORGANIZED HEALTH CARE EDUCATION/TRAINING PROGRAM

## 2022-05-15 PROCEDURE — 25000003 PHARM REV CODE 250

## 2022-05-15 PROCEDURE — 25000003 PHARM REV CODE 250: Performed by: STUDENT IN AN ORGANIZED HEALTH CARE EDUCATION/TRAINING PROGRAM

## 2022-05-15 PROCEDURE — 85060 BLOOD SMEAR INTERPRETATION: CPT | Mod: ,,, | Performed by: PATHOLOGY

## 2022-05-15 PROCEDURE — 85025 COMPLETE CBC W/AUTO DIFF WBC: CPT | Mod: 91

## 2022-05-15 PROCEDURE — 99233 SBSQ HOSP IP/OBS HIGH 50: CPT | Mod: GC,,, | Performed by: HOSPITALIST

## 2022-05-15 PROCEDURE — 84100 ASSAY OF PHOSPHORUS: CPT

## 2022-05-15 PROCEDURE — 20600001 HC STEP DOWN PRIVATE ROOM

## 2022-05-15 PROCEDURE — 94761 N-INVAS EAR/PLS OXIMETRY MLT: CPT

## 2022-05-15 PROCEDURE — 36415 COLL VENOUS BLD VENIPUNCTURE: CPT | Performed by: HOSPITALIST

## 2022-05-15 PROCEDURE — 63600175 PHARM REV CODE 636 W HCPCS: Performed by: STUDENT IN AN ORGANIZED HEALTH CARE EDUCATION/TRAINING PROGRAM

## 2022-05-15 PROCEDURE — 25500020 PHARM REV CODE 255: Performed by: HOSPITALIST

## 2022-05-15 PROCEDURE — 85610 PROTHROMBIN TIME: CPT | Performed by: HOSPITALIST

## 2022-05-15 RX ORDER — SODIUM,POTASSIUM PHOSPHATES 280-250MG
2 POWDER IN PACKET (EA) ORAL EVERY 4 HOURS
Status: COMPLETED | OUTPATIENT
Start: 2022-05-15 | End: 2022-05-15

## 2022-05-15 RX ORDER — FUROSEMIDE 10 MG/ML
40 INJECTION INTRAMUSCULAR; INTRAVENOUS ONCE
Status: COMPLETED | OUTPATIENT
Start: 2022-05-15 | End: 2022-05-15

## 2022-05-15 RX ORDER — HYDROCODONE BITARTRATE AND ACETAMINOPHEN 500; 5 MG/1; MG/1
TABLET ORAL
Status: DISCONTINUED | OUTPATIENT
Start: 2022-05-15 | End: 2022-05-16

## 2022-05-15 RX ADMIN — AMOXICILLIN AND CLAVULANATE POTASSIUM 1 TABLET: 875; 125 TABLET, FILM COATED ORAL at 09:05

## 2022-05-15 RX ADMIN — CARBIDOPA AND LEVODOPA 1 TABLET: 25; 100 TABLET ORAL at 02:05

## 2022-05-15 RX ADMIN — POTASSIUM & SODIUM PHOSPHATES POWDER PACK 280-160-250 MG 2 PACKET: 280-160-250 PACK at 02:05

## 2022-05-15 RX ADMIN — RASAGILINE 1 MG: 1 TABLET ORAL at 09:05

## 2022-05-15 RX ADMIN — CARBIDOPA AND LEVODOPA 1 TABLET: 25; 100 TABLET ORAL at 06:05

## 2022-05-15 RX ADMIN — CARBIDOPA AND LEVODOPA 1 TABLET: 25; 100 TABLET ORAL at 09:05

## 2022-05-15 RX ADMIN — OXYMETAZOLINE HCL 2 SPRAY: 0.05 SPRAY NASAL at 09:05

## 2022-05-15 RX ADMIN — Medication: at 09:05

## 2022-05-15 RX ADMIN — FUROSEMIDE 40 MG: 10 INJECTION, SOLUTION INTRAMUSCULAR; INTRAVENOUS at 09:05

## 2022-05-15 RX ADMIN — IOHEXOL 75 ML: 350 INJECTION, SOLUTION INTRAVENOUS at 06:05

## 2022-05-15 RX ADMIN — POTASSIUM & SODIUM PHOSPHATES POWDER PACK 280-160-250 MG 2 PACKET: 280-160-250 PACK at 09:05

## 2022-05-15 NOTE — ASSESSMENT & PLAN NOTE
Presents with SBO, initially admitted to General Surgery.   Hx of UC s/p total colectomy  Resolved    -- Regular diet  -- Monitor for bowel movements, abdominal pain

## 2022-05-15 NOTE — ASSESSMENT & PLAN NOTE
88 y.o M with epistaxis contributing to hemoptysis. Fibrillar placed on L side, rapid rhino placed on R.     Based on H/H drop and hemostasis of epistaxis, would consider additional sources of bleeding.     - Rapid rhino will stay in place for 3 days   - Plan to deflate 5/16   - Antibiotic prophylaxis while packing in place  - Fibrillar is absorbable   - Will continue to monitor   - Call/page ENT with questions/concerns

## 2022-05-15 NOTE — PROGRESS NOTES
[facility] - [facility]  Otorhinolaryngology-Head & Neck Surgery  Progress Note    Subjective:     Post-Op Info:  * No surgery found *      Hospital Day: 8     Interval History: H/H drop. No bleeding from nares or mouth.     Medications:  Continuous Infusions:  Scheduled Meds:   amoxicillin-clavulanate 875-125mg  1 tablet Oral Q12H    balsam peru-castor oiL   Topical (Top) BID    carbidopa-levodopa  mg  1 tablet Oral QID    potassium, sodium phosphates  2 packet Oral Q4H    rasagiline  1 mg Oral Daily    sodium chloride  2 spray Each Nostril BID     PRN Meds:acetaminophen, albuterol-ipratropium, dextrose 10%, dextrose 10%, glucagon (human recombinant), glucose, glucose, loperamide, ondansetron, oxyCODONE, oxymetazoline, sodium chloride 0.9%     Review of patient's allergies indicates:  No Known Allergies  Objective:     Vital Signs (24h Range):  Temp:  [97.2 °F (36.2 °C)-99.2 °F (37.3 °C)] 98 °F (36.7 °C)  Pulse:  [69-96] 76  Resp:  [16-18] 18  SpO2:  [92 %-98 %] 94 %  BP: (114-140)/(58-72) 114/58       Lines/Drains/Airways       Peripheral Intravenous Line  Duration                  Peripheral IV - Single Lumen 05/09/22 20 G Anterior;Left;Proximal Forearm 6 days                    Physical Exam  Constitutional:       General: He is not in acute distress.     Appearance: He is ill-appearing.   HENT:      Head: Normocephalic and atraumatic.      Nose:      Comments: Fibrillar in place on L, rapid rhino in place on R     Mouth/Throat:      Comments: Dried blood on palate  Oropharynx is clear without clot or bleeding  Neurological:      Mental Status: He is alert.     Significant Labs:  CBC:   Recent Labs   Lab 05/15/22  0616   WBC 23.18*   RBC 2.44*   HGB 7.5*   HCT 23.5*      MCV 96   MCH 30.7   MCHC 31.9*       Significant Diagnostics:  None    Assessment/Plan:     Epistaxis  88 y.o M with epistaxis contributing to hemoptysis. Fibrillar placed on L side, rapid rhino placed on R.     Based on H/H drop and  hemostasis of epistaxis, would consider additional sources of bleeding.     - Rapid rhino will stay in place for 3 days   - Plan to deflate 5/16   - Antibiotic prophylaxis while packing in place  - Fibrillar is absorbable   - Will continue to monitor   - Call/page ENT with questions/concerns         Evy Horton MD  Otorhinolaryngology-Head & Neck Surgery  [facility]

## 2022-05-15 NOTE — ASSESSMENT & PLAN NOTE
RLL pulmonary nodule seen incidentally on CT CAP.     -- Followup imaging recommended in 3-6 months  -- Will defer to outpatient followup; noted in discharge summary

## 2022-05-15 NOTE — SUBJECTIVE & OBJECTIVE
Interval History: H/H drop. No bleeding from nares or mouth.     Medications:  Continuous Infusions:  Scheduled Meds:   amoxicillin-clavulanate 875-125mg  1 tablet Oral Q12H    balsam peru-castor oiL   Topical (Top) BID    carbidopa-levodopa  mg  1 tablet Oral QID    potassium, sodium phosphates  2 packet Oral Q4H    rasagiline  1 mg Oral Daily    sodium chloride  2 spray Each Nostril BID     PRN Meds:acetaminophen, albuterol-ipratropium, dextrose 10%, dextrose 10%, glucagon (human recombinant), glucose, glucose, loperamide, ondansetron, oxyCODONE, oxymetazoline, sodium chloride 0.9%     Review of patient's allergies indicates:  No Known Allergies  Objective:     Vital Signs (24h Range):  Temp:  [97.2 °F (36.2 °C)-99.2 °F (37.3 °C)] 98 °F (36.7 °C)  Pulse:  [69-96] 76  Resp:  [16-18] 18  SpO2:  [92 %-98 %] 94 %  BP: (114-140)/(58-72) 114/58       Lines/Drains/Airways       Peripheral Intravenous Line  Duration                  Peripheral IV - Single Lumen 05/09/22 20 G Anterior;Left;Proximal Forearm 6 days                    Physical Exam  Constitutional:       General: He is not in acute distress.     Appearance: He is ill-appearing.   HENT:      Head: Normocephalic and atraumatic.      Nose:      Comments: Fibrillar in place on L, rapid rhino in place on R     Mouth/Throat:      Comments: Dried blood on palate  Oropharynx is clear without clot or bleeding  Neurological:      Mental Status: He is alert.     Significant Labs:  CBC:   Recent Labs   Lab 05/15/22  0616   WBC 23.18*   RBC 2.44*   HGB 7.5*   HCT 23.5*      MCV 96   MCH 30.7   MCHC 31.9*       Significant Diagnostics:  None

## 2022-05-15 NOTE — ASSESSMENT & PLAN NOTE
Patient noted to be spitting up a small-moderate amount of [place] blood on 5/13. Initially thought to be a nosebleed, as patient complained of dry/itchy nose.There was then some concern patient was actually coughing up blood. Red tinge was noted in patient's posterior oropharynx, but still unclear of the source.   Significant epistaxis with decrease in Hgb. Patient swallowing blood and subsequent rise in BUN.   Currently stable s/p rapid rhino    -- ENT consulted, appreciate assistance  -- ENT placed nasal packing and rapid rhino will keep in place for 3 days- plan to remove tomorrow  -- Amoxicillin-clavulanate for abx ppx while packings in place  -- Holding DVT ppx in setting of bleed

## 2022-05-15 NOTE — ASSESSMENT & PLAN NOTE
Development of epistaxis and L psoas hematoma around same timeframe   Epistaxis stable s/p packings  Unclear if L psoas hematoma still hemorrhaging  Hgb downtrend to 7.5 this morning from 8-9 previous days  Hepatic function panel, PT-INR normal  Normal platelet count  D-dimer elevated, Fibrinogen slightly elevated    -- See plan for epistaxis and psoas hematoma   -- Repeat CBC this afternoon  -- Transfuse Hgb <7  -- Hold DVT ppx  -- Hypocoagulable workup pending

## 2022-05-15 NOTE — H&P
Inpatient Radiology Pre-procedure Note    History of Present Illness:  Giselle Lemus is a 88 y.o. male with Hx of UC s/p total colectomy admitted for SBO who developed sudden left hip pain during hospital course and epistaxis. Pt was on Lovenox DVT prophylactic last dose 5/13 . CT 5/14 showed 6.8 cm heterogenous fluid collection along the left iliopsoas muscle concerning for hematoma. Hg continue to drop to 6.8 requiring transfusion. CTA was obtained showing areas of active bleeding within the left iliopsoas hematoma. IR consulted for evaluation of possible hematoma drain vs. Embolization.     Admission H&P reviewed.    Past Medical History:   Diagnosis Date    BPH (benign prostatic hyperplasia)     Parkinsons 09/2019    R hand tremor starting in 2017, diagnosed Sept 2019 by Dr. Angeles at     Ulcerative colitis     s/p colectomy    Unspecified chronic bronchitis      Past Surgical History:   Procedure Laterality Date    TOTAL COLECTOMY         Review of Systems:   As documented in primary team H&P    Home Meds:   Prior to Admission medications    Medication Sig Start Date End Date Taking? Authorizing Provider   acetaminophen (TYLENOL) 325 MG tablet Take 650 mg by mouth every 6 (six) hours as needed for Pain.   Yes Historical Provider   finasteride (PROSCAR) 5 mg tablet Take 5 mg by mouth once daily.   Yes Historical Provider   rasagiline (AZILECT) 1 mg Tab Take 1 tablet (1 mg total) by mouth once daily at 6am. 3/30/22 3/30/23 Yes Toro Britton MD   carbidopa-levodopa  mg (SINEMET)  mg per tablet Take 1 tablet by mouth 4 (four) times daily. 5/12/22 5/12/23  Guadalupe Mcguire MD   loperamide (IMODIUM) 2 mg capsule Take 1 capsule (2 mg total) by mouth 3 (three) times daily as needed for Diarrhea. 5/12/22 5/22/22  Guadalupe Mcguire MD   oxymetazoline (AFRIN) 0.05 % nasal spray 2 sprays by Nasal route 2 (two) times daily as needed (nose bleed). 5/13/22 5/16/22  Guadalupe Mcguire MD   sodium  chloride (OCEAN) 0.65 % nasal spray 2 sprays by Nasal route 2 (two) times a day. 5/13/22   Guadalupe Mcguire MD     Scheduled Meds:    amoxicillin-clavulanate 875-125mg  1 tablet Oral Q12H    balsam peru-castor oiL   Topical (Top) BID    carbidopa-levodopa  mg  1 tablet Oral QID    rasagiline  1 mg Oral Daily    sodium chloride  2 spray Each Nostril BID     Continuous Infusions:   PRN Meds:sodium chloride, acetaminophen, albuterol-ipratropium, dextrose 10%, dextrose 10%, glucagon (human recombinant), glucose, glucose, loperamide, ondansetron, oxyCODONE, oxymetazoline, sodium chloride 0.9%  Anticoagulants/Antiplatelets: no anticoagulation    Allergies:   Review of patient's allergies indicates:   Allergen Reactions    Heparin analogues Other (See Comments)     Not true allergy - but patient developed large spontaneous RP hematoma and concurrent severe epistaxis while on DVT prophylactic dose heparin - consider mechanical DVT ppx in future     Sedation Hx: have not been any systemic reactions    Labs:  Recent Labs   Lab 05/15/22  0616   INR 1.0       Recent Labs   Lab 05/15/22  1113   WBC 20.40*  20.40*   HGB 6.8*  6.8*   HCT 20.8*  20.8*   MCV 95  95     196      Recent Labs   Lab 05/15/22  0616         K 4.0      CO2 25   BUN 31*   CREATININE 0.9   CALCIUM 8.4*   MG 1.7   ALT 6*   AST 31   ALBUMIN 2.4*   BILITOT 0.5   BILIDIR 0.1         Vitals:  Temp: 98.5 °F (36.9 °C) (05/15/22 1700)  Pulse: 69 (05/15/22 1700)  Resp: 18 (05/15/22 1700)  BP: 135/63 (05/15/22 1700)  SpO2: (!) 91 % (05/15/22 1700)     Physical Exam:  ASA: III    Mallampati: II    General: no acute distress  Mental Status: alert and oriented to person, place and time  HEENT: normocephalic, atraumatic  Chest: unlabored breathing  Heart: regular heart rate  Abdomen: nondistended  Extremity: moves all extremities      Plan:  Sedation Plan: up to moderate   Patient will undergo angiogram vesical with possible  intervention.             Belinda Bolton MD  Radiology Resident PGY- 2  [facility]

## 2022-05-15 NOTE — PLAN OF CARE
POC reviewed with patient who verbalized understanding. VSS on RA. AAOX4. Patient very hard of hearing. Remains free of falls and injury. Frequent weight shift encouraged with assistance provided as needed    - nasal packing in place, bleeding controlled  - bruising and pain to L hip  - Avasys in use  - incontinent of bowel and bladder  - CT abdomen/pelvis completed    Mild tolerance of diet, denies nausea. Telemetry being monitored running NSR. Patient denies chest pain & SOB. No acute events. No distress noted. Bed in lowest position, call light within reach, frequent rounds made for safety.     WCTM

## 2022-05-15 NOTE — CONSULTS
Radiology Consult    Eng Allan is a 88 y.o. male with  Hx of UC s/p total colectomy admitted for SBO who developed sudden left hip pain during hospital course and epistaxis. Pt was on Lovenox DVT prophylactic last dose 5/13 . CT 5/14 showed 6.8 cm heterogenous fluid collection along the left iliopsoas muscle concerning for hematoma. IR consulted for evaluation of possible hematoma drain vs. Embolization.     Past Medical History:   Diagnosis Date    BPH (benign prostatic hyperplasia)     Parkinsons 09/2019    R hand tremor starting in 2017, diagnosed Sept 2019 by Dr. Angeles at     Ulcerative colitis     s/p colectomy    Unspecified chronic bronchitis      Past Surgical History:   Procedure Laterality Date    TOTAL COLECTOMY         Discussed with primary team, Dr. Alexander.    Imaging reviewed with Radiology staff, Dr. Ashley.     Procedure:     Scheduled Meds:    amoxicillin-clavulanate 875-125mg  1 tablet Oral Q12H    balsam peru-castor oiL   Topical (Top) BID    carbidopa-levodopa  mg  1 tablet Oral QID    potassium, sodium phosphates  2 packet Oral Q4H    rasagiline  1 mg Oral Daily    sodium chloride  2 spray Each Nostril BID     Continuous Infusions:   PRN Meds:acetaminophen, albuterol-ipratropium, dextrose 10%, dextrose 10%, glucagon (human recombinant), glucose, glucose, loperamide, ondansetron, oxyCODONE, oxymetazoline, sodium chloride 0.9%    Allergies: Review of patient's allergies indicates:  No Known Allergies    Labs:  Recent Labs   Lab 05/15/22  0616   INR 1.0       Recent Labs   Lab 05/15/22  0616   WBC 23.18*   HGB 7.5*   HCT 23.5*   MCV 96         Recent Labs   Lab 05/15/22  0616         K 4.0      CO2 25   BUN 31*   CREATININE 0.9   CALCIUM 8.4*   MG 1.7   ALT 6*   AST 31   ALBUMIN 2.4*   BILITOT 0.5   BILIDIR 0.1         Vitals (Most Recent):  Temp: 98 °F (36.7 °C) (05/15/22 0733)  Pulse: 76 (05/15/22 0733)  Resp: 18 (05/15/22 0733)  BP: (!) 114/58 (05/15/22  0733)  SpO2: (!) 94 % (05/15/22 0733)    Plan:   Findings Favored to represent evolving hematoma. Lovenox is likely source of hemorrhage.  - Because collection is retroperitoneal, there is high liklihood that it will tamponade on its own.  - Q6 cbc checks   - transfuse blood to maintain Hg above 7.  - Recommend considering FFP .  - If Hb has dramatic drop or patient becomes hemodynamically unstable recommend obtaining emergent CTA.        Belinda Bolton MD  Radiology Resident PGY- 2  [facility]

## 2022-05-15 NOTE — PROGRESS NOTES
[facility] Medicine  Progress Note    Patient Name: Giselle Lemus  MRN: [id number]  Patient Class: IP- Inpatient   Admission Date: 5/8/2022  Length of Stay: 7 days  Attending Physician: John Alexander MD  Primary Care Provider: Tl Torres MD        Subjective:     Principal Problem:SBO (small bowel obstruction)        HPI:  Giselle Lemus is an 88 y.o. M. With history of Parkinson's, BPH, UC s/p total colectomy, and chronic bronchitis who presents with abdominal pain. Found to have SBO. NGT was placed initially and since d/c. Patient with + BM. Per general surgery, SBO resolved. Noted leukocytosis 27 on presentation, since improved to 12 today. Normotensive, afebrile, and normal LA. CXR revealed small to moderate left pleural effusion. Zosyn initialed. IR consulted and plan for thoracentesis. NPO now with IV fluids. Oxygenation slightly worsening. Currently on 2L O2 via NC. Developed SB with rate 30-40s. Cardiology consulted and recs currently pending. Medicine consulted for possible transfer to medicine given resolved SBO without surgical intervention, pleural effusion, and bradycardia.      Overview/Hospital Course:  Admitted to general surgery for treatment of SBO, which resolved with conservative care and bowel rest. Began having Bms and tolerating PO well. However, subsequently found to have L pleural effusion, which was present on original abdominal CT but became more enlarged after IV fluids given during SBO. TTE showed normal cardiac function. IR thoracentesis revealed transudative effusion without signs of infection. Attempting lasix to relieve small O2 requirement. Course also complicated by asymptomatic sinus bradycardia to 40s. Cardiology contacted and reviewed EKG which was sinus maycol. PT recommending rehab, patient and family in agreement. Accepted to [facility]. Resumed on home loperamide PRN for baseline diarrhea following meals since patient is s/p colectomy and is chronically dependent on  this. 2 episodes of epistaxis, coughing up blood. Cancelled discharge to [facility]. CXR w/ worsening in pleural effusions. CT Chest w/ pulmonary edema and partially visualized L psoas hematoma. Discontinued DVT ppx. Overnight continued epistaxis, coughing up blood, and tarry stool. ENT consulted, placed rhino rocket/nasal packings. Drop in Hgb, continuing to monitor. Tarry stool likely secondary to swallowed blood. Plan to evaluate L psoas hematoma w/ CT noncontrast abdomen. Of note, a RLL pulmonary nodule was incidentally seen on CT imaging, and followup within 3-6 months was recommended.      Interval History: NAEON. Epistaxis stopped. Yesterday CT abdomen w/ large psoas hematoma with possible increased size. IR consulted for possible intervention. Will trend CBC to monitor bleeding. If acute drop in Hgb, plan to order CTA.       Review of Systems   Constitutional:  Negative for chills and fever.   HENT:  Positive for nosebleeds.    Respiratory:  Positive for cough. Negative for shortness of breath.    Cardiovascular:  Negative for chest pain and palpitations.   Gastrointestinal:  Negative for abdominal pain, nausea and vomiting.   Psychiatric/Behavioral:  Positive for sleep disturbance. The patient is nervous/anxious.    Objective:     Vital Signs (Most Recent):  Temp: 98 °F (36.7 °C) (05/15/22 0733)  Pulse: 76 (05/15/22 0733)  Resp: 18 (05/15/22 0733)  BP: (!) 114/58 (05/15/22 0733)  SpO2: (!) 94 % (05/15/22 0733)   Vital Signs (24h Range):  Temp:  [97.2 °F (36.2 °C)-99.2 °F (37.3 °C)] 98 °F (36.7 °C)  Pulse:  [69-96] 76  Resp:  [16-18] 18  SpO2:  [92 %-98 %] 94 %  BP: (114-140)/(58-72) 114/58     Weight: 65.3 kg (144 lb)  Body mass index is 23.96 kg/m².    Intake/Output Summary (Last 24 hours) at 5/15/2022 1046  Last data filed at 5/14/2022 2300  Gross per 24 hour   Intake 460 ml   Output --   Net 460 ml        Physical Exam  Vitals and nursing note reviewed.   Constitutional:       General: He is not in acute  distress.     Appearance: Normal appearance. He is ill-appearing. He is not toxic-appearing.   HENT:      Head: Normocephalic and atraumatic.      Mouth/Throat:      Mouth: Mucous membranes are moist.      Pharynx: Oropharynx is clear.   Eyes:      Extraocular Movements: Extraocular movements intact.   Cardiovascular:      Rate and Rhythm: Normal rate and regular rhythm.      Pulses: Normal pulses.      Heart sounds: Normal heart sounds.   Pulmonary:      Effort: Pulmonary effort is normal. No respiratory distress.      Breath sounds: Normal breath sounds.   Abdominal:      General: Abdomen is flat. Bowel sounds are normal. There is no distension.      Palpations: Abdomen is soft.   Musculoskeletal:         General: Tenderness present. No swelling or deformity.      Comments: L flank tenderness   Skin:     General: Skin is warm and dry.      Capillary Refill: Capillary refill takes less than 2 seconds.   Neurological:      Mental Status: He is alert and oriented to person, place, and time.      GCS: GCS eye subscore is 4. GCS verbal subscore is 5. GCS motor subscore is 6.      Motor: No weakness.      Comments: Hard of hearing   Psychiatric:         Mood and Affect: Mood normal.         Behavior: Behavior normal. Behavior is not agitated or aggressive. Behavior is cooperative.       Significant Labs: All pertinent labs within the past 24 hours have been reviewed.  CBC:   Recent Labs   Lab 05/14/22  0301 05/14/22  0917 05/15/22  0616   WBC 18.46* 18.40* 23.18*   HGB 8.4* 9.2* 7.5*   HCT 26.1* 28.5* 23.5*    130* 187       CMP:   Recent Labs   Lab 05/14/22  0301 05/15/22  0616    139   K 3.8 4.0    105   CO2 24 25   * 109   BUN 32* 31*   CREATININE 0.8 0.9   CALCIUM 8.0* 8.4*   PROT  --  6.0   ALBUMIN 2.4* 2.4*   BILITOT  --  0.5   ALKPHOS  --  49*   AST  --  31   ALT  --  6*   ANIONGAP 10 9   EGFRNONAA >60.0 >60.0       Cardiac Markers: No results for input(s): CKMB, MYOGLOBIN, BNP,  ESTELLE in the last 48 hours.    Significant Imaging: I have reviewed all pertinent imaging results/findings within the past 24 hours.      Assessment/Plan:      * SBO (small bowel obstruction)  Presents with SBO, initially admitted to General Surgery.   Hx of UC s/p total colectomy  Resolved    -- Regular diet  -- Monitor for bowel movements, abdominal pain    Acute blood loss anemia  Development of epistaxis and L psoas hematoma around same timeframe   Epistaxis stable s/p packings  Unclear if L psoas hematoma still hemorrhaging  Hgb downtrend to 7.5 this morning from 8-9 previous days  Hepatic function panel, PT-INR normal  Normal platelet count  D-dimer elevated, Fibrinogen slightly elevated    -- See plan for epistaxis and psoas hematoma   -- Repeat CBC this afternoon  -- Transfuse Hgb <7  -- Hold DVT ppx  -- Hypocoagulable workup pending    Pulmonary nodule  RLL pulmonary nodule seen incidentally on CT CAP.     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 hearing aids    -- Signage placed on door  -- Speak slowly clearly and allow to see lips  -- Use pen and paper to communicate if unable to understand    Nontraumatic psoas hematoma  Psoas hematoma noted on CT Chest 5/13  Patient reporting new-onset hip pain particularly with hip flexion that he says started 5/12  CT abdomen yesterday w/ large psoas hematoma, possibly enlarged from previous CT Chest partially visualized  HDS, although Hgb has downtrended on CBC    -- Hold DVT ppx in bleed  -- Repeat CBC this afternoon to evaluate if continuing to hemorrhage  -- IR consulted, appreciate recommendations  -- Will plan for CTA if afternoon CBC w/ acute drop in Hgb  -- Will work-up hypocoagulable  state    Epistaxis  Patient noted to be spitting up a small-moderate amount of valentina blood on 5/13. Initially thought to be a nosebleed, as patient complained of dry/itchy nose.There was then some concern patient was actually coughing up blood. Red tinge was noted in patient's posterior oropharynx, but still unclear of the source.   Significant epistaxis with decrease in Hgb. Patient swallowing blood and subsequent rise in BUN.   Currently stable s/p rapid rhino    -- ENT consulted, appreciate assistance  -- ENT placed nasal packing and rapid rhino will keep in place for 3 days- plan to remove tomorrow  -- Amoxicillin-clavulanate for abx ppx while packings in place  -- Holding DVT ppx in setting of bleed    Impaired mobility and activities of daily living  -- PT/OT following, appreciate recommendations  -- Plan to discharge to [facility] for continued therapy when medically cleared    Hypophosphatemia  -- Monitor on morning labs  -- Encourage po intake  -- Replenish as indicated    Sinus bradycardia  EKG reviewed w/ sinus bradycardia with rate 40s.  Asymptomatic  Hemodynamically stable  Suspect possible SSS    -- Cardiology reviewed EKG, no recommendations/interventions for asymptomatic sinus bradycardia  -- Avoid daniel blocking agents/medications    Pleural effusion  CXR revealed small to moderate left pleural effusion.   Suspect likely transudative. Worsening oxygenation with continued IV fluids. Some BLE edema, prescribed bumex prn, and noted elevated CVP on TTE last year concerning for volume overload  Less likely infectious. Noted leukocytosis 27 on presentation, since improved to 12 today. Otherwise, normotensive, afebrile, and normal LA.   TTE nml EF, unremarkable  S/p thoracentesis w/ IR  CT Noncontrast 5/13 w/ pleural effusions still present, worsening  On room air      -- Pleural fluid studies consistent with TRANSUDATIVE effusion. Etiology thought to be somewhat chronic, as was present on initial CT< but  aggravated by large volume IV fluid resuscitation during SBO.  -- Hold off diuresis today given loss of blood in epistaxis and psoas hematoma  -- Plan to resume diuresis when bleeding stabilizes  -- Will need follow-up in Pulmonology clinic at discharge, can consider IP consult tomorrow     Benign prostatic hyperplasia  -- Resume home proscar      Parkinsons  -- Continue home sinemet and rasagiline        VTE Risk Mitigation (From admission, onward)         Ordered     IP VTE HIGH RISK PATIENT  Once         05/08/22 0512     Place sequential compression device  Until discontinued         05/08/22 0512                Discharge Planning   JOHAN: 5/17/2022     Code Status: Full Code   Is the patient medically ready for discharge?: No    Reason for patient still in hospital (select all that apply): Patient trending condition  Discharge Plan A: Rehab   Discharge Delays: (!) Change in Medical Condition        Jono Wagner DO  Department of Hospital Medicine   [facility]

## 2022-05-15 NOTE — ASSESSMENT & PLAN NOTE
Psoas hematoma noted on CT Chest 5/13  Patient reporting new-onset hip pain particularly with hip flexion that he says started 5/12  CT abdomen yesterday w/ large psoas hematoma, possibly enlarged from previous CT Chest partially visualized  HDS, although Hgb has downtrended on CBC    -- Hold DVT ppx in bleed  -- Repeat CBC this afternoon to evaluate if continuing to hemorrhage  -- IR consulted, appreciate recommendations  -- Will plan for CTA if afternoon CBC w/ acute drop in Hgb  -- Will work-up hypocoagulable state

## 2022-05-15 NOTE — PLAN OF CARE
POC reviewed w/ pt, verbalized understanding. Pt AAOx4. VS stable on RA. On TELE, NSR. IS bedside. Denies nausea, chest pain, & SOB.  Pt remains free of fall & injury. No acute events. Bed in lowest position, call light in reach, frequent rounds made for safety.     - b/l nasal packing in place  - Avysys camera at bedside  - 1 episode black stool, incontinent  - Pt is extremely hard of hearing  - ankle boots in place      Care transferred to oncoming nurse.

## 2022-05-15 NOTE — SUBJECTIVE & OBJECTIVE
Interval History: NAEON. Epistaxis stopped. Yesterday CT abdomen w/ large psoas hematoma with possible increased size. IR consulted for possible intervention. Will trend CBC to monitor bleeding. If acute drop in Hgb, plan to order CTA.       Review of Systems   Constitutional:  Negative for chills and fever.   HENT:  Positive for nosebleeds.    Respiratory:  Positive for cough. Negative for shortness of breath.    Cardiovascular:  Negative for chest pain and palpitations.   Gastrointestinal:  Negative for abdominal pain, nausea and vomiting.   Psychiatric/Behavioral:  Positive for sleep disturbance. The patient is nervous/anxious.    Objective:     Vital Signs (Most Recent):  Temp: 98 °F (36.7 °C) (05/15/22 0733)  Pulse: 76 (05/15/22 0733)  Resp: 18 (05/15/22 0733)  BP: (!) 114/58 (05/15/22 0733)  SpO2: (!) 94 % (05/15/22 0733)   Vital Signs (24h Range):  Temp:  [97.2 °F (36.2 °C)-99.2 °F (37.3 °C)] 98 °F (36.7 °C)  Pulse:  [69-96] 76  Resp:  [16-18] 18  SpO2:  [92 %-98 %] 94 %  BP: (114-140)/(58-72) 114/58     Weight: 65.3 kg (144 lb)  Body mass index is 23.96 kg/m².    Intake/Output Summary (Last 24 hours) at 5/15/2022 1046  Last data filed at 5/14/2022 2300  Gross per 24 hour   Intake 460 ml   Output --   Net 460 ml        Physical Exam  Vitals and nursing note reviewed.   Constitutional:       General: He is not in acute distress.     Appearance: Normal appearance. He is ill-appearing. He is not toxic-appearing.   HENT:      Head: Normocephalic and atraumatic.      Mouth/Throat:      Mouth: Mucous membranes are moist.      Pharynx: Oropharynx is clear.   Eyes:      Extraocular Movements: Extraocular movements intact.   Cardiovascular:      Rate and Rhythm: Normal rate and regular rhythm.      Pulses: Normal pulses.      Heart sounds: Normal heart sounds.   Pulmonary:      Effort: Pulmonary effort is normal. No respiratory distress.      Breath sounds: Normal breath sounds.   Abdominal:      General: Abdomen is  flat. Bowel sounds are normal. There is no distension.      Palpations: Abdomen is soft.   Musculoskeletal:         General: Tenderness present. No swelling or deformity.      Comments: L flank tenderness   Skin:     General: Skin is warm and dry.      Capillary Refill: Capillary refill takes less than 2 seconds.   Neurological:      Mental Status: He is alert and oriented to person, place, and time.      GCS: GCS eye subscore is 4. GCS verbal subscore is 5. GCS motor subscore is 6.      Motor: No weakness.      Comments: Hard of hearing   Psychiatric:         Mood and Affect: Mood normal.         Behavior: Behavior normal. Behavior is not agitated or aggressive. Behavior is cooperative.       Significant Labs: All pertinent labs within the past 24 hours have been reviewed.  CBC:   Recent Labs   Lab 05/14/22  0301 05/14/22  0917 05/15/22  0616   WBC 18.46* 18.40* 23.18*   HGB 8.4* 9.2* 7.5*   HCT 26.1* 28.5* 23.5*    130* 187       CMP:   Recent Labs   Lab 05/14/22  0301 05/15/22  0616    139   K 3.8 4.0    105   CO2 24 25   * 109   BUN 32* 31*   CREATININE 0.8 0.9   CALCIUM 8.0* 8.4*   PROT  --  6.0   ALBUMIN 2.4* 2.4*   BILITOT  --  0.5   ALKPHOS  --  49*   AST  --  31   ALT  --  6*   ANIONGAP 10 9   EGFRNONAA >60.0 >60.0       Cardiac Markers: No results for input(s): CKMB, MYOGLOBIN, BNP, TROPISTAT in the last 48 hours.    Significant Imaging: I have reviewed all pertinent imaging results/findings within the past 24 hours.

## 2022-05-16 LAB
ALBUMIN SERPL BCP-MCNC: 2.3 G/DL (ref 3.5–5.2)
ANION GAP SERPL CALC-SCNC: 10 MMOL/L (ref 8–16)
BASOPHILS # BLD AUTO: 0.05 K/UL (ref 0–0.2)
BASOPHILS # BLD AUTO: 0.06 K/UL (ref 0–0.2)
BASOPHILS NFR BLD: 0.2 % (ref 0–1.9)
BASOPHILS NFR BLD: 0.3 % (ref 0–1.9)
BUN SERPL-MCNC: 37 MG/DL (ref 8–23)
CALCIUM SERPL-MCNC: 8.2 MG/DL (ref 8.7–10.5)
CHLORIDE SERPL-SCNC: 104 MMOL/L (ref 95–110)
CO2 SERPL-SCNC: 28 MMOL/L (ref 23–29)
CREAT SERPL-MCNC: 1 MG/DL (ref 0.5–1.4)
DIFFERENTIAL METHOD: ABNORMAL
DIFFERENTIAL METHOD: ABNORMAL
EOSINOPHIL # BLD AUTO: 0.3 K/UL (ref 0–0.5)
EOSINOPHIL # BLD AUTO: 0.3 K/UL (ref 0–0.5)
EOSINOPHIL NFR BLD: 1.3 % (ref 0–8)
EOSINOPHIL NFR BLD: 1.7 % (ref 0–8)
ERYTHROCYTE [DISTWIDTH] IN BLOOD BY AUTOMATED COUNT: 15.9 % (ref 11.5–14.5)
ERYTHROCYTE [DISTWIDTH] IN BLOOD BY AUTOMATED COUNT: 16.1 % (ref 11.5–14.5)
ERYTHROCYTE [DISTWIDTH] IN BLOOD BY AUTOMATED COUNT: 16.3 % (ref 11.5–14.5)
EST. GFR  (AFRICAN AMERICAN): >60 ML/MIN/1.73 M^2
EST. GFR  (NON AFRICAN AMERICAN): >60 ML/MIN/1.73 M^2
FACT VIII ACT/NOR PPP: 248 % (ref 60–170)
GLUCOSE SERPL-MCNC: 130 MG/DL (ref 70–110)
HCT VFR BLD AUTO: 27.3 % (ref 40–54)
HCT VFR BLD AUTO: 29.7 % (ref 40–54)
HCT VFR BLD AUTO: 33.1 % (ref 40–54)
HGB BLD-MCNC: 10.8 G/DL (ref 14–18)
HGB BLD-MCNC: 8.9 G/DL (ref 14–18)
HGB BLD-MCNC: 9.5 G/DL (ref 14–18)
IMM GRANULOCYTES # BLD AUTO: 0.12 K/UL (ref 0–0.04)
IMM GRANULOCYTES # BLD AUTO: 0.25 K/UL (ref 0–0.04)
IMM GRANULOCYTES NFR BLD AUTO: 0.7 % (ref 0–0.5)
IMM GRANULOCYTES NFR BLD AUTO: 1 % (ref 0–0.5)
LYMPHOCYTES # BLD AUTO: 1.3 K/UL (ref 1–4.8)
LYMPHOCYTES # BLD AUTO: 1.9 K/UL (ref 1–4.8)
LYMPHOCYTES NFR BLD: 7.6 % (ref 18–48)
LYMPHOCYTES NFR BLD: 8 % (ref 18–48)
MAGNESIUM SERPL-MCNC: 1.8 MG/DL (ref 1.6–2.6)
MCH RBC QN AUTO: 29.4 PG (ref 27–31)
MCH RBC QN AUTO: 29.8 PG (ref 27–31)
MCH RBC QN AUTO: 30.3 PG (ref 27–31)
MCHC RBC AUTO-ENTMCNC: 32 G/DL (ref 32–36)
MCHC RBC AUTO-ENTMCNC: 32.6 G/DL (ref 32–36)
MCHC RBC AUTO-ENTMCNC: 32.6 G/DL (ref 32–36)
MCV RBC AUTO: 91 FL (ref 82–98)
MCV RBC AUTO: 92 FL (ref 82–98)
MCV RBC AUTO: 93 FL (ref 82–98)
MONOCYTES # BLD AUTO: 1.6 K/UL (ref 0.3–1)
MONOCYTES # BLD AUTO: 2.4 K/UL (ref 0.3–1)
MONOCYTES NFR BLD: 10.1 % (ref 4–15)
MONOCYTES NFR BLD: 9.4 % (ref 4–15)
NEUTROPHILS # BLD AUTO: 14 K/UL (ref 1.8–7.7)
NEUTROPHILS # BLD AUTO: 19.1 K/UL (ref 1.8–7.7)
NEUTROPHILS NFR BLD: 79.4 % (ref 38–73)
NEUTROPHILS NFR BLD: 80.3 % (ref 38–73)
NRBC BLD-RTO: 0 /100 WBC
NRBC BLD-RTO: 0 /100 WBC
PATH REV BLD -IMP: NORMAL
PF4 HEPARIN CMPLX AB SER QL: NORMAL OD (ref 0–0.4)
PHOSPHATE SERPL-MCNC: 3.3 MG/DL (ref 2.7–4.5)
PLATELET # BLD AUTO: 151 K/UL (ref 150–450)
PLATELET # BLD AUTO: 189 K/UL (ref 150–450)
PLATELET # BLD AUTO: 204 K/UL (ref 150–450)
PLATELET BLD QL SMEAR: ABNORMAL
PMV BLD AUTO: 10.6 FL (ref 9.2–12.9)
PMV BLD AUTO: 10.9 FL (ref 9.2–12.9)
PMV BLD AUTO: 11.4 FL (ref 9.2–12.9)
POTASSIUM SERPL-SCNC: 3.5 MMOL/L (ref 3.5–5.1)
RBC # BLD AUTO: 2.99 M/UL (ref 4.6–6.2)
RBC # BLD AUTO: 3.23 M/UL (ref 4.6–6.2)
RBC # BLD AUTO: 3.57 M/UL (ref 4.6–6.2)
SODIUM SERPL-SCNC: 142 MMOL/L (ref 136–145)
WBC # BLD AUTO: 17.38 K/UL (ref 3.9–12.7)
WBC # BLD AUTO: 24.08 K/UL (ref 3.9–12.7)
WBC # BLD AUTO: 27.59 K/UL (ref 3.9–12.7)

## 2022-05-16 PROCEDURE — 99232 SBSQ HOSP IP/OBS MODERATE 35: CPT | Mod: GC,,, | Performed by: HOSPITALIST

## 2022-05-16 PROCEDURE — 99232 PR SUBSEQUENT HOSPITAL CARE,LEVL II: ICD-10-PCS | Mod: GC,,, | Performed by: HOSPITALIST

## 2022-05-16 PROCEDURE — 94761 N-INVAS EAR/PLS OXIMETRY MLT: CPT

## 2022-05-16 PROCEDURE — 99222 PR INITIAL HOSPITAL CARE,LEVL II: ICD-10-PCS | Mod: ,,, | Performed by: INTERNAL MEDICINE

## 2022-05-16 PROCEDURE — 97530 THERAPEUTIC ACTIVITIES: CPT

## 2022-05-16 PROCEDURE — 85025 COMPLETE CBC W/AUTO DIFF WBC: CPT

## 2022-05-16 PROCEDURE — 25000003 PHARM REV CODE 250: Performed by: STUDENT IN AN ORGANIZED HEALTH CARE EDUCATION/TRAINING PROGRAM

## 2022-05-16 PROCEDURE — 36415 COLL VENOUS BLD VENIPUNCTURE: CPT | Performed by: STUDENT IN AN ORGANIZED HEALTH CARE EDUCATION/TRAINING PROGRAM

## 2022-05-16 PROCEDURE — 99233 PR SUBSEQUENT HOSPITAL CARE,LEVL III: ICD-10-PCS | Mod: GC,,, | Performed by: INTERNAL MEDICINE

## 2022-05-16 PROCEDURE — 80069 RENAL FUNCTION PANEL: CPT | Performed by: HOSPITALIST

## 2022-05-16 PROCEDURE — 85027 COMPLETE CBC AUTOMATED: CPT | Performed by: STUDENT IN AN ORGANIZED HEALTH CARE EDUCATION/TRAINING PROGRAM

## 2022-05-16 PROCEDURE — 25000003 PHARM REV CODE 250

## 2022-05-16 PROCEDURE — 97535 SELF CARE MNGMENT TRAINING: CPT

## 2022-05-16 PROCEDURE — 99233 SBSQ HOSP IP/OBS HIGH 50: CPT | Mod: GC,,, | Performed by: INTERNAL MEDICINE

## 2022-05-16 PROCEDURE — 20600001 HC STEP DOWN PRIVATE ROOM

## 2022-05-16 PROCEDURE — 63600175 PHARM REV CODE 636 W HCPCS: Performed by: STUDENT IN AN ORGANIZED HEALTH CARE EDUCATION/TRAINING PROGRAM

## 2022-05-16 PROCEDURE — 83735 ASSAY OF MAGNESIUM: CPT | Performed by: STUDENT IN AN ORGANIZED HEALTH CARE EDUCATION/TRAINING PROGRAM

## 2022-05-16 PROCEDURE — 99222 1ST HOSP IP/OBS MODERATE 55: CPT | Mod: ,,, | Performed by: INTERNAL MEDICINE

## 2022-05-16 RX ORDER — FUROSEMIDE 10 MG/ML
40 INJECTION INTRAMUSCULAR; INTRAVENOUS ONCE
Status: COMPLETED | OUTPATIENT
Start: 2022-05-16 | End: 2022-05-16

## 2022-05-16 RX ADMIN — AMOXICILLIN AND CLAVULANATE POTASSIUM 1 TABLET: 875; 125 TABLET, FILM COATED ORAL at 09:05

## 2022-05-16 RX ADMIN — OXYMETAZOLINE HCL 2 SPRAY: 0.05 SPRAY NASAL at 09:05

## 2022-05-16 RX ADMIN — CARBIDOPA AND LEVODOPA 1 TABLET: 25; 100 TABLET ORAL at 05:05

## 2022-05-16 RX ADMIN — AMOXICILLIN AND CLAVULANATE POTASSIUM 1 TABLET: 875; 125 TABLET, FILM COATED ORAL at 08:05

## 2022-05-16 RX ADMIN — RASAGILINE 1 MG: 1 TABLET ORAL at 06:05

## 2022-05-16 RX ADMIN — OXYCODONE 5 MG: 5 TABLET ORAL at 08:05

## 2022-05-16 RX ADMIN — NASAL 2 SPRAY: 6.5 SPRAY NASAL at 09:05

## 2022-05-16 RX ADMIN — Medication: at 08:05

## 2022-05-16 RX ADMIN — Medication: at 09:05

## 2022-05-16 RX ADMIN — LOPERAMIDE HYDROCHLORIDE 2 MG: 2 CAPSULE ORAL at 07:05

## 2022-05-16 RX ADMIN — CARBIDOPA AND LEVODOPA 1 TABLET: 25; 100 TABLET ORAL at 09:05

## 2022-05-16 RX ADMIN — FUROSEMIDE 40 MG: 10 INJECTION, SOLUTION INTRAMUSCULAR; INTRAVENOUS at 05:05

## 2022-05-16 RX ADMIN — CARBIDOPA AND LEVODOPA 1 TABLET: 25; 100 TABLET ORAL at 01:05

## 2022-05-16 RX ADMIN — CARBIDOPA AND LEVODOPA 1 TABLET: 25; 100 TABLET ORAL at 08:05

## 2022-05-16 NOTE — ASSESSMENT & PLAN NOTE
Patient noted to be spitting up a small-moderate amount of valentina blood on 5/13. Initially thought to be a nosebleed, as patient complained of dry/itchy nose.There was then some concern patient was actually coughing up blood. Red tinge was noted in patient's posterior oropharynx, but still unclear of the source.   Significant epistaxis with decrease in Hgb. Patient swallowing blood and subsequent rise in BUN.   Currently stable s/p rapid rhino    -- ENT consulted, appreciate assistance  -- ENT placed nasal packing and rapid rhino will keep in place for 3 days- deflated today, plan to remove tomorrow  -- Amoxicillin-clavulanate for abx ppx while packings in place  -- Holding DVT ppx in setting of bleed

## 2022-05-16 NOTE — SUBJECTIVE & OBJECTIVE
Past Medical History:   Diagnosis Date    BPH (benign prostatic hyperplasia)     Parkinsons 09/2019    R hand tremor starting in 2017, diagnosed Sept 2019 by Dr. Angeles at [facility]    Ulcerative colitis     s/p colectomy    Unspecified chronic bronchitis        Past Surgical History:   Procedure Laterality Date    TOTAL COLECTOMY         Review of patient's allergies indicates:   Allergen Reactions    Heparin analogues Other (See Comments)     Not true allergy - but patient developed large spontaneous RP hematoma and concurrent severe epistaxis while on DVT prophylactic dose heparin - consider mechanical DVT ppx in future       Family History    None       Tobacco Use    Smoking status: Never Smoker    Smokeless tobacco: Never Used   Substance and Sexual Activity    Alcohol use: Never    Drug use: Not on file    Sexual activity: Not on file         Review of Systems   Unable to perform ROS: Other   Objective:     Vital Signs (Most Recent):  Temp: 97.7 °F (36.5 °C) (05/16/22 1115)  Pulse: (!) 59 (05/16/22 1115)  Resp: 18 (05/16/22 1115)  BP: (!) 118/58 (05/16/22 1115)  SpO2: 96 % (05/16/22 1115)   Vital Signs (24h Range):  Temp:  [97.1 °F (36.2 °C)-98.8 °F (37.1 °C)] 97.7 °F (36.5 °C)  Pulse:  [56-78] 59  Resp:  [14-20] 18  SpO2:  [90 %-96 %] 96 %  BP: (118-171)/(56-74) 118/58     Weight: 65.3 kg (144 lb)  Body mass index is 23.96 kg/m².      Intake/Output Summary (Last 24 hours) at 5/16/2022 1312  Last data filed at 5/16/2022 0600  Gross per 24 hour   Intake 1047.17 ml   Output --   Net 1047.17 ml       Physical Exam  Vitals and nursing note reviewed.   Constitutional:       General: He is not in acute distress.     Appearance: He is ill-appearing.   HENT:      Head: Normocephalic and atraumatic.      Nose:      Comments: Dried blood; right rocket in place  Cardiovascular:      Rate and Rhythm: Normal rate and regular rhythm.      Heart sounds: No murmur heard.  Pulmonary:      Effort: Pulmonary effort is normal. No  respiratory distress.      Breath sounds: Normal breath sounds.   Musculoskeletal:      Right lower leg: No edema.      Left lower leg: No edema.   Skin:     Capillary Refill: Capillary refill takes less than 2 seconds.       Vents:       Lines/Drains/Airways       Peripheral Intravenous Line  Duration                  Peripheral IV - Single Lumen 05/09/22 20 G Anterior;Left;Proximal Forearm 7 days         Peripheral IV - Single Lumen 05/15/22 1130 22 G Anterior;Distal;Left Forearm 1 day                    Significant Labs:    CBC/Anemia Profile:  Recent Labs   Lab 05/15/22  1113 05/16/22  0055 05/16/22  0553   WBC 20.40*  20.40* 27.59* 24.08*   HGB 6.8*  6.8* 9.5* 8.9*   HCT 20.8*  20.8* 29.7* 27.3*     196 189 151   MCV 95  95 92 91   RDW 16.5*  16.5* 15.9* 16.1*        Chemistries:  Recent Labs   Lab 05/15/22  0616 05/16/22  0553     --    K 4.0  --      --    CO2 25  --    BUN 31*  --    CREATININE 0.9  --    CALCIUM 8.4*  --    ALBUMIN 2.4*  --    PROT 6.0  --    BILITOT 0.5  --    ALKPHOS 49*  --    ALT 6*  --    AST 31  --    MG 1.7 1.8   PHOS 2.4*  --        All pertinent labs within the past 24 hours have been reviewed.    Significant Imaging:   I have reviewed all pertinent imaging results/findings within the past 24 hours.

## 2022-05-16 NOTE — PLAN OF CARE
Problem: Occupational Therapy  Goal: Occupational Therapy Goal  Description: Goals to be met by: 06/12/22    Patient will increase functional independence with ADLs by performing:    UE Dressing with Contact Guard Assistance at EOB .  LE Dressing with Minimal Assistance at EOB.  Grooming while standing at sink with Minimal Assistance.  Toileting from bedside commode with Minimal Assistance for hygiene and clothing management.   Toilet transfer to bedside commode with Minimal Assistance with RW.    Continue OT as tolerated.  Malissa Wooten OT  5/16/2022    Outcome: Ongoing, Progressing

## 2022-05-16 NOTE — ASSESSMENT & PLAN NOTE
88 y.o M with epistaxis contributing to hemoptysis. Fibrillar placed on L side, rapid rhino placed on R.       - Rapid rhino will stay in place for 3 days   - Deflated on 5/16   - Plan to remove on 5/17   - Antibiotic prophylaxis while packing in place  - Fibrillar is absorbable   - Will continue to monitor   - Call/page ENT with questions/concerns

## 2022-05-16 NOTE — PROGRESS NOTES
[facility] Medicine  Progress Note    Patient Name: Giselle Lemus  MRN: [id number]  Patient Class: IP- Inpatient   Admission Date: 5/8/2022  Length of Stay: 8 days  Attending Physician: John Alexander MD  Primary Care Provider: Tl Torres MD        Subjective:     Principal Problem:SBO (small bowel obstruction)        HPI:  Giselle Lemus is an 88 y.o. M. With history of Parkinson's, BPH, UC s/p total colectomy, and chronic bronchitis who presents with abdominal pain. Found to have SBO. NGT was placed initially and since d/c. Patient with + BM. Per general surgery, SBO resolved. Noted leukocytosis 27 on presentation, since improved to 12 today. Normotensive, afebrile, and normal LA. CXR revealed small to moderate left pleural effusion. Zosyn initialed. IR consulted and plan for thoracentesis. NPO now with IV fluids. Oxygenation slightly worsening. Currently on 2L O2 via NC. Developed SB with rate 30-40s. Cardiology consulted and recs currently pending. Medicine consulted for possible transfer to medicine given resolved SBO without surgical intervention, pleural effusion, and bradycardia.      Overview/Hospital Course:  Admitted to general surgery for treatment of SBO, which resolved with conservative care and bowel rest. Began having Bms and tolerating PO well. However, subsequently found to have L pleural effusion, which was present on original abdominal CT but became more enlarged after IV fluids given during SBO. TTE showed normal cardiac function. IR thoracentesis revealed transudative effusion without signs of infection. Attempting lasix to relieve small O2 requirement. Course also complicated by asymptomatic sinus bradycardia to 40s. Cardiology contacted and reviewed EKG which was sinus maycol. PT recommending rehab, patient and family in agreement. Accepted to [facility]. Resumed on home loperamide PRN for baseline diarrhea following meals since patient is s/p colectomy and is chronically dependent on  this. 2 episodes of epistaxis, coughing up blood. Cancelled discharge to [facility]Rehab. CXR w/ worsening in pleural effusions. CT Chest w/ pulmonary edema and partially visualized L psoas hematoma. Discontinued DVT ppx. Overnight continued epistaxis, coughing up blood, and tarry stool. ENT consulted, placed rhino rocket/nasal packings. Drop in Hgb, continuing to monitor. Tarry stool likely secondary to swallowed blood. Plan to evaluate L psoas hematoma w/ CT noncontrast abdomen. Of note, a RLL pulmonary nodule was incidentally seen on CT imaging, and followup within 3-6 months was recommended.      Interval History: NAEON. HDS. Yesterday afternoon w/ trending down Hgb, had appropriate response to transfusion on morning labs. CTA abdomen done w/ concern for active bleeding psoas hematoma and was consented for embolization w/ IR. On re-evaluation of imaging, given stability less concern for bleed, no intervention currently needed. Doing better today. Remains stable. Working w/ therapy.       Review of Systems   Constitutional:  Negative for chills and fever.   HENT:  Positive for nosebleeds.    Respiratory:  Positive for cough. Negative for shortness of breath.    Cardiovascular:  Negative for chest pain and palpitations.   Gastrointestinal:  Negative for abdominal pain, nausea and vomiting.   Psychiatric/Behavioral:  Positive for sleep disturbance. The patient is nervous/anxious.    Objective:     Vital Signs (Most Recent):  Temp: 97.7 °F (36.5 °C) (05/16/22 1115)  Pulse: (!) 59 (05/16/22 1115)  Resp: 18 (05/16/22 1115)  BP: (!) 118/58 (05/16/22 1115)  SpO2: 96 % (05/16/22 1115)   Vital Signs (24h Range):  Temp:  [97.1 °F (36.2 °C)-98.8 °F (37.1 °C)] 97.7 °F (36.5 °C)  Pulse:  [56-78] 59  Resp:  [14-20] 18  SpO2:  [90 %-96 %] 96 %  BP: (118-171)/(56-74) 118/58     Weight: 65.3 kg (144 lb)  Body mass index is 23.96 kg/m².    Intake/Output Summary (Last 24 hours) at 5/16/2022 1316  Last data filed at 5/16/2022 0600  Gross  per 24 hour   Intake 1047.17 ml   Output --   Net 1047.17 ml        Physical Exam  Vitals and nursing note reviewed.   Constitutional:       General: He is not in acute distress.     Appearance: Normal appearance. He is ill-appearing. He is not toxic-appearing.   HENT:      Head: Normocephalic and atraumatic.      Mouth/Throat:      Mouth: Mucous membranes are moist.      Pharynx: Oropharynx is clear.   Eyes:      Extraocular Movements: Extraocular movements intact.   Cardiovascular:      Rate and Rhythm: Normal rate and regular rhythm.      Pulses: Normal pulses.      Heart sounds: Normal heart sounds.   Pulmonary:      Effort: Pulmonary effort is normal. No respiratory distress.      Breath sounds: Normal breath sounds.   Abdominal:      General: Abdomen is flat. Bowel sounds are normal. There is no distension.      Palpations: Abdomen is soft.   Musculoskeletal:         General: Tenderness present. No swelling or deformity.      Comments: L flank tenderness   Skin:     General: Skin is warm and dry.      Capillary Refill: Capillary refill takes less than 2 seconds.   Neurological:      Mental Status: He is alert and oriented to person, place, and time.      GCS: GCS eye subscore is 4. GCS verbal subscore is 5. GCS motor subscore is 6.      Motor: No weakness.      Comments: Hard of hearing   Psychiatric:         Mood and Affect: Mood normal.         Behavior: Behavior normal. Behavior is not agitated or aggressive. Behavior is cooperative.       Significant Labs: All pertinent labs within the past 24 hours have been reviewed.  CBC:   Recent Labs   Lab 05/15/22  1113 05/16/22  0055 05/16/22  0553   WBC 20.40*  20.40* 27.59* 24.08*   HGB 6.8*  6.8* 9.5* 8.9*   HCT 20.8*  20.8* 29.7* 27.3*     196 189 151       CMP:   Recent Labs   Lab 05/15/22  0616      K 4.0      CO2 25      BUN 31*   CREATININE 0.9   CALCIUM 8.4*   PROT 6.0   ALBUMIN 2.4*   BILITOT 0.5   ALKPHOS 49*   AST 31   ALT  6*   ANIONGAP 9   EGFRNONAA >60.0       Significant Imaging: I have reviewed all pertinent imaging results/findings within the past 24 hours.      Assessment/Plan:      * SBO (small bowel obstruction)  Presents with SBO, initially admitted to General Surgery.   Hx of UC s/p total colectomy  Resolved    -- Regular diet  -- Monitor for bowel movements, abdominal pain    Acute blood loss anemia  Development of epistaxis and L psoas hematoma around same timeframe   Epistaxis stable s/p packings  Unclear if L psoas hematoma still hemorrhaging  Hgb downtrend to 7.5 this morning from 8-9 previous days  Hepatic function panel, PT-INR normal  Normal platelet count  D-dimer elevated, Fibrinogen slightly elevated    -- See plan for epistaxis and psoas hematoma   -- Repeat CBC this afternoon  -- Transfuse Hgb <7  -- Hold DVT ppx  -- Hypocoagulable workup pending  -- Hematology consulted for assistance for hypocoagulable workup, appreciate recs    Pulmonary nodule  RLL pulmonary nodule seen incidentally on CT CAP.     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 hearing aids    -- Signage placed on door  -- Speak slowly clearly and allow to see lips  -- Use pen and paper to communicate if unable to understand    Nontraumatic psoas hematoma  Psoas hematoma noted on CT Chest 5/13  Patient reporting new-onset hip pain particularly with hip flexion that he says started 5/12  CT abdomen yesterday w/ large psoas hematoma, possibly enlarged from previous CT Chest partially visualized  CTA yesterday w/ concern for continued active bleed  Consented for IR embolization, on re-evaluation of imaging, appeared stable- no current planned intervention w/ IR  HDS, although Hgb has downtrended on  CBC  S/p 2 units pRBC w/ appropriate response on CBC this am    -- Hold DVT ppx in bleed  -- Repeat CBC this afternoon to evaluate if continuing to hemorrhage  -- IR consulted, appreciate recommendations      Epistaxis  Patient noted to be spitting up a small-moderate amount of [place] blood on 5/13. Initially thought to be a nosebleed, as patient complained of dry/itchy nose.There was then some concern patient was actually coughing up blood. Red tinge was noted in patient's posterior oropharynx, but still unclear of the source.   Significant epistaxis with decrease in Hgb. Patient swallowing blood and subsequent rise in BUN.   Currently stable s/p rapid rhino    -- ENT consulted, appreciate assistance  -- ENT placed nasal packing and rapid rhino will keep in place for 3 days- deflated today, plan to remove tomorrow  -- Amoxicillin-clavulanate for abx ppx while packings in place  -- Holding DVT ppx in setting of bleed    Impaired mobility and activities of daily living  -- PT/OT following, appreciate recommendations  -- Plan to discharge to [facility]-Rehab for continued therapy when medically cleared    Hypophosphatemia  -- Monitor on morning labs  -- Encourage po intake  -- Replenish as indicated    Sinus bradycardia  EKG reviewed w/ sinus bradycardia with rate 40s.  Asymptomatic  Hemodynamically stable  Suspect possible SSS    -- Cardiology reviewed EKG, no recommendations/interventions for asymptomatic sinus bradycardia  -- Avoid daniel blocking agents/medications    Pleural effusion  CXR revealed small to moderate left pleural effusion.   Suspect likely transudative. Worsening oxygenation with continued IV fluids. Some BLE edema, prescribed bumex prn, and noted elevated CVP on TTE last year concerning for volume overload  Less likely infectious. Noted leukocytosis 27 on presentation, since improved to 12 today. Otherwise, normotensive, afebrile, and normal LA.   TTE nml EF, unremarkable  S/p thoracentesis w/ IR  CT  Noncontrast 5/13 w/ pleural effusions still present, worsening  On room air      -- Pleural fluid studies consistent with TRANSUDATIVE effusion. Etiology thought to be somewhat chronic, as was present on initial CT< but aggravated by large volume IV fluid resuscitation during SBO.  -- Hold off diuresis today given loss of blood in epistaxis and psoas hematoma  -- Plan to resume diuresis when bleeding stabilizes  -- Pulmonology consulted given recurrent effusions unknown etiology    Benign prostatic hyperplasia  -- Resume home proscar      Parkinsons  -- Continue home sinemet and rasagiline        VTE Risk Mitigation (From admission, onward)         Ordered     IP VTE HIGH RISK PATIENT  Once         05/08/22 0512     Place sequential compression device  Until discontinued         05/08/22 0512                Discharge Planning   JOHAN: 5/18/2022     Code Status: Full Code   Is the patient medically ready for discharge?: No    Reason for patient still in hospital (select all that apply): Patient trending condition and Treatment  Discharge Plan A: Rehab   Discharge Delays: (!) Change in Medical Condition        Jono Wagner DO  Department of Hospital Medicine   [facility]

## 2022-05-16 NOTE — ASSESSMENT & PLAN NOTE
Development of epistaxis and L psoas hematoma around same timeframe   Epistaxis stable s/p packings  Unclear if L psoas hematoma still hemorrhaging  Hgb downtrend to 7.5 this morning from 8-9 previous days  Hepatic function panel, PT-INR normal  Normal platelet count  D-dimer elevated, Fibrinogen slightly elevated    -- See plan for epistaxis and psoas hematoma   -- Repeat CBC this afternoon  -- Transfuse Hgb <7  -- Hold DVT ppx  -- Hypocoagulable workup pending  -- Hematology consulted for assistance for hypocoagulable workup, appreciate recs

## 2022-05-16 NOTE — SUBJECTIVE & OBJECTIVE
Interval History: NAEON. HDS. Yesterday afternoon w/ trending down Hgb, had appropriate response to transfusion on morning labs. CTA abdomen done w/ concern for active bleeding psoas hematoma and was consented for embolization w/ IR. On re-evaluation of imaging, given stability less concern for bleed, no intervention currently needed. Doing better today. Remains stable. Working w/ therapy.       Review of Systems   Constitutional:  Negative for chills and fever.   HENT:  Positive for nosebleeds.    Respiratory:  Positive for cough. Negative for shortness of breath.    Cardiovascular:  Negative for chest pain and palpitations.   Gastrointestinal:  Negative for abdominal pain, nausea and vomiting.   Psychiatric/Behavioral:  Positive for sleep disturbance. The patient is nervous/anxious.    Objective:     Vital Signs (Most Recent):  Temp: 97.7 °F (36.5 °C) (05/16/22 1115)  Pulse: (!) 59 (05/16/22 1115)  Resp: 18 (05/16/22 1115)  BP: (!) 118/58 (05/16/22 1115)  SpO2: 96 % (05/16/22 1115)   Vital Signs (24h Range):  Temp:  [97.1 °F (36.2 °C)-98.8 °F (37.1 °C)] 97.7 °F (36.5 °C)  Pulse:  [56-78] 59  Resp:  [14-20] 18  SpO2:  [90 %-96 %] 96 %  BP: (118-171)/(56-74) 118/58     Weight: 65.3 kg (144 lb)  Body mass index is 23.96 kg/m².    Intake/Output Summary (Last 24 hours) at 5/16/2022 1316  Last data filed at 5/16/2022 0600  Gross per 24 hour   Intake 1047.17 ml   Output --   Net 1047.17 ml        Physical Exam  Vitals and nursing note reviewed.   Constitutional:       General: He is not in acute distress.     Appearance: Normal appearance. He is ill-appearing. He is not toxic-appearing.   HENT:      Head: Normocephalic and atraumatic.      Mouth/Throat:      Mouth: Mucous membranes are moist.      Pharynx: Oropharynx is clear.   Eyes:      Extraocular Movements: Extraocular movements intact.   Cardiovascular:      Rate and Rhythm: Normal rate and regular rhythm.      Pulses: Normal pulses.      Heart sounds: Normal heart  sounds.   Pulmonary:      Effort: Pulmonary effort is normal. No respiratory distress.      Breath sounds: Normal breath sounds.   Abdominal:      General: Abdomen is flat. Bowel sounds are normal. There is no distension.      Palpations: Abdomen is soft.   Musculoskeletal:         General: Tenderness present. No swelling or deformity.      Comments: L flank tenderness   Skin:     General: Skin is warm and dry.      Capillary Refill: Capillary refill takes less than 2 seconds.   Neurological:      Mental Status: He is alert and oriented to person, place, and time.      GCS: GCS eye subscore is 4. GCS verbal subscore is 5. GCS motor subscore is 6.      Motor: No weakness.      Comments: Hard of hearing   Psychiatric:         Mood and Affect: Mood normal.         Behavior: Behavior normal. Behavior is not agitated or aggressive. Behavior is cooperative.       Significant Labs: All pertinent labs within the past 24 hours have been reviewed.  CBC:   Recent Labs   Lab 05/15/22  1113 05/16/22  0055 05/16/22  0553   WBC 20.40*  20.40* 27.59* 24.08*   HGB 6.8*  6.8* 9.5* 8.9*   HCT 20.8*  20.8* 29.7* 27.3*     196 189 151       CMP:   Recent Labs   Lab 05/15/22  0616      K 4.0      CO2 25      BUN 31*   CREATININE 0.9   CALCIUM 8.4*   PROT 6.0   ALBUMIN 2.4*   BILITOT 0.5   ALKPHOS 49*   AST 31   ALT 6*   ANIONGAP 9   EGFRNONAA >60.0       Significant Imaging: I have reviewed all pertinent imaging results/findings within the past 24 hours.

## 2022-05-16 NOTE — PT/OT/SLP PROGRESS
Occupational Therapy   Treatment    Name: Giselle Leums  MRN: [id number]  Admitting Diagnosis:  SBO (small bowel obstruction)       Recommendations:     Discharge Recommendations: rehabilitation facility  Discharge Equipment Recommendations:  other (see comments) (tbd)  Barriers to discharge:       Assessment:     Giselle Lemus is a 88 y.o. male with a medical diagnosis of SBO (small bowel obstruction).  He presents with impaired ADL and mobility performance deficits. Pt found upright in bed and agreeable for therapy. Pt session included bedroom mobility tolerating one full lap in room using RW. Pt was moderately unsteady with min A required. Pt demonstrated PD style gait with narrow BRANDON and kyphotic posturing. Pt was safely let upright in chair completing grooming in chair. Pt [place] throughout requiring increased time for communication (hearing aids present-- At this time, pt is a high fall risk and is not safe to return home. Pt would benefit from continued OT skilled services 4x/wk to improve daily living skills to optimize QOL.  Pt is recommended to discharge to rehab at this time.   Performance deficits affecting function are weakness, impaired functional mobilty, impaired endurance, gait instability, impaired self care skills, decreased lower extremity function, decreased upper extremity function, impaired balance, decreased safety awareness, pain.     Rehab Prognosis:  Good; patient would benefit from acute skilled OT services to address these deficits and reach maximum level of function.       Plan:     Patient to be seen 4 x/week to address the above listed problems via self-care/home management, therapeutic activities, therapeutic exercises  · Plan of Care Expires: 06/12/22  · Plan of Care Reviewed with: patient    Subjective     Pain/Comfort:  · Pain Rating 1: 0/10  · Pain Rating Post-Intervention 1: other (see comments) (pain in LLE not ranked)  · Location - Side 2: Left  · Location - Orientation 2:  generalized  · Location 2: leg  · Pain Addressed 2: Reposition, Distraction    Objective:     Communicated with: RN prior to session.  Patient found HOB elevated with telemetry, SCD, monreal catheter upon OT entry to room.    General Precautions: Standard, fall, hearing impaired   Orthopedic Precautions:N/A   Braces: N/A  Respiratory Status: Room air     Occupational Performance:     Bed Mobility:    · Patient completed Rolling/Turning to Right with minimum assistance  · Patient completed Scooting/Bridging with minimum assistance  · Patient completed Supine to Sit with minimum assistance     Functional Mobility/Transfers:  · Patient completed Sit <> Stand Transfer with minimum assistance  with  rolling walker   · Patient completed Bed <> Chair Transfer using Step Transfer technique with minimum assistance with rolling walker  · Functional Mobility: Pt stood and mobilized in room 1 full lap using RW with min A.     Activities of Daily Living:  · Grooming: setup A washing face upright in chair.   · Upper Body Dressing: minimum assistance donning gown      [facility] 6 Click ADL: 14    Treatment & Education:  Pt educated on role of occupational therapy, POC, and safety during ADLs and functional mobility. Pt and OT discussed importance of safe, continued mobility to optimize daily living skills. Pt verbalized understanding. White board updated during session. Pt given instruction to call for medical staff/nurse for assistance.       Patient left up in chair with all lines intact, call button in reach and RN notifiedEducation:      GOALS:   Multidisciplinary Problems     Occupational Therapy Goals        Problem: Occupational Therapy    Goal Priority Disciplines Outcome Interventions   Occupational Therapy Goal     OT, PT/OT Ongoing, Progressing    Description: Goals to be met by: 06/12/22    Patient will increase functional independence with ADLs by performing:    UE Dressing with Contact Guard Assistance at EOB .  LE  Dressing with Minimal Assistance at EOB.  Grooming while standing at sink with Minimal Assistance.  Toileting from bedside commode with Minimal Assistance for hygiene and clothing management.   Toilet transfer to bedside commode with Minimal Assistance with RW.                     Time Tracking:     OT Date of Treatment: 05/16/22  OT Start Time: 1002  OT Stop Time: 1032  OT Total Time (min): 30 min    Billable Minutes:Self Care/Home Management 15 min  Therapeutic Activity 15 min    OT/HAYLEY: OT          5/16/2022

## 2022-05-16 NOTE — ASSESSMENT & PLAN NOTE
CXR revealed small to moderate left pleural effusion.   Suspect likely transudative. Worsening oxygenation with continued IV fluids. Some BLE edema, prescribed bumex prn, and noted elevated CVP on TTE last year concerning for volume overload  Less likely infectious. Noted leukocytosis 27 on presentation, since improved to 12 today. Otherwise, normotensive, afebrile, and normal LA.   TTE nml EF, unremarkable  S/p thoracentesis w/ IR  CT Noncontrast 5/13 w/ pleural effusions still present, worsening  On room air      -- Pleural fluid studies consistent with TRANSUDATIVE effusion. Etiology thought to be somewhat chronic, as was present on initial CT< but aggravated by large volume IV fluid resuscitation during SBO.  -- Hold off diuresis today given loss of blood in epistaxis and psoas hematoma  -- Plan to resume diuresis when bleeding stabilizes  -- Pulmonology consulted given recurrent effusions unknown etiology

## 2022-05-16 NOTE — PLAN OF CARE
POC reviewed with patient who verbalized understanding. VSS on RA. AAOX4 w/periods of confusion. Patient very hard of hearing. Remains free of falls and injury. Patient up to chair. Frequent weight shift encouraged with assistance provided as needed     - nasal packing in place, bleeding controlled  - Avasys in use  - incontinent of bowel and bladder, 3 x large tarry, black stools, MD aware  - sacral wound, care per order  - 1 x RBC given  - CTA completed, did not tolerate well, pt stated he would refuse further scans     Mild tolerance of diet, denies nausea. Telemetry being monitored running NSR. Patient denies chest pain & SOB. No acute events. No distress noted. Bed in lowest position, call light within reach, frequent rounds made for safety.      WCTM

## 2022-05-16 NOTE — PLAN OF CARE
POC reviewed w/ pt, verbalized understanding. Pt AAOx4. VSS except HR SB in the 50's on continuous cardiac monitoring, on RA. Skin intact except non blanchable redness to buttocks. Venelex ointment applied to buttocks. R. Nare Rapid rhino in place with packing. Turned Q2 with X1 assist. Pain managed with PRN pain meds. Incontinent X2, BM occurred today. Pt tolerating regular diet with no c/o nausea.Consumed 50% of meals.  Worked with physical therapy, sat in chair for a few hours. Frequent rounds made for pt safety. AVAYS tele sitter in place. Call light in reach and bed in lowest position. Will continue to monitor POC.

## 2022-05-16 NOTE — PROGRESS NOTES
"Re-reviewed images from CTA, and I am less convinced that the patient has an active bleed at this time. His vitals signs have improved, with most recent BP of 171/74 and HR of 67. At this time will refrain from intervention. Recommend continuing to trend CBC and replenish blood as needed. If there is a dramatic change in his vital signs or drop in H/h please reach out to IR for angiogram.    Carlitos Ashley MD ([facility])  Interventional Radiology  [phone]      "

## 2022-05-16 NOTE — CARE UPDATE
Called patient's relative Lincoln (unsure if son-in-law or brother-in-law) to inform him of evening events, including that patient's CTA showed active bleeding and that Mr. Lemus's blood counts continue to drop.  Informed him that Mr. Lemus appeared to have decision-making capacity and consented to undergo IR embolization procedure to try and stop the bleeding after we explained the procedure and necessity by writing on paper (he is hard of hearing and initially refused due to not understanding the whole situation).  Lincoln said he would inform the rest of the family including the patient's wife.    - transfuse an additional 1U PRBC now as actively bleeding  - administer 40IV lasix with blood as patient with large pleural effusions, severe ronchi/cough, and already received 1U blood  - an additional (3rd) unit of blood is prepared and may be transfused if needed  - repeat Hgb around midnight (must be drawn after 2nd U blood is done) to assess need for 3rd U    Addendum: Spoke with IR who stated since the patient's vitals are stable and the CTA did not show as clear a target for embolization as previously thought, they will hold off on embolization for now and monitor serial Hgb.  If vitals become unstable or Hgb drastically drops, will reach back out to them. Rest of plan as above remains the same.

## 2022-05-16 NOTE — ASSESSMENT & PLAN NOTE
Psoas hematoma noted on CT Chest 5/13  Patient reporting new-onset hip pain particularly with hip flexion that he says started 5/12  CT abdomen yesterday w/ large psoas hematoma, possibly enlarged from previous CT Chest partially visualized  CTA yesterday w/ concern for continued active bleed  Consented for IR embolization, on re-evaluation of imaging, appeared stable- no current planned intervention w/ IR  HDS, although Hgb has downtrended on CBC  S/p 2 units pRBC w/ appropriate response on CBC this am    -- Hold DVT ppx in bleed  -- Repeat CBC this afternoon to evaluate if continuing to hemorrhage  -- IR consulted, appreciate recommendations

## 2022-05-16 NOTE — PROGRESS NOTES
[facility], [place]  Otorhinolaryngology-Head & Neck Surgery  Progress Note    Subjective:     Post-Op Info:  * No surgery found *      Hospital Day: 9     Interval History: No further bleeding from nares or mouth. Found to have developing psoas hematoma requiring blood transfusion. No need for interventions per IR.     Medications:  Continuous Infusions:  Scheduled Meds:   amoxicillin-clavulanate 875-125mg  1 tablet Oral Q12H    balsam peru-castor oiL   Topical (Top) BID    carbidopa-levodopa  mg  1 tablet Oral QID    rasagiline  1 mg Oral Daily    sodium chloride  2 spray Each Nostril BID     PRN Meds:sodium chloride, sodium chloride, sodium chloride, acetaminophen, albuterol-ipratropium, dextrose 10%, dextrose 10%, glucagon (human recombinant), glucose, glucose, loperamide, ondansetron, oxyCODONE, oxymetazoline, sodium chloride 0.9%     Review of patient's allergies indicates:   Allergen Reactions    Heparin analogues Other (See Comments)     Not true allergy - but patient developed large spontaneous RP hematoma and concurrent severe epistaxis while on DVT prophylactic dose heparin - consider mechanical DVT ppx in future     Objective:     Vital Signs (24h Range):  Temp:  [97.1 °F (36.2 °C)-98.8 °F (37.1 °C)] 98.4 °F (36.9 °C)  Pulse:  [56-80] 61  Resp:  [16-20] 16  SpO2:  [90 %-95 %] 94 %  BP: (102-171)/(55-74) 128/60       Lines/Drains/Airways       Peripheral Intravenous Line  Duration                  Peripheral IV - Single Lumen 05/09/22 20 G Anterior;Left;Proximal Forearm 7 days         Peripheral IV - Single Lumen 05/15/22 1130 22 G Anterior;Distal;Left Forearm <1 day                  Physical Exam  Constitutional:       General: He is not in acute distress.     Appearance: He is ill-appearing.   HENT:      Head: Normocephalic and atraumatic.      Nose:      Comments: Fibrillar in place on L, rapid rhino in place on R     Mouth/Throat:      Comments: Oropharynx is clear without clot or  bleeding  Neurological:      Mental Status: He is alert.     Significant Labs:  BMP:   Recent Labs   Lab 05/15/22  0616         CO2 25   BUN 31*   CREATININE 0.9   CALCIUM 8.4*   MG 1.7     CBC:   Recent Labs   Lab 05/16/22  0055   WBC 27.59*   RBC 3.23*   HGB 9.5*   HCT 29.7*      MCV 92   MCH 29.4   MCHC 32.0     Significant Diagnostics:  I have reviewed all pertinent imaging results/findings within the past 24 hours.    Assessment/Plan:     Epistaxis  88 y.o M with epistaxis contributing to hemoptysis. Fibrillar placed on L side, rapid rhino placed on R.       - Rapid rhino will stay in place for 3 days   - Deflated on 5/16   - Plan to remove on 5/17   - Antibiotic prophylaxis while packing in place  - Fibrillar is absorbable   - Will continue to monitor   - Call/page ENT with questions/concerns         Ryan Schulte MD  Otorhinolaryngology-Head & Neck Surgery  [facility]

## 2022-05-16 NOTE — ASSESSMENT & PLAN NOTE
Patient admitted to medicine with multiple medical issues including SBO (now resolved s/p NGT), epistaxis, pleural effusion s/p thoracentesis with IR on 5/10, and iliopsoas hematoma. CT chest with bilateral effusions + ggo consistent with pulmonary edema. On exam, patient is not in respiratory distress and is on room air. His effusion that was sampled earlier this admit was transudative and non-infectious. Pulmonology consulted for evaluation for repeat thoracentesis.    Recommendations:  - recommend conservative management of his bilateral effusions with diuresis (patient net positive >4L)  - goal SpO2 >92%  - incentive spirometry and ambulation/out of bed as tolerated  - repeat CXR for worsening respiratory distress or new oxygen requirement  - we will sign off. Please reach out for any questions/concerns.

## 2022-05-16 NOTE — CONSULTS
Hematology Consult Note:    HPI:  Patient is an 88-year-old male with history of Parkinson's, BPH, UC s/p total colectomy, chronic bronchitis who presented on 05/08/2022 with abdominal pain found to have SBO which resolved with conservative management.  Patient subsequently developed 2 episodes of epistaxis which ENT relieved with intervention.  He was incidentally found to have a left psoas hematoma which did not require intervention.    Patient is hard of hearing.  Communicated through writing.  He denies history of trauma bleeding.  Reports having a nose bleed after he fractured his nasal septum.  Denies family history of bleeding.    Labs with leukocytosis 24 K, normocytic anemia, normal platelets.  Normal renal function.  Low albumin.  Normal PT, INR.  Normal LFTs.  Normal fibrinogen.    Peripheral smear with normocytic anemia without significant morphologic abnormalities. Leukocytosis with granulocytic predominance and absolute monocytosis. Normal plt in number and morphology.     CT chest without contrast:  Moderate right and small moderate left pleural effusion with atelectasis.  Ground-glass opacities.  Few subcentimeter lesions in the kidneys.  New enlargement on the left iliopsoas muscle.    CT abdomen/pelvis:  6.8 x 7 x 7 heterogeneous lesion.    ROS:  Unable to obtain fully.  Patient denies nausea, vomiting, bleeding.  Reports left-sided leg pain with movement.    PE;  Alert awake oriented   Frail  Rhino rocket in nares  Abdomen soft nontender  No tenderness to left lower extremity    Assessment/plan:  Patient has likely developed iatrogenic epistaxis due to intervention (NG tube/nasal cannula).  Retroperitoneal hematoma can occur spontaneously more frequently in elderly patients.  It is unlikely that patient has developed an acquired or hereditary bleeding disorder in the setting of normal platelets and coagulation studies.  Would not pursue further diagnostics at this time.    We will sign off.   Please call for questions.    Anamaria Alcazar MD  Hematology/Oncology Fellow PGY IV  [facility]

## 2022-05-16 NOTE — CONSULTS
[facility]  Pulmonology  Consult Note    Patient Name: Giselle Lemus  MRN: [id number]  Admission Date: 5/8/2022  Hospital Length of Stay: 8 days  Code Status: Full Code  Attending Physician: John Alexander MD  Primary Care Provider: Tl Torres MD   Principal Problem: SBO (small bowel obstruction)    Inpatient consult to Pulmonology  Consult performed by: Veronica Savage DO  Consult ordered by: Guadalupe Mcguire MD        Subjective:     HPI:  89 yo male with PMH of BPH, Parkinson's, UC s/p total colectomy, chronic bronchitis admitted to hospital medicine. He initially presented on 5/8 for abdominal pain and was found to have an SBO which was medically managed with NGT. His hospital course has been complicated by small/moderate pleural effusion s/p thoracentesis with IR done 5/10 found to be transudative in nature. He also has had episodes of epistaxis requiring rhino rockets and found to have psoas hematoma with dropping HgB requiring 2 units of PRBC. CT chest non contrast obtained 5/13 demonstrated bilateral effusions with ground glass opacities and pulm edema. He has been intermittently diuresed with lasix for the past few days. Upon evaluation, patient denies cough, shortness of breath, or chest discomfort. He has previously been on a diuretic however not currently taking any at home. Pulmonology was consulted for consideration of repeat thoracentesis.       Past Medical History:   Diagnosis Date    BPH (benign prostatic hyperplasia)     Parkinsons 09/2019    R hand tremor starting in 2017, diagnosed Sept 2019 by Dr. Angeles at     Ulcerative colitis     s/p colectomy    Unspecified chronic bronchitis        Past Surgical History:   Procedure Laterality Date    TOTAL COLECTOMY         Review of patient's allergies indicates:   Allergen Reactions    Heparin analogues Other (See Comments)     Not true allergy - but patient developed large spontaneous RP hematoma and concurrent severe epistaxis  while on DVT prophylactic dose heparin - consider mechanical DVT ppx in future       Family History    None       Tobacco Use    Smoking status: Never Smoker    Smokeless tobacco: Never Used   Substance and Sexual Activity    Alcohol use: Never    Drug use: Not on file    Sexual activity: Not on file         Review of Systems   Unable to perform ROS: Other   Objective:     Vital Signs (Most Recent):  Temp: 97.7 °F (36.5 °C) (05/16/22 1115)  Pulse: (!) 59 (05/16/22 1115)  Resp: 18 (05/16/22 1115)  BP: (!) 118/58 (05/16/22 1115)  SpO2: 96 % (05/16/22 1115)   Vital Signs (24h Range):  Temp:  [97.1 °F (36.2 °C)-98.8 °F (37.1 °C)] 97.7 °F (36.5 °C)  Pulse:  [56-78] 59  Resp:  [14-20] 18  SpO2:  [90 %-96 %] 96 %  BP: (118-171)/(56-74) 118/58     Weight: 65.3 kg (144 lb)  Body mass index is 23.96 kg/m².      Intake/Output Summary (Last 24 hours) at 5/16/2022 1312  Last data filed at 5/16/2022 0600  Gross per 24 hour   Intake 1047.17 ml   Output --   Net 1047.17 ml       Physical Exam  Vitals and nursing note reviewed.   Constitutional:       General: He is not in acute distress.     Appearance: He is ill-appearing.   HENT:      Head: Normocephalic and atraumatic.      Nose:      Comments: Dried blood; right rocket in place  Cardiovascular:      Rate and Rhythm: Normal rate and regular rhythm.      Heart sounds: No murmur heard.  Pulmonary:      Effort: Pulmonary effort is normal. No respiratory distress.      Breath sounds: Normal breath sounds.   Musculoskeletal:      Right lower leg: No edema.      Left lower leg: No edema.   Skin:     Capillary Refill: Capillary refill takes less than 2 seconds.       Vents:       Lines/Drains/Airways       Peripheral Intravenous Line  Duration                  Peripheral IV - Single Lumen 05/09/22 20 G Anterior;Left;Proximal Forearm 7 days         Peripheral IV - Single Lumen 05/15/22 1130 22 G Anterior;Distal;Left Forearm 1 day                    Significant Labs:    CBC/Anemia  Profile:  Recent Labs   Lab 05/15/22  1113 05/16/22  0055 05/16/22  0553   WBC 20.40*  20.40* 27.59* 24.08*   HGB 6.8*  6.8* 9.5* 8.9*   HCT 20.8*  20.8* 29.7* 27.3*     196 189 151   MCV 95  95 92 91   RDW 16.5*  16.5* 15.9* 16.1*        Chemistries:  Recent Labs   Lab 05/15/22  0616 05/16/22  0553     --    K 4.0  --      --    CO2 25  --    BUN 31*  --    CREATININE 0.9  --    CALCIUM 8.4*  --    ALBUMIN 2.4*  --    PROT 6.0  --    BILITOT 0.5  --    ALKPHOS 49*  --    ALT 6*  --    AST 31  --    MG 1.7 1.8   PHOS 2.4*  --        All pertinent labs within the past 24 hours have been reviewed.    Significant Imaging:   I have reviewed all pertinent imaging results/findings within the past 24 hours.    Assessment/Plan:     Pleural effusion  Patient admitted to medicine with multiple medical issues including SBO (now resolved s/p NGT), epistaxis, pleural effusion s/p thoracentesis with IR on 5/10, and iliopsoas hematoma. CT chest with bilateral effusions + ggo consistent with pulmonary edema. On exam, patient is not in respiratory distress and is on room air. His effusion that was sampled earlier this admit was transudative and non-infectious. Pulmonology consulted for evaluation for repeat thoracentesis.    Recommendations:  - recommend conservative management of his bilateral effusions with diuresis (patient net positive >4L)  - goal SpO2 >92%  - incentive spirometry and ambulation/out of bed as tolerated  - repeat CXR for worsening respiratory distress or new oxygen requirement  - we will sign off. Please reach out for any questions/concerns.           Thank you for your consult. I will sign off. Please contact us if you have any additional questions.     Veronica Savage, DO  Pulmonology  [facility]

## 2022-05-16 NOTE — HPI
87 yo male with PMH of BPH, Parkinson's, UC s/p total colectomy, chronic bronchitis admitted to hospital medicine. He initially presented on 5/8 for abdominal pain and was found to have an SBO which was medically managed with NGT. His hospital course has been complicated by small/moderate pleural effusion s/p thoracentesis with IR done 5/10 found to be transudative in nature. He also has had episodes of epistaxis requiring rhino rockets and found to have psoas hematoma with dropping HgB requiring 2 units of PRBC. CT chest non contrast obtained 5/13 demonstrated bilateral effusions with ground glass opacities and pulm edema. He has been intermittently diuresed with lasix for the past few days. Upon evaluation, patient denies cough, shortness of breath, or chest discomfort. He has previously been on a diuretic however not currently taking any at home. Pulmonology was consulted for consideration of repeat thoracentesis.

## 2022-05-16 NOTE — SUBJECTIVE & OBJECTIVE
Interval History: No further bleeding from nares or mouth. Found to have developing psoas hematoma requiring blood transfusion. No need for interventions per IR.     Medications:  Continuous Infusions:  Scheduled Meds:   amoxicillin-clavulanate 875-125mg  1 tablet Oral Q12H    balsam peru-castor oiL   Topical (Top) BID    carbidopa-levodopa  mg  1 tablet Oral QID    rasagiline  1 mg Oral Daily    sodium chloride  2 spray Each Nostril BID     PRN Meds:sodium chloride, sodium chloride, sodium chloride, acetaminophen, albuterol-ipratropium, dextrose 10%, dextrose 10%, glucagon (human recombinant), glucose, glucose, loperamide, ondansetron, oxyCODONE, oxymetazoline, sodium chloride 0.9%     Review of patient's allergies indicates:   Allergen Reactions    Heparin analogues Other (See Comments)     Not true allergy - but patient developed large spontaneous RP hematoma and concurrent severe epistaxis while on DVT prophylactic dose heparin - consider mechanical DVT ppx in future     Objective:     Vital Signs (24h Range):  Temp:  [97.1 °F (36.2 °C)-98.8 °F (37.1 °C)] 98.4 °F (36.9 °C)  Pulse:  [56-80] 61  Resp:  [16-20] 16  SpO2:  [90 %-95 %] 94 %  BP: (102-171)/(55-74) 128/60       Lines/Drains/Airways       Peripheral Intravenous Line  Duration                  Peripheral IV - Single Lumen 05/09/22 20 G Anterior;Left;Proximal Forearm 7 days         Peripheral IV - Single Lumen 05/15/22 1130 22 G Anterior;Distal;Left Forearm <1 day                  Physical Exam  Constitutional:       General: He is not in acute distress.     Appearance: He is ill-appearing.   HENT:      Head: Normocephalic and atraumatic.      Nose:      Comments: Fibrillar in place on L, rapid rhino in place on R     Mouth/Throat:      Comments: Oropharynx is clear without clot or bleeding  Neurological:      Mental Status: He is alert.     Significant Labs:  BMP:   Recent Labs   Lab 05/15/22  0616         CO2 25   BUN 31*   CREATININE  0.9   CALCIUM 8.4*   MG 1.7     CBC:   Recent Labs   Lab 05/16/22  0055   WBC 27.59*   RBC 3.23*   HGB 9.5*   HCT 29.7*      MCV 92   MCH 29.4   MCHC 32.0     Significant Diagnostics:  I have reviewed all pertinent imaging results/findings within the past 24 hours.

## 2022-05-17 PROBLEM — L25.8 INCONTINENCE ASSOCIATED DERMATITIS: Status: ACTIVE | Noted: 2022-05-17

## 2022-05-17 PROBLEM — K56.609 SBO (SMALL BOWEL OBSTRUCTION): Status: RESOLVED | Noted: 2022-05-08 | Resolved: 2022-05-17

## 2022-05-17 PROBLEM — R32 INCONTINENCE ASSOCIATED DERMATITIS: Status: ACTIVE | Noted: 2022-05-17

## 2022-05-17 LAB
ALBUMIN SERPL BCP-MCNC: 1.9 G/DL (ref 3.5–5.2)
ANION GAP SERPL CALC-SCNC: 10 MMOL/L (ref 8–16)
ANION GAP SERPL CALC-SCNC: 7 MMOL/L (ref 8–16)
BASOPHILS # BLD AUTO: 0.05 K/UL (ref 0–0.2)
BASOPHILS NFR BLD: 0.4 % (ref 0–1.9)
BLD PROD TYP BPU: NORMAL
BLD PROD TYP BPU: NORMAL
BLOOD UNIT EXPIRATION DATE: NORMAL
BLOOD UNIT EXPIRATION DATE: NORMAL
BLOOD UNIT TYPE CODE: 7300
BLOOD UNIT TYPE CODE: 7300
BLOOD UNIT TYPE: NORMAL
BLOOD UNIT TYPE: NORMAL
BUN SERPL-MCNC: 38 MG/DL (ref 8–23)
BUN SERPL-MCNC: 39 MG/DL (ref 8–23)
CALCIUM SERPL-MCNC: 7.8 MG/DL (ref 8.7–10.5)
CALCIUM SERPL-MCNC: 8 MG/DL (ref 8.7–10.5)
CHLORIDE SERPL-SCNC: 105 MMOL/L (ref 95–110)
CHLORIDE SERPL-SCNC: 106 MMOL/L (ref 95–110)
CO2 SERPL-SCNC: 27 MMOL/L (ref 23–29)
CO2 SERPL-SCNC: 31 MMOL/L (ref 23–29)
CODING SYSTEM: NORMAL
CODING SYSTEM: NORMAL
CREAT SERPL-MCNC: 0.8 MG/DL (ref 0.5–1.4)
CREAT SERPL-MCNC: 1.1 MG/DL (ref 0.5–1.4)
DIFFERENTIAL METHOD: ABNORMAL
DISPENSE STATUS: NORMAL
DISPENSE STATUS: NORMAL
EOSINOPHIL # BLD AUTO: 0.5 K/UL (ref 0–0.5)
EOSINOPHIL NFR BLD: 4.5 % (ref 0–8)
ERYTHROCYTE [DISTWIDTH] IN BLOOD BY AUTOMATED COUNT: 16.2 % (ref 11.5–14.5)
EST. GFR  (AFRICAN AMERICAN): >60 ML/MIN/1.73 M^2
EST. GFR  (AFRICAN AMERICAN): >60 ML/MIN/1.73 M^2
EST. GFR  (NON AFRICAN AMERICAN): 59.6 ML/MIN/1.73 M^2
EST. GFR  (NON AFRICAN AMERICAN): >60 ML/MIN/1.73 M^2
GLUCOSE SERPL-MCNC: 109 MG/DL (ref 70–110)
GLUCOSE SERPL-MCNC: 95 MG/DL (ref 70–110)
HCT VFR BLD AUTO: 26.6 % (ref 40–54)
HGB BLD-MCNC: 8.2 G/DL (ref 14–18)
HGB BLD-MCNC: 9.7 G/DL (ref 14–18)
IMM GRANULOCYTES # BLD AUTO: 0.08 K/UL (ref 0–0.04)
IMM GRANULOCYTES NFR BLD AUTO: 0.7 % (ref 0–0.5)
LYMPHOCYTES # BLD AUTO: 1.2 K/UL (ref 1–4.8)
LYMPHOCYTES NFR BLD: 10.6 % (ref 18–48)
MAGNESIUM SERPL-MCNC: 1.8 MG/DL (ref 1.6–2.6)
MCH RBC QN AUTO: 29.3 PG (ref 27–31)
MCHC RBC AUTO-ENTMCNC: 30.8 G/DL (ref 32–36)
MCV RBC AUTO: 95 FL (ref 82–98)
MONOCYTES # BLD AUTO: 1.2 K/UL (ref 0.3–1)
MONOCYTES NFR BLD: 10.3 % (ref 4–15)
NEUTROPHILS # BLD AUTO: 8.5 K/UL (ref 1.8–7.7)
NEUTROPHILS NFR BLD: 73.5 % (ref 38–73)
NRBC BLD-RTO: 0 /100 WBC
NUM UNITS TRANS PACKED RBC: NORMAL
PHOSPHATE SERPL-MCNC: 2.8 MG/DL (ref 2.7–4.5)
PLATELET # BLD AUTO: 205 K/UL (ref 150–450)
PMV BLD AUTO: 11 FL (ref 9.2–12.9)
POTASSIUM SERPL-SCNC: 3.4 MMOL/L (ref 3.5–5.1)
POTASSIUM SERPL-SCNC: 3.9 MMOL/L (ref 3.5–5.1)
RBC # BLD AUTO: 2.8 M/UL (ref 4.6–6.2)
SODIUM SERPL-SCNC: 143 MMOL/L (ref 136–145)
SODIUM SERPL-SCNC: 143 MMOL/L (ref 136–145)
TRANS ERYTHROCYTES VOL PATIENT: NORMAL ML
WBC # BLD AUTO: 11.57 K/UL (ref 3.9–12.7)

## 2022-05-17 PROCEDURE — 97116 GAIT TRAINING THERAPY: CPT

## 2022-05-17 PROCEDURE — 99223 PR INITIAL HOSPITAL CARE,LEVL III: ICD-10-PCS | Mod: ,,, | Performed by: NURSE PRACTITIONER

## 2022-05-17 PROCEDURE — 94761 N-INVAS EAR/PLS OXIMETRY MLT: CPT

## 2022-05-17 PROCEDURE — 99232 SBSQ HOSP IP/OBS MODERATE 35: CPT | Mod: GC,,, | Performed by: HOSPITALIST

## 2022-05-17 PROCEDURE — 20600001 HC STEP DOWN PRIVATE ROOM

## 2022-05-17 PROCEDURE — 63600175 PHARM REV CODE 636 W HCPCS: Performed by: STUDENT IN AN ORGANIZED HEALTH CARE EDUCATION/TRAINING PROGRAM

## 2022-05-17 PROCEDURE — 25000003 PHARM REV CODE 250: Performed by: STUDENT IN AN ORGANIZED HEALTH CARE EDUCATION/TRAINING PROGRAM

## 2022-05-17 PROCEDURE — 83735 ASSAY OF MAGNESIUM: CPT | Performed by: STUDENT IN AN ORGANIZED HEALTH CARE EDUCATION/TRAINING PROGRAM

## 2022-05-17 PROCEDURE — 80048 BASIC METABOLIC PNL TOTAL CA: CPT | Performed by: STUDENT IN AN ORGANIZED HEALTH CARE EDUCATION/TRAINING PROGRAM

## 2022-05-17 PROCEDURE — 36415 COLL VENOUS BLD VENIPUNCTURE: CPT | Performed by: HOSPITALIST

## 2022-05-17 PROCEDURE — 99223 1ST HOSP IP/OBS HIGH 75: CPT | Mod: ,,, | Performed by: NURSE PRACTITIONER

## 2022-05-17 PROCEDURE — 80069 RENAL FUNCTION PANEL: CPT | Performed by: HOSPITALIST

## 2022-05-17 PROCEDURE — 99900035 HC TECH TIME PER 15 MIN (STAT)

## 2022-05-17 PROCEDURE — 99232 SBSQ HOSP IP/OBS MODERATE 35: CPT | Mod: ,,, | Performed by: NURSE PRACTITIONER

## 2022-05-17 PROCEDURE — 85025 COMPLETE CBC W/AUTO DIFF WBC: CPT

## 2022-05-17 PROCEDURE — 99232 PR SUBSEQUENT HOSPITAL CARE,LEVL II: ICD-10-PCS | Mod: ,,, | Performed by: NURSE PRACTITIONER

## 2022-05-17 PROCEDURE — 99232 PR SUBSEQUENT HOSPITAL CARE,LEVL II: ICD-10-PCS | Mod: GC,,, | Performed by: HOSPITALIST

## 2022-05-17 PROCEDURE — 85018 HEMOGLOBIN: CPT | Performed by: STUDENT IN AN ORGANIZED HEALTH CARE EDUCATION/TRAINING PROGRAM

## 2022-05-17 RX ORDER — FUROSEMIDE 10 MG/ML
40 INJECTION INTRAMUSCULAR; INTRAVENOUS ONCE
Status: COMPLETED | OUTPATIENT
Start: 2022-05-17 | End: 2022-05-17

## 2022-05-17 RX ADMIN — Medication: at 08:05

## 2022-05-17 RX ADMIN — CARBIDOPA AND LEVODOPA 1 TABLET: 25; 100 TABLET ORAL at 04:05

## 2022-05-17 RX ADMIN — POTASSIUM BICARBONATE 40 MEQ: 391 TABLET, EFFERVESCENT ORAL at 08:05

## 2022-05-17 RX ADMIN — CARBIDOPA AND LEVODOPA 1 TABLET: 25; 100 TABLET ORAL at 08:05

## 2022-05-17 RX ADMIN — NASAL 2 SPRAY: 6.5 SPRAY NASAL at 08:05

## 2022-05-17 RX ADMIN — CARBIDOPA AND LEVODOPA 1 TABLET: 25; 100 TABLET ORAL at 01:05

## 2022-05-17 RX ADMIN — OXYMETAZOLINE HCL 2 SPRAY: 0.05 SPRAY NASAL at 08:05

## 2022-05-17 RX ADMIN — Medication: at 09:05

## 2022-05-17 RX ADMIN — FUROSEMIDE 40 MG: 10 INJECTION, SOLUTION INTRAMUSCULAR; INTRAVENOUS at 11:05

## 2022-05-17 RX ADMIN — NASAL 2 SPRAY: 6.5 SPRAY NASAL at 09:05

## 2022-05-17 RX ADMIN — RASAGILINE 1 MG: 1 TABLET ORAL at 05:05

## 2022-05-17 NOTE — SUBJECTIVE & OBJECTIVE
Interval History: No acute events. Balloon deflated yesterday with no further bleeding. Posterior OP dry.    Medications:  Continuous Infusions:  Scheduled Meds:   amoxicillin-clavulanate 875-125mg  1 tablet Oral Q12H    balsam peru-castor oiL   Topical (Top) BID    carbidopa-levodopa  mg  1 tablet Oral QID    rasagiline  1 mg Oral Daily    sodium chloride  2 spray Each Nostril BID     PRN Meds:acetaminophen, albuterol-ipratropium, dextrose 10%, dextrose 10%, glucagon (human recombinant), glucose, glucose, loperamide, ondansetron, oxyCODONE, oxymetazoline, sodium chloride 0.9%     Review of patient's allergies indicates:   Allergen Reactions    Heparin analogues Other (See Comments)     Not true allergy - but patient developed large spontaneous RP hematoma and concurrent severe epistaxis while on DVT prophylactic dose heparin - consider mechanical DVT ppx in future     Objective:     Vital Signs (24h Range):  Temp:  [97.3 °F (36.3 °C)-98.2 °F (36.8 °C)] 97.8 °F (36.6 °C)  Pulse:  [49-70] 68  Resp:  [14-18] 17  SpO2:  [92 %-96 %] 94 %  BP: (118-145)/(58-68) 145/68       Lines/Drains/Airways       Peripheral Intravenous Line  Duration                  Peripheral IV - Single Lumen 05/09/22 20 G Anterior;Left;Proximal Forearm 8 days         Peripheral IV - Single Lumen 05/15/22 1130 22 G Anterior;Distal;Left Forearm 1 day                    Physical Exam  Constitutional:       General: He is not in acute distress.     Appearance: He is ill-appearing.   HENT:      Head: Normocephalic and atraumatic.      Nose:      Comments: Fibrillar in place on L, rapid rhino in place on R, balloon deflated and removed. No further bleeding     Mouth/Throat:      Comments: Oropharynx is clear without clot or bleeding  Neurological:      Mental Status: He is alert.     Significant Labs:  CBC:   Recent Labs   Lab 05/16/22  1318   WBC 17.38*   RBC 3.57*   HGB 10.8*   HCT 33.1*      MCV 93   MCH 30.3   MCHC 32.6     CMP:    Recent Labs   Lab 05/15/22  0616 05/16/22  1911 05/17/22  0358      < > 95   CALCIUM 8.4*   < > 7.8*   ALBUMIN 2.4*   < > 1.9*   PROT 6.0  --   --       < > 143   K 4.0   < > 3.4*   CO2 25   < > 27      < > 106   BUN 31*   < > 39*   CREATININE 0.9   < > 0.8   ALKPHOS 49*  --   --    ALT 6*  --   --    AST 31  --   --    BILITOT 0.5  --   --     < > = values in this interval not displayed.       Significant Diagnostics:  I have reviewed all pertinent imaging results/findings within the past 24 hours.

## 2022-05-17 NOTE — PLAN OF CARE
POC reviewed with PT and family at , stated understanding, AXO4, VSS.  Zero nosebleeds this shift.  INCONT B&B, 3 loose BM, adequate voids.  Skin status unchanged, red scrotum, brief removed, educated PT to remain brief free to allow for healing.   Denies pain and nausea this shift.  Turns self in bed and on command.   AMB in halls with assist X1 and walker. Up in chair this PM.  SCDs applied and removed for OOB to chair.  Tolerates diet.  NO adverse events this shift, bed low, call light in reach, will cont to manage POC.      Problem: Adult Inpatient Plan of Care  Goal: Plan of Care Review  Outcome: Ongoing, Progressing  Goal: Patient-Specific Goal (Individualized)  Outcome: Ongoing, Progressing  Goal: Absence of Hospital-Acquired Illness or Injury  Outcome: Ongoing, Progressing  Goal: Optimal Comfort and Wellbeing  Outcome: Ongoing, Progressing  Goal: Readiness for Transition of Care  Outcome: Ongoing, Progressing     Problem: Fall Injury Risk  Goal: Absence of Fall and Fall-Related Injury  Outcome: Ongoing, Progressing     Problem: Skin Injury Risk Increased  Goal: Skin Health and Integrity  Outcome: Ongoing, Progressing     Problem: Fluid Deficit (Intestinal Obstruction)  Goal: Fluid Balance  Outcome: Ongoing, Progressing     Problem: Infection (Intestinal Obstruction)  Goal: Absence of Infection Signs and Symptoms  Outcome: Ongoing, Progressing     Problem: Nausea and Vomiting (Intestinal Obstruction)  Goal: Nausea and Vomiting Relief  Outcome: Ongoing, Progressing     Problem: Pain (Intestinal Obstruction)  Goal: Acceptable Pain Control  Outcome: Ongoing, Progressing     Problem: Infection  Goal: Absence of Infection Signs and Symptoms  Outcome: Ongoing, Progressing     Problem: Impaired Wound Healing  Goal: Optimal Wound Healing  Outcome: Ongoing, Progressing

## 2022-05-17 NOTE — HPI
Mr. Giselle Lemus is an 87 year old male with Parkinson's, BPH, hyperuricemia, ulcerative colitis, ulcerative proctitis s/p surgical (colonic) resction, chronic mastoiditis, diplopia, and chronic bronchitis presenting with left-sided abdominal pain, nausea, bloating for 1 day.  Patient is very hard of hearing and needs to have questions written down to answer.  He states he has not had any blood in his stool.  Last bowel movement was this morning, denies diarrhea or constipation. Wound care consulted for evaluation of skin injury.

## 2022-05-17 NOTE — ASSESSMENT & PLAN NOTE
Development of epistaxis and L psoas hematoma around same timeframe   Epistaxis stable s/p packings  Unclear if L psoas hematoma still hemorrhaging  Hgb downtrend to 7.5 this morning from 8-9 previous days  Hepatic function panel, PT-INR normal  Normal platelet count  D-dimer elevated, Fibrinogen slightly elevated    -- See plan for epistaxis and psoas hematoma   -- Transfuse Hgb <7  -- Hold DVT ppx  -- Hematology consulted for assistance for hypocoagulable workup, recommend no further workup as bleeding likely incidental

## 2022-05-17 NOTE — ASSESSMENT & PLAN NOTE
CXR revealed small to moderate left pleural effusion.   Suspect likely transudative. Worsening oxygenation with continued IV fluids. Some BLE edema, prescribed bumex prn, and noted elevated CVP on TTE last year concerning for volume overload  Less likely infectious. Noted leukocytosis 27 on presentation, since improved to 12 today. Otherwise, normotensive, afebrile, and normal LA.   TTE nml EF, unremarkable  S/p thoracentesis w/ IR  CT Noncontrast 5/13 w/ pleural effusions still present, worsening  On room air      -- Pleural fluid studies consistent with TRANSUDATIVE effusion. Etiology thought to be somewhat chronic, as was present on initial CT< but aggravated by large volume IV fluid resuscitation during SBO.  - Pulmonology consulted given recurrent effusions unknown etiology; state no indication for repeat thoracentesis; continue diuresis  - Lasix 40mg IV BID while inpatient; will likely place on lasix 20mg PO with potassium supplements at discharge

## 2022-05-17 NOTE — ASSESSMENT & PLAN NOTE
Psoas hematoma noted on CT Chest 5/13  Patient reporting new-onset hip pain particularly with hip flexion that he says started 5/12  CT abdomen yesterday w/ large psoas hematoma, possibly enlarged from previous CT Chest partially visualized  CTA 5/15 w/ concern for continued active bleed  Consented for IR embolization, on re-evaluation of imaging, appeared stable- no current planned intervention w/ IR  S/p 2 units pRBC w/ appropriate response on CBC    -- Hgb stablizing, appears to be resolving  -- Hold DVT ppx in bleed; SCDs ordered  -- Will STAT call IR if patient becomes unstable

## 2022-05-17 NOTE — PROGRESS NOTES
[facility] Medicine  Progress Note    Patient Name: Giselle Lemus  MRN: [id number]  Patient Class: IP- Inpatient   Admission Date: 5/8/2022  Length of Stay: 9 days  Attending Physician: John Alexander MD  Primary Care Provider: Tl Torres MD        Subjective:     Principal Problem:Nontraumatic psoas hematoma        HPI:  Giselle Lemus is an 88 y.o. M. With history of Parkinson's, BPH, UC s/p total colectomy, and chronic bronchitis who presents with abdominal pain. Found to have SBO. NGT was placed initially and since d/c. Patient with + BM. Per general surgery, SBO resolved. Noted leukocytosis 27 on presentation, since improved to 12 today. Normotensive, afebrile, and normal LA. CXR revealed small to moderate left pleural effusion. Zosyn initialed. IR consulted and plan for thoracentesis. NPO now with IV fluids. Oxygenation slightly worsening. Currently on 2L O2 via NC. Developed SB with rate 30-40s. Cardiology consulted and recs currently pending. Medicine consulted for possible transfer to medicine given resolved SBO without surgical intervention, pleural effusion, and bradycardia.      Overview/Hospital Course:  Admitted to general surgery for treatment of SBO, which resolved with conservative care and bowel rest. Began having Bms and tolerating PO well. However, subsequently found to have L pleural effusion, which was present on original abdominal CT but became more enlarged after IV fluids given during SBO. TTE showed normal cardiac function. IR thoracentesis revealed transudative effusion without signs of infection. Attempting lasix to relieve small O2 requirement. Course also complicated by asymptomatic sinus bradycardia to 40s. Cardiology contacted and reviewed EKG which was sinus maycol. PT recommending rehab, patient and family in agreement. Accepted to O-rehab. Resumed on home loperamide PRN for baseline diarrhea following meals since patient is s/p colectomy and is chronically dependent on  this. 2 episodes of epistaxis, coughing up blood. Cancelled discharge to O-Rehab. CXR w/ worsening in pleural effusions. CT Chest w/ pulmonary edema and partially visualized L psoas hematoma. Discontinued DVT ppx. Overnight continued epistaxis, coughing up blood, and tarry stool. ENT consulted, placed rhino rocket/nasal packings. Drop in Hgb, continuing to monitor. Tarry stool likely secondary to swallowed blood. Plan to evaluate L psoas hematoma w/ CT noncontrast abdomen. Of note, a RLL pulmonary nodule was incidentally seen on CT imaging, and followup within 3-6 months was recommended.      Interval History: NAEON. Hgb grossly stable (dropped 2 points but suspect prior was a false reading as repeat Hgb this afternoon was higher). Vitals stable. Eager to get stronger and go to rehab.    Review of Systems  Objective:     Vital Signs (Most Recent):  Temp: 97.5 °F (36.4 °C) (05/17/22 1126)  Pulse: 64 (05/17/22 1126)  Resp: 18 (05/17/22 1126)  BP: 121/60 (05/17/22 1126)  SpO2: 97 % (05/17/22 1126) Vital Signs (24h Range):  Temp:  [96.7 °F (35.9 °C)-98 °F (36.7 °C)] 97.5 °F (36.4 °C)  Pulse:  [49-70] 64  Resp:  [16-18] 18  SpO2:  [90 %-97 %] 97 %  BP: (112-145)/(53-68) 121/60     Weight: 65.3 kg (144 lb)  Body mass index is 23.96 kg/m².    Intake/Output Summary (Last 24 hours) at 5/17/2022 1515  Last data filed at 5/17/2022 0500  Gross per 24 hour   Intake 300 ml   Output --   Net 300 ml      Physical Exam  Vitals and nursing note reviewed.   Constitutional:       General: He is not in acute distress.     Appearance: He is not toxic-appearing or diaphoretic.   HENT:      Head: Normocephalic.   Cardiovascular:      Rate and Rhythm: Normal rate and regular rhythm.   Pulmonary:      Effort: Pulmonary effort is normal. No respiratory distress.   Abdominal:      Palpations: Abdomen is soft.      Tenderness: There is no guarding.   Musculoskeletal:      Right lower leg: No edema.      Left lower leg: No edema.   Skin:      General: Skin is warm and dry.   Neurological:      Mental Status: He is alert. Mental status is at baseline.      Cranial Nerves: No dysarthria.   Psychiatric:         Mood and Affect: Mood normal.         Behavior: Behavior normal.       Significant Labs: All pertinent labs within the past 24 hours have been reviewed.    Significant Imaging: I have reviewed all pertinent imaging results/findings within the past 24 hours.      Assessment/Plan:      * Nontraumatic psoas hematoma  Psoas hematoma noted on CT Chest 5/13  Patient reporting new-onset hip pain particularly with hip flexion that he says started 5/12  CT abdomen yesterday w/ large psoas hematoma, possibly enlarged from previous CT Chest partially visualized  CTA 5/15 w/ concern for continued active bleed  Consented for IR embolization, on re-evaluation of imaging, appeared stable- no current planned intervention w/ IR  S/p 2 units pRBC w/ appropriate response on CBC    -- Hgb stablizing, appears to be resolving  -- Hold DVT ppx in bleed; SCDs ordered  -- Will STAT call IR if patient becomes unstable      Incontinence associated dermatitis  Wound care following  Pressure ulcer prevention      Acute blood loss anemia  Development of epistaxis and L psoas hematoma around same timeframe   Epistaxis stable s/p packings  Unclear if L psoas hematoma still hemorrhaging  Hgb downtrend to 7.5 this morning from 8-9 previous days  Hepatic function panel, PT-INR normal  Normal platelet count  D-dimer elevated, Fibrinogen slightly elevated    -- See plan for epistaxis and psoas hematoma   -- Transfuse Hgb <7  -- Hold DVT ppx  -- Hematology consulted for assistance for hypocoagulable workup, recommend no further workup as bleeding likely incidental    Pulmonary nodule  RLL pulmonary nodule seen incidentally on CT CAP.     -- Followup imaging recommended in 3-6 months  -- Will defer to outpatient followup; noted in discharge summary      Delirium  High risk of delirium  given hard of hearing and hospital environment    -- Delirium precautions  -- Telesitter stopped since patient cannot be admitted to rehab having had a sitter in last 24h; does not need it  -- See plan for hard of hearing      Hard of hearing  Has hearing aids    -- Signage placed on door  -- Speak slowly clearly and allow to see lips  -- Use pen and paper to communicate if unable to understand    Epistaxis  Patient noted to be spitting up a small-moderate amount of valentina blood on 5/13. Initially thought to be a nosebleed, as patient complained of dry/itchy nose.There was then some concern patient was actually coughing up blood. Red tinge was noted in patient's posterior oropharynx, but still unclear of the source.   Significant epistaxis with decrease in Hgb. Patient swallowing blood and subsequent rise in BUN.   Currently stable s/p rapid rhino    -- ENT consulted, appreciate assistance   -- ENT placed nasal packing and rapid rhino will keep in place for 3 days- removed today  -- Amoxicillin-clavulanate for abx ppx while packings in place -ENDED  -- Holding DVT ppx in setting of bleed    Impaired mobility and activities of daily living  -- PT/OT following, appreciate recommendations  -- Plan to discharge to O-Rehab for continued therapy when medically cleared    Hypophosphatemia  -- Monitor on morning labs  -- Encourage po intake  -- Replenish as indicated    Sinus bradycardia  EKG reviewed w/ sinus bradycardia with rate 40s.  Asymptomatic  Hemodynamically stable  Suspect possible SSS    -- Cardiology reviewed EKG, no recommendations/interventions for asymptomatic sinus bradycardia  -- Avoid daniel blocking agents/medications    Pleural effusion  CXR revealed small to moderate left pleural effusion.   Suspect likely transudative. Worsening oxygenation with continued IV fluids. Some BLE edema, prescribed bumex prn, and noted elevated CVP on TTE last year concerning for volume overload  Less likely infectious. Noted  leukocytosis 27 on presentation, since improved to 12 today. Otherwise, normotensive, afebrile, and normal LA.   TTE nml EF, unremarkable  S/p thoracentesis w/ IR  CT Noncontrast 5/13 w/ pleural effusions still present, worsening  On room air      -- Pleural fluid studies consistent with TRANSUDATIVE effusion. Etiology thought to be somewhat chronic, as was present on initial CT< but aggravated by large volume IV fluid resuscitation during SBO.  - Pulmonology consulted given recurrent effusions unknown etiology; state no indication for repeat thoracentesis; continue diuresis  - Lasix 40mg IV BID while inpatient; will likely place on lasix 20mg PO with potassium supplements at discharge    Benign prostatic hyperplasia  -- Resume home proscar      Parkinsons  -- Continue home sinemet and rasagiline        VTE Risk Mitigation (From admission, onward)         Ordered     IP VTE HIGH RISK PATIENT  Once         05/08/22 0512     Place sequential compression device  Until discontinued         05/08/22 0512                Discharge Planning   JOHAN: 5/18/2022     Code Status: Full Code   Is the patient medically ready for discharge?: No    Reason for patient still in hospital (select all that apply): Pending disposition  Discharge Plan A: Rehab   Discharge Delays: (!) Change in Medical Condition              Guadalupe Mcguire MD  Department of Hospital Medicine   [facility]

## 2022-05-17 NOTE — CONSULTS
[facility]  Skin Integrity CHARLES  Consult Note    Patient Name: Giselle Lemus  MRN: [id number]  Admission Date: 5/8/2022  Hospital Length of Stay: 9 days  Attending Physician: John Alexander MD  Primary Care Provider: Tl Torres MD     Consults  Subjective:     History of Present Illness:  Mr. Giselle Lemus is an 87 year old male with Parkinson's, BPH, hyperuricemia, ulcerative colitis, ulcerative proctitis s/p surgical (colonic) resction, chronic mastoiditis, diplopia, and chronic bronchitis presenting with left-sided abdominal pain, nausea, bloating for 1 day.  Patient is very hard of hearing and needs to have questions written down to answer.  He states he has not had any blood in his stool.  Last bowel movement was this morning, denies diarrhea or constipation. Wound care consulted for evaluation of skin injury.      Scheduled Meds:   balsam peru-castor oiL   Topical (Top) BID    carbidopa-levodopa  mg  1 tablet Oral QID    rasagiline  1 mg Oral Daily    sodium chloride  2 spray Each Nostril BID     Continuous Infusions:  PRN Meds:acetaminophen, albuterol-ipratropium, dextrose 10%, dextrose 10%, glucagon (human recombinant), glucose, glucose, loperamide, ondansetron, oxyCODONE, oxymetazoline, sodium chloride 0.9%    Review of patient's allergies indicates:   Allergen Reactions    Heparin analogues Other (See Comments)     Not true allergy - but patient developed large spontaneous RP hematoma and concurrent severe epistaxis while on DVT prophylactic dose heparin - consider mechanical DVT ppx in future        Past Medical History:   Diagnosis Date    BPH (benign prostatic hyperplasia)     Parkinsons 09/2019    R hand tremor starting in 2017, diagnosed Sept 2019 by Dr. Angeles at     Ulcerative colitis     s/p colectomy    Unspecified chronic bronchitis      Past Surgical History:   Procedure Laterality Date    TOTAL COLECTOMY         Family History    None       Tobacco Use    Smoking status:  Never Smoker    Smokeless tobacco: Never Used   Substance and Sexual Activity    Alcohol use: Never    Drug use: Not on file    Sexual activity: Not on file     Review of Systems   Skin:  Positive for wound.     Objective:     Vital Signs (Most Recent):  Temp: 97.5 °F (36.4 °C) (05/17/22 1126)  Pulse: 64 (05/17/22 1126)  Resp: 18 (05/17/22 1126)  BP: 121/60 (05/17/22 1126)  SpO2: 97 % (05/17/22 1126) Vital Signs (24h Range):  Temp:  [96.7 °F (35.9 °C)-98 °F (36.7 °C)] 97.5 °F (36.4 °C)  Pulse:  [49-70] 64  Resp:  [16-18] 18  SpO2:  [90 %-97 %] 97 %  BP: (112-145)/(53-68) 121/60     Weight: 65.3 kg (144 lb)  Body mass index is 23.96 kg/m².  Physical Exam  Constitutional:       Appearance: Normal appearance.   Skin:     General: Skin is warm and dry.      Findings: Lesion present.   Neurological:      Mental Status: He is alert.       Laboratory:  All pertinent labs reviewed within the last 24 hours.    Diagnostic Results:  None        Assessment/Plan:          CHARLES Skin Integrity Evaluation    Skin Integrity CHARLES evaluation of patient as part of the comprehensive skin care team.   He has been admitted for 9 days. Skin injury was noted on 5/8/22. POA No.    Sacrum/buttocks            Incontinence associated dermatitis  - consult received for evaluation of buttocks.  - pt admitted with abdominal pain.  - incontinent of stool.  - pt assessed while standing from the chair with the assistance of PT.  - irritation and redness appears to be related to incontinence dermatitis.  - continue BPCO (Venelex) bid/prn cleaning.  - currently using a reina surface with waffle overlay.  - nursing to maintain pressure injury prevention measures and continue scheduled wound care per orders.        Thank you for your consult. I will follow-up with patient. Please contact us if you have any additional questions.       Magda Wooten NP  Skin Integrity [facility]LES  [facility]

## 2022-05-17 NOTE — ASSESSMENT & PLAN NOTE
Patient noted to be spitting up a small-moderate amount of valentina blood on 5/13. Initially thought to be a nosebleed, as patient complained of dry/itchy nose.There was then some concern patient was actually coughing up blood. Red tinge was noted in patient's posterior oropharynx, but still unclear of the source.   Significant epistaxis with decrease in Hgb. Patient swallowing blood and subsequent rise in BUN.   Currently stable s/p rapid rhino    -- ENT consulted, appreciate assistance   -- ENT placed nasal packing and rapid rhino will keep in place for 3 days- removed today  -- Amoxicillin-clavulanate for abx ppx while packings in place -ENDED  -- Holding DVT ppx in setting of bleed

## 2022-05-17 NOTE — PT/OT/SLP PROGRESS
Physical Therapy Treatment    Patient Name:  Giselle Lemus   MRN:  [id number]    Recent Surgery: * No surgery found *      Recommendations:     Discharge Recommendations:  rehabilitation facility   Discharge Equipment Recommendations:  (tbd @ rehab)   Barriers to discharge: Increased level of assist    Highest Level of Mobility: Gait 60'  Assistance Required: Min(A) w/ RW    Assessment:     Giselle Lemus is a 88 y.o. male admitted with a medical diagnosis of SBO (small bowel obstruction).  He presents with the following impairments/functional limitations:  weakness, impaired functional mobilty, gait instability, impaired balance, decreased safety awareness, pain, decreased lower extremity function, impaired self care skills, impaired endurance, impaired cognition. Giselle Lemus would benefit from acute PT intervention to address listed functional deficits, provide patient/caregiver education, reduce fall risk, and maximize (I) and safety with functional mobility.    Pt met with HOB elevated and agreeable to PT treatment. Today's PT treatment focus was on gait training to improve activity tolerance and address gait deviations. Pt demos a very unsteady gait with flexed posture, NBOS, and very poor RW management. Patient displayed paranoid behavior regarding the avasys camera in his room and was confused throughout session. He is a high fall risk and is unsafe to return home at this time.    Pt is progressing towards acute PT goals appropriately and continues to benefit from acute PT sessions. After hospital discharge, pt would benefit from inpatient rehab to maximize rehab potential.    Rehab Prognosis: Good; patient would benefit from acute skilled PT services to address these deficits and reach maximum level of function.      Plan:     During this hospitalization, patient to be seen 4 x/week to address the identified rehab impairments via gait training, therapeutic activities, therapeutic exercises, neuromuscular re-education and  progress toward the following goals:    · Plan of Care Expires:  06/11/22    This plan of care has been discussed with the patient/caregiver, who was included in its development and is in agreement with the identified goals and treatment plan.     Subjective     Communicated with RN prior to session.  Patient agreeable to participate.     Pain/Comfort:  · Pain Rating 1: 0/10  · Pain Rating Post-Intervention 1: 0/10    Chief Complaint: None stated  Patient/Family Comments/goals: None stated      Objective:     Patient found HOB elevated with telemetry  upon PT entry to room.    General Precautions: Standard, fall, hearing impaired   Orthopedic Precautions:N/A   Braces: N/A         Exams:     Cognition:  o Patient is oriented to Person, follows simple commands 100% of the time    Functional Mobility:    Bed Mobility:  · Supine to Sit: Moderate Assistance on R side of bed  · Sit to Supine: Minimal Assistance  · Scooting anteriorly to EOB to plant feet on floor: Minimal Assistance    Transfers:   · Sit to Stand Transfer: Minimal Assistance  from EOB with RW AD   · Bed to Chair: Minimal Assistance with RW AD via stand step t/f  · Pt assisted to chair, then due to pt's level of confusion, was assisted back to bed for safety             Gait:  · Patient received gait training in hallway 60' feet with Minimal Assistance and rolling walker  · Gait Assessment: unsteady gait, decreased step length, narrow base of support, ambulates outside BRANDON of RW, flexed posture and decreased afshan  · Gait Pattern Observed: Step-through reciprocal gait  · Comments: gait belt utilized, mask donned for out of room ambulation. PT provided multiple cues for proper RW management and widening BRANDON to improve balance.    Balance:  · Static Sit:   · Stand-By Assist at EOB  · Normal: Patient able to maintain steady balance without handhold support.  · Static Stand:   · Min Assist with Rolling walker  · Fair: Patient able to maintain balance with  handhold support; may require occasional minimal assistance.  · Dynamic Stand:  · Min Assist with Rolling walker      Therapeutic Activities/Exercises      Patient assisted with functional mobility as noted above with an emphasis on gait training   Patient educated on the importance of early mobility to prevent functional decline during hospital stay   Patient was instructed to utilize staff assistance for mobility/transfers.  o Patient is appropriate to transfer with min(A) and RN/PCT assist   Patient educated on PT POC and role of PT in acute care   White board updated regarding patient's safest level of mobility with staff assistance, RN also updated.     AM-PAC 6 CLICK MOBILITY  Turning over in bed (including adjusting bedclothes, sheets and blankets)?: 3  Sitting down on and standing up from a chair with arms (e.g., wheelchair, bedside commode, etc.): 3  Moving from lying on back to sitting on the side of the bed?: 3  Moving to and from a bed to a chair (including a wheelchair)?: 3  Need to walk in hospital room?: 3  Climbing 3-5 steps with a railing?: 2  Basic Mobility Total Score: 17     Patient left HOB elevated with all lines intact, call button in reach, bed alarm on and RN notified.        History/Goals:     PAST MEDICAL HISTORY:  Past Medical History:   Diagnosis Date    BPH (benign prostatic hyperplasia)     Parkinsons 09/2019    R hand tremor starting in 2017, diagnosed Sept 2019 by Dr. Angeles at     Ulcerative colitis     s/p colectomy    Unspecified chronic bronchitis        Past Surgical History:   Procedure Laterality Date    TOTAL COLECTOMY         GOALS:   Multidisciplinary Problems     Physical Therapy Goals        Problem: Physical Therapy    Goal Priority Disciplines Outcome Goal Variances Interventions   Physical Therapy Goal     PT, PT/OT Ongoing, Progressing     Description: Goals to be met by: 5/26/22     Patient will increase functional independence with mobility by  performin. Supine to sit with Stand-by Assistance  2. Sit to stand transfer with Stand-by Assistance  3. Bed to chair transfer with Stand-by Assistance using LRAD  4. Gait  x 50 feet with Stand-by Assistance using LRAD  5. Ascend/Descend 6 inch curb step with Stand-by Assistance using LRAD  6. Lower extremity exercise program x30 reps per handout, with independence                     Time Tracking:     PT Received On: 22  PT Start Time: 0950     PT Stop Time: 1021  PT Total Time (min): 31 min     Billable Minutes: Gait Training 31      Jacquelin Crespo, PT  2022  Pager# [phone]

## 2022-05-17 NOTE — ASSESSMENT & PLAN NOTE
High risk of delirium given hard of hearing and hospital environment    -- Delirium precautions  -- Telesitter stopped since patient cannot be admitted to rehab having had a sitter in last 24h; does not need it  -- See plan for hard of hearing

## 2022-05-17 NOTE — ASSESSMENT & PLAN NOTE
88 y.o M with epistaxis contributing to hemoptysis. Fibrillar placed on L side, rapid rhino placed on R.       - Rapid rhino removed this morning, ok to DC antibiotic prophylaxis  - Fibrillar is absorbable   - Please use Afrin and direct pressure for at least 15 minutes if there is evidence of bleeding. If this does not resolve, call ENT  - Call/page ENT with questions/concerns

## 2022-05-17 NOTE — PROGRESS NOTES
[facility]  Physical Medicine & Rehab  Progress Note    Patient Name: Giselle Lemus  MRN: [id number]  Admission Date: 5/8/2022  Length of Stay: 9 days  Attending Physician: John Alexander MD    Subjective:     Principal Problem:Nontraumatic psoas hematoma    Hospital Course:   5/12/22: Participated w/ OT & PT.Bed [facility] modA.  Ambulated 3 ft modA w/ RW.       Interval History 5/17/2022:  Patient is seen for follow-up PM&R evaluation and recommendations: Camera sitter being removed. Participating with therapy.     HPI, Past Medical, Family, and Social History remains the same as documented in the initial encounter.    Scheduled Medications:    balsam peru-castor oiL   Topical (Top) BID    carbidopa-levodopa  mg  1 tablet Oral QID    rasagiline  1 mg Oral Daily    sodium chloride  2 spray Each Nostril BID       Diagnostic Results: Labs: Reviewed  ECG: Reviewed  CT: Reviewed    PRN Medications: acetaminophen, albuterol-ipratropium, dextrose 10%, dextrose 10%, glucagon (human recombinant), glucose, glucose, loperamide, ondansetron, oxyCODONE, oxymetazoline, sodium chloride 0.9%    Review of Systems   Reason unable to perform ROS: Difficult to get ROS 2/2 hearing deficit.   HENT:  Positive for hearing loss.    Musculoskeletal:  Positive for gait problem.   Neurological:  Positive for weakness.   Objective:     Vital Signs (Most Recent):  Temp: 97.7 °F (36.5 °C) (05/17/22 1603)  Pulse: 71 (05/17/22 1603)  Resp: 18 (05/17/22 1603)  BP: 138/63 (05/17/22 1603)  SpO2: 95 % (05/17/22 1603)    Vital Signs (24h Range):  Temp:  [96.7 °F (35.9 °C)-98 °F (36.7 °C)] 97.7 °F (36.5 °C)  Pulse:  [49-76] 71  Resp:  [16-18] 18  SpO2:  [90 %-97 %] 95 %  BP: (112-145)/(53-68) 138/63     Physical Exam  Vitals and nursing note reviewed.   Constitutional:       Appearance: He is well-developed.   HENT:      Head: Normocephalic and atraumatic.   Eyes:      General:         Right eye: No discharge.         Left eye: No discharge.       Pupils: Pupils are equal, round, and reactive to light.   Pulmonary:      Effort: Pulmonary effort is normal. No respiratory distress.   Abdominal:      General: There is no distension.      Palpations: Abdomen is soft.      Tenderness: There is no abdominal tenderness.   Musculoskeletal:         General: No deformity.      Comments: Deconditioned and generalized weakness   Skin:     General: Skin is warm and dry.   Neurological:      Mental Status: He is alert.      Sensory: No sensory deficit.      Motor: Weakness present. No abnormal muscle tone.      Gait: Gait abnormal.      Comments: Minimal confusion   Psychiatric:         Behavior: Behavior normal.         Assessment/Plan:      Impaired mobility and activities of daily living  - Related to prolonged/acute hospital course.     Recommendations  -  Encourage mobility, OOB in chair at least 3 hours per day, and early ambulation as appropriate  -  PT/OT evaluate and treat  -  Pain management  -  Monitor for and prevent skin breakdown and pressure ulcers  · Early mobility, repositioning/weight shifting every 20-30 minutes when sitting, turn patient every 2 hours, proper mattress/overlay and chair cushioning, pressure relief/heel protector boots  -  DVT prophylaxis    -  Reviewed discharge options (IP rehab, SNF, HH therapy, and OP therapy)    Pleural effusion  - C- xray revealed small to moderate left pleural effusion, s/p thoracentesis w/ IR, pleural fluid studies consistent with transudative effusion, per HM etiology thought to be chronic     Nontraumatic psoas hematoma  Hematology consulted and per team Retroperitoneal hematoma can occur spontaneously more frequently in elderly patients.  It is unlikely that patient has developed an acquired or hereditary bleeding disorder in the setting of normal platelets and coagulation studies.   - Would not pursue further diagnostics at this time.    PM&R Recommendation:     At this time, the PM&R team has reviewed this  patient's ongoing medical case including inpatient diagnosis, medical history, clinical examination, labs, vitals, current social and functional history to provide the post-acute recommendation as follows:     RECOMMENDATIONS: inpatient rehabilitation due to good motivation/participation with therapies and good potential for recovery.    MEDICAL STABILITY:     At this time, no barriers for post-acute rehab admission     I will sign off with the transfer of the patient to [facility] liaison who will continue to follow going forward until admission to [facility].     Sabine Lopez NP  Department of Physical Medicine & Rehab   [facility]

## 2022-05-17 NOTE — SUBJECTIVE & OBJECTIVE
Scheduled Meds:   balsam peru-castor oiL   Topical (Top) BID    carbidopa-levodopa  mg  1 tablet Oral QID    rasagiline  1 mg Oral Daily    sodium chloride  2 spray Each Nostril BID     Continuous Infusions:  PRN Meds:acetaminophen, albuterol-ipratropium, dextrose 10%, dextrose 10%, glucagon (human recombinant), glucose, glucose, loperamide, ondansetron, oxyCODONE, oxymetazoline, sodium chloride 0.9%    Review of patient's allergies indicates:   Allergen Reactions    Heparin analogues Other (See Comments)     Not true allergy - but patient developed large spontaneous RP hematoma and concurrent severe epistaxis while on DVT prophylactic dose heparin - consider mechanical DVT ppx in future        Past Medical History:   Diagnosis Date    BPH (benign prostatic hyperplasia)     Parkinsons 09/2019    R hand tremor starting in 2017, diagnosed Sept 2019 by Dr. Angeles at     Ulcerative colitis     s/p colectomy    Unspecified chronic bronchitis      Past Surgical History:   Procedure Laterality Date    TOTAL COLECTOMY         Family History    None       Tobacco Use    Smoking status: Never Smoker    Smokeless tobacco: Never Used   Substance and Sexual Activity    Alcohol use: Never    Drug use: Not on file    Sexual activity: Not on file     Review of Systems   Skin:  Positive for wound.     Objective:     Vital Signs (Most Recent):  Temp: 97.5 °F (36.4 °C) (05/17/22 1126)  Pulse: 64 (05/17/22 1126)  Resp: 18 (05/17/22 1126)  BP: 121/60 (05/17/22 1126)  SpO2: 97 % (05/17/22 1126) Vital Signs (24h Range):  Temp:  [96.7 °F (35.9 °C)-98 °F (36.7 °C)] 97.5 °F (36.4 °C)  Pulse:  [49-70] 64  Resp:  [16-18] 18  SpO2:  [90 %-97 %] 97 %  BP: (112-145)/(53-68) 121/60     Weight: 65.3 kg (144 lb)  Body mass index is 23.96 kg/m².  Physical Exam  Constitutional:       Appearance: Normal appearance.   Skin:     General: Skin is warm and dry.      Findings: Lesion present.   Neurological:      Mental Status: He is alert.        Laboratory:  All pertinent labs reviewed within the last 24 hours.    Diagnostic Results:  None

## 2022-05-17 NOTE — SUBJECTIVE & OBJECTIVE
Interval History: NAEON. Hgb grossly stable (dropped 2 points but suspect prior was a false reading as repeat Hgb this afternoon was higher). Vitals stable. Eager to get stronger and go to rehab.    Review of Systems  Objective:     Vital Signs (Most Recent):  Temp: 97.5 °F (36.4 °C) (05/17/22 1126)  Pulse: 64 (05/17/22 1126)  Resp: 18 (05/17/22 1126)  BP: 121/60 (05/17/22 1126)  SpO2: 97 % (05/17/22 1126) Vital Signs (24h Range):  Temp:  [96.7 °F (35.9 °C)-98 °F (36.7 °C)] 97.5 °F (36.4 °C)  Pulse:  [49-70] 64  Resp:  [16-18] 18  SpO2:  [90 %-97 %] 97 %  BP: (112-145)/(53-68) 121/60     Weight: 65.3 kg (144 lb)  Body mass index is 23.96 kg/m².    Intake/Output Summary (Last 24 hours) at 5/17/2022 1515  Last data filed at 5/17/2022 0500  Gross per 24 hour   Intake 300 ml   Output --   Net 300 ml      Physical Exam  Vitals and nursing note reviewed.   Constitutional:       General: He is not in acute distress.     Appearance: He is not toxic-appearing or diaphoretic.   HENT:      Head: Normocephalic.   Cardiovascular:      Rate and Rhythm: Normal rate and regular rhythm.   Pulmonary:      Effort: Pulmonary effort is normal. No respiratory distress.   Abdominal:      Palpations: Abdomen is soft.      Tenderness: There is no guarding.   Musculoskeletal:      Right lower leg: No edema.      Left lower leg: No edema.   Skin:     General: Skin is warm and dry.   Neurological:      Mental Status: He is alert. Mental status is at baseline.      Cranial Nerves: No dysarthria.   Psychiatric:         Mood and Affect: Mood normal.         Behavior: Behavior normal.       Significant Labs: All pertinent labs within the past 24 hours have been reviewed.    Significant Imaging: I have reviewed all pertinent imaging results/findings within the past 24 hours.

## 2022-05-17 NOTE — PLAN OF CARE
[facility]  Discharge Reassessment    Primary Care Provider: Tl Torres MD    Expected Discharge Date: 5/18/2022    Patient will be discharged to [facility] as soon as he is sitter free for 24 hours and is medically cleared     Reassessment (most recent)     Discharge Reassessment - 05/17/22 1544        Discharge Reassessment    Assessment Type Discharge Planning Reassessment     Discharge Plan A Rehab     Discharge Plan B Home Health     DME Needed Upon Discharge  none     Discharge Barriers Identified None     Why the patient remains in the hospital Requires continued medical care               Liliana Lomeli RN, CM   Ext: [phone]

## 2022-05-17 NOTE — ASSESSMENT & PLAN NOTE
- consult received for evaluation of buttocks.  - pt admitted with abdominal pain.  - incontinent of stool.  - pt assessed while standing from the chair with the assistance of PT.  - irritation and redness appears to be related to incontinence dermatitis.  - continue BPCO (Venelex) bid/prn cleaning.  - currently using a reina surface with waffle overlay.  - nursing to maintain pressure injury prevention measures and continue scheduled wound care per orders.

## 2022-05-17 NOTE — PROGRESS NOTES
[facility]  Otorhinolaryngology-Head & Neck Surgery  Progress Note    Subjective:     Post-Op Info:  * No surgery found *      Hospital Day: 10     Interval History: No acute events. Balloon deflated yesterday with no further bleeding. Posterior OP dry.    Medications:  Continuous Infusions:  Scheduled Meds:   amoxicillin-clavulanate 875-125mg  1 tablet Oral Q12H    balsam peru-castor oiL   Topical (Top) BID    carbidopa-levodopa  mg  1 tablet Oral QID    rasagiline  1 mg Oral Daily    sodium chloride  2 spray Each Nostril BID     PRN Meds:acetaminophen, albuterol-ipratropium, dextrose 10%, dextrose 10%, glucagon (human recombinant), glucose, glucose, loperamide, ondansetron, oxyCODONE, oxymetazoline, sodium chloride 0.9%     Review of patient's allergies indicates:   Allergen Reactions    Heparin analogues Other (See Comments)     Not true allergy - but patient developed large spontaneous RP hematoma and concurrent severe epistaxis while on DVT prophylactic dose heparin - consider mechanical DVT ppx in future     Objective:     Vital Signs (24h Range):  Temp:  [97.3 °F (36.3 °C)-98.2 °F (36.8 °C)] 97.8 °F (36.6 °C)  Pulse:  [49-70] 68  Resp:  [14-18] 17  SpO2:  [92 %-96 %] 94 %  BP: (118-145)/(58-68) 145/68       Lines/Drains/Airways       Peripheral Intravenous Line  Duration                  Peripheral IV - Single Lumen 05/09/22 20 G Anterior;Left;Proximal Forearm 8 days         Peripheral IV - Single Lumen 05/15/22 1130 22 G Anterior;Distal;Left Forearm 1 day                    Physical Exam  Constitutional:       General: He is not in acute distress.     Appearance: He is ill-appearing.   HENT:      Head: Normocephalic and atraumatic.      Nose:      Comments: Fibrillar in place on L, rapid rhino in place on R, balloon deflated and removed. No further bleeding     Mouth/Throat:      Comments: Oropharynx is clear without clot or bleeding  Neurological:      Mental Status: He is alert.      Significant Labs:  CBC:   Recent Labs   Lab 05/16/22  1318   WBC 17.38*   RBC 3.57*   HGB 10.8*   HCT 33.1*      MCV 93   MCH 30.3   MCHC 32.6     CMP:   Recent Labs   Lab 05/15/22  0616 05/16/22  1911 05/17/22  0358      < > 95   CALCIUM 8.4*   < > 7.8*   ALBUMIN 2.4*   < > 1.9*   PROT 6.0  --   --       < > 143   K 4.0   < > 3.4*   CO2 25   < > 27      < > 106   BUN 31*   < > 39*   CREATININE 0.9   < > 0.8   ALKPHOS 49*  --   --    ALT 6*  --   --    AST 31  --   --    BILITOT 0.5  --   --     < > = values in this interval not displayed.       Significant Diagnostics:  I have reviewed all pertinent imaging results/findings within the past 24 hours.    Assessment/Plan:     Epistaxis  88 y.o M with epistaxis contributing to hemoptysis. Fibrillar placed on L side, rapid rhino placed on R.       - Rapid rhino removed this morning, ok to DC antibiotic prophylaxis  - Fibrillar is absorbable   - Please use Afrin and direct pressure for at least 15 minutes if there is evidence of bleeding. If this does not resolve, call ENT  - Call/page ENT with questions/concerns         Patrick Gayle MD  Otorhinolaryngology-Head & Neck Surgery  [facility]

## 2022-05-17 NOTE — SUBJECTIVE & OBJECTIVE
Interval History 5/17/2022:  Patient is seen for follow-up PM&R evaluation and recommendations: Camera sitter being removed. Participating with therapy.     HPI, Past Medical, Family, and Social History remains the same as documented in the initial encounter.    Scheduled Medications:    balsam peru-castor oiL   Topical (Top) BID    carbidopa-levodopa  mg  1 tablet Oral QID    rasagiline  1 mg Oral Daily    sodium chloride  2 spray Each Nostril BID       Diagnostic Results: Labs: Reviewed  ECG: Reviewed  CT: Reviewed    PRN Medications: acetaminophen, albuterol-ipratropium, dextrose 10%, dextrose 10%, glucagon (human recombinant), glucose, glucose, loperamide, ondansetron, oxyCODONE, oxymetazoline, sodium chloride 0.9%    Review of Systems   Reason unable to perform ROS: Difficult to get ROS 2/2 hearing deficit.   HENT:  Positive for hearing loss.    Musculoskeletal:  Positive for gait problem.   Neurological:  Positive for weakness.   Objective:     Vital Signs (Most Recent):  Temp: 97.7 °F (36.5 °C) (05/17/22 1603)  Pulse: 71 (05/17/22 1603)  Resp: 18 (05/17/22 1603)  BP: 138/63 (05/17/22 1603)  SpO2: 95 % (05/17/22 1603)    Vital Signs (24h Range):  Temp:  [96.7 °F (35.9 °C)-98 °F (36.7 °C)] 97.7 °F (36.5 °C)  Pulse:  [49-76] 71  Resp:  [16-18] 18  SpO2:  [90 %-97 %] 95 %  BP: (112-145)/(53-68) 138/63     Physical Exam  Vitals and nursing note reviewed.   Constitutional:       Appearance: He is well-developed.   HENT:      Head: Normocephalic and atraumatic.   Eyes:      General:         Right eye: No discharge.         Left eye: No discharge.      Pupils: Pupils are equal, round, and reactive to light.   Pulmonary:      Effort: Pulmonary effort is normal. No respiratory distress.   Abdominal:      General: There is no distension.      Palpations: Abdomen is soft.      Tenderness: There is no abdominal tenderness.   Musculoskeletal:         General: No deformity.      Comments: Deconditioned and generalized  weakness   Skin:     General: Skin is warm and dry.   Neurological:      Mental Status: He is alert.      Sensory: No sensory deficit.      Motor: Weakness present. No abnormal muscle tone.      Gait: Gait abnormal.      Comments: Minimal confusion   Psychiatric:         Behavior: Behavior normal.     NEUROLOGICAL EXAMINATION:     CRANIAL NERVES     CN III, IV, VI   Pupils are equal, round, and reactive to light.

## 2022-05-17 NOTE — ASSESSMENT & PLAN NOTE
Presents with SBO, initially admitted to General Surgery.   Hx of UC s/p total colectomy  Resolved    -- Regular diet  -- Monitor for bowel movements, abdominal pain   sudden onset

## 2022-05-18 VITALS
HEIGHT: 65 IN | WEIGHT: 144 LBS | BODY MASS INDEX: 23.99 KG/M2 | HEART RATE: 73 BPM | TEMPERATURE: 98 F | OXYGEN SATURATION: 96 % | DIASTOLIC BLOOD PRESSURE: 54 MMHG | RESPIRATION RATE: 18 BRPM | SYSTOLIC BLOOD PRESSURE: 108 MMHG

## 2022-05-18 LAB
ALBUMIN SERPL BCP-MCNC: 2 G/DL (ref 3.5–5.2)
ANION GAP SERPL CALC-SCNC: 6 MMOL/L (ref 8–16)
BACTERIA SPEC ANAEROBE CULT: NORMAL
BUN SERPL-MCNC: 40 MG/DL (ref 8–23)
CALCIUM SERPL-MCNC: 7.7 MG/DL (ref 8.7–10.5)
CHLORIDE SERPL-SCNC: 107 MMOL/L (ref 95–110)
CO2 SERPL-SCNC: 29 MMOL/L (ref 23–29)
CREAT SERPL-MCNC: 0.9 MG/DL (ref 0.5–1.4)
EST. GFR  (AFRICAN AMERICAN): >60 ML/MIN/1.73 M^2
EST. GFR  (NON AFRICAN AMERICAN): >60 ML/MIN/1.73 M^2
GLUCOSE SERPL-MCNC: 95 MG/DL (ref 70–110)
PHOSPHATE SERPL-MCNC: 2.5 MG/DL (ref 2.7–4.5)
POTASSIUM SERPL-SCNC: 3.7 MMOL/L (ref 3.5–5.1)
SODIUM SERPL-SCNC: 142 MMOL/L (ref 136–145)

## 2022-05-18 PROCEDURE — 97535 SELF CARE MNGMENT TRAINING: CPT

## 2022-05-18 PROCEDURE — 94761 N-INVAS EAR/PLS OXIMETRY MLT: CPT

## 2022-05-18 PROCEDURE — 99238 HOSP IP/OBS DSCHRG MGMT 30/<: CPT | Mod: GC,,, | Performed by: HOSPITALIST

## 2022-05-18 PROCEDURE — 94799 UNLISTED PULMONARY SVC/PX: CPT

## 2022-05-18 PROCEDURE — 97530 THERAPEUTIC ACTIVITIES: CPT

## 2022-05-18 PROCEDURE — 99900035 HC TECH TIME PER 15 MIN (STAT)

## 2022-05-18 PROCEDURE — 80069 RENAL FUNCTION PANEL: CPT | Performed by: HOSPITALIST

## 2022-05-18 PROCEDURE — 36415 COLL VENOUS BLD VENIPUNCTURE: CPT | Performed by: HOSPITALIST

## 2022-05-18 PROCEDURE — 25000003 PHARM REV CODE 250

## 2022-05-18 PROCEDURE — 25000003 PHARM REV CODE 250: Performed by: STUDENT IN AN ORGANIZED HEALTH CARE EDUCATION/TRAINING PROGRAM

## 2022-05-18 PROCEDURE — 99238 PR HOSPITAL DISCHARGE DAY,<30 MIN: ICD-10-PCS | Mod: GC,,, | Performed by: HOSPITALIST

## 2022-05-18 RX ORDER — FUROSEMIDE 20 MG/1
20 TABLET ORAL DAILY
Qty: 360 TABLET | Refills: 0
Start: 2022-05-18 | End: 2023-01-01 | Stop reason: SDUPTHER

## 2022-05-18 RX ORDER — OXYMETAZOLINE HCL 0.05 %
2 SPRAY, NON-AEROSOL (ML) NASAL 2 TIMES DAILY PRN
Refills: 0
Start: 2022-05-18 | End: 2022-05-21

## 2022-05-18 RX ORDER — SODIUM,POTASSIUM PHOSPHATES 280-250MG
1 POWDER IN PACKET (EA) ORAL ONCE
Status: CANCELLED | OUTPATIENT
Start: 2022-05-18 | End: 2022-05-18

## 2022-05-18 RX ORDER — BALSAM PERU/CASTOR OIL
OINTMENT (GRAM) TOPICAL 2 TIMES DAILY
Start: 2022-05-18

## 2022-05-18 RX ORDER — POTASSIUM CHLORIDE 750 MG/1
10 CAPSULE, EXTENDED RELEASE ORAL DAILY
Qty: 360 CAPSULE | Refills: 0
Start: 2022-05-18 | End: 2022-01-01

## 2022-05-18 RX ADMIN — LOPERAMIDE HYDROCHLORIDE 2 MG: 2 CAPSULE ORAL at 04:05

## 2022-05-18 RX ADMIN — Medication: at 09:05

## 2022-05-18 RX ADMIN — RASAGILINE 1 MG: 1 TABLET ORAL at 05:05

## 2022-05-18 RX ADMIN — NASAL 2 SPRAY: 6.5 SPRAY NASAL at 09:05

## 2022-05-18 RX ADMIN — CARBIDOPA AND LEVODOPA 1 TABLET: 25; 100 TABLET ORAL at 09:05

## 2022-05-18 RX ADMIN — POTASSIUM BICARBONATE 30 MEQ: 391 TABLET, EFFERVESCENT ORAL at 08:05

## 2022-05-18 NOTE — PLAN OF CARE
Problem: Occupational Therapy  Goal: Occupational Therapy Goal  Description: Goals to be met by: 06/12/22    Patient will increase functional independence with ADLs by performing:    UE Dressing with Contact Guard Assistance at EOB .  LE Dressing with Minimal Assistance at EOB.  Grooming while standing at sink with Minimal Assistance.  Toileting from bedside commode with Minimal Assistance for hygiene and clothing management.   Toilet transfer to bedside commode with Minimal Assistance with RW. (Met, updated to SBA)    Pt met BSC t/f today with goal updated. Continue OT as tolerated.   Malissa Wooten OT  5/18/2022    Outcome: Ongoing, Progressing

## 2022-05-18 NOTE — PT/OT/SLP PROGRESS
Occupational Therapy   Treatment    Name: Giselle Lemus  MRN: [id number]  Admitting Diagnosis:  Nontraumatic psoas hematoma       Recommendations:     Discharge Recommendations: rehabilitation facility  Discharge Equipment Recommendations:  other (see comments) (tbd)  Barriers to discharge:       Assessment:     Giselle Lemus is a 88 y.o. male with a medical diagnosis of Nontraumatic psoas hematoma.  He presents with impaired ADL and mobility performance deficits. Pt found upright in bed and agreeable for therapy. Pt remains [place] with hearing aids present and following all commands. Session focused on bedroom mobility and toileting using BSC. Pt required increased time and cues for toileting. Pt did report frustrations related to immobility with pt educated on importance of self-advocating for daily movement with NSG using RW. Pt vocalized understanding and voiced appreciation of session. Pt was fatigue following toileting and standing pericare with further mobility ceased. Pt left upright in chair. At this time, pt is a high fall risk and not safe to return home. Pt is motivated to return to [facility]. Pt would benefit from continued OT skilled services 4x/wk to improve daily living skills to optimize QOL.  Pt is recommended to discharge to rehab  at this time. Performance deficits affecting function are impaired functional mobilty, weakness, impaired endurance, gait instability, impaired self care skills, decreased lower extremity function, decreased upper extremity function, decreased safety awareness, decreased coordination, pain.     Rehab Prognosis:  Good; patient would benefit from acute skilled OT services to address these deficits and reach maximum level of function.       Plan:     Patient to be seen 4 x/week to address the above listed problems via self-care/home management, therapeutic activities, therapeutic exercises  · Plan of Care Expires: 06/12/22  · Plan of Care Reviewed with: patient    Subjective      Pain/Comfort:  · Pain Rating 1: other (see comments) (pain in LLE/groin)  · Location - Side 1: Left  · Location - Orientation 1: generalized    Objective:     Communicated with: RN prior to session.  Patient found HOB elevated with telemetry upon OT entry to room.    General Precautions: Standard, fall, hearing impaired   Orthopedic Precautions:N/A   Braces: N/A  Respiratory Status: Room air     Occupational Performance:     Bed Mobility:    · Patient completed Rolling/Turning to Right with minimum assistance  · Patient completed Scooting/Bridging with minimum assistance  · Patient completed Supine to Sit with maximum assistance      Functional Mobility/Transfers:  · Patient completed Sit <> Stand Transfer with minimum assistance  with  rolling walker   · Patient completed Bed <> Chair Transfer using Step Transfer technique with minimum assistance with rolling walker  · Patient completed Toilet Transfer Step Transfer technique with minimum assistance with  rolling walker  · Functional Mobility: Pt stood from bed and mobilized in room ~20 steps with  BSC placed near doorway for pt. Pt was min A using RW with slow gait speed and narrow BRANDON. Pt completed several minutes of toileting seated. [facility]are 2/2 scrotal sensitivity. This therapist helped with rectal cleaning. Pt was mobilized back to chair.     Activities of Daily Living:  · Grooming: setup A of hand towelette of cleaning   · Upper Body Dressing: minimum assistance donning gown   · Toileting: total assistance for pericare (pt A as able around scrotum)  · Feeding: pt setup for breakfast meal to finish while upright in chair.      [facility] 6 Click ADL: 14    Treatment & Education:  Pt educated on role of occupational therapy, POC, and safety during ADLs and functional mobility. Pt and OT discussed importance of safe, continued mobility to optimize daily living skills. Pt verbalized understanding.     White board  updated during session. Pt given instruction to call for medical staff/nurse for assistance.       Patient left up in chair with all lines intact, call button in reach, RN notified    GOALS:   Multidisciplinary Problems       Occupational Therapy Goals          Problem: Occupational Therapy    Goal Priority Disciplines Outcome Interventions   Occupational Therapy Goal     OT, PT/OT Ongoing, Progressing    Description: Goals to be met by: 06/12/22    Patient will increase functional independence with ADLs by performing:    UE Dressing with Contact Guard Assistance at EOB .  LE Dressing with Minimal Assistance at EOB.  Grooming while standing at sink with Minimal Assistance.  Toileting from bedside commode with Minimal Assistance for hygiene and clothing management.   Toilet transfer to bedside commode with Minimal Assistance with RW.                         Time Tracking:     OT Date of Treatment: 05/18/22  OT Start Time: 0923  OT Stop Time: 1001  OT Total Time (min): 38 min    Billable Minutes:Self Care/Home Management 15 min  Therapeutic Activity 23 min    OT/HAYLEY: OT          5/18/2022

## 2022-05-18 NOTE — ASSESSMENT & PLAN NOTE
-- PT/OT following, appreciate recommendations  -- Plan to discharge to O-Rehab for continued therapy

## 2022-05-18 NOTE — DISCHARGE SUMMARY
[facility]  Discharge Summary      Patient Name: Giselle Lemus  MRN: [id number]  Patient Class: IP- Inpatient  Admission Date: 5/8/2022  Hospital Length of Stay: 10 days  Discharge Date and Time:  05/18/2022 1:45 PM  Attending Physician: John Alexander MD   Discharging Provider: Jono Wagner DO  Primary Care Provider: Tl Torres MD  [facility] Medicine Team: [facility] 1 Jono Wagner DO    HPI:   Giselle Lemus is an 88 y.o. M. With history of Parkinson's, BPH, UC s/p total colectomy, and chronic bronchitis who presents with abdominal pain. Found to have SBO. NGT was placed initially and since d/c. Patient with + BM. Per general surgery, SBO resolved. Noted leukocytosis 27 on presentation, since improved to 12 today. Normotensive, afebrile, and normal LA. CXR revealed small to moderate left pleural effusion. Zosyn initialed. IR consulted and plan for thoracentesis. NPO now with IV fluids. Oxygenation slightly worsening. Currently on 2L O2 via NC. Developed SB with rate 30-40s. Cardiology consulted and recs currently pending. Medicine consulted for possible transfer to medicine given resolved SBO without surgical intervention, pleural effusion, and bradycardia.      * No surgery found *      Hospital Course:   Admitted to general surgery for treatment of SBO, which resolved with conservative care and bowel rest. Patient takes frequent loperamide after his colectomy at [place] due to diarrhea after meals but per surgery, would discourage loperamide as it may have predisposed the SBO. Began having BM and tolerating PO well. However, subsequently found to have L pleural effusion, which was present on original abdominal CT but became more enlarged after IV fluids given during SBO. TTE showed normal cardiac function. IR thoracentesis revealed transudative effusion without signs of infection. Attempting lasix to relieve small O2 requirement. Course also complicated by asymptomatic sinus bradycardia  to 40s. Cardiology contacted and reviewed EKG which was sinus maycol. PT recommending rehab, patient and family in agreement. Accepted to O-Rehab. Resumed on home loperamide PRN for baseline diarrhea following meals since patient is s/p colectomy and is chronically dependent on this. 2 episodes of epistaxis, coughing up blood. Cancelled discharge to O-Rehab. CXR w/ worsening in pleural effusions. CT Chest w/ pulmonary edema and partially visualized L psoas hematoma. Discontinued DVT ppx. Overnight continued epistaxis, coughing up blood, and tarry stool. ENT consulted, placed rhino rocket/nasal packings. Drop in Hgb, continuing to monitor. Tarry stool likely secondary to swallowed blood. Plan to evaluate L psoas hematoma w/ CT noncontrast abdomen. Hgb continued trending down <7, transfused 1 unit pRBC, w/ appropriate response. CTA abdomen done w/ concern for active bleeding psoas hematoma and was consented for embolization w/ IR. IR Dr. Ashley re-evaluated imaging and given hemodynamic stability, he had less concern for bleed, no intervention currently. Nasal packings removed w/o further epistaxis. Hgb remained stable over next couple days. Hematology consulted and felt unlikely that patient has developed an acquired or hereditary bleeding disorder in the setting of normal platelets and coagulation studies and would not pursue further diagnostics at this time.  Pulmonology consulted for further workup of pleural effusions (which were present prior to patient being admitted). Pulm recommending further diuresis. Tolerated lasix 40mg qd inpatient. Medically stabilized to discharge to Inpatient Rehab after no further bleeding and Hgb stable for 2-3 days. Discharaged w/ 20mg Lasix PO for the pleural effusions w/ 10mEq potassium. Of note, a RLL pulmonary nodule was incidentally seen on CT imaging, and followup within 3-6 months was recommended. Heparin analogues added to allergies list and family/pt instructed to let future  providers know about hx of bleeding. Recommend avoidance if possible and mechanical DVT ppx.      Vitals:    05/18/22 0732 05/18/22 1100 05/18/22 1153 05/18/22 1156   BP:   (!) 108/54    BP Location:       Patient Position:   Lying    Pulse: 63 69 74 73   Resp:   18 18   Temp:   97.7 °F (36.5 °C)    TempSrc:   Oral    SpO2:   96% 96%   Weight:       Height:         Physical Exam  Vitals and nursing note reviewed.   Constitutional:       General: He is not in acute distress.     Appearance: He is not toxic-appearing or diaphoretic.   HENT:      Head: Normocephalic.   Cardiovascular:      Rate and Rhythm: Normal rate and regular rhythm.   Pulmonary:      Effort: Pulmonary effort is normal. No respiratory distress.   Abdominal:      Palpations: Abdomen is soft.      Tenderness: There is no guarding.   Musculoskeletal:      Right lower leg: No edema.      Left lower leg: No edema.   Skin:     General: Skin is warm and dry.   Neurological:      Mental Status: He is alert. Mental status is at baseline.      Cranial Nerves: No dysarthria.   Psychiatric:         Mood and Affect: Mood normal.         Behavior: Behavior normal.       Goals of Care Treatment Preferences:  Code Status: Full Code      Consults:   Consults (From admission, onward)        Status Ordering Provider     Inpatient consult to Hematology  Once        Provider:  (Not yet assigned)    Completed MT BARRIOS     Inpatient consult to Pulmonology  Once        Provider:  (Not yet assigned)    Completed RICKIE PAIGE     Inpatient consult to Interventional Radiology  Once        Provider:  (Not yet assigned)    Completed MT BARRIOS     Inpatient consult to ENT  Once        Provider:  (Not yet assigned)    Completed RICARDA STEWART     Inpatient consult to Physical Medicine Rehab  Once        Provider:  (Not yet assigned)    Completed SHIVANI HAGEN     [facility]        Provider:  (Not yet assigned)     Completed WATSON SCHUMACHER     Inpatient consult to General surgery  Once        Provider:  (Not yet assigned)    Completed BHAKTI SINGH          * Nontraumatic psoas hematoma  Psoas hematoma noted on CT Chest 5/13  Patient reporting new-onset hip pain particularly with hip flexion that he says started 5/12  CT abdomen yesterday w/ large psoas hematoma, possibly enlarged from previous CT Chest partially visualized  CTA 5/15 w/ concern for continued active bleed  Consented for IR embolization, on re-evaluation of imaging, appeared stable- no current planned intervention w/ IR  S/p 2 units pRBC w/ appropriate response on CBC  Hgb stablized, appears to be resolving    -- Hold DVT ppx in bleed; SCDs ordered  -- Will STAT call IR if patient becomes unstable    Incontinence associated dermatitis  Wound care following  Pressure ulcer prevention      Acute blood loss anemia  Development of epistaxis and L psoas hematoma around same timeframe   Epistaxis stable s/p packings  Unclear if L psoas hematoma still hemorrhaging  Hgb downtrend to 7.5 this morning from 8-9 previous days  Hepatic function panel, PT-INR normal  Normal platelet count  D-dimer elevated, Fibrinogen slightly elevated    -- See plan for epistaxis and psoas hematoma   -- Transfuse Hgb <7  -- Hold DVT ppx  -- Hematology consulted for assistance for hypocoagulable workup, recommend no further workup as bleeding likely incidental    Pulmonary nodule  RLL pulmonary nodule seen incidentally on CT CAP.     -- Followup imaging recommended in 3-6 months  -- Will defer to outpatient followup; noted in discharge summary      Delirium  High risk of delirium given hard of hearing and hospital environment    -- Delirium precautions  -- Telesitter stopped since patient cannot be admitted to rehab having had a sitter in last 24h; does not need it  -- See plan for hard of hearing      Hard of hearing  Has hearing aids    -- Signage placed on door  -- Speak slowly  clearly and allow to see lips  -- Use pen and paper to communicate if unable to understand    Epistaxis  Patient noted to be spitting up a small-moderate amount of [place] blood on 5/13. Initially thought to be a nosebleed, as patient complained of dry/itchy nose.There was then some concern patient was actually coughing up blood. Red tinge was noted in patient's posterior oropharynx, but still unclear of the source.   Significant epistaxis with decrease in Hgb. Patient swallowing blood and subsequent rise in BUN.   Currently stable s/p rapid rhino  Nasal packings removed, no further episodes of bleeding    -- ENT consulted, appreciate assistance   -- ENT placed nasal packing and rapid rhino will keep in place for 3 days- removed  -- Amoxicillin-clavulanate for abx ppx while packings in place   -- Holding DVT ppx in setting of bleed  -- Can use Afrin PRN for nose bleed in future    Impaired mobility and activities of daily living  -- PT/OT following, appreciate recommendations  -- Plan to discharge to O-Rehab for continued therapy    Hypophosphatemia  -- Monitor on morning labs  -- Encourage po intake  -- Replenish as indicated    Sinus bradycardia  EKG reviewed w/ sinus bradycardia with rate 40s.  Asymptomatic  Hemodynamically stable  Suspect possible SSS    -- Cardiology reviewed EKG, no recommendations/interventions for asymptomatic sinus bradycardia  -- Avoid daniel blocking agents/medications    Pleural effusion  CXR revealed small to moderate left pleural effusion.   Suspect likely transudative. Worsening oxygenation with continued IV fluids. Some BLE edema, prescribed bumex prn, and noted elevated CVP on TTE last year concerning for volume overload  Less likely infectious. Noted leukocytosis 27 on presentation, since improved to 12 today. Otherwise, normotensive, afebrile, and normal LA.   TTE nml EF, unremarkable  S/p thoracentesis w/ IR  CT Noncontrast 5/13 w/ pleural effusions still present, worsening  On room  air    -- Pleural fluid studies consistent with TRANSUDATIVE effusion. Etiology thought to be somewhat chronic, as was present on initial CT< but aggravated by large volume IV fluid resuscitation during SBO.  - Pulmonology consulted given recurrent effusions unknown etiology; state no indication for repeat thoracentesis; continue diuresis  - Lasix 40mg IV BID while inpatient; placed on lasix 20mg PO with potassium supplements at discharge    Benign prostatic hyperplasia  -- Resume home proscar      Parkinsons  -- Continue home sinemet and rasagiline        Final Active Diagnoses:    Diagnosis Date Noted POA    PRINCIPAL PROBLEM:  Nontraumatic psoas hematoma [M79.81] 05/14/2022 Clinically Undetermined    Incontinence associated dermatitis [L25.8, R32] 05/17/2022 No    Pulmonary nodule [R91.1] 05/15/2022 Yes    Acute blood loss anemia [D62] 05/15/2022 No    Hard of hearing [H91.90] 05/14/2022 Yes     Chronic    Delirium [R41.0] 05/14/2022 Yes    Epistaxis [R04.0] 05/13/2022 No    Impaired mobility and activities of daily living [Z74.09, Z78.9] 05/12/2022 Yes    Pleural effusion [J90] 05/10/2022 Yes    Sinus bradycardia [R00.1] 05/10/2022 Yes    Hypophosphatemia [E83.39] 05/10/2022 Yes    Parkinsons [G20] 09/22/2019 Yes    Benign prostatic hyperplasia [N40.0] 09/22/2019 Yes      Problems Resolved During this Admission:    Diagnosis Date Noted Date Resolved POA    SBO (small bowel obstruction) [K56.609] 05/08/2022 05/17/2022 Yes       Discharged Condition: stable    Disposition: Rehab Facility    Follow Up:    Patient Instructions:      Ambulatory referral/consult to Pulmonology   Standing Status: Future   Referral Priority: Routine Referral Type: Consultation   Referral Reason: Specialty Services Required   Requested Specialty: Pulmonary Disease   Number of Visits Requested: 1     COVID-19 PFIZER 2ND DOSAGE APPT REQUEST   Standing Status: Future Standing Exp. Date: 05/11/23     Order Specific Question  Answer Comments   Schedule the second dosage on this date: 6/1/2022        Significant Diagnostic Studies: Labs:   CMP   Recent Labs   Lab 05/16/22  1911 05/17/22  0358 05/17/22  1339 05/18/22  0332    143 143 142   K 3.5 3.4* 3.9 3.7    106 105 107   CO2 28 27 31* 29   * 95 109 95   BUN 37* 39* 38* 40*   CREATININE 1.0 0.8 1.1 0.9   CALCIUM 8.2* 7.8* 8.0* 7.7*   ALBUMIN 2.3* 1.9*  --  2.0*   ANIONGAP 10 10 7* 6*   ESTGFRAFRICA >60.0 >60.0 >60.0 >60.0   EGFRNONAA >60.0 >60.0 59.6* >60.0    and CBC   Recent Labs   Lab 05/17/22 0358 05/17/22  1339   WBC 11.57  --    HGB 8.2* 9.7*   HCT 26.6*  --      --        Pending Diagnostic Studies:     Procedure Component Value Units Date/Time    IR Angiogram Visceral [[id number]]     Order Status: Sent Lab Status: No result     IR Thoracentesis with Imaging [[id number]] Resulted: 05/10/22 1809    Order Status: Sent Lab Status: In process Updated: 05/10/22 1810    Lactic acid, plasma [[id number]] Collected: 05/08/22 0201    Order Status: Sent Lab Status: In process Updated: 05/08/22 0201    Specimen: Blood          Medications:  Reconciled Home Medications:      Medication List      START taking these medications    balsam peru-castor oiL Oint  Apply topically 2 (two) times a day. Apply to buttocks     furosemide 20 MG tablet  Commonly known as: LASIX  Take 1 tablet (20 mg total) by mouth once daily.     oxymetazoline 0.05 % nasal spray  Commonly known as: AFRIN  2 sprays by Nasal route 2 (two) times daily as needed (nose bleed).     potassium chloride 10 MEQ Cpsr  Commonly known as: MICRO-K  Take 1 capsule (10 mEq total) by mouth once daily.     sodium chloride 0.65 % nasal spray  Commonly known as: OCEAN  1 spray by Nasal route as needed (irritation).        CHANGE how you take these medications    carbidopa-levodopa  mg  mg per tablet  Commonly known as: SINEMET  Take 1 tablet by mouth 4 (four) times daily.  What changed: how much to take      loperamide 2 mg capsule  Commonly known as: IMODIUM  Take 1 capsule (2 mg total) by mouth 3 (three) times daily as needed for Diarrhea.  What changed:   · how much to take  · how to take this  · when to take this  · reasons to take this  · additional instructions        CONTINUE taking these medications    acetaminophen 325 MG tablet  Commonly known as: TYLENOL  Take 650 mg by mouth every 6 (six) hours as needed for Pain.     finasteride 5 mg tablet  Commonly known as: PROSCAR  Take 5 mg by mouth once daily.     rasagiline 1 mg Tab  Commonly known as: AZILECT  Take 1 tablet (1 mg total) by mouth once daily at 6am.            Indwelling Lines/Drains at time of discharge:   Lines/Drains/Airways     None                 Time spent on the discharge of patient: >30 minutes         Jono Wagner DO  Department of Hospital Medicine  [facility]

## 2022-05-18 NOTE — ASSESSMENT & PLAN NOTE
CXR revealed small to moderate left pleural effusion.   Suspect likely transudative. Worsening oxygenation with continued IV fluids. Some BLE edema, prescribed bumex prn, and noted elevated CVP on TTE last year concerning for volume overload  Less likely infectious. Noted leukocytosis 27 on presentation, since improved to 12 today. Otherwise, normotensive, afebrile, and normal LA.   TTE nml EF, unremarkable  S/p thoracentesis w/ IR  CT Noncontrast 5/13 w/ pleural effusions still present, worsening  On room air    -- Pleural fluid studies consistent with TRANSUDATIVE effusion. Etiology thought to be somewhat chronic, as was present on initial CT< but aggravated by large volume IV fluid resuscitation during SBO.  - Pulmonology consulted given recurrent effusions unknown etiology; state no indication for repeat thoracentesis; continue diuresis  - Lasix 40mg IV BID while inpatient; placed on lasix 20mg PO with potassium supplements at discharge

## 2022-05-18 NOTE — ASSESSMENT & PLAN NOTE
Psoas hematoma noted on CT Chest 5/13  Patient reporting new-onset hip pain particularly with hip flexion that he says started 5/12  CT abdomen yesterday w/ large psoas hematoma, possibly enlarged from previous CT Chest partially visualized  CTA 5/15 w/ concern for continued active bleed  Consented for IR embolization, on re-evaluation of imaging, appeared stable- no current planned intervention w/ IR  S/p 2 units pRBC w/ appropriate response on CBC  Hgb stablized, appears to be resolving    -- Hold DVT ppx in bleed; SCDs ordered  -- Will STAT call IR if patient becomes unstable

## 2022-05-18 NOTE — ASSESSMENT & PLAN NOTE
Patient noted to be spitting up a small-moderate amount of [place] blood on 5/13. Initially thought to be a nosebleed, as patient complained of dry/itchy nose.There was then some concern patient was actually coughing up blood. Red tinge was noted in patient's posterior oropharynx, but still unclear of the source.   Significant epistaxis with decrease in Hgb. Patient swallowing blood and subsequent rise in BUN.   Currently stable s/p rapid rhino  Nasal packings removed, no further episodes of bleeding    -- ENT consulted, appreciate assistance   -- ENT placed nasal packing and rapid rhino will keep in place for 3 days- removed  -- Amoxicillin-clavulanate for abx ppx while packings in place   -- Holding DVT ppx in setting of bleed  -- Can use Afrin PRN for nose bleed in future

## 2022-05-18 NOTE — PLAN OF CARE
POC discussed with patient. Intermittently disoriented to time. Continues to have loose frequent bowel movements. Per patient, he uses imodium at home (patient says he gave his home medication list to his doctors). Scrotum excoriated and tender- barrier ointment applied. Buttocks/coccyx maroon color and signs of incontinent dermatitis. Q2 turns in progress

## 2022-05-18 NOTE — PLAN OF CARE
[facility]    FACILITY TRANSFER ORDERS      Patient Name: Giselle Lemus  YOB: 1933    PCP: Tl Torres MD   PCP Address: [address]  / [facility] [address]  PCP Phone Number: [phone]  PCP Fax: [phone]    Encounter Date: 05/18/2022    Admit to: Inpatient Rehab    Vital Signs:  Routine    Diagnoses:   Active Hospital Problems    Diagnosis  POA    *Nontraumatic psoas hematoma [M79.81]  Clinically Undetermined    Incontinence associated dermatitis [L25.8, R32]  No    Pulmonary nodule [R91.1]  Yes    Acute blood loss anemia [D62]  No    Hard of hearing [H91.90]  Yes     Chronic    Delirium [R41.0]  Yes    Epistaxis [R04.0]  No    Impaired mobility and activities of daily living [Z74.09, Z78.9]  Yes    Pleural effusion [J90]  Yes    Sinus bradycardia [R00.1]  Yes    Hypophosphatemia [E83.39]  Yes    Parkinsons [G20]  Yes     R hand tremor starting in 2017, diagnosed Sept 2019 by Dr. Angeles at       Benign prostatic hyperplasia [N40.0]  Yes      Resolved Hospital Problems    Diagnosis Date Resolved POA    SBO (small bowel obstruction) [K56.609] 05/17/2022 Yes       Allergies:  Review of patient's allergies indicates:   Allergen Reactions    Heparin analogues Other (See Comments)     Not true allergy - but patient developed large spontaneous RP hematoma and concurrent severe epistaxis while on DVT prophylactic dose heparin - consider mechanical DVT ppx in future       Diet: regular diet    Activities: Activity as tolerated    Nursing: Routine, Delirium Precautions    CONSULTS:    Physical Therapy to evaluate and treat.  and Occupational Therapy to evaluate and treat. and Would Care to evaluate incontinence-associated dermatitis to areas of buttock and scrotum.    MISCELLANEOUS CARE:  Routine Skin for Bedridden Patients: Apply moisture barrier cream to all skin folds and wet areas in perineal area daily and after baths and all bowel movements.    WOUND CARE  ORDERS  Yes: Nursing to apply BPCO (Venelex) bid/prn cleaning to buttock (dermatitis due to incontinence)    Medications: Review discharge medications with patient and family and provide education.      Current Discharge Medication List      START taking these medications    Details   balsam peru-castor oiL Oint Apply topically 2 (two) times a day. Apply to buttocks      furosemide (LASIX) 20 MG tablet Take 1 tablet (20 mg total) by mouth once daily.  Qty: 360 tablet, Refills: 0      oxymetazoline (AFRIN) 0.05 % nasal spray 2 sprays by Nasal route 2 (two) times daily as needed (nose bleed).  Refills: 0      potassium chloride (MICRO-K) 10 MEQ CpSR Take 1 capsule (10 mEq total) by mouth once daily.  Qty: 360 capsule, Refills: 0      sodium chloride (OCEAN) 0.65 % nasal spray 1 spray by Nasal route as needed (irritation).  Refills: 12         CONTINUE these medications which have CHANGED    Details   carbidopa-levodopa  mg (SINEMET)  mg per tablet Take 1 tablet by mouth 4 (four) times daily.  Qty: 120 tablet, Refills: 11      loperamide (IMODIUM) 2 mg capsule Take 1 capsule (2 mg total) by mouth 3 (three) times daily as needed for Diarrhea.  Refills: 0         CONTINUE these medications which have NOT CHANGED    Details   acetaminophen (TYLENOL) 325 MG tablet Take 650 mg by mouth every 6 (six) hours as needed for Pain.      finasteride (PROSCAR) 5 mg tablet Take 5 mg by mouth once daily.      rasagiline (AZILECT) 1 mg Tab Take 1 tablet (1 mg total) by mouth once daily at 6am.  Qty: 90 tablet, Refills: 3                Immunizations Administered as of 5/18/2022     Name Date Dose VIS Date Route Exp Date    COVID-19, MRNA, LN-S, PF (Pfizer) (Purple Cap) 1/31/2021  1:14 PM 0.3 mL 12/12/2020 Intramuscular 5/31/2021    Site: Left deltoid     Given By: Angy Choi RN     : [employer]     Lot: [id number]     COVID-19, MRNA, LN-S, PF (Pfizer) (Purple Cap) 1/10/2021  1:24 PM 0.3 mL 12/12/2020  Intramuscular 4/30/2021    Site: Left deltoid     Given By: Olya Mcfadden, RN     : [employer]     Lot: [id number]           This patient has had both covid vaccinations    Some patients may experience side effects after vaccination.  These may include fever, headache, muscle or joint aches.  Most symptoms resolve with 24-48 hours and do not require urgent medical evaluation unless they persist for more than 72 hours or symptoms are concerning for an unrelated medical condition.          _________________________________  Jono Wagner DO  05/18/2022

## 2022-06-04 PROCEDURE — G0180 MD CERTIFICATION HHA PATIENT: HCPCS | Mod: ,,, | Performed by: STUDENT IN AN ORGANIZED HEALTH CARE EDUCATION/TRAINING PROGRAM

## 2022-06-04 PROCEDURE — G0180 PR HOME HEALTH MD CERTIFICATION: ICD-10-PCS | Mod: ,,, | Performed by: STUDENT IN AN ORGANIZED HEALTH CARE EDUCATION/TRAINING PROGRAM

## 2022-06-08 LAB
ALLENS TEST: ABNORMAL
DELSYS: ABNORMAL
FLOW: 2
FUNGUS SPEC CULT: NORMAL
HCO3 UR-SCNC: 26.1 MMOL/L (ref 24–28)
HCO3 UR-SCNC: 26.3 MMOL/L (ref 24–28)
HCT VFR BLD CALC: 28 %PCV (ref 36–54)
HCT VFR BLD CALC: 30 %PCV (ref 36–54)
MODE: ABNORMAL
PCO2 BLDA: 49.9 MMHG (ref 35–45)
PCO2 BLDA: 52.2 MMHG (ref 35–45)
PH SMN: 7.31 [PH] (ref 7.35–7.45)
PH SMN: 7.33 [PH] (ref 7.35–7.45)
PO2 BLDA: 67 MMHG (ref 80–100)
PO2 BLDA: 97 MMHG (ref 80–100)
POC BE: 0 MMOL/L
POC BE: 0 MMOL/L
POC IONIZED CALCIUM: 1.26 MMOL/L (ref 1.06–1.42)
POC IONIZED CALCIUM: 1.28 MMOL/L (ref 1.06–1.42)
POC SATURATED O2: 91 % (ref 95–100)
POC SATURATED O2: 97 % (ref 95–100)
POC TCO2: 28 MMOL/L (ref 23–27)
POC TCO2: 28 MMOL/L (ref 23–27)
POTASSIUM BLD-SCNC: 4 MMOL/L (ref 3.5–5.1)
POTASSIUM BLD-SCNC: 4.2 MMOL/L (ref 3.5–5.1)
SAMPLE: ABNORMAL
SITE: ABNORMAL
SODIUM BLD-SCNC: 141 MMOL/L (ref 136–145)
SODIUM BLD-SCNC: 144 MMOL/L (ref 136–145)
SP02: 96

## 2022-06-14 LAB
POCT GLUCOSE: 124 MG/DL (ref 70–110)
POCT GLUCOSE: 62 MG/DL (ref 70–110)
POCT GLUCOSE: 90 MG/DL (ref 70–110)

## 2022-06-20 ENCOUNTER — TELEPHONE (OUTPATIENT)
Dept: NEUROLOGY | Facility: CLINIC | Age: 87
End: 2022-06-20
Payer: MEDICARE

## 2022-06-20 NOTE — TELEPHONE ENCOUNTER
----- Message from Liana Georges sent at 6/20/2022  3:44 PM CDT -----      Please reach ou to patients wife to make f/u for Parkinsons following inpatient stay  No dates come up for me to schedule    Please contact patients wife can be contacted @# [phone]

## 2022-06-21 ENCOUNTER — LAB VISIT (OUTPATIENT)
Dept: LAB | Facility: HOSPITAL | Age: 87
End: 2022-06-21
Attending: FAMILY MEDICINE
Payer: MEDICARE

## 2022-06-21 ENCOUNTER — TELEPHONE (OUTPATIENT)
Dept: NEUROLOGY | Facility: CLINIC | Age: 87
End: 2022-06-21
Payer: MEDICARE

## 2022-06-21 ENCOUNTER — OFFICE VISIT (OUTPATIENT)
Dept: PRIMARY CARE CLINIC | Facility: CLINIC | Age: 87
End: 2022-06-21
Payer: MEDICARE

## 2022-06-21 VITALS
TEMPERATURE: 98 F | SYSTOLIC BLOOD PRESSURE: 100 MMHG | DIASTOLIC BLOOD PRESSURE: 50 MMHG | BODY MASS INDEX: 21.19 KG/M2 | RESPIRATION RATE: 18 BRPM | OXYGEN SATURATION: 94 % | HEIGHT: 65 IN | WEIGHT: 127.19 LBS | HEART RATE: 56 BPM

## 2022-06-21 DIAGNOSIS — Z87.898 HISTORY OF EPISTAXIS: ICD-10-CM

## 2022-06-21 DIAGNOSIS — Z00.00 GENERAL MEDICAL EXAM: Primary | ICD-10-CM

## 2022-06-21 DIAGNOSIS — K51.20 ULCERATIVE (CHRONIC) PROCTITIS WITHOUT COMPLICATIONS: ICD-10-CM

## 2022-06-21 DIAGNOSIS — R79.9 ABNORMAL FINDING OF BLOOD CHEMISTRY, UNSPECIFIED: ICD-10-CM

## 2022-06-21 DIAGNOSIS — Z00.00 GENERAL MEDICAL EXAM: ICD-10-CM

## 2022-06-21 DIAGNOSIS — E64.0 SEQUELAE OF PROTEIN-CALORIE MALNUTRITION: ICD-10-CM

## 2022-06-21 DIAGNOSIS — N40.0 BENIGN PROSTATIC HYPERPLASIA, UNSPECIFIED WHETHER LOWER URINARY TRACT SYMPTOMS PRESENT: ICD-10-CM

## 2022-06-21 DIAGNOSIS — R27.9 UNSPECIFIED LACK OF COORDINATION: ICD-10-CM

## 2022-06-21 DIAGNOSIS — G20.A1 PARKINSONS: ICD-10-CM

## 2022-06-21 DIAGNOSIS — D64.9 ANEMIA, UNSPECIFIED TYPE: ICD-10-CM

## 2022-06-21 DIAGNOSIS — R68.89 OTHER GENERAL SYMPTOMS AND SIGNS: ICD-10-CM

## 2022-06-21 DIAGNOSIS — E79.0 HYPERURICEMIA: ICD-10-CM

## 2022-06-21 DIAGNOSIS — H91.90 HEARING LOSS, UNSPECIFIED HEARING LOSS TYPE, UNSPECIFIED LATERALITY: Chronic | ICD-10-CM

## 2022-06-21 LAB
ALBUMIN SERPL BCP-MCNC: 3.1 G/DL (ref 3.5–5.2)
ALP SERPL-CCNC: 74 U/L (ref 55–135)
ALT SERPL W/O P-5'-P-CCNC: <5 U/L (ref 10–44)
ANION GAP SERPL CALC-SCNC: 9 MMOL/L (ref 8–16)
AST SERPL-CCNC: 13 U/L (ref 10–40)
BILIRUB SERPL-MCNC: 0.4 MG/DL (ref 0.1–1)
BUN SERPL-MCNC: 19 MG/DL (ref 8–23)
CALCIUM SERPL-MCNC: 8.5 MG/DL (ref 8.7–10.5)
CHLORIDE SERPL-SCNC: 104 MMOL/L (ref 95–110)
CHOLEST SERPL-MCNC: 151 MG/DL (ref 120–199)
CHOLEST/HDLC SERPL: 2.6 {RATIO} (ref 2–5)
CO2 SERPL-SCNC: 25 MMOL/L (ref 23–29)
CREAT SERPL-MCNC: 1.2 MG/DL (ref 0.5–1.4)
EST. GFR  (AFRICAN AMERICAN): >60 ML/MIN/1.73 M^2
EST. GFR  (NON AFRICAN AMERICAN): 53.7 ML/MIN/1.73 M^2
ESTIMATED AVG GLUCOSE: 103 MG/DL (ref 68–131)
GLUCOSE SERPL-MCNC: 129 MG/DL (ref 70–110)
HBA1C MFR BLD: 5.2 % (ref 4–5.6)
HDLC SERPL-MCNC: 59 MG/DL (ref 40–75)
HDLC SERPL: 39.1 % (ref 20–50)
IRON SERPL-MCNC: 74 UG/DL (ref 45–160)
LDLC SERPL CALC-MCNC: 81.2 MG/DL (ref 63–159)
NONHDLC SERPL-MCNC: 92 MG/DL
POTASSIUM SERPL-SCNC: 4.2 MMOL/L (ref 3.5–5.1)
PROT SERPL-MCNC: 6.9 G/DL (ref 6–8.4)
SATURATED IRON: 27 % (ref 20–50)
SODIUM SERPL-SCNC: 138 MMOL/L (ref 136–145)
TOTAL IRON BINDING CAPACITY: 272 UG/DL (ref 250–450)
TRANSFERRIN SERPL-MCNC: 184 MG/DL (ref 200–375)
TRIGL SERPL-MCNC: 54 MG/DL (ref 30–150)

## 2022-06-21 PROCEDURE — 99999 PR PBB SHADOW E&M-EST. PATIENT-LVL IV: ICD-10-PCS | Mod: PBBFAC,,, | Performed by: FAMILY MEDICINE

## 2022-06-21 PROCEDURE — 82607 VITAMIN B-12: CPT | Performed by: FAMILY MEDICINE

## 2022-06-21 PROCEDURE — 99397 PR PREVENTIVE VISIT,EST,65 & OVER: ICD-10-PCS | Mod: S$PBB,GZ,, | Performed by: FAMILY MEDICINE

## 2022-06-21 PROCEDURE — 82728 ASSAY OF FERRITIN: CPT | Performed by: FAMILY MEDICINE

## 2022-06-21 PROCEDURE — 80061 LIPID PANEL: CPT | Performed by: FAMILY MEDICINE

## 2022-06-21 PROCEDURE — 84443 ASSAY THYROID STIM HORMONE: CPT | Performed by: FAMILY MEDICINE

## 2022-06-21 PROCEDURE — 84466 ASSAY OF TRANSFERRIN: CPT | Performed by: FAMILY MEDICINE

## 2022-06-21 PROCEDURE — 80053 COMPREHEN METABOLIC PANEL: CPT | Performed by: FAMILY MEDICINE

## 2022-06-21 PROCEDURE — 82746 ASSAY OF FOLIC ACID SERUM: CPT | Performed by: FAMILY MEDICINE

## 2022-06-21 PROCEDURE — 99214 OFFICE O/P EST MOD 30 MIN: CPT | Mod: PBBFAC,PN | Performed by: FAMILY MEDICINE

## 2022-06-21 PROCEDURE — 85025 COMPLETE CBC W/AUTO DIFF WBC: CPT | Performed by: FAMILY MEDICINE

## 2022-06-21 PROCEDURE — 36415 COLL VENOUS BLD VENIPUNCTURE: CPT | Mod: PN | Performed by: FAMILY MEDICINE

## 2022-06-21 PROCEDURE — 99397 PER PM REEVAL EST PAT 65+ YR: CPT | Mod: S$PBB,GZ,, | Performed by: FAMILY MEDICINE

## 2022-06-21 PROCEDURE — 99999 PR PBB SHADOW E&M-EST. PATIENT-LVL IV: CPT | Mod: PBBFAC,,, | Performed by: FAMILY MEDICINE

## 2022-06-21 PROCEDURE — 83036 HEMOGLOBIN GLYCOSYLATED A1C: CPT | Performed by: FAMILY MEDICINE

## 2022-06-21 NOTE — TELEPHONE ENCOUNTER
----- Message from Judd Aleman sent at 6/21/2022 12:19 PM CDT -----  Contact: @[phone]  Pt is calling to schedule an appt in October. Please call to discuss further. Pt would like to reschedule with Dr. Britton.

## 2022-06-21 NOTE — PROGRESS NOTES
"BP (!) 100/50   Pulse (!) 56   Temp 97.8 °F (36.6 °C)   Resp 18   Ht 5' 5" (1.651 m)   Wt 57.7 kg (127 lb 3.3 oz)   SpO2 (!) 94%   BMI 21.17 kg/m²   ===========================================    HPI  Giselle Lemus is a 88 y.o. male here for  Annual Wellness Exam     Not seen by me in almost 3 years.    Health maintenance reviewed w pt inc screening labs.        No complaints today other than weakness. Using walker    Dc from hospital last month    Dc summary as follows:   "Name: Giselle Lemus  MRN: [id number]  Patient Class: IP- Inpatient  Admission Date: 5/8/2022  Hospital Length of Stay: 10 days  Discharge Date and Time:  05/18/2022 1:45 PM  Attending Physician: John Alexander MD   Discharging Provider: Jono Wagner DO  Primary Care Provider: Tl Torres MD  [facility] Medicine Team: [facility] MED 1 Jono Wagner DO     HPI:   Giselle Lemus is an 88 y.o. M. With history of Parkinson's, BPH, UC s/p total colectomy, and chronic bronchitis who presents with abdominal pain. Found to have SBO. NGT was placed initially and since d/c. Patient with + BM. Per general surgery, SBO resolved. Noted leukocytosis 27 on presentation, since improved to 12 today. Normotensive, afebrile, and normal LA. CXR revealed small to moderate left pleural effusion. Zosyn initialed. IR consulted and plan for thoracentesis. NPO now with IV fluids. Oxygenation slightly worsening. Currently on 2L O2 via NC. Developed SB with rate 30-40s. Cardiology consulted and recs currently pending. Medicine consulted for possible transfer to medicine given resolved SBO without surgical intervention, pleural effusion, and bradycardia.        * No surgery found *       Hospital Course:   Admitted to general surgery for treatment of SBO, which resolved with conservative care and bowel rest. Patient takes frequent loperamide after his colectomy at [place] due to diarrhea after meals but per surgery, would discourage loperamide as it may have predisposed the SBO. " Began having BM and tolerating PO well. However, subsequently found to have L pleural effusion, which was present on original abdominal CT but became more enlarged after IV fluids given during SBO. TTE showed normal cardiac function. IR thoracentesis revealed transudative effusion without signs of infection. Attempting lasix to relieve small O2 requirement. Course also complicated by asymptomatic sinus bradycardia to 40s. Cardiology contacted and reviewed EKG which was sinus maycol. PT recommending rehab, patient and family in agreement. Accepted to [facility]. Resumed on home loperamide PRN for baseline diarrhea following meals since patient is s/p colectomy and is chronically dependent on this. 2 episodes of epistaxis, coughing up blood. Cancelled discharge to [facility]. CXR w/ worsening in pleural effusions. CT Chest w/ pulmonary edema and partially visualized L psoas hematoma. Discontinued DVT ppx. Overnight continued epistaxis, coughing up blood, and tarry stool. ENT consulted, placed rhino rocket/nasal packings. Drop in Hgb, continuing to monitor. Tarry stool likely secondary to swallowed blood. Plan to evaluate L psoas hematoma w/ CT noncontrast abdomen. Hgb continued trending down <7, transfused 1 unit pRBC, w/ appropriate response. CTA abdomen done w/ concern for active bleeding psoas hematoma and was consented for embolization w/ IR. IR Dr. Ashley re-evaluated imaging and given hemodynamic stability, he had less concern for bleed, no intervention currently. Nasal packings removed w/o further epistaxis. Hgb remained stable over next couple days. Hematology consulted and felt unlikely that patient has developed an acquired or hereditary bleeding disorder in the setting of normal platelets and coagulation studies and would not pursue further diagnostics at this time.  Pulmonology consulted for further workup of pleural effusions (which were present prior to patient being admitted). Pulm recommending further  "diuresis. Tolerated lasix 40mg qd inpatient. Medically stabilized to discharge to Inpatient Rehab after no further bleeding and Hgb stable for 2-3 days. Discharaged w/ 20mg Lasix PO for the pleural effusions w/ 10mEq potassium. Of note, a RLL pulmonary nodule was incidentally seen on CT imaging, and followup within 3-6 months was recommended. Heparin analogues added to allergies list and family/pt instructed to let future providers know about hx of bleeding. Recommend avoidance if possible and mechanical DVT ppx. "         ===========================================  History reviewed. No pertinent family history.  Social History     Socioeconomic History    Marital status:    Tobacco Use    Smoking status: Never Smoker    Smokeless tobacco: Never Used   Substance and Sexual Activity    Alcohol use: Never     Past Surgical History:   Procedure Laterality Date    TOTAL COLECTOMY       Review of Systems   Constitutional: Negative for activity change, appetite change, chills, diaphoresis, fatigue, fever and unexpected weight change.   HENT: Negative for congestion, ear discharge, ear pain, facial swelling, hearing loss, nosebleeds, postnasal drip, rhinorrhea, sinus pressure, sneezing, sore throat, tinnitus, trouble swallowing and voice change.    Eyes: Negative for photophobia, pain, discharge, redness, itching and visual disturbance.   Respiratory: Negative for cough, chest tightness, shortness of breath and wheezing.    Cardiovascular: Negative for chest pain, palpitations and leg swelling.   Gastrointestinal: Negative for abdominal distention, abdominal pain, anal bleeding, blood in stool, constipation, diarrhea, nausea, rectal pain and vomiting.   Endocrine: Negative for cold intolerance, heat intolerance, polydipsia, polyphagia and polyuria.   Genitourinary: Negative for difficulty urinating, dysuria and flank pain.   Musculoskeletal: Negative for arthralgias, back pain, joint swelling, myalgias and " "neck pain.   Skin: Negative for rash.   Neurological: Positive for weakness. Negative for dizziness, tremors, seizures, syncope, speech difficulty, light-headedness, numbness and headaches.   Psychiatric/Behavioral: Negative for behavioral problems, confusion, decreased concentration, dysphoric mood, sleep disturbance and suicidal ideas. The patient is not nervous/anxious and is not hyperactive.      Current Outpatient Medications on File Prior to Visit   Medication Sig Dispense Refill    balsam peru-castor oiL Oint Apply topically 2 (two) times a day. Apply to buttocks      carbidopa-levodopa  mg (SINEMET)  mg per tablet Take 1 tablet by mouth 4 (four) times daily. 120 tablet 11    finasteride (PROSCAR) 5 mg tablet Take 5 mg by mouth once daily.      furosemide (LASIX) 20 MG tablet Take 1 tablet (20 mg total) by mouth once daily. 360 tablet 0    potassium chloride (MICRO-K) 10 MEQ CpSR Take 1 capsule (10 mEq total) by mouth once daily. 360 capsule 0    rasagiline (AZILECT) 1 mg Tab Take 1 tablet (1 mg total) by mouth once daily at 6am. 90 tablet 3    sodium chloride (OCEAN) 0.65 % nasal spray 1 spray by Nasal route as needed (irritation).  12     No current facility-administered medications on file prior to visit.     BP (!) 100/50   Pulse (!) 56   Temp 97.8 °F (36.6 °C)   Resp 18   Ht 5' 5" (1.651 m)   Wt 57.7 kg (127 lb 3.3 oz)   SpO2 (!) 94%   BMI 21.17 kg/m²   Physical Exam  Vitals and nursing note reviewed.   Constitutional:       General: He is not in acute distress.     Appearance: Normal appearance. He is well-developed. He is not ill-appearing or toxic-appearing.   HENT:      Head: Normocephalic and atraumatic.      Right Ear: Tympanic membrane, ear canal and external ear normal.      Left Ear: Tympanic membrane, ear canal and external ear normal.      Nose: Nose normal.      Mouth/Throat:      Lips: Pink.      Mouth: Mucous membranes are moist.      Pharynx: No oropharyngeal " exudate or posterior oropharyngeal erythema.   Eyes:      General: No scleral icterus.        Right eye: No discharge.         Left eye: No discharge.      Extraocular Movements: Extraocular movements intact.      Conjunctiva/sclera: Conjunctivae normal.   Neck:      Vascular: No carotid bruit.   Cardiovascular:      Rate and Rhythm: Normal rate and regular rhythm.      Pulses: Normal pulses.      Heart sounds: Normal heart sounds. No murmur heard.  Pulmonary:      Effort: Pulmonary effort is normal. No respiratory distress.      Breath sounds: Normal breath sounds. No wheezing or rales.   Abdominal:      General: Bowel sounds are normal. There is no distension.      Palpations: Abdomen is soft. There is no mass.      Tenderness: There is no abdominal tenderness. There is no right CVA tenderness, left CVA tenderness, guarding or rebound.      Hernia: No hernia is present.   Musculoskeletal:      Cervical back: Normal range of motion and neck supple. No rigidity or tenderness.   Lymphadenopathy:      Cervical: No cervical adenopathy.   Skin:     General: Skin is warm and dry.   Neurological:      General: No focal deficit present.      Mental Status: He is alert. Mental status is at baseline.   Psychiatric:         Mood and Affect: Mood normal.         Behavior: Behavior normal. Behavior is cooperative.       ============================    Eng was seen today for follow-up.    Diagnoses and all orders for this visit:    General medical exam  -     CBC Auto Differential; Future  -     Folate; Future  -     Ferritin; Future  -     Iron and TIBC; Future  -     Vitamin B12; Future  -     Comprehensive Metabolic Panel; Future  -     Lipid Panel; Future  -     Hemoglobin A1C; Future  -     TSH; Future    Anemia, unspecified type  -     CBC Auto Differential; Future  -     Folate; Future  -     Ferritin; Future  -     Iron and TIBC; Future  -     Vitamin B12; Future    Unspecified lack of coordination   -     Folate;  Future    Sequelae of protein-calorie malnutrition   -     Vitamin B12; Future    Abnormal finding of blood chemistry, unspecified   -     Lipid Panel; Future  -     Hemoglobin A1C; Future    Other general symptoms and signs   -     TSH; Future    Parkinsons    Hearing loss, unspecified hearing loss type, unspecified laterality    History of epistaxis    Benign prostatic hyperplasia, unspecified whether lower urinary tract symptoms present    Hyperuricemia    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    Tl Torres MD    ===========================================

## 2022-06-22 LAB
BASOPHILS # BLD AUTO: 0.06 K/UL (ref 0–0.2)
BASOPHILS NFR BLD: 0.8 % (ref 0–1.9)
DIFFERENTIAL METHOD: ABNORMAL
EOSINOPHIL # BLD AUTO: 0.2 K/UL (ref 0–0.5)
EOSINOPHIL NFR BLD: 2.1 % (ref 0–8)
ERYTHROCYTE [DISTWIDTH] IN BLOOD BY AUTOMATED COUNT: 16.5 % (ref 11.5–14.5)
FERRITIN SERPL-MCNC: 223 NG/ML (ref 20–300)
FOLATE SERPL-MCNC: 7.5 NG/ML (ref 4–24)
HCT VFR BLD AUTO: 32.8 % (ref 40–54)
HGB BLD-MCNC: 10.1 G/DL (ref 14–18)
IMM GRANULOCYTES # BLD AUTO: 0.01 K/UL (ref 0–0.04)
IMM GRANULOCYTES NFR BLD AUTO: 0.1 % (ref 0–0.5)
LYMPHOCYTES # BLD AUTO: 1.6 K/UL (ref 1–4.8)
LYMPHOCYTES NFR BLD: 20 % (ref 18–48)
MCH RBC QN AUTO: 30.9 PG (ref 27–31)
MCHC RBC AUTO-ENTMCNC: 30.8 G/DL (ref 32–36)
MCV RBC AUTO: 100 FL (ref 82–98)
MONOCYTES # BLD AUTO: 0.8 K/UL (ref 0.3–1)
MONOCYTES NFR BLD: 10.5 % (ref 4–15)
NEUTROPHILS # BLD AUTO: 5.3 K/UL (ref 1.8–7.7)
NEUTROPHILS NFR BLD: 66.5 % (ref 38–73)
NRBC BLD-RTO: 0 /100 WBC
PLATELET # BLD AUTO: 220 K/UL (ref 150–450)
PMV BLD AUTO: 11 FL (ref 9.2–12.9)
RBC # BLD AUTO: 3.27 M/UL (ref 4.6–6.2)
TSH SERPL DL<=0.005 MIU/L-ACNC: 1.42 UIU/ML (ref 0.4–4)
VIT B12 SERPL-MCNC: 728 PG/ML (ref 210–950)
WBC # BLD AUTO: 7.94 K/UL (ref 3.9–12.7)

## 2022-06-23 PROBLEM — D62 ACUTE BLOOD LOSS ANEMIA: Status: RESOLVED | Noted: 2022-05-15 | Resolved: 2022-06-23

## 2022-06-23 PROBLEM — R27.9 UNSPECIFIED LACK OF COORDINATION: Status: ACTIVE | Noted: 2022-06-23

## 2022-06-23 PROBLEM — R41.0 DELIRIUM: Status: RESOLVED | Noted: 2022-05-14 | Resolved: 2022-06-23

## 2022-06-23 PROBLEM — Z87.898 HISTORY OF EPISTAXIS: Status: ACTIVE | Noted: 2022-06-23

## 2022-06-23 PROBLEM — R04.0 EPISTAXIS: Status: RESOLVED | Noted: 2022-05-13 | Resolved: 2022-06-23

## 2022-06-23 PROBLEM — K51.20 ULCERATIVE (CHRONIC) PROCTITIS WITHOUT COMPLICATIONS: Status: ACTIVE | Noted: 2022-06-23

## 2022-06-23 PROBLEM — D64.9 ANEMIA: Status: ACTIVE | Noted: 2022-06-23

## 2022-06-23 PROBLEM — E83.39 HYPOPHOSPHATEMIA: Status: RESOLVED | Noted: 2022-05-10 | Resolved: 2022-06-23

## 2022-06-23 PROBLEM — E64.0 SEQUELAE OF PROTEIN-CALORIE MALNUTRITION: Status: ACTIVE | Noted: 2022-06-23

## 2022-06-28 ENCOUNTER — EXTERNAL HOME HEALTH (OUTPATIENT)
Dept: HOME HEALTH SERVICES | Facility: HOSPITAL | Age: 87
End: 2022-06-28
Payer: MEDICARE

## 2022-06-30 ENCOUNTER — DOCUMENT SCAN (OUTPATIENT)
Dept: HOME HEALTH SERVICES | Facility: HOSPITAL | Age: 87
End: 2022-06-30
Payer: MEDICARE

## 2022-07-06 ENCOUNTER — TELEPHONE (OUTPATIENT)
Dept: PRIMARY CARE CLINIC | Facility: CLINIC | Age: 87
End: 2022-07-06
Payer: MEDICARE

## 2022-07-06 NOTE — TELEPHONE ENCOUNTER
----- Message from Marilia Henriquez sent at 7/6/2022  4:09 PM CDT -----  Contact: [facility] [phone]  WANTS TO REQUEST A VERBAL OK TO CONTINUE HOME PHYSICAL THERAPY.

## 2022-07-13 ENCOUNTER — DOCUMENT SCAN (OUTPATIENT)
Dept: HOME HEALTH SERVICES | Facility: HOSPITAL | Age: 87
End: 2022-07-13
Payer: MEDICARE

## 2022-07-21 ENCOUNTER — DOCUMENT SCAN (OUTPATIENT)
Dept: HOME HEALTH SERVICES | Facility: HOSPITAL | Age: 87
End: 2022-07-21
Payer: MEDICARE

## 2022-08-03 NOTE — TELEPHONE ENCOUNTER
----- Message from Nataly Sosa sent at 8/3/2022 10:21 AM CDT -----  Contact: Obi - wife  Pt's wife requesting call back re: scheduling appt. Caller states someone was supposed to call her in July to set up an appt and she never heard from anyone.     Confirmed contact below:  Contact Name:Obi  Phone Number: [phone]

## 2022-08-04 NOTE — TELEPHONE ENCOUNTER
----- Message from Yogi Alvarez sent at 8/4/2022  2:37 PM CDT -----  Regarding: PT CALLED IN JUNE AND WAS TOLD ITS TOO EALRY  Contact: PT  Pt's requesting a call back regarding scheduling new appt for Oct.      Confirmed contact info below:  Contact Name: Giselle Lemus  Phone Number: [phone]

## 2022-08-09 NOTE — TELEPHONE ENCOUNTER
----- Message from Carole Toussaint sent at 8/9/2022  3:31 PM CDT -----  Regarding: Appt  Contact: pt @ [phone]  Wife is calling because nurse stated that she was going to make appt for pt asap.  Asking for call back

## 2022-08-09 NOTE — TELEPHONE ENCOUNTER
I spoke with Mrs. Lemus to schedule a 6 month follow up that she has been attempt to set up since earlier this summer. She understood we had to wait until the calendar was unlocked, but was concerned as her  had a hospitalization and is experiencing increased sleepiness from a newly added medication, rasagiline 1 mg, from 3/30/22.    Currently taking  - cd/ld QID  - rasagiline 1 mg once a day    Attributes excessive day sleeping to rasagiline as this began with the addition of this medication.     Walking:  - 1 fall since hospital  - PT in home since discharge  - sometimes loses balance   - left leg is painful and slower to respond, may drag (unclear on phone)  - walks slowly and cautiously using walker    Tremor:  - well controlled with cd/ld  - largely not noticeable    Sleep/Cognition:  - sleeps well at night  - denies VH  - mentioned vision problem, but clarified physical eye complaint  - unclear if there are memory challenges as has been slow to respond w/ medications  - sometimes forgets things per Mrs. Lemus.     - - -  I will forward to Dr. Britton (10/26 at 3:40 PM) and Janina (hold placed for 8/16 at 2:30 PM for potential earlier appointment). I will [place] back later this week to see if Mrs. Lemus does want the earlier appointment after all.

## 2022-08-10 NOTE — TELEPHONE ENCOUNTER
When I spoke with Mrs. Lemus, she asked for clarification regarding the reduction of rasagiline (confirmed 1 mg) vs. carbidopa-levodopa as she associated the increased sleepiness and drowsiness with the cd/ld.    -  I confirmed that Mr. Lemus has been receiving carbidopa-levodopa QID for some time prior to the increased drowsiness and excessive sleepiness, and that this began after rasagiline based on her recounting.     - I explained in simple language the use of rasagiline to prevent breakdown of dopamine (already increased via addition of cd/ld) as reinforcing of the effects of cd/ld to maintain the desired dopamine level. I laid out why some of the excessive drowsiness can be associated with cd/ld dosing schedule but may still be due to the current level of rasagiline based on Dr. Britton's recommendation here.   -- offered to keep 8/16 hold with Janina so they can discuss in depth to receive confirmation of the above and to monitor response to lower dose.     - explained the goal to maintain positive effects while reducing drowsiness.     - we discussed ways to split the tablets. Mrs. Lemus will procure a pill cutter and timeline to check in with us via phone in the next few weeks.     - Currently holding 10/26 at 3:40 with Dr. Britton.

## 2022-08-10 NOTE — TELEPHONE ENCOUNTER
Please ask them to cut the rasagiline in half (0.5 mg) and see if tolerated better - with same benefits.

## 2022-08-11 NOTE — TELEPHONE ENCOUNTER
----- Message from Kari Pulido sent at 8/11/2022  3:37 PM CDT -----  Contact: Anni @ [phone]  Patient is returning a phone call.  Who left a message for the patient: Jessi  Does patient know what this is regarding:  home health  Would you like a call back, or a response through your [facility] portal?:     Comments:     Home health nurse Anni  will be on site until 4:30  Anni can be reached at     [phone]

## 2022-08-11 NOTE — TELEPHONE ENCOUNTER
Spoke to Anni and gave verbal order for OT and Speech, she says he already has an order for PT.  Nurse once a week for vs and med check, Nurse aide twice a week for bathing, grooming and ADL.

## 2022-08-16 PROBLEM — R29.6 FREQUENT FALLS: Status: ACTIVE | Noted: 2022-01-01

## 2022-08-16 NOTE — PROGRESS NOTES
"BP (!) 110/58 (BP Location: Left arm, Patient Position: Sitting, BP Method: Medium (Manual))   Pulse 70   Temp 98 °F (36.7 °C) (Oral)   Ht 5' 5" (1.651 m)   Wt 56 kg (123 lb 7.3 oz)   SpO2 96%   BMI 20.54 kg/m²     Chief Complaint: Follow-up      HPI  Giselle Lemus is a 89 y.o. male here for    Multiple medical complaints including Parkinson's.  Have an appointment next month but canceled this.  He wants to see if he can be seen sooner.  Will discuss with referral coordinator     Complains of weakness, increased mucus production without sinus pain or fevers or chills.  No cough.  No shortness of breath.  No loss of sense of taste or smell     Complains of generalized weakness.  Complains of bilateral lower extremity weakness especially.  Complains at least 2 falls over the past weeks.  Complains of memory disturbance.    Complains of difficulty hearing more and more        SURGICAL AND MEDICAL HISTORY: updated and reviewed.  ALLERGIES updated and reviewed.  Review of patient's allergies indicates:   Allergen Reactions    Heparin analogues Other (See Comments)     Not true allergy - but patient developed large spontaneous RP hematoma and concurrent severe epistaxis while on DVT prophylactic dose heparin - consider mechanical DVT ppx in future     CURRENT OUTPATIENT MEDICATIONS updated and reviewed    Current Outpatient Medications:     balsam peru-castor oiL Oint, Apply topically 2 (two) times a day. Apply to buttocks, Disp: , Rfl:     carbidopa-levodopa  mg (SINEMET)  mg per tablet, Take 1 tablet by mouth 4 (four) times daily., Disp: 120 tablet, Rfl: 11    finasteride (PROSCAR) 5 mg tablet, Take 5 mg by mouth once daily., Disp: , Rfl:     furosemide (LASIX) 20 MG tablet, Take 1 tablet (20 mg total) by mouth once daily., Disp: 360 tablet, Rfl: 0    rasagiline (AZILECT) 1 mg Tab, Take 1 tablet (1 mg total) by mouth once daily at 6am., Disp: 90 tablet, Rfl: 3    TOBRADEX 0.3-0.1 % DrpS, Place 1 " "drop into both eyes 4 (four) times daily., Disp: , Rfl:     sodium chloride (OCEAN) 0.65 % nasal spray, 1 spray by Nasal route as needed (irritation)., Disp: , Rfl: 12    Review of Systems   Constitutional: Positive for activity change. Negative for appetite change, chills, diaphoresis, fatigue, fever and unexpected weight change.   HENT: Positive for congestion, hearing loss and trouble swallowing (Only with relation to complaint of thick mucus). Negative for ear discharge, ear pain, facial swelling, nosebleeds, postnasal drip, rhinorrhea, sinus pressure, sneezing, sore throat, tinnitus and voice change.    Eyes: Negative for photophobia, pain, discharge, redness, itching and visual disturbance.   Respiratory: Negative for cough, chest tightness, shortness of breath and wheezing.    Cardiovascular: Negative for chest pain, palpitations and leg swelling.   Gastrointestinal: Negative for abdominal distention, abdominal pain, anal bleeding, blood in stool, constipation, diarrhea, nausea, rectal pain and vomiting.   Endocrine: Negative for cold intolerance, heat intolerance, polydipsia, polyphagia and polyuria.   Genitourinary: Negative for difficulty urinating, dysuria and flank pain.   Musculoskeletal: Negative for arthralgias, back pain, joint swelling, myalgias and neck pain.   Skin: Negative for rash.   Neurological: Positive for tremors and weakness. Negative for dizziness, seizures, syncope, speech difficulty, light-headedness, numbness and headaches.   Psychiatric/Behavioral: Negative for behavioral problems, confusion, decreased concentration, dysphoric mood, sleep disturbance and suicidal ideas. The patient is not nervous/anxious and is not hyperactive.        BP (!) 110/58 (BP Location: Left arm, Patient Position: Sitting, BP Method: Medium (Manual))   Pulse 70   Temp 98 °F (36.7 °C) (Oral)   Ht 5' 5" (1.651 m)   Wt 56 kg (123 lb 7.3 oz)   SpO2 96%   BMI 20.54 kg/m²   Physical Exam  Vitals and nursing " note reviewed.   Constitutional:       General: He is not in acute distress.     Appearance: Normal appearance. He is well-developed. He is not ill-appearing or toxic-appearing.   HENT:      Head: Normocephalic and atraumatic.      Right Ear: Tympanic membrane, ear canal and external ear normal.      Left Ear: Tympanic membrane, ear canal and external ear normal.      Nose: Nose normal.      Mouth/Throat:      Lips: Pink.      Mouth: Mucous membranes are moist.      Pharynx: No oropharyngeal exudate or posterior oropharyngeal erythema.   Eyes:      General: No scleral icterus.        Right eye: No discharge.         Left eye: No discharge.      Extraocular Movements: Extraocular movements intact.      Conjunctiva/sclera: Conjunctivae normal.   Neck:      Vascular: No carotid bruit.   Cardiovascular:      Rate and Rhythm: Normal rate and regular rhythm.      Pulses: Normal pulses.      Heart sounds: Normal heart sounds. No murmur heard.  Pulmonary:      Effort: Pulmonary effort is normal. No respiratory distress.      Breath sounds: Normal breath sounds. No wheezing or rales.   Abdominal:      General: Bowel sounds are normal. There is no distension.      Palpations: Abdomen is soft. There is no mass.      Tenderness: There is no abdominal tenderness. There is no right CVA tenderness, left CVA tenderness, guarding or rebound.      Hernia: No hernia is present.   Musculoskeletal:      Right shoulder: Normal strength.      Left shoulder: Normal strength.      Cervical back: Normal range of motion and neck supple. No rigidity or tenderness.      Right hip: Decreased strength.      Left hip: Decreased strength.   Lymphadenopathy:      Cervical: No cervical adenopathy.   Skin:     General: Skin is warm and dry.   Neurological:      General: No focal deficit present.      Mental Status: He is alert. Mental status is at baseline.   Psychiatric:         Mood and Affect: Mood normal.         Behavior: Behavior normal.  Behavior is cooperative.       Giselle was seen today for follow-up.    Diagnoses and all orders for this visit:    Parkinsons    Unspecified lack of coordination     Frequent falls    General weakness    Anemia, unspecified type  -     CBC Without Differential; Future    Other orders  -     sodium chloride (OCEAN) 0.65 % nasal spray; 1 spray by Nasal route as needed (irritation).      No follow-ups on file.    Will add PT and OT to his present home health.  Will see if 8 can come out as well if insurance will provide because he is having significant limitation with his activities of daily living his wife cannot help who is also debilitated in today using a rolling walker herself.  Need to consider other community sources such as possible nursing home placement.  They are not amenable to discuss this now     Not amenable to consider over-the-counter medications such as Mucinex, nasal saline, Flonase etcetera which may help with the nasal congestion.  He will consider trying ocean nasal spray again    Check a CBC     Maximize nutrition.  Consider boost or Ensure b.i.d. to t.i.d. in addition to meals.    Again stress about PT OT in about consideration of other living situation since he is having frequent falls.  They are not amenable discussion at this time--wife is present as above    Follow-up 6 month    Tl Torres MD        ===========================================

## 2022-08-24 NOTE — CARE UPDATE
Pt arrived to ED via EMS with c/o nose bleed. Pt sts nose has been bleeding x1 hr. Pt is on blood thinners. Pt denies any other complaints. RAPID RESPONSE NURSE ROUND       Rounding completed with charge RN, Pedro Luis. No concerns verbalized at this time. Instructed to call [phone] for further concerns or assistance.

## 2022-09-26 NOTE — TELEPHONE ENCOUNTER
----- Message from Arianna Jewell sent at 9/26/2022 10:23 AM CDT -----  Regarding: carbidopa-levodopa  mg (SINEMET)  mg per tablet  Contact: pt  Refill request.      carbidopa-levodopa  mg (SINEMET)  mg per tablet        [facility]/pharmacy #[id number] - [place], [place] - [address]  [address]  [facility] [address]  Phone: [phone] Fax: [phone]    Confirmed patient's contact info below:  Contact Name: Giselle Lemus  Phone Number: [phone]

## 2022-10-26 NOTE — PATIENT INSTRUCTIONS
Stop rasagiline now. There is no need to taper down.   Keep carbidopa/levodopa at 1 tablet four times daily (dose during the day, 4-5 hours apart)  Send us an update in 1 month via [employer] regarding the sleepiness, tremor and balance.

## 2022-10-26 NOTE — PROGRESS NOTES
Name: Giselle Lemus  MRN: [id number]   CSN: [id number]      Date: 10/26/2022    Referring physician:  No referring provider defined for this encounter.    Chief Complaint: PD     Interval History:  - added rasagiline last visit   - PT/OT   - sleeps a lot during the day, worse since rasagiline was added   - 1 tab QID + 1 mg rasagiline   - walking has improved some but not much   - has to be careful whenever he wakes up in the morning         From March 2022  - not better or worse, but notes the medication is not acting completely  - if he delays the medication, he notices wearing off with ldopa  - an average day, takes first dose at 7a, 1p, 6p, bedtime  - but also having dose and meal-related sleepiness, also still leg swelling which is not better - swelling of legs is the same to worse  - willing to take once daily, or change ldopa (or both)  - feels slower on his left side, but evidence more on the R side  - dysarhtria is improved, but still some swallow issues with solids and quick thin liquids, no reported clinical aspiration    From Nov 2021 with [facility]:  - hearing aids not working well today, accompanied by brother in law today   - tremor is stable   - main issue is the swelling in the bilateral LE, not on amantadine or dopamine agonist  - currently on cd/ld 1.5 tab QID   - feels like he has some loss of sensation in his toes   - did not have leg swelling prior to starting cd/ld  - last dose prior to office visit, takes doses 4-6 hours apart   - some issues with short term memory   - some mild issues with dysphagia     From August 2021  - last seen by [facility], new to me   - diagnosed with PD by dr. gerber in 2019  - tremor and gait are his major issues.  - taking cd/ld 1.5 tab 4-6 times a day; prescribed QID but takes the extra doses when his tremors are worse. Tolerating the medication well  - has been more 'shaky'. Rarely needs assitive devices around the house. Needs a walker when he leaves his house.  - last fall was  a few months ago. Happened when he 'turned too soon'.  - helps take care of his wife.   - mood, sleep and appetite are good.  - swelling of both his lower extremities. Cardiac workup has been unremarkable.      From June 2021  - increased cd/ld to 1.5 tab QID last visit   - feels tired   - right shoulder feels stiff   - more edema in b/l LE   - saw cardiologist, did echo   - increase is helping a little bit   - sometimes shakes more than other times  - tremor has improved some   - feels like gait is about the same   - no falls   - accompanied by son in law   - last dose was at 7 am   - falls asleep more easily now, sits down to watch TV and falls asleep  - having some double vision, told son-in-law on the way here, also asks if he can drive   - has not scheduled to follow up with ophthalmologist   - waiting to get prescription filled   - most falls happened previously with turns   - has to take his time when getting up   - has to get one of his hearing aids fixed        From 4/2021: Giselle Lemus is a R HANDED 89 y.o. male with a medical issues significant for PD and BPH who presents to establish care for PD. Previously followed by Dr. Angeles at . Accompanied by brother in law. First noticed tremor 3-4 years ago in the R hand. Not much tremor in the L hand. In 2019, diagnosed by Dr. Angeles for parkinsons. Wants to establish PD care with Dr. Britton. Started him on cd/ld 25/100 1 tab TID, now on 1 tab 4 times a day (10 am, 2pm, 6p, 10p). Thinks that he responds to the medicine. Last dose of cd/ld was at 10 am. Balance issues more when he turns. Starting using a walker 3-4 months ago. Several months ago, had two falls and then started using the walker. + Shuffling. No hallucinations.   Slow gets up because endorses some dizziness/feeling lightheaded -- very seldom.   Did big and loud therapy at . Patient plans to do exercises at home.     Originally from [facility]. Retired .       Family History: no family  history of PD or tremors that he knows of     Neuroleptic Exposure: none     Nonmotor/Premotor ROS:  Anosmia: poor   Dysarthria/Hypophonia: yes hypophonia   Dysphagia/Sialorrhea: occasional -- no choking   Cognitive slowing: long term issues   Hallucinations: none   Urinary changes: none  Constipation: none   Orthostasis: very seldom   Falls: as above   Micrographia: yes    Sleep issues:  -LEONEL: none  -RBD: none   Vision Changes:       Review of Systems:   Review of Systems   Constitutional:  Negative for chills, fever and malaise/fatigue.   HENT:  Negative for hearing loss.    Eyes:  Negative for blurred vision and double vision.   Respiratory:  Negative for cough, shortness of breath and stridor.    Cardiovascular:  Negative for chest pain and leg swelling.   Gastrointestinal:  Negative for constipation, diarrhea and nausea.   Genitourinary:  Negative for frequency and urgency.   Musculoskeletal:  Positive for falls.   Skin:  Negative for itching and rash.   Neurological:  Positive for tremors. Negative for dizziness, loss of consciousness and weakness.   Psychiatric/Behavioral:  Negative for hallucinations and memory loss.          Past Medical History: The patient  has a past medical history of BPH (benign prostatic hyperplasia), Parkinsons (09/2019), Ulcerative colitis, and Unspecified chronic bronchitis.    Social History: The patient  reports that he has never smoked. He has never used smokeless tobacco. He reports that he does not drink alcohol.    Family History: Their family history is not on file.    Allergies: Heparin analogues     Meds:   Current Outpatient Medications on File Prior to Visit   Medication Sig Dispense Refill    balsam peru-castor oiL Oint Apply topically 2 (two) times a day. Apply to buttocks      carbidopa-levodopa  mg (SINEMET)  mg per tablet TAKE 1 TABLET BY MOUTH 4 (FOUR) TIMES A DAY FOR 30 DAYS. 120 tablet 11    finasteride (PROSCAR) 5 mg tablet Take 5 mg by mouth once  "daily.      furosemide (LASIX) 20 MG tablet Take 1 tablet (20 mg total) by mouth once daily. 360 tablet 0    rasagiline (AZILECT) 1 mg Tab Take 1 tablet (1 mg total) by mouth once daily at 6am. 90 tablet 3    sodium chloride (OCEAN) 0.65 % nasal spray 1 spray by Nasal route as needed (irritation).  12    TOBRADEX 0.3-0.1 % DrpS Place 1 drop into both eyes 4 (four) times daily.       No current facility-administered medications on file prior to visit.       Exam:  BP (!) 130/56   Pulse 60   Ht 5' 5" (1.651 m)   Wt 55.7 kg (122 lb 12.7 oz)   BMI 20.43 kg/m²     Constitutional  Well-developed, well-nourished, appears stated age   Ophthalmoscopic  No papilledema with no hemorrhages or exudates bilaterally   Cardiovascular  Radial pulses 2+ and symmetric   Pitting LE edema bilaterally+   Neurological    * Mental status  MOCA =      - Orientation  Oriented to person, place, time, and situation     - Memory   Intact recent and remote     - Attention/concentration  Attentive, vigilant during exam     - Language  Naming & repetition intact, +2-step commands     - Fund of knowledge  Aware of current events     - Executive  Well-organized thoughts     - Other     * Cranial nerves       - CN II  PERRL, visual fields full to confrontation     - CN III, IV, VI  Extraocular movements full, normal pursuits and saccades     - CN V  Sensation V1 - V3 intact     - CN VII  Face strong and symmetric bilaterally     - CN VIII  Hearing intact bilaterally     - CN IX, X  Palate raises midline and symmetric     - CN XI  SCM and trapezius 5/5 bilaterally     - CN XII  Tongue midline   * Motor  Muscle bulk normal, strength 5/5 throughout   * Sensory   Intact to temperature and vibration throughout   * Coordination  No dysmetria with finger-to-nose or heel-to-shin   * Gait  See below.   * Deep tendon reflexes  NOt tested today   Babinski downgoing bilaterally   * Specialized movement exam  + hypophonic speech.    + facial masking.   R > L " ++ cogwheel rigidity.     R > L bradykinesia.   resting tremor of R hand, regular and during most the exam   No other dystonia, chorea, athetosis, myoclonus, or tics.   No motor impersistence.   narrow-based, shuffling gait.   ++ shortened stride length.   Able to walk without a wqalker, but has sig PI.   moderate anterior stoop      Laboratory/Radiological:  - Results:  Lab Visit on 2022   Component Date Value Ref Range Status    WBC 2022 10.30  3.90 - 12.70 K/uL Final    RBC 2022 3.75 (L)  4.60 - 6.20 M/uL Final    Hemoglobin 2022 11.5 (L)  14.0 - 18.0 g/dL Final    Hematocrit 2022 36.0 (L)  40.0 - 54.0 % Final    MCV 2022 96  82 - 98 fL Final    MCH 2022 30.7  27.0 - 31.0 pg Final    MCHC 2022 31.9 (L)  32.0 - 36.0 g/dL Final    RDW 2022 16.7 (H)  11.5 - 14.5 % Final    Platelets 2022 195  150 - 450 K/uL Final    MPV 2022 10.5  9.2 - 12.9 fL Final       - Independent review of images: none new, but reviewed cxr results, no aspiration    Diagnoses:          1. PD, HY 3.  2. Cognitive change, stable   3. Gait instability 2/2 #1  4. Dysphagia, stable, but will watch closely    Medical Decision Makin) Keep cd/ld to 1 tab QID, but more off time coupled with likely swelling worse  2) rasagiline has boosted side effects of ldopa, more daytime sleepiness   3) stop rasagiline now   4) watch swallow, stable for now, counseled on double-swallow  5) considering xadago or nourianz but he would like to hold off for now       F/u in 3 months with [facility]       Collaborating Physician, Dr. Britton, was available during today's encounter. Any change to plan along with cosign to appear in the EMR.          Janina Richardson PA-C   [facility]  Department of Neurology  Movement Disorders            Toro Britton MD, MPH  Division of Movement and Memory Disorders  [facility]

## 2022-10-26 NOTE — PROGRESS NOTES
Chief Complaint:       Interval History:   Hx:  -interim call with wife to discuss rasagiline levels and increased drowsiness  --- explained that increased dosage amplifies cd/ld, even though dosage and timing of cd/ld hasn't changed        Gait:  - no falls in the last 10 months  - using a walker currently  - notes that most of the gait instability previously was due to imbalance  - reports vertiginous sensations that he is compensating for  --- moving more slowly/no sudden movements  - notes pain on left side of the leg, with ascending character  --- unable to clarify if there is weakness or numbness   -      Tremor:  -   -  -      Sleep:  - markedly increased daytime somnolence   -  -      Cognition/Memory:  -  -  -      Mood:   -  -  -      Rx:  -  -  -

## 2023-01-01 ENCOUNTER — LAB VISIT (OUTPATIENT)
Dept: LAB | Facility: HOSPITAL | Age: 88
End: 2023-01-01
Attending: FAMILY MEDICINE
Payer: MEDICARE

## 2023-01-01 ENCOUNTER — HOSPITAL ENCOUNTER (INPATIENT)
Facility: HOSPITAL | Age: 88
LOS: 3 days | DRG: 871 | End: 2023-07-28
Attending: EMERGENCY MEDICINE | Admitting: HOSPITALIST
Payer: MEDICARE

## 2023-01-01 ENCOUNTER — OFFICE VISIT (OUTPATIENT)
Dept: PRIMARY CARE CLINIC | Facility: CLINIC | Age: 88
End: 2023-01-01
Payer: MEDICARE

## 2023-01-01 ENCOUNTER — EXTERNAL HOME HEALTH (OUTPATIENT)
Dept: HOME HEALTH SERVICES | Facility: HOSPITAL | Age: 88
End: 2023-01-01
Payer: MEDICARE

## 2023-01-01 ENCOUNTER — PATIENT OUTREACH (OUTPATIENT)
Dept: ADMINISTRATIVE | Facility: HOSPITAL | Age: 88
End: 2023-01-01
Payer: MEDICARE

## 2023-01-01 ENCOUNTER — HOSPITAL ENCOUNTER (OUTPATIENT)
Dept: RADIOLOGY | Facility: HOSPITAL | Age: 88
Discharge: HOME | End: 2023-06-11
Attending: FAMILY MEDICINE
Payer: MEDICARE

## 2023-01-01 ENCOUNTER — PES CALL (OUTPATIENT)
Dept: ADMINISTRATIVE | Facility: CLINIC | Age: 88
End: 2023-01-01
Payer: MEDICARE

## 2023-01-01 ENCOUNTER — PATIENT MESSAGE (OUTPATIENT)
Dept: RESEARCH | Facility: HOSPITAL | Age: 88
End: 2023-01-01
Payer: MEDICARE

## 2023-01-01 ENCOUNTER — HOSPITAL ENCOUNTER (OUTPATIENT)
Facility: HOSPITAL | Age: 88
Discharge: NURSING, REHAB OR LONG-TERM CARE | End: 2023-06-19
Attending: EMERGENCY MEDICINE | Admitting: FAMILY MEDICINE
Payer: MEDICARE

## 2023-01-01 ENCOUNTER — PATIENT MESSAGE (OUTPATIENT)
Dept: PRIMARY CARE CLINIC | Facility: CLINIC | Age: 88
End: 2023-01-01
Payer: MEDICARE

## 2023-01-01 ENCOUNTER — DOCUMENT SCAN (OUTPATIENT)
Dept: HOME HEALTH SERVICES | Facility: HOSPITAL | Age: 88
End: 2023-01-01
Payer: MEDICARE

## 2023-01-01 ENCOUNTER — TELEPHONE (OUTPATIENT)
Dept: PRIMARY CARE CLINIC | Facility: CLINIC | Age: 88
End: 2023-01-01
Payer: MEDICARE

## 2023-01-01 ENCOUNTER — TELEPHONE (OUTPATIENT)
Dept: NEUROLOGY | Facility: CLINIC | Age: 88
End: 2023-01-01
Payer: MEDICARE

## 2023-01-01 ENCOUNTER — OFFICE VISIT (OUTPATIENT)
Dept: NEUROLOGY | Facility: CLINIC | Age: 88
End: 2023-01-01
Payer: MEDICARE

## 2023-01-01 ENCOUNTER — OFFICE VISIT (OUTPATIENT)
Dept: PRIMARY CARE CLINIC | Facility: CLINIC | Age: 88
DRG: 871 | End: 2023-01-01
Payer: MEDICARE

## 2023-01-01 ENCOUNTER — HOSPITAL ENCOUNTER (EMERGENCY)
Facility: HOSPITAL | Age: 88
Discharge: HOME | End: 2023-05-16
Attending: EMERGENCY MEDICINE
Payer: MEDICARE

## 2023-01-01 ENCOUNTER — HOSPITAL ENCOUNTER (OUTPATIENT)
Dept: RADIOLOGY | Facility: HOSPITAL | Age: 88
Discharge: HOME | DRG: 871 | End: 2023-05-31
Attending: FAMILY MEDICINE
Payer: MEDICARE

## 2023-01-01 ENCOUNTER — HOSPITAL ENCOUNTER (INPATIENT)
Facility: HOSPITAL | Age: 88
LOS: 5 days | Discharge: NURSING, REHAB OR LONG-TERM CARE | DRG: 177 | End: 2023-07-21
Attending: EMERGENCY MEDICINE | Admitting: FAMILY MEDICINE
Payer: MEDICARE

## 2023-01-01 ENCOUNTER — HOSPITAL ENCOUNTER (INPATIENT)
Facility: HOSPITAL | Age: 88
LOS: 3 days | Discharge: NURSING, REHAB OR LONG-TERM CARE | DRG: 871 | End: 2023-06-05
Attending: EMERGENCY MEDICINE | Admitting: FAMILY MEDICINE
Payer: MEDICARE

## 2023-01-01 VITALS
BODY MASS INDEX: 20.42 KG/M2 | HEIGHT: 65 IN | DIASTOLIC BLOOD PRESSURE: 66 MMHG | HEART RATE: 58 BPM | SYSTOLIC BLOOD PRESSURE: 128 MMHG | WEIGHT: 122.56 LBS

## 2023-01-01 VITALS
HEIGHT: 65 IN | DIASTOLIC BLOOD PRESSURE: 74 MMHG | OXYGEN SATURATION: 97 % | BODY MASS INDEX: 20.38 KG/M2 | TEMPERATURE: 98 F | HEART RATE: 69 BPM | SYSTOLIC BLOOD PRESSURE: 189 MMHG | RESPIRATION RATE: 20 BRPM

## 2023-01-01 VITALS
HEIGHT: 65 IN | SYSTOLIC BLOOD PRESSURE: 104 MMHG | WEIGHT: 119.06 LBS | DIASTOLIC BLOOD PRESSURE: 62 MMHG | OXYGEN SATURATION: 97 % | HEART RATE: 60 BPM | BODY MASS INDEX: 19.83 KG/M2

## 2023-01-01 VITALS
DIASTOLIC BLOOD PRESSURE: 62 MMHG | BODY MASS INDEX: 20.4 KG/M2 | HEIGHT: 65 IN | WEIGHT: 122.44 LBS | HEART RATE: 56 BPM | SYSTOLIC BLOOD PRESSURE: 148 MMHG

## 2023-01-01 VITALS
DIASTOLIC BLOOD PRESSURE: 51 MMHG | WEIGHT: 104 LBS | HEIGHT: 67 IN | TEMPERATURE: 98 F | RESPIRATION RATE: 18 BRPM | SYSTOLIC BLOOD PRESSURE: 81 MMHG | HEART RATE: 106 BPM | BODY MASS INDEX: 16.32 KG/M2 | OXYGEN SATURATION: 82 %

## 2023-01-01 VITALS
HEART RATE: 59 BPM | TEMPERATURE: 99 F | WEIGHT: 120 LBS | RESPIRATION RATE: 18 BRPM | SYSTOLIC BLOOD PRESSURE: 123 MMHG | BODY MASS INDEX: 19.99 KG/M2 | OXYGEN SATURATION: 93 % | DIASTOLIC BLOOD PRESSURE: 61 MMHG | HEIGHT: 65 IN

## 2023-01-01 VITALS
RESPIRATION RATE: 18 BRPM | BODY MASS INDEX: 20.16 KG/M2 | TEMPERATURE: 98 F | SYSTOLIC BLOOD PRESSURE: 124 MMHG | HEIGHT: 65 IN | WEIGHT: 121 LBS | HEART RATE: 43 BPM | DIASTOLIC BLOOD PRESSURE: 56 MMHG | OXYGEN SATURATION: 95 %

## 2023-01-01 VITALS
HEART RATE: 54 BPM | WEIGHT: 104.25 LBS | HEIGHT: 67 IN | BODY MASS INDEX: 16.36 KG/M2 | RESPIRATION RATE: 17 BRPM | SYSTOLIC BLOOD PRESSURE: 116 MMHG | OXYGEN SATURATION: 95 % | DIASTOLIC BLOOD PRESSURE: 55 MMHG | TEMPERATURE: 98 F

## 2023-01-01 VITALS
TEMPERATURE: 98 F | SYSTOLIC BLOOD PRESSURE: 120 MMHG | OXYGEN SATURATION: 97 % | DIASTOLIC BLOOD PRESSURE: 72 MMHG | HEART RATE: 65 BPM | BODY MASS INDEX: 20.2 KG/M2 | HEIGHT: 65 IN | WEIGHT: 121.25 LBS

## 2023-01-01 VITALS
DIASTOLIC BLOOD PRESSURE: 70 MMHG | HEART RATE: 60 BPM | SYSTOLIC BLOOD PRESSURE: 128 MMHG | BODY MASS INDEX: 20.38 KG/M2 | OXYGEN SATURATION: 95 % | TEMPERATURE: 98 F | HEIGHT: 65 IN

## 2023-01-01 DIAGNOSIS — R00.1 BRADYCARDIA: ICD-10-CM

## 2023-01-01 DIAGNOSIS — R79.89 ELEVATED D-DIMER: ICD-10-CM

## 2023-01-01 DIAGNOSIS — R41.89 COGNITIVE CHANGE: ICD-10-CM

## 2023-01-01 DIAGNOSIS — E79.0 HYPERURICEMIA: ICD-10-CM

## 2023-01-01 DIAGNOSIS — R26.81 GAIT INSTABILITY: ICD-10-CM

## 2023-01-01 DIAGNOSIS — G47.19 EXCESSIVE DAYTIME SLEEPINESS: Primary | ICD-10-CM

## 2023-01-01 DIAGNOSIS — M79.604 RIGHT LEG PAIN: ICD-10-CM

## 2023-01-01 DIAGNOSIS — Z74.09 IMPAIRED MOBILITY AND ACTIVITIES OF DAILY LIVING: ICD-10-CM

## 2023-01-01 DIAGNOSIS — A41.9 SEPSIS, DUE TO UNSPECIFIED ORGANISM, UNSPECIFIED WHETHER ACUTE ORGAN DYSFUNCTION PRESENT: Primary | ICD-10-CM

## 2023-01-01 DIAGNOSIS — J18.9 PARAPNEUMONIC EFFUSION: ICD-10-CM

## 2023-01-01 DIAGNOSIS — G20.A1 PARKINSON'S DISEASE: ICD-10-CM

## 2023-01-01 DIAGNOSIS — R41.3 MEMORY CHANGE: ICD-10-CM

## 2023-01-01 DIAGNOSIS — R07.9 CHEST PAIN: ICD-10-CM

## 2023-01-01 DIAGNOSIS — I70.0 AORTIC ATHEROSCLEROSIS: ICD-10-CM

## 2023-01-01 DIAGNOSIS — R41.82 ALTERED MENTAL STATUS, UNSPECIFIED ALTERED MENTAL STATUS TYPE: Primary | ICD-10-CM

## 2023-01-01 DIAGNOSIS — R06.02 SOB (SHORTNESS OF BREATH): ICD-10-CM

## 2023-01-01 DIAGNOSIS — J18.9 MULTIFOCAL PNEUMONIA: ICD-10-CM

## 2023-01-01 DIAGNOSIS — G93.41 ACUTE METABOLIC ENCEPHALOPATHY: ICD-10-CM

## 2023-01-01 DIAGNOSIS — D64.9 CHRONIC ANEMIA: ICD-10-CM

## 2023-01-01 DIAGNOSIS — E87.5 HYPERKALEMIA: ICD-10-CM

## 2023-01-01 DIAGNOSIS — R53.1 WEAKNESS: Primary | ICD-10-CM

## 2023-01-01 DIAGNOSIS — Z78.9 IMPAIRED MOBILITY AND ACTIVITIES OF DAILY LIVING: ICD-10-CM

## 2023-01-01 DIAGNOSIS — U07.1 COVID-19: ICD-10-CM

## 2023-01-01 DIAGNOSIS — D64.9 ANEMIA, UNSPECIFIED TYPE: ICD-10-CM

## 2023-01-01 DIAGNOSIS — J69.0 ASPIRATION PNEUMONITIS: ICD-10-CM

## 2023-01-01 DIAGNOSIS — E64.0 SEQUELAE OF PROTEIN-CALORIE MALNUTRITION: ICD-10-CM

## 2023-01-01 DIAGNOSIS — H91.90 HEARING LOSS, UNSPECIFIED HEARING LOSS TYPE, UNSPECIFIED LATERALITY: Chronic | ICD-10-CM

## 2023-01-01 DIAGNOSIS — R29.6 FREQUENT FALLS: ICD-10-CM

## 2023-01-01 DIAGNOSIS — R00.1 SINUS BRADYCARDIA: ICD-10-CM

## 2023-01-01 DIAGNOSIS — R41.82 AMS (ALTERED MENTAL STATUS): ICD-10-CM

## 2023-01-01 DIAGNOSIS — N28.9 RENAL INSUFFICIENCY: ICD-10-CM

## 2023-01-01 DIAGNOSIS — G20.A1 DEMENTIA DUE TO PARKINSON'S DISEASE, UNSPECIFIED DEMENTIA SEVERITY, UNSPECIFIED WHETHER BEHAVIORAL, PSYCHOTIC, OR MOOD DISTURBANCE OR ANXIETY: ICD-10-CM

## 2023-01-01 DIAGNOSIS — R52 PAIN: ICD-10-CM

## 2023-01-01 DIAGNOSIS — G20.A1 PARKINSON DISEASE: Primary | ICD-10-CM

## 2023-01-01 DIAGNOSIS — H91.8X3 OTHER SPECIFIED HEARING LOSS OF BOTH EARS: Chronic | ICD-10-CM

## 2023-01-01 DIAGNOSIS — Z09 HOSPITAL DISCHARGE FOLLOW-UP: Primary | ICD-10-CM

## 2023-01-01 DIAGNOSIS — H57.89 EYE IRRITATION: ICD-10-CM

## 2023-01-01 DIAGNOSIS — N40.0 BENIGN PROSTATIC HYPERPLASIA, UNSPECIFIED WHETHER LOWER URINARY TRACT SYMPTOMS PRESENT: ICD-10-CM

## 2023-01-01 DIAGNOSIS — R91.8 LUNG FIELD ABNORMAL FINDING ON EXAMINATION: ICD-10-CM

## 2023-01-01 DIAGNOSIS — F05 DELIRIUM DUE TO MULTIPLE ETIOLOGIES: ICD-10-CM

## 2023-01-01 DIAGNOSIS — R29.6 MULTIPLE FALLS: ICD-10-CM

## 2023-01-01 DIAGNOSIS — K51.20 ULCERATIVE (CHRONIC) PROCTITIS WITHOUT COMPLICATIONS: ICD-10-CM

## 2023-01-01 DIAGNOSIS — L25.8 INCONTINENCE ASSOCIATED DERMATITIS: ICD-10-CM

## 2023-01-01 DIAGNOSIS — Z71.89 COUNSELING REGARDING GOALS OF CARE: ICD-10-CM

## 2023-01-01 DIAGNOSIS — G20.A1 PARKINSONS: Primary | ICD-10-CM

## 2023-01-01 DIAGNOSIS — G47.19 EXCESSIVE DAYTIME SLEEPINESS: ICD-10-CM

## 2023-01-01 DIAGNOSIS — R00.0 TACHYCARDIA: ICD-10-CM

## 2023-01-01 DIAGNOSIS — G93.40 ENCEPHALOPATHY: Primary | ICD-10-CM

## 2023-01-01 DIAGNOSIS — J90 PLEURAL EFFUSION: ICD-10-CM

## 2023-01-01 DIAGNOSIS — G20.A1 PARKINSONS: ICD-10-CM

## 2023-01-01 DIAGNOSIS — J18.9 PNEUMONIA OF LEFT LOWER LOBE DUE TO INFECTIOUS ORGANISM: ICD-10-CM

## 2023-01-01 DIAGNOSIS — R32 INCONTINENCE ASSOCIATED DERMATITIS: ICD-10-CM

## 2023-01-01 DIAGNOSIS — Z23 NEED FOR PNEUMOCOCCAL VACCINATION: ICD-10-CM

## 2023-01-01 DIAGNOSIS — I45.10 RBBB: ICD-10-CM

## 2023-01-01 DIAGNOSIS — S31.000D WOUND OF SACRAL REGION, SUBSEQUENT ENCOUNTER: ICD-10-CM

## 2023-01-01 DIAGNOSIS — F02.80 DEMENTIA DUE TO PARKINSON'S DISEASE, UNSPECIFIED DEMENTIA SEVERITY, UNSPECIFIED WHETHER BEHAVIORAL, PSYCHOTIC, OR MOOD DISTURBANCE OR ANXIETY: ICD-10-CM

## 2023-01-01 DIAGNOSIS — R53.83 FATIGUE, UNSPECIFIED TYPE: ICD-10-CM

## 2023-01-01 DIAGNOSIS — R60.1 ANASARCA: Primary | ICD-10-CM

## 2023-01-01 DIAGNOSIS — A41.9 SEPSIS, DUE TO UNSPECIFIED ORGANISM, UNSPECIFIED WHETHER ACUTE ORGAN DYSFUNCTION PRESENT: ICD-10-CM

## 2023-01-01 DIAGNOSIS — J91.8 PARAPNEUMONIC EFFUSION: ICD-10-CM

## 2023-01-01 DIAGNOSIS — M79.89 LEG SWELLING: Primary | ICD-10-CM

## 2023-01-01 DIAGNOSIS — U07.1 COVID-19 VIRUS DETECTED: ICD-10-CM

## 2023-01-01 LAB
ALBUMIN SERPL BCP-MCNC: 1.9 G/DL (ref 3.5–5.2)
ALBUMIN SERPL BCP-MCNC: 2 G/DL (ref 3.5–5.2)
ALBUMIN SERPL BCP-MCNC: 2.1 G/DL (ref 3.5–5.2)
ALBUMIN SERPL BCP-MCNC: 2.2 G/DL (ref 3.5–5.2)
ALBUMIN SERPL BCP-MCNC: 2.2 G/DL (ref 3.5–5.2)
ALBUMIN SERPL BCP-MCNC: 2.3 G/DL (ref 3.5–5.2)
ALBUMIN SERPL BCP-MCNC: 2.4 G/DL (ref 3.5–5.2)
ALBUMIN SERPL BCP-MCNC: 2.8 G/DL (ref 3.5–5.2)
ALBUMIN SERPL BCP-MCNC: 2.9 G/DL (ref 3.5–5.2)
ALBUMIN SERPL BCP-MCNC: 3.1 G/DL (ref 3.5–5.2)
ALLENS TEST: ABNORMAL
ALLENS TEST: NORMAL
ALLENS TEST: NORMAL
ALP SERPL-CCNC: 105 U/L (ref 55–135)
ALP SERPL-CCNC: 107 U/L (ref 55–135)
ALP SERPL-CCNC: 107 U/L (ref 55–135)
ALP SERPL-CCNC: 113 U/L (ref 55–135)
ALP SERPL-CCNC: 117 U/L (ref 55–135)
ALP SERPL-CCNC: 118 U/L (ref 55–135)
ALP SERPL-CCNC: 149 U/L (ref 55–135)
ALP SERPL-CCNC: 77 U/L (ref 55–135)
ALP SERPL-CCNC: 82 U/L (ref 55–135)
ALP SERPL-CCNC: 89 U/L (ref 55–135)
ALP SERPL-CCNC: 94 U/L (ref 55–135)
ALP SERPL-CCNC: 98 U/L (ref 55–135)
ALP SERPL-CCNC: 99 U/L (ref 55–135)
ALT SERPL W/O P-5'-P-CCNC: 18 U/L (ref 10–44)
ALT SERPL W/O P-5'-P-CCNC: 18 U/L (ref 10–44)
ALT SERPL W/O P-5'-P-CCNC: 5 U/L (ref 10–44)
ALT SERPL W/O P-5'-P-CCNC: 6 U/L (ref 10–44)
ALT SERPL W/O P-5'-P-CCNC: <5 U/L (ref 10–44)
ANION GAP SERPL CALC-SCNC: 1 MMOL/L (ref 8–16)
ANION GAP SERPL CALC-SCNC: 10 MMOL/L (ref 8–16)
ANION GAP SERPL CALC-SCNC: 4 MMOL/L (ref 8–16)
ANION GAP SERPL CALC-SCNC: 6 MMOL/L (ref 8–16)
ANION GAP SERPL CALC-SCNC: 7 MMOL/L (ref 8–16)
ANION GAP SERPL CALC-SCNC: 8 MMOL/L (ref 8–16)
ANION GAP SERPL CALC-SCNC: 9 MMOL/L (ref 8–16)
ANISOCYTOSIS BLD QL SMEAR: SLIGHT
APTT PPP: 29 SEC (ref 21–32)
APTT PPP: 34.1 SEC (ref 21–32)
AST SERPL-CCNC: 11 U/L (ref 10–40)
AST SERPL-CCNC: 11 U/L (ref 10–40)
AST SERPL-CCNC: 13 U/L (ref 10–40)
AST SERPL-CCNC: 14 U/L (ref 10–40)
AST SERPL-CCNC: 15 U/L (ref 10–40)
AST SERPL-CCNC: 16 U/L (ref 10–40)
AST SERPL-CCNC: 18 U/L (ref 10–40)
AST SERPL-CCNC: 19 U/L (ref 10–40)
AST SERPL-CCNC: 29 U/L (ref 10–40)
AST SERPL-CCNC: 30 U/L (ref 10–40)
AST SERPL-CCNC: 31 U/L (ref 10–40)
BACTERIA #/AREA URNS AUTO: ABNORMAL /HPF
BACTERIA #/AREA URNS AUTO: NORMAL /HPF
BACTERIA BLD CULT: NORMAL
BACTERIA SPEC AEROBE CULT: NORMAL
BACTERIA SPEC AEROBE CULT: NORMAL
BACTERIA UR CULT: ABNORMAL
BACTERIA UR CULT: NORMAL
BACTERIA UR CULT: NORMAL
BASO STIPL BLD QL SMEAR: ABNORMAL
BASOPHILS # BLD AUTO: 0.02 K/UL (ref 0–0.2)
BASOPHILS # BLD AUTO: 0.03 K/UL (ref 0–0.2)
BASOPHILS # BLD AUTO: 0.04 K/UL (ref 0–0.2)
BASOPHILS # BLD AUTO: 0.04 K/UL (ref 0–0.2)
BASOPHILS # BLD AUTO: 0.06 K/UL (ref 0–0.2)
BASOPHILS # BLD AUTO: 0.06 K/UL (ref 0–0.2)
BASOPHILS # BLD AUTO: 0.07 K/UL (ref 0–0.2)
BASOPHILS # BLD AUTO: ABNORMAL K/UL (ref 0–0.2)
BASOPHILS NFR BLD: 0.1 % (ref 0–1.9)
BASOPHILS NFR BLD: 0.1 % (ref 0–1.9)
BASOPHILS NFR BLD: 0.2 % (ref 0–1.9)
BASOPHILS NFR BLD: 0.3 % (ref 0–1.9)
BASOPHILS NFR BLD: 0.4 % (ref 0–1.9)
BASOPHILS NFR BLD: 0.4 % (ref 0–1.9)
BASOPHILS NFR BLD: 0.5 % (ref 0–1.9)
BASOPHILS NFR BLD: 0.6 % (ref 0–1.9)
BASOPHILS NFR BLD: 0.7 % (ref 0–1.9)
BASOPHILS NFR BLD: 0.7 % (ref 0–1.9)
BASOPHILS NFR BLD: 1 % (ref 0–1.9)
BILIRUB SERPL-MCNC: 0.1 MG/DL (ref 0.1–1)
BILIRUB SERPL-MCNC: 0.1 MG/DL (ref 0.1–1)
BILIRUB SERPL-MCNC: 0.2 MG/DL (ref 0.1–1)
BILIRUB SERPL-MCNC: 0.3 MG/DL (ref 0.1–1)
BILIRUB SERPL-MCNC: 0.4 MG/DL (ref 0.1–1)
BILIRUB SERPL-MCNC: 0.5 MG/DL (ref 0.1–1)
BILIRUB SERPL-MCNC: 0.5 MG/DL (ref 0.1–1)
BILIRUB UR QL STRIP: NEGATIVE
BNP SERPL-MCNC: 214 PG/ML (ref 0–99)
BNP SERPL-MCNC: 310 PG/ML (ref 0–99)
BNP SERPL-MCNC: 398 PG/ML (ref 0–99)
BUN SERPL-MCNC: 12 MG/DL (ref 8–23)
BUN SERPL-MCNC: 13 MG/DL (ref 8–23)
BUN SERPL-MCNC: 15 MG/DL (ref 8–23)
BUN SERPL-MCNC: 17 MG/DL (ref 8–23)
BUN SERPL-MCNC: 20 MG/DL (ref 8–23)
BUN SERPL-MCNC: 22 MG/DL (ref 8–23)
BUN SERPL-MCNC: 23 MG/DL (ref 8–23)
BUN SERPL-MCNC: 23 MG/DL (ref 8–23)
BUN SERPL-MCNC: 24 MG/DL (ref 8–23)
BUN SERPL-MCNC: 25 MG/DL (ref 8–23)
BUN SERPL-MCNC: 25 MG/DL (ref 8–23)
BUN SERPL-MCNC: 26 MG/DL (ref 8–23)
BUN SERPL-MCNC: 27 MG/DL (ref 6–30)
BUN SERPL-MCNC: 29 MG/DL (ref 8–23)
BUN SERPL-MCNC: 32 MG/DL (ref 6–30)
BURR CELLS BLD QL SMEAR: ABNORMAL
C DIFF GDH STL QL: NEGATIVE
C DIFF GDH STL QL: NEGATIVE
C DIFF TOX A+B STL QL IA: NEGATIVE
C DIFF TOX A+B STL QL IA: NEGATIVE
CALCIUM SERPL-MCNC: 8.1 MG/DL (ref 8.7–10.5)
CALCIUM SERPL-MCNC: 8.1 MG/DL (ref 8.7–10.5)
CALCIUM SERPL-MCNC: 8.2 MG/DL (ref 8.7–10.5)
CALCIUM SERPL-MCNC: 8.4 MG/DL (ref 8.7–10.5)
CALCIUM SERPL-MCNC: 8.6 MG/DL (ref 8.7–10.5)
CALCIUM SERPL-MCNC: 8.6 MG/DL (ref 8.7–10.5)
CALCIUM SERPL-MCNC: 8.7 MG/DL (ref 8.7–10.5)
CALCIUM SERPL-MCNC: 8.8 MG/DL (ref 8.7–10.5)
CALCIUM SERPL-MCNC: 9.1 MG/DL (ref 8.7–10.5)
CALCIUM SERPL-MCNC: 9.1 MG/DL (ref 8.7–10.5)
CALCIUM SERPL-MCNC: 9.2 MG/DL (ref 8.7–10.5)
CALCIUM SERPL-MCNC: 9.4 MG/DL (ref 8.7–10.5)
CHLORIDE SERPL-SCNC: 100 MMOL/L (ref 95–110)
CHLORIDE SERPL-SCNC: 100 MMOL/L (ref 95–110)
CHLORIDE SERPL-SCNC: 101 MMOL/L (ref 95–110)
CHLORIDE SERPL-SCNC: 102 MMOL/L (ref 95–110)
CHLORIDE SERPL-SCNC: 103 MMOL/L (ref 95–110)
CHLORIDE SERPL-SCNC: 104 MMOL/L (ref 95–110)
CHLORIDE SERPL-SCNC: 105 MMOL/L (ref 95–110)
CHLORIDE SERPL-SCNC: 106 MMOL/L (ref 95–110)
CHLORIDE SERPL-SCNC: 107 MMOL/L (ref 95–110)
CHLORIDE SERPL-SCNC: 109 MMOL/L (ref 95–110)
CHLORIDE SERPL-SCNC: 109 MMOL/L (ref 95–110)
CHLORIDE SERPL-SCNC: 110 MMOL/L (ref 95–110)
CK SERPL-CCNC: 236 U/L (ref 20–200)
CK SERPL-CCNC: 282 U/L (ref 20–200)
CK SERPL-CCNC: 295 U/L (ref 20–200)
CLARITY UR REFRACT.AUTO: ABNORMAL
CLARITY UR REFRACT.AUTO: CLEAR
CO2 SERPL-SCNC: 22 MMOL/L (ref 23–29)
CO2 SERPL-SCNC: 22 MMOL/L (ref 23–29)
CO2 SERPL-SCNC: 23 MMOL/L (ref 23–29)
CO2 SERPL-SCNC: 23 MMOL/L (ref 23–29)
CO2 SERPL-SCNC: 24 MMOL/L (ref 23–29)
CO2 SERPL-SCNC: 25 MMOL/L (ref 23–29)
CO2 SERPL-SCNC: 25 MMOL/L (ref 23–29)
CO2 SERPL-SCNC: 26 MMOL/L (ref 23–29)
CO2 SERPL-SCNC: 27 MMOL/L (ref 23–29)
CO2 SERPL-SCNC: 27 MMOL/L (ref 23–29)
CO2 SERPL-SCNC: 29 MMOL/L (ref 23–29)
CO2 SERPL-SCNC: 30 MMOL/L (ref 23–29)
CO2 SERPL-SCNC: 30 MMOL/L (ref 23–29)
CO2 SERPL-SCNC: 33 MMOL/L (ref 23–29)
COLOR UR AUTO: ABNORMAL
COLOR UR AUTO: YELLOW
CREAT SERPL-MCNC: 0.7 MG/DL (ref 0.5–1.4)
CREAT SERPL-MCNC: 0.8 MG/DL (ref 0.5–1.4)
CREAT SERPL-MCNC: 0.9 MG/DL (ref 0.5–1.4)
CREAT SERPL-MCNC: 1 MG/DL (ref 0.5–1.4)
CREAT SERPL-MCNC: 1.1 MG/DL (ref 0.5–1.4)
CRP SERPL-MCNC: 67.3 MG/L (ref 0–8.2)
CRP SERPL-MCNC: 71.8 MG/L (ref 0–8.2)
D DIMER PPP IA.FEU-MCNC: 0.52 MG/L FEU
D DIMER PPP IA.FEU-MCNC: 1.16 MG/L FEU
D DIMER PPP IA.FEU-MCNC: 1.59 MG/L FEU
D DIMER PPP IA.FEU-MCNC: 1.59 MG/L FEU
DACRYOCYTES BLD QL SMEAR: ABNORMAL
DELSYS: ABNORMAL
DIFFERENTIAL METHOD: ABNORMAL
EOSINOPHIL # BLD AUTO: 0 K/UL (ref 0–0.5)
EOSINOPHIL # BLD AUTO: 0.1 K/UL (ref 0–0.5)
EOSINOPHIL # BLD AUTO: ABNORMAL K/UL (ref 0–0.5)
EOSINOPHIL NFR BLD: 0 % (ref 0–8)
EOSINOPHIL NFR BLD: 0 % (ref 0–8)
EOSINOPHIL NFR BLD: 0.1 % (ref 0–8)
EOSINOPHIL NFR BLD: 0.2 % (ref 0–8)
EOSINOPHIL NFR BLD: 0.3 % (ref 0–8)
EOSINOPHIL NFR BLD: 0.5 % (ref 0–8)
EOSINOPHIL NFR BLD: 0.8 % (ref 0–8)
EOSINOPHIL NFR BLD: 0.8 % (ref 0–8)
EOSINOPHIL NFR BLD: 0.9 % (ref 0–8)
EOSINOPHIL NFR BLD: 1.1 % (ref 0–8)
ERYTHROCYTE [DISTWIDTH] IN BLOOD BY AUTOMATED COUNT: 19.3 % (ref 11.5–14.5)
ERYTHROCYTE [DISTWIDTH] IN BLOOD BY AUTOMATED COUNT: 20.3 % (ref 11.5–14.5)
ERYTHROCYTE [DISTWIDTH] IN BLOOD BY AUTOMATED COUNT: 20.3 % (ref 11.5–14.5)
ERYTHROCYTE [DISTWIDTH] IN BLOOD BY AUTOMATED COUNT: 20.5 % (ref 11.5–14.5)
ERYTHROCYTE [DISTWIDTH] IN BLOOD BY AUTOMATED COUNT: 20.7 % (ref 11.5–14.5)
ERYTHROCYTE [DISTWIDTH] IN BLOOD BY AUTOMATED COUNT: 21.2 % (ref 11.5–14.5)
ERYTHROCYTE [DISTWIDTH] IN BLOOD BY AUTOMATED COUNT: 21.5 % (ref 11.5–14.5)
ERYTHROCYTE [DISTWIDTH] IN BLOOD BY AUTOMATED COUNT: 21.7 % (ref 11.5–14.5)
ERYTHROCYTE [DISTWIDTH] IN BLOOD BY AUTOMATED COUNT: 21.9 % (ref 11.5–14.5)
ERYTHROCYTE [DISTWIDTH] IN BLOOD BY AUTOMATED COUNT: 22.1 % (ref 11.5–14.5)
ERYTHROCYTE [DISTWIDTH] IN BLOOD BY AUTOMATED COUNT: 22.2 % (ref 11.5–14.5)
ERYTHROCYTE [DISTWIDTH] IN BLOOD BY AUTOMATED COUNT: 22.2 % (ref 11.5–14.5)
ERYTHROCYTE [DISTWIDTH] IN BLOOD BY AUTOMATED COUNT: 22.4 % (ref 11.5–14.5)
ERYTHROCYTE [DISTWIDTH] IN BLOOD BY AUTOMATED COUNT: 22.5 % (ref 11.5–14.5)
ERYTHROCYTE [DISTWIDTH] IN BLOOD BY AUTOMATED COUNT: 22.7 % (ref 11.5–14.5)
ERYTHROCYTE [DISTWIDTH] IN BLOOD BY AUTOMATED COUNT: 22.9 % (ref 11.5–14.5)
ERYTHROCYTE [SEDIMENTATION RATE] IN BLOOD BY PHOTOMETRIC METHOD: >120 MM/HR (ref 0–23)
EST. GFR  (NO RACE VARIABLE): >60 ML/MIN/1.73 M^2
FERRITIN SERPL-MCNC: 343 NG/ML (ref 20–300)
FOLATE SERPL-MCNC: 10.7 NG/ML (ref 4–24)
FOLATE SERPL-MCNC: 6.9 NG/ML (ref 4–24)
GLUCOSE SERPL-MCNC: 101 MG/DL (ref 70–110)
GLUCOSE SERPL-MCNC: 102 MG/DL (ref 70–110)
GLUCOSE SERPL-MCNC: 105 MG/DL (ref 70–110)
GLUCOSE SERPL-MCNC: 107 MG/DL (ref 70–110)
GLUCOSE SERPL-MCNC: 108 MG/DL (ref 70–110)
GLUCOSE SERPL-MCNC: 123 MG/DL (ref 70–110)
GLUCOSE SERPL-MCNC: 62 MG/DL (ref 70–110)
GLUCOSE SERPL-MCNC: 67 MG/DL (ref 70–110)
GLUCOSE SERPL-MCNC: 68 MG/DL (ref 70–110)
GLUCOSE SERPL-MCNC: 69 MG/DL (ref 70–110)
GLUCOSE SERPL-MCNC: 73 MG/DL (ref 70–110)
GLUCOSE SERPL-MCNC: 79 MG/DL (ref 70–110)
GLUCOSE SERPL-MCNC: 84 MG/DL (ref 70–110)
GLUCOSE SERPL-MCNC: 86 MG/DL (ref 70–110)
GLUCOSE SERPL-MCNC: 89 MG/DL (ref 70–110)
GLUCOSE SERPL-MCNC: 93 MG/DL (ref 70–110)
GLUCOSE SERPL-MCNC: 96 MG/DL (ref 70–110)
GLUCOSE SERPL-MCNC: 96 MG/DL (ref 70–110)
GLUCOSE SERPL-MCNC: 99 MG/DL (ref 70–110)
GLUCOSE UR QL STRIP: NEGATIVE
GRAM STN SPEC: NORMAL
HCO3 UR-SCNC: 29.2 MMOL/L (ref 24–28)
HCO3 UR-SCNC: 29.9 MMOL/L (ref 24–28)
HCT VFR BLD AUTO: 23.6 % (ref 40–54)
HCT VFR BLD AUTO: 25.5 % (ref 40–54)
HCT VFR BLD AUTO: 26 % (ref 40–54)
HCT VFR BLD AUTO: 26.1 % (ref 40–54)
HCT VFR BLD AUTO: 26.1 % (ref 40–54)
HCT VFR BLD AUTO: 26.2 % (ref 40–54)
HCT VFR BLD AUTO: 27 % (ref 40–54)
HCT VFR BLD AUTO: 27 % (ref 40–54)
HCT VFR BLD AUTO: 27.3 % (ref 40–54)
HCT VFR BLD AUTO: 27.8 % (ref 40–54)
HCT VFR BLD AUTO: 28.4 % (ref 40–54)
HCT VFR BLD AUTO: 28.7 % (ref 40–54)
HCT VFR BLD AUTO: 29.1 % (ref 40–54)
HCT VFR BLD AUTO: 29.2 % (ref 40–54)
HCT VFR BLD AUTO: 30.6 % (ref 40–54)
HCT VFR BLD AUTO: 32.8 % (ref 40–54)
HCT VFR BLD CALC: 29 %PCV (ref 36–54)
HCT VFR BLD CALC: 34 %PCV (ref 36–54)
HCV AB SERPL QL IA: NORMAL
HGB BLD-MCNC: 10 G/DL (ref 14–18)
HGB BLD-MCNC: 7.1 G/DL (ref 14–18)
HGB BLD-MCNC: 7.7 G/DL (ref 14–18)
HGB BLD-MCNC: 7.9 G/DL (ref 14–18)
HGB BLD-MCNC: 8.1 G/DL (ref 14–18)
HGB BLD-MCNC: 8.3 G/DL (ref 14–18)
HGB BLD-MCNC: 8.4 G/DL (ref 14–18)
HGB BLD-MCNC: 8.7 G/DL (ref 14–18)
HGB BLD-MCNC: 8.7 G/DL (ref 14–18)
HGB BLD-MCNC: 8.8 G/DL (ref 14–18)
HGB BLD-MCNC: 9 G/DL (ref 14–18)
HGB BLD-MCNC: 9.3 G/DL (ref 14–18)
HGB UR QL STRIP: ABNORMAL
HGB UR QL STRIP: NEGATIVE
HIV 1+2 AB+HIV1 P24 AG SERPL QL IA: NORMAL
HYALINE CASTS UR QL AUTO: 0 /LPF
HYPOCHROMIA BLD QL SMEAR: ABNORMAL
HYPOCHROMIA BLD QL SMEAR: ABNORMAL
IMM GRANULOCYTES # BLD AUTO: 0.03 K/UL (ref 0–0.04)
IMM GRANULOCYTES # BLD AUTO: 0.04 K/UL (ref 0–0.04)
IMM GRANULOCYTES # BLD AUTO: 0.05 K/UL (ref 0–0.04)
IMM GRANULOCYTES # BLD AUTO: 0.06 K/UL (ref 0–0.04)
IMM GRANULOCYTES # BLD AUTO: 0.1 K/UL (ref 0–0.04)
IMM GRANULOCYTES # BLD AUTO: 0.1 K/UL (ref 0–0.04)
IMM GRANULOCYTES # BLD AUTO: 0.11 K/UL (ref 0–0.04)
IMM GRANULOCYTES # BLD AUTO: 0.17 K/UL (ref 0–0.04)
IMM GRANULOCYTES # BLD AUTO: ABNORMAL K/UL (ref 0–0.04)
IMM GRANULOCYTES NFR BLD AUTO: 0.3 % (ref 0–0.5)
IMM GRANULOCYTES NFR BLD AUTO: 0.3 % (ref 0–0.5)
IMM GRANULOCYTES NFR BLD AUTO: 0.4 % (ref 0–0.5)
IMM GRANULOCYTES NFR BLD AUTO: 0.5 % (ref 0–0.5)
IMM GRANULOCYTES NFR BLD AUTO: 0.6 % (ref 0–0.5)
IMM GRANULOCYTES NFR BLD AUTO: 0.6 % (ref 0–0.5)
IMM GRANULOCYTES NFR BLD AUTO: 0.7 % (ref 0–0.5)
IMM GRANULOCYTES NFR BLD AUTO: 1 % (ref 0–0.5)
IMM GRANULOCYTES NFR BLD AUTO: ABNORMAL % (ref 0–0.5)
INFLUENZA A, MOLECULAR: NOT DETECTED
INFLUENZA A, MOLECULAR: NOT DETECTED
INFLUENZA B, MOLECULAR: NOT DETECTED
INFLUENZA B, MOLECULAR: NOT DETECTED
INR PPP: 1 (ref 0.8–1.2)
INR PPP: 1 (ref 0.8–1.2)
IRON SERPL-MCNC: 64 UG/DL (ref 45–160)
KETONES UR QL STRIP: ABNORMAL
KETONES UR QL STRIP: NEGATIVE
LACTATE SERPL-SCNC: 1.1 MMOL/L (ref 0.5–2.2)
LACTATE SERPL-SCNC: 1.1 MMOL/L (ref 0.5–2.2)
LACTATE SERPL-SCNC: 1.3 MMOL/L (ref 0.5–2.2)
LDH SERPL L TO P-CCNC: 0.8 MMOL/L (ref 0.5–2.2)
LDH SERPL L TO P-CCNC: 1.14 MMOL/L (ref 0.5–2.2)
LDH SERPL L TO P-CCNC: 197 U/L (ref 110–260)
LDH SERPL L TO P-CCNC: 2.4 MMOL/L (ref 0.5–2.2)
LEUKOCYTE ESTERASE UR QL STRIP: ABNORMAL
LEUKOCYTE ESTERASE UR QL STRIP: NEGATIVE
LIPASE SERPL-CCNC: 10 U/L (ref 4–60)
LYMPHOCYTES # BLD AUTO: 0.7 K/UL (ref 1–4.8)
LYMPHOCYTES # BLD AUTO: 0.8 K/UL (ref 1–4.8)
LYMPHOCYTES # BLD AUTO: 0.8 K/UL (ref 1–4.8)
LYMPHOCYTES # BLD AUTO: 0.9 K/UL (ref 1–4.8)
LYMPHOCYTES # BLD AUTO: 1 K/UL (ref 1–4.8)
LYMPHOCYTES # BLD AUTO: 1.1 K/UL (ref 1–4.8)
LYMPHOCYTES # BLD AUTO: 1.2 K/UL (ref 1–4.8)
LYMPHOCYTES # BLD AUTO: 1.3 K/UL (ref 1–4.8)
LYMPHOCYTES # BLD AUTO: 1.3 K/UL (ref 1–4.8)
LYMPHOCYTES # BLD AUTO: 1.7 K/UL (ref 1–4.8)
LYMPHOCYTES # BLD AUTO: ABNORMAL K/UL (ref 1–4.8)
LYMPHOCYTES NFR BLD: 0 % (ref 18–48)
LYMPHOCYTES NFR BLD: 11 % (ref 18–48)
LYMPHOCYTES NFR BLD: 11.7 % (ref 18–48)
LYMPHOCYTES NFR BLD: 14.2 % (ref 18–48)
LYMPHOCYTES NFR BLD: 23.3 % (ref 18–48)
LYMPHOCYTES NFR BLD: 3.5 % (ref 18–48)
LYMPHOCYTES NFR BLD: 4.7 % (ref 18–48)
LYMPHOCYTES NFR BLD: 7 % (ref 18–48)
LYMPHOCYTES NFR BLD: 7.2 % (ref 18–48)
LYMPHOCYTES NFR BLD: 7.4 % (ref 18–48)
LYMPHOCYTES NFR BLD: 7.6 % (ref 18–48)
LYMPHOCYTES NFR BLD: 7.8 % (ref 18–48)
LYMPHOCYTES NFR BLD: 7.8 % (ref 18–48)
LYMPHOCYTES NFR BLD: 8.4 % (ref 18–48)
LYMPHOCYTES NFR BLD: 8.9 % (ref 18–48)
LYMPHOCYTES NFR BLD: 9.2 % (ref 18–48)
LYMPHOCYTES NFR BLD: 9.2 % (ref 18–48)
LYMPHOCYTES NFR BLD: 9.5 % (ref 18–48)
MAGNESIUM SERPL-MCNC: 1.7 MG/DL (ref 1.6–2.6)
MAGNESIUM SERPL-MCNC: 1.7 MG/DL (ref 1.6–2.6)
MAGNESIUM SERPL-MCNC: 1.8 MG/DL (ref 1.6–2.6)
MAGNESIUM SERPL-MCNC: 1.9 MG/DL (ref 1.6–2.6)
MAGNESIUM SERPL-MCNC: 2 MG/DL (ref 1.6–2.6)
MAGNESIUM SERPL-MCNC: 2.1 MG/DL (ref 1.6–2.6)
MAGNESIUM SERPL-MCNC: 2.2 MG/DL (ref 1.6–2.6)
MAGNESIUM SERPL-MCNC: 2.2 MG/DL (ref 1.6–2.6)
MCH RBC QN AUTO: 26.4 PG (ref 27–31)
MCH RBC QN AUTO: 26.6 PG (ref 27–31)
MCH RBC QN AUTO: 26.6 PG (ref 27–31)
MCH RBC QN AUTO: 26.7 PG (ref 27–31)
MCH RBC QN AUTO: 26.7 PG (ref 27–31)
MCH RBC QN AUTO: 26.8 PG (ref 27–31)
MCH RBC QN AUTO: 27 PG (ref 27–31)
MCH RBC QN AUTO: 27.1 PG (ref 27–31)
MCH RBC QN AUTO: 27.2 PG (ref 27–31)
MCH RBC QN AUTO: 27.3 PG (ref 27–31)
MCH RBC QN AUTO: 27.4 PG (ref 27–31)
MCH RBC QN AUTO: 27.6 PG (ref 27–31)
MCH RBC QN AUTO: 27.7 PG (ref 27–31)
MCH RBC QN AUTO: 27.8 PG (ref 27–31)
MCHC RBC AUTO-ENTMCNC: 29.9 G/DL (ref 32–36)
MCHC RBC AUTO-ENTMCNC: 30.1 G/DL (ref 32–36)
MCHC RBC AUTO-ENTMCNC: 30.2 G/DL (ref 32–36)
MCHC RBC AUTO-ENTMCNC: 30.2 G/DL (ref 32–36)
MCHC RBC AUTO-ENTMCNC: 30.3 G/DL (ref 32–36)
MCHC RBC AUTO-ENTMCNC: 30.3 G/DL (ref 32–36)
MCHC RBC AUTO-ENTMCNC: 30.4 G/DL (ref 32–36)
MCHC RBC AUTO-ENTMCNC: 30.5 G/DL (ref 32–36)
MCHC RBC AUTO-ENTMCNC: 30.7 G/DL (ref 32–36)
MCHC RBC AUTO-ENTMCNC: 30.8 G/DL (ref 32–36)
MCHC RBC AUTO-ENTMCNC: 30.8 G/DL (ref 32–36)
MCHC RBC AUTO-ENTMCNC: 30.9 G/DL (ref 32–36)
MCHC RBC AUTO-ENTMCNC: 31 G/DL (ref 32–36)
MCHC RBC AUTO-ENTMCNC: 31 G/DL (ref 32–36)
MCHC RBC AUTO-ENTMCNC: 31.1 G/DL (ref 32–36)
MCHC RBC AUTO-ENTMCNC: 31.2 G/DL (ref 32–36)
MCV RBC AUTO: 87 FL (ref 82–98)
MCV RBC AUTO: 88 FL (ref 82–98)
MCV RBC AUTO: 89 FL (ref 82–98)
MCV RBC AUTO: 90 FL (ref 82–98)
MCV RBC AUTO: 90 FL (ref 82–98)
MCV RBC AUTO: 91 FL (ref 82–98)
METAMYELOCYTES NFR BLD MANUAL: 1 %
MICROSCOPIC COMMENT: ABNORMAL
MICROSCOPIC COMMENT: NORMAL
MICROSCOPIC COMMENT: NORMAL
MONOCYTES # BLD AUTO: 0.4 K/UL (ref 0.3–1)
MONOCYTES # BLD AUTO: 0.5 K/UL (ref 0.3–1)
MONOCYTES # BLD AUTO: 0.7 K/UL (ref 0.3–1)
MONOCYTES # BLD AUTO: 0.8 K/UL (ref 0.3–1)
MONOCYTES # BLD AUTO: 1.1 K/UL (ref 0.3–1)
MONOCYTES # BLD AUTO: 1.2 K/UL (ref 0.3–1)
MONOCYTES # BLD AUTO: 1.3 K/UL (ref 0.3–1)
MONOCYTES # BLD AUTO: 1.6 K/UL (ref 0.3–1)
MONOCYTES # BLD AUTO: ABNORMAL K/UL (ref 0.3–1)
MONOCYTES NFR BLD: 14.3 % (ref 4–15)
MONOCYTES NFR BLD: 18.9 % (ref 4–15)
MONOCYTES NFR BLD: 3.6 % (ref 4–15)
MONOCYTES NFR BLD: 4.5 % (ref 4–15)
MONOCYTES NFR BLD: 4.8 % (ref 4–15)
MONOCYTES NFR BLD: 5 % (ref 4–15)
MONOCYTES NFR BLD: 6.3 % (ref 4–15)
MONOCYTES NFR BLD: 6.5 % (ref 4–15)
MONOCYTES NFR BLD: 6.7 % (ref 4–15)
MONOCYTES NFR BLD: 7.4 % (ref 4–15)
MONOCYTES NFR BLD: 7.4 % (ref 4–15)
MONOCYTES NFR BLD: 7.5 % (ref 4–15)
MONOCYTES NFR BLD: 7.5 % (ref 4–15)
MONOCYTES NFR BLD: 8.2 % (ref 4–15)
MONOCYTES NFR BLD: 8.6 % (ref 4–15)
MONOCYTES NFR BLD: 8.9 % (ref 4–15)
MONOCYTES NFR BLD: 9.4 % (ref 4–15)
MONOCYTES NFR BLD: 9.7 % (ref 4–15)
MYELOCYTES NFR BLD MANUAL: 1 %
NEUTROPHILS # BLD AUTO: 10.2 K/UL (ref 1.8–7.7)
NEUTROPHILS # BLD AUTO: 10.4 K/UL (ref 1.8–7.7)
NEUTROPHILS # BLD AUTO: 11.2 K/UL (ref 1.8–7.7)
NEUTROPHILS # BLD AUTO: 11.7 K/UL (ref 1.8–7.7)
NEUTROPHILS # BLD AUTO: 12.1 K/UL (ref 1.8–7.7)
NEUTROPHILS # BLD AUTO: 15.2 K/UL (ref 1.8–7.7)
NEUTROPHILS # BLD AUTO: 15.3 K/UL (ref 1.8–7.7)
NEUTROPHILS # BLD AUTO: 20.1 K/UL (ref 1.8–7.7)
NEUTROPHILS # BLD AUTO: 3.2 K/UL (ref 1.8–7.7)
NEUTROPHILS # BLD AUTO: 5.9 K/UL (ref 1.8–7.7)
NEUTROPHILS # BLD AUTO: 7.7 K/UL (ref 1.8–7.7)
NEUTROPHILS # BLD AUTO: 7.9 K/UL (ref 1.8–7.7)
NEUTROPHILS # BLD AUTO: 8 K/UL (ref 1.8–7.7)
NEUTROPHILS # BLD AUTO: 8.1 K/UL (ref 1.8–7.7)
NEUTROPHILS # BLD AUTO: 8.9 K/UL (ref 1.8–7.7)
NEUTROPHILS # BLD AUTO: 9.2 K/UL (ref 1.8–7.7)
NEUTROPHILS # BLD AUTO: 9.4 K/UL (ref 1.8–7.7)
NEUTROPHILS NFR BLD: 56.3 % (ref 38–73)
NEUTROPHILS NFR BLD: 70.5 % (ref 38–73)
NEUTROPHILS NFR BLD: 78.5 % (ref 38–73)
NEUTROPHILS NFR BLD: 79.2 % (ref 38–73)
NEUTROPHILS NFR BLD: 80 % (ref 38–73)
NEUTROPHILS NFR BLD: 80.7 % (ref 38–73)
NEUTROPHILS NFR BLD: 82.2 % (ref 38–73)
NEUTROPHILS NFR BLD: 82.8 % (ref 38–73)
NEUTROPHILS NFR BLD: 83.1 % (ref 38–73)
NEUTROPHILS NFR BLD: 83.4 % (ref 38–73)
NEUTROPHILS NFR BLD: 83.4 % (ref 38–73)
NEUTROPHILS NFR BLD: 83.7 % (ref 38–73)
NEUTROPHILS NFR BLD: 84.4 % (ref 38–73)
NEUTROPHILS NFR BLD: 84.5 % (ref 38–73)
NEUTROPHILS NFR BLD: 87 % (ref 38–73)
NEUTROPHILS NFR BLD: 87.5 % (ref 38–73)
NEUTROPHILS NFR BLD: 87.8 % (ref 38–73)
NEUTROPHILS NFR BLD: 92.2 % (ref 38–73)
NEUTS BAND NFR BLD MANUAL: 5 %
NITRITE UR QL STRIP: NEGATIVE
NITRITE UR QL STRIP: POSITIVE
NRBC BLD-RTO: 0 /100 WBC
OVALOCYTES BLD QL SMEAR: ABNORMAL
PCO2 BLDA: 55.2 MMHG (ref 35–45)
PCO2 BLDA: 57.6 MMHG (ref 35–45)
PH SMN: 7.31 [PH] (ref 7.35–7.45)
PH SMN: 7.34 [PH] (ref 7.35–7.45)
PH UR STRIP: 5 [PH] (ref 5–8)
PH UR STRIP: 5 [PH] (ref 5–8)
PH UR STRIP: 6 [PH] (ref 5–8)
PHOSPHATE SERPL-MCNC: 2.6 MG/DL (ref 2.7–4.5)
PHOSPHATE SERPL-MCNC: 2.8 MG/DL (ref 2.7–4.5)
PHOSPHATE SERPL-MCNC: 2.9 MG/DL (ref 2.7–4.5)
PHOSPHATE SERPL-MCNC: 3 MG/DL (ref 2.7–4.5)
PHOSPHATE SERPL-MCNC: 3 MG/DL (ref 2.7–4.5)
PHOSPHATE SERPL-MCNC: 3.1 MG/DL (ref 2.7–4.5)
PHOSPHATE SERPL-MCNC: 3.2 MG/DL (ref 2.7–4.5)
PLATELET # BLD AUTO: 190 K/UL (ref 150–450)
PLATELET # BLD AUTO: 192 K/UL (ref 150–450)
PLATELET # BLD AUTO: 195 K/UL (ref 150–450)
PLATELET # BLD AUTO: 202 K/UL (ref 150–450)
PLATELET # BLD AUTO: 206 K/UL (ref 150–450)
PLATELET # BLD AUTO: 206 K/UL (ref 150–450)
PLATELET # BLD AUTO: 216 K/UL (ref 150–450)
PLATELET # BLD AUTO: 219 K/UL (ref 150–450)
PLATELET # BLD AUTO: 223 K/UL (ref 150–450)
PLATELET # BLD AUTO: 224 K/UL (ref 150–450)
PLATELET # BLD AUTO: 227 K/UL (ref 150–450)
PLATELET # BLD AUTO: 230 K/UL (ref 150–450)
PLATELET # BLD AUTO: 252 K/UL (ref 150–450)
PLATELET # BLD AUTO: 272 K/UL (ref 150–450)
PLATELET # BLD AUTO: 311 K/UL (ref 150–450)
PLATELET # BLD AUTO: 342 K/UL (ref 150–450)
PLATELET # BLD AUTO: 348 K/UL (ref 150–450)
PLATELET # BLD AUTO: 358 K/UL (ref 150–450)
PLATELET BLD QL SMEAR: ABNORMAL
PLATELET BLD QL SMEAR: ABNORMAL
PMV BLD AUTO: 10 FL (ref 9.2–12.9)
PMV BLD AUTO: 10 FL (ref 9.2–12.9)
PMV BLD AUTO: 10.1 FL (ref 9.2–12.9)
PMV BLD AUTO: 10.1 FL (ref 9.2–12.9)
PMV BLD AUTO: 10.3 FL (ref 9.2–12.9)
PMV BLD AUTO: 10.4 FL (ref 9.2–12.9)
PMV BLD AUTO: 10.6 FL (ref 9.2–12.9)
PMV BLD AUTO: 9.2 FL (ref 9.2–12.9)
PMV BLD AUTO: 9.2 FL (ref 9.2–12.9)
PMV BLD AUTO: 9.4 FL (ref 9.2–12.9)
PMV BLD AUTO: 9.6 FL (ref 9.2–12.9)
PMV BLD AUTO: 9.6 FL (ref 9.2–12.9)
PMV BLD AUTO: 9.8 FL (ref 9.2–12.9)
PMV BLD AUTO: 9.8 FL (ref 9.2–12.9)
PMV BLD AUTO: 9.9 FL (ref 9.2–12.9)
PO2 BLDA: 33 MMHG (ref 40–60)
PO2 BLDA: 45 MMHG (ref 40–60)
POC BE: 3 MMOL/L
POC BE: 4 MMOL/L
POC IONIZED CALCIUM: 1.15 MMOL/L (ref 1.06–1.42)
POC IONIZED CALCIUM: 1.16 MMOL/L (ref 1.06–1.42)
POC SATURATED O2: 57 % (ref 95–100)
POC SATURATED O2: 77 % (ref 95–100)
POC TCO2 (MEASURED): 26 MMOL/L (ref 23–29)
POC TCO2 (MEASURED): 28 MMOL/L (ref 23–29)
POC TCO2: 31 MMOL/L (ref 24–29)
POC TCO2: 32 MMOL/L (ref 24–29)
POCT GLUCOSE: 102 MG/DL (ref 70–110)
POCT GLUCOSE: 103 MG/DL (ref 70–110)
POCT GLUCOSE: 127 MG/DL (ref 70–110)
POCT GLUCOSE: 135 MG/DL (ref 70–110)
POCT GLUCOSE: 139 MG/DL (ref 70–110)
POCT GLUCOSE: 161 MG/DL (ref 70–110)
POCT GLUCOSE: 195 MG/DL (ref 70–110)
POCT GLUCOSE: 95 MG/DL (ref 70–110)
POIKILOCYTOSIS BLD QL SMEAR: SLIGHT
POLYCHROMASIA BLD QL SMEAR: ABNORMAL
POLYCHROMASIA BLD QL SMEAR: ABNORMAL
POTASSIUM BLD-SCNC: 4.8 MMOL/L (ref 3.5–5.1)
POTASSIUM BLD-SCNC: 5.7 MMOL/L (ref 3.5–5.1)
POTASSIUM SERPL-SCNC: 4 MMOL/L (ref 3.5–5.1)
POTASSIUM SERPL-SCNC: 4.2 MMOL/L (ref 3.5–5.1)
POTASSIUM SERPL-SCNC: 4.3 MMOL/L (ref 3.5–5.1)
POTASSIUM SERPL-SCNC: 4.3 MMOL/L (ref 3.5–5.1)
POTASSIUM SERPL-SCNC: 4.4 MMOL/L (ref 3.5–5.1)
POTASSIUM SERPL-SCNC: 4.4 MMOL/L (ref 3.5–5.1)
POTASSIUM SERPL-SCNC: 4.5 MMOL/L (ref 3.5–5.1)
POTASSIUM SERPL-SCNC: 4.6 MMOL/L (ref 3.5–5.1)
POTASSIUM SERPL-SCNC: 4.7 MMOL/L (ref 3.5–5.1)
POTASSIUM SERPL-SCNC: 4.8 MMOL/L (ref 3.5–5.1)
POTASSIUM SERPL-SCNC: 4.9 MMOL/L (ref 3.5–5.1)
POTASSIUM SERPL-SCNC: 4.9 MMOL/L (ref 3.5–5.1)
POTASSIUM SERPL-SCNC: 5.5 MMOL/L (ref 3.5–5.1)
POTASSIUM SERPL-SCNC: 5.5 MMOL/L (ref 3.5–5.1)
POTASSIUM SERPL-SCNC: 6.2 MMOL/L (ref 3.5–5.1)
PROCALCITONIN SERPL IA-MCNC: 0.05 NG/ML
PROCALCITONIN SERPL IA-MCNC: 0.07 NG/ML
PROCALCITONIN SERPL IA-MCNC: 0.12 NG/ML
PROCALCITONIN SERPL IA-MCNC: 0.15 NG/ML
PROCALCITONIN SERPL IA-MCNC: 0.15 NG/ML
PROCALCITONIN SERPL IA-MCNC: 0.19 NG/ML
PROCALCITONIN SERPL IA-MCNC: 0.24 NG/ML
PROCALCITONIN SERPL IA-MCNC: 0.32 NG/ML
PROT SERPL-MCNC: 5.7 G/DL (ref 6–8.4)
PROT SERPL-MCNC: 5.8 G/DL (ref 6–8.4)
PROT SERPL-MCNC: 5.8 G/DL (ref 6–8.4)
PROT SERPL-MCNC: 5.9 G/DL (ref 6–8.4)
PROT SERPL-MCNC: 6.1 G/DL (ref 6–8.4)
PROT SERPL-MCNC: 6.1 G/DL (ref 6–8.4)
PROT SERPL-MCNC: 6.2 G/DL (ref 6–8.4)
PROT SERPL-MCNC: 6.2 G/DL (ref 6–8.4)
PROT SERPL-MCNC: 6.3 G/DL (ref 6–8.4)
PROT SERPL-MCNC: 6.9 G/DL (ref 6–8.4)
PROT SERPL-MCNC: 7.1 G/DL (ref 6–8.4)
PROT SERPL-MCNC: 7.2 G/DL (ref 6–8.4)
PROT SERPL-MCNC: 7.6 G/DL (ref 6–8.4)
PROT UR QL STRIP: ABNORMAL
PROT UR QL STRIP: NEGATIVE
PROTHROMBIN TIME: 10.3 SEC (ref 9–12.5)
PROTHROMBIN TIME: 10.4 SEC (ref 9–12.5)
RBC # BLD AUTO: 2.67 M/UL (ref 4.6–6.2)
RBC # BLD AUTO: 2.87 M/UL (ref 4.6–6.2)
RBC # BLD AUTO: 2.89 M/UL (ref 4.6–6.2)
RBC # BLD AUTO: 2.93 M/UL (ref 4.6–6.2)
RBC # BLD AUTO: 2.96 M/UL (ref 4.6–6.2)
RBC # BLD AUTO: 2.98 M/UL (ref 4.6–6.2)
RBC # BLD AUTO: 3.02 M/UL (ref 4.6–6.2)
RBC # BLD AUTO: 3.11 M/UL (ref 4.6–6.2)
RBC # BLD AUTO: 3.12 M/UL (ref 4.6–6.2)
RBC # BLD AUTO: 3.15 M/UL (ref 4.6–6.2)
RBC # BLD AUTO: 3.25 M/UL (ref 4.6–6.2)
RBC # BLD AUTO: 3.26 M/UL (ref 4.6–6.2)
RBC # BLD AUTO: 3.29 M/UL (ref 4.6–6.2)
RBC # BLD AUTO: 3.29 M/UL (ref 4.6–6.2)
RBC # BLD AUTO: 3.36 M/UL (ref 4.6–6.2)
RBC # BLD AUTO: 3.65 M/UL (ref 4.6–6.2)
RBC #/AREA URNS AUTO: 2 /HPF (ref 0–4)
RBC #/AREA URNS AUTO: 2 /HPF (ref 0–4)
RBC #/AREA URNS AUTO: 3 /HPF (ref 0–4)
RBC #/AREA URNS AUTO: 5 /HPF (ref 0–4)
RBC #/AREA URNS AUTO: >100 /HPF (ref 0–4)
RSV AG BY MOLECULAR METHOD: NOT DETECTED
RSV AG BY MOLECULAR METHOD: NOT DETECTED
SAMPLE: ABNORMAL
SAMPLE: NORMAL
SAMPLE: NORMAL
SARS-COV-2 RDRP RESP QL NAA+PROBE: POSITIVE
SARS-COV-2 RNA RESP QL NAA+PROBE: DETECTED
SARS-COV-2 RNA RESP QL NAA+PROBE: NOT DETECTED
SATURATED IRON: 29 % (ref 20–50)
SCHISTOCYTES BLD QL SMEAR: ABNORMAL
SCHISTOCYTES BLD QL SMEAR: ABNORMAL
SCHISTOCYTES BLD QL SMEAR: PRESENT
SITE: ABNORMAL
SITE: NORMAL
SITE: NORMAL
SODIUM BLD-SCNC: 137 MMOL/L (ref 136–145)
SODIUM BLD-SCNC: 137 MMOL/L (ref 136–145)
SODIUM SERPL-SCNC: 136 MMOL/L (ref 136–145)
SODIUM SERPL-SCNC: 137 MMOL/L (ref 136–145)
SODIUM SERPL-SCNC: 138 MMOL/L (ref 136–145)
SODIUM SERPL-SCNC: 139 MMOL/L (ref 136–145)
SODIUM SERPL-SCNC: 140 MMOL/L (ref 136–145)
SODIUM SERPL-SCNC: 141 MMOL/L (ref 136–145)
SODIUM SERPL-SCNC: 141 MMOL/L (ref 136–145)
SP GR UR STRIP: 1.01 (ref 1–1.03)
SP GR UR STRIP: 1.02 (ref 1–1.03)
SP GR UR STRIP: 1.03 (ref 1–1.03)
SPHEROCYTES BLD QL SMEAR: ABNORMAL
SPHEROCYTES BLD QL SMEAR: ABNORMAL
SQUAMOUS #/AREA URNS AUTO: 0 /HPF
SQUAMOUS #/AREA URNS AUTO: 1 /HPF
SQUAMOUS #/AREA URNS AUTO: 1 /HPF
SQUAMOUS #/AREA URNS AUTO: 2 /HPF
TARGETS BLD QL SMEAR: ABNORMAL
TOTAL IRON BINDING CAPACITY: 221 UG/DL (ref 250–450)
TRANSFERRIN SERPL-MCNC: 149 MG/DL (ref 200–375)
TROPONIN I SERPL DL<=0.01 NG/ML-MCNC: 0.01 NG/ML (ref 0–0.03)
TROPONIN I SERPL DL<=0.01 NG/ML-MCNC: 0.02 NG/ML (ref 0–0.03)
TSH SERPL DL<=0.005 MIU/L-ACNC: 1.91 UIU/ML (ref 0.4–4)
TSH SERPL DL<=0.005 MIU/L-ACNC: 3.03 UIU/ML (ref 0.4–4)
TSH SERPL DL<=0.005 MIU/L-ACNC: 3.19 UIU/ML (ref 0.4–4)
URN SPEC COLLECT METH UR: ABNORMAL
VANCOMYCIN SERPL-MCNC: 12.5 UG/ML
VIT B12 SERPL-MCNC: 1001 PG/ML (ref 210–950)
VIT B12 SERPL-MCNC: 1076 PG/ML (ref 210–950)
WBC # BLD AUTO: 10.19 K/UL (ref 3.9–12.7)
WBC # BLD AUTO: 10.78 K/UL (ref 3.9–12.7)
WBC # BLD AUTO: 10.88 K/UL (ref 3.9–12.7)
WBC # BLD AUTO: 11.79 K/UL (ref 3.9–12.7)
WBC # BLD AUTO: 11.89 K/UL (ref 3.9–12.7)
WBC # BLD AUTO: 12.57 K/UL (ref 3.9–12.7)
WBC # BLD AUTO: 13.74 K/UL (ref 3.9–12.7)
WBC # BLD AUTO: 14.05 K/UL (ref 3.9–12.7)
WBC # BLD AUTO: 14.2 K/UL (ref 3.9–12.7)
WBC # BLD AUTO: 14.55 K/UL (ref 3.9–12.7)
WBC # BLD AUTO: 17.39 K/UL (ref 3.9–12.7)
WBC # BLD AUTO: 18.14 K/UL (ref 3.9–12.7)
WBC # BLD AUTO: 21.81 K/UL (ref 3.9–12.7)
WBC # BLD AUTO: 5.62 K/UL (ref 3.9–12.7)
WBC # BLD AUTO: 8.4 K/UL (ref 3.9–12.7)
WBC # BLD AUTO: 9.17 K/UL (ref 3.9–12.7)
WBC # BLD AUTO: 9.35 K/UL (ref 3.9–12.7)
WBC # BLD AUTO: 9.64 K/UL (ref 3.9–12.7)
WBC #/AREA URNS AUTO: 10 /HPF (ref 0–5)
WBC #/AREA URNS AUTO: 18 /HPF (ref 0–5)
WBC #/AREA URNS AUTO: 4 /HPF (ref 0–5)
WBC #/AREA URNS AUTO: 5 /HPF (ref 0–5)
WBC #/AREA URNS AUTO: 9 /HPF (ref 0–5)

## 2023-01-01 PROCEDURE — 99233 PR SUBSEQUENT HOSPITAL CARE,LEVL III: ICD-10-PCS | Mod: GC,,, | Performed by: EMERGENCY MEDICINE

## 2023-01-01 PROCEDURE — 27000207 HC ISOLATION

## 2023-01-01 PROCEDURE — 21400001 HC TELEMETRY ROOM

## 2023-01-01 PROCEDURE — 99214 OFFICE O/P EST MOD 30 MIN: CPT | Mod: S$PBB,,, | Performed by: FAMILY MEDICINE

## 2023-01-01 PROCEDURE — 99291 PR CRITICAL CARE, E/M 30-74 MINUTES: ICD-10-PCS | Mod: ,,, | Performed by: EMERGENCY MEDICINE

## 2023-01-01 PROCEDURE — 63600175 PHARM REV CODE 636 W HCPCS: Performed by: FAMILY MEDICINE

## 2023-01-01 PROCEDURE — 80053 COMPREHEN METABOLIC PANEL: CPT | Performed by: INTERNAL MEDICINE

## 2023-01-01 PROCEDURE — 99223 PR INITIAL HOSPITAL CARE,LEVL III: ICD-10-PCS | Mod: ,,, | Performed by: STUDENT IN AN ORGANIZED HEALTH CARE EDUCATION/TRAINING PROGRAM

## 2023-01-01 PROCEDURE — 99232 SBSQ HOSP IP/OBS MODERATE 35: CPT | Mod: ,,, | Performed by: STUDENT IN AN ORGANIZED HEALTH CARE EDUCATION/TRAINING PROGRAM

## 2023-01-01 PROCEDURE — 87088 URINE BACTERIA CULTURE: CPT | Performed by: STUDENT IN AN ORGANIZED HEALTH CARE EDUCATION/TRAINING PROGRAM

## 2023-01-01 PROCEDURE — 99285 EMERGENCY DEPT VISIT HI MDM: CPT | Mod: 25

## 2023-01-01 PROCEDURE — 83880 ASSAY OF NATRIURETIC PEPTIDE: CPT | Performed by: EMERGENCY MEDICINE

## 2023-01-01 PROCEDURE — 99285 PR EMERGENCY DEPT VISIT,LEVEL V: ICD-10-PCS | Mod: ,,, | Performed by: EMERGENCY MEDICINE

## 2023-01-01 PROCEDURE — 25000003 PHARM REV CODE 250: Performed by: STUDENT IN AN ORGANIZED HEALTH CARE EDUCATION/TRAINING PROGRAM

## 2023-01-01 PROCEDURE — 96361 HYDRATE IV INFUSION ADD-ON: CPT

## 2023-01-01 PROCEDURE — 85025 COMPLETE CBC W/AUTO DIFF WBC: CPT | Performed by: FAMILY MEDICINE

## 2023-01-01 PROCEDURE — 93010 EKG 12-LEAD: ICD-10-PCS | Mod: ,,, | Performed by: INTERNAL MEDICINE

## 2023-01-01 PROCEDURE — 96367 TX/PROPH/DG ADDL SEQ IV INF: CPT

## 2023-01-01 PROCEDURE — 85379 FIBRIN DEGRADATION QUANT: CPT | Performed by: STUDENT IN AN ORGANIZED HEALTH CARE EDUCATION/TRAINING PROGRAM

## 2023-01-01 PROCEDURE — 97530 THERAPEUTIC ACTIVITIES: CPT

## 2023-01-01 PROCEDURE — G0378 HOSPITAL OBSERVATION PER HR: HCPCS

## 2023-01-01 PROCEDURE — 92610 EVALUATE SWALLOWING FUNCTION: CPT

## 2023-01-01 PROCEDURE — 36415 COLL VENOUS BLD VENIPUNCTURE: CPT | Performed by: FAMILY MEDICINE

## 2023-01-01 PROCEDURE — 93010 ELECTROCARDIOGRAM REPORT: CPT | Mod: ,,, | Performed by: INTERNAL MEDICINE

## 2023-01-01 PROCEDURE — S0166 INJ OLANZAPINE 2.5MG: HCPCS | Performed by: HOSPITALIST

## 2023-01-01 PROCEDURE — 84100 ASSAY OF PHOSPHORUS: CPT | Performed by: FAMILY MEDICINE

## 2023-01-01 PROCEDURE — 99233 PR SUBSEQUENT HOSPITAL CARE,LEVL III: ICD-10-PCS | Mod: ,,, | Performed by: FAMILY MEDICINE

## 2023-01-01 PROCEDURE — 63600175 PHARM REV CODE 636 W HCPCS: Mod: JZ,TB | Performed by: STUDENT IN AN ORGANIZED HEALTH CARE EDUCATION/TRAINING PROGRAM

## 2023-01-01 PROCEDURE — 63600175 PHARM REV CODE 636 W HCPCS: Performed by: HOSPITALIST

## 2023-01-01 PROCEDURE — 96374 THER/PROPH/DIAG INJ IV PUSH: CPT

## 2023-01-01 PROCEDURE — 25000003 PHARM REV CODE 250: Performed by: FAMILY MEDICINE

## 2023-01-01 PROCEDURE — 80053 COMPREHEN METABOLIC PANEL: CPT | Performed by: FAMILY MEDICINE

## 2023-01-01 PROCEDURE — 85025 COMPLETE CBC W/AUTO DIFF WBC: CPT | Performed by: INTERNAL MEDICINE

## 2023-01-01 PROCEDURE — 93005 ELECTROCARDIOGRAM TRACING: CPT

## 2023-01-01 PROCEDURE — 99232 PR SUBSEQUENT HOSPITAL CARE,LEVL II: ICD-10-PCS | Mod: ,,, | Performed by: FAMILY MEDICINE

## 2023-01-01 PROCEDURE — 82607 VITAMIN B-12: CPT | Performed by: HOSPITALIST

## 2023-01-01 PROCEDURE — 85025 COMPLETE CBC W/AUTO DIFF WBC: CPT | Performed by: HOSPITALIST

## 2023-01-01 PROCEDURE — 99999 PR PBB SHADOW E&M-EST. PATIENT-LVL III: CPT | Mod: PBBFAC,,, | Performed by: PSYCHIATRY & NEUROLOGY

## 2023-01-01 PROCEDURE — 94761 N-INVAS EAR/PLS OXIMETRY MLT: CPT

## 2023-01-01 PROCEDURE — 97166 OT EVAL MOD COMPLEX 45 MIN: CPT

## 2023-01-01 PROCEDURE — 99900035 HC TECH TIME PER 15 MIN (STAT)

## 2023-01-01 PROCEDURE — 85025 COMPLETE CBC W/AUTO DIFF WBC: CPT | Mod: 91 | Performed by: FAMILY MEDICINE

## 2023-01-01 PROCEDURE — 81001 URINALYSIS AUTO W/SCOPE: CPT | Performed by: EMERGENCY MEDICINE

## 2023-01-01 PROCEDURE — 99223 1ST HOSP IP/OBS HIGH 75: CPT | Mod: GC,,, | Performed by: STUDENT IN AN ORGANIZED HEALTH CARE EDUCATION/TRAINING PROGRAM

## 2023-01-01 PROCEDURE — 94640 AIRWAY INHALATION TREATMENT: CPT

## 2023-01-01 PROCEDURE — 83735 ASSAY OF MAGNESIUM: CPT | Performed by: INTERNAL MEDICINE

## 2023-01-01 PROCEDURE — 97535 SELF CARE MNGMENT TRAINING: CPT

## 2023-01-01 PROCEDURE — 99233 SBSQ HOSP IP/OBS HIGH 50: CPT | Mod: ,,, | Performed by: STUDENT IN AN ORGANIZED HEALTH CARE EDUCATION/TRAINING PROGRAM

## 2023-01-01 PROCEDURE — 99239 HOSP IP/OBS DSCHRG MGMT >30: CPT | Mod: ,,, | Performed by: STUDENT IN AN ORGANIZED HEALTH CARE EDUCATION/TRAINING PROGRAM

## 2023-01-01 PROCEDURE — 87205 SMEAR GRAM STAIN: CPT | Performed by: FAMILY MEDICINE

## 2023-01-01 PROCEDURE — 99223 PR INITIAL HOSPITAL CARE,LEVL III: ICD-10-PCS | Mod: GC,,, | Performed by: STUDENT IN AN ORGANIZED HEALTH CARE EDUCATION/TRAINING PROGRAM

## 2023-01-01 PROCEDURE — 12000002 HC ACUTE/MED SURGE SEMI-PRIVATE ROOM

## 2023-01-01 PROCEDURE — 86140 C-REACTIVE PROTEIN: CPT | Performed by: EMERGENCY MEDICINE

## 2023-01-01 PROCEDURE — 84145 PROCALCITONIN (PCT): CPT | Performed by: FAMILY MEDICINE

## 2023-01-01 PROCEDURE — 25000003 PHARM REV CODE 250: Performed by: INTERNAL MEDICINE

## 2023-01-01 PROCEDURE — U0002 COVID-19 LAB TEST NON-CDC: HCPCS | Performed by: STUDENT IN AN ORGANIZED HEALTH CARE EDUCATION/TRAINING PROGRAM

## 2023-01-01 PROCEDURE — 99214 OFFICE O/P EST MOD 30 MIN: CPT | Mod: S$PBB,,, | Performed by: INTERNAL MEDICINE

## 2023-01-01 PROCEDURE — 85025 COMPLETE CBC W/AUTO DIFF WBC: CPT | Performed by: EMERGENCY MEDICINE

## 2023-01-01 PROCEDURE — 99999 PR PBB SHADOW E&M-EST. PATIENT-LVL IV: ICD-10-PCS | Mod: PBBFAC,,, | Performed by: FAMILY MEDICINE

## 2023-01-01 PROCEDURE — 80047 BASIC METABLC PNL IONIZED CA: CPT

## 2023-01-01 PROCEDURE — 87040 BLOOD CULTURE FOR BACTERIA: CPT | Performed by: EMERGENCY MEDICINE

## 2023-01-01 PROCEDURE — 84443 ASSAY THYROID STIM HORMONE: CPT | Performed by: EMERGENCY MEDICINE

## 2023-01-01 PROCEDURE — 74018 XR KUB: ICD-10-PCS | Mod: 26,,, | Performed by: RADIOLOGY

## 2023-01-01 PROCEDURE — 99232 SBSQ HOSP IP/OBS MODERATE 35: CPT | Mod: ,,, | Performed by: FAMILY MEDICINE

## 2023-01-01 PROCEDURE — 83735 ASSAY OF MAGNESIUM: CPT

## 2023-01-01 PROCEDURE — 99222 1ST HOSP IP/OBS MODERATE 55: CPT | Mod: ,,, | Performed by: NURSE PRACTITIONER

## 2023-01-01 PROCEDURE — 99497 PR ADVNCD CARE PLAN 30 MIN: ICD-10-PCS | Mod: ,,, | Performed by: STUDENT IN AN ORGANIZED HEALTH CARE EDUCATION/TRAINING PROGRAM

## 2023-01-01 PROCEDURE — 51702 INSERT TEMP BLADDER CATH: CPT

## 2023-01-01 PROCEDURE — 36415 COLL VENOUS BLD VENIPUNCTURE: CPT | Performed by: STUDENT IN AN ORGANIZED HEALTH CARE EDUCATION/TRAINING PROGRAM

## 2023-01-01 PROCEDURE — 25000003 PHARM REV CODE 250: Performed by: EMERGENCY MEDICINE

## 2023-01-01 PROCEDURE — 63600175 PHARM REV CODE 636 W HCPCS: Performed by: EMERGENCY MEDICINE

## 2023-01-01 PROCEDURE — 81001 URINALYSIS AUTO W/SCOPE: CPT

## 2023-01-01 PROCEDURE — 99999 PR PBB SHADOW E&M-EST. PATIENT-LVL III: ICD-10-PCS | Mod: PBBFAC,,, | Performed by: PSYCHIATRY & NEUROLOGY

## 2023-01-01 PROCEDURE — 25000003 PHARM REV CODE 250: Performed by: HOSPITALIST

## 2023-01-01 PROCEDURE — 73590 X-RAY EXAM OF LOWER LEG: CPT | Mod: 26,RT,, | Performed by: RADIOLOGY

## 2023-01-01 PROCEDURE — 80053 COMPREHEN METABOLIC PANEL: CPT | Performed by: EMERGENCY MEDICINE

## 2023-01-01 PROCEDURE — 86803 HEPATITIS C AB TEST: CPT | Performed by: PHYSICIAN ASSISTANT

## 2023-01-01 PROCEDURE — 99999 PR PBB SHADOW E&M-EST. PATIENT-LVL IV: CPT | Mod: PBBFAC,,, | Performed by: FAMILY MEDICINE

## 2023-01-01 PROCEDURE — 82746 ASSAY OF FOLIC ACID SERUM: CPT | Performed by: HOSPITALIST

## 2023-01-01 PROCEDURE — 99223 1ST HOSP IP/OBS HIGH 75: CPT | Mod: ,,, | Performed by: STUDENT IN AN ORGANIZED HEALTH CARE EDUCATION/TRAINING PROGRAM

## 2023-01-01 PROCEDURE — 83735 ASSAY OF MAGNESIUM: CPT | Performed by: FAMILY MEDICINE

## 2023-01-01 PROCEDURE — 63600175 PHARM REV CODE 636 W HCPCS: Performed by: INTERNAL MEDICINE

## 2023-01-01 PROCEDURE — 81001 URINALYSIS AUTO W/SCOPE: CPT | Performed by: STUDENT IN AN ORGANIZED HEALTH CARE EDUCATION/TRAINING PROGRAM

## 2023-01-01 PROCEDURE — 99213 OFFICE O/P EST LOW 20 MIN: CPT | Mod: PBBFAC | Performed by: PSYCHIATRY & NEUROLOGY

## 2023-01-01 PROCEDURE — 97165 OT EVAL LOW COMPLEX 30 MIN: CPT

## 2023-01-01 PROCEDURE — 94668 MNPJ CHEST WALL SBSQ: CPT

## 2023-01-01 PROCEDURE — 99239 PR HOSPITAL DISCHARGE DAY,>30 MIN: ICD-10-PCS | Mod: ,,, | Performed by: INTERNAL MEDICINE

## 2023-01-01 PROCEDURE — 82803 BLOOD GASES ANY COMBINATION: CPT

## 2023-01-01 PROCEDURE — 99222 PR INITIAL HOSPITAL CARE,LEVL II: ICD-10-PCS | Mod: ,,, | Performed by: NURSE PRACTITIONER

## 2023-01-01 PROCEDURE — 99233 SBSQ HOSP IP/OBS HIGH 50: CPT | Mod: ,,, | Performed by: FAMILY MEDICINE

## 2023-01-01 PROCEDURE — 99239 HOSP IP/OBS DSCHRG MGMT >30: CPT | Mod: ,,, | Performed by: INTERNAL MEDICINE

## 2023-01-01 PROCEDURE — 94667 MNPJ CHEST WALL 1ST: CPT

## 2023-01-01 PROCEDURE — 96360 HYDRATION IV INFUSION INIT: CPT | Mod: 59

## 2023-01-01 PROCEDURE — 99284 EMERGENCY DEPT VISIT MOD MDM: CPT | Mod: ,,, | Performed by: EMERGENCY MEDICINE

## 2023-01-01 PROCEDURE — 96375 TX/PRO/DX INJ NEW DRUG ADDON: CPT

## 2023-01-01 PROCEDURE — 80048 BASIC METABOLIC PNL TOTAL CA: CPT | Performed by: FAMILY MEDICINE

## 2023-01-01 PROCEDURE — 87086 URINE CULTURE/COLONY COUNT: CPT | Performed by: EMERGENCY MEDICINE

## 2023-01-01 PROCEDURE — 27000221 HC OXYGEN, UP TO 24 HOURS

## 2023-01-01 PROCEDURE — 99233 SBSQ HOSP IP/OBS HIGH 50: CPT | Mod: GC,,, | Performed by: EMERGENCY MEDICINE

## 2023-01-01 PROCEDURE — 83880 ASSAY OF NATRIURETIC PEPTIDE: CPT | Performed by: STUDENT IN AN ORGANIZED HEALTH CARE EDUCATION/TRAINING PROGRAM

## 2023-01-01 PROCEDURE — 99215 OFFICE O/P EST HI 40 MIN: CPT | Mod: S$PBB,,, | Performed by: FAMILY MEDICINE

## 2023-01-01 PROCEDURE — 85025 COMPLETE CBC W/AUTO DIFF WBC: CPT | Performed by: STUDENT IN AN ORGANIZED HEALTH CARE EDUCATION/TRAINING PROGRAM

## 2023-01-01 PROCEDURE — 99233 PR SUBSEQUENT HOSPITAL CARE,LEVL III: ICD-10-PCS | Mod: ,,, | Performed by: STUDENT IN AN ORGANIZED HEALTH CARE EDUCATION/TRAINING PROGRAM

## 2023-01-01 PROCEDURE — 25000003 PHARM REV CODE 250: Performed by: NURSE PRACTITIONER

## 2023-01-01 PROCEDURE — 84484 ASSAY OF TROPONIN QUANT: CPT | Performed by: EMERGENCY MEDICINE

## 2023-01-01 PROCEDURE — 99239 PR HOSPITAL DISCHARGE DAY,>30 MIN: ICD-10-PCS | Mod: ,,, | Performed by: STUDENT IN AN ORGANIZED HEALTH CARE EDUCATION/TRAINING PROGRAM

## 2023-01-01 PROCEDURE — 97530 THERAPEUTIC ACTIVITIES: CPT | Mod: CQ

## 2023-01-01 PROCEDURE — 99223 PR INITIAL HOSPITAL CARE,LEVL III: ICD-10-PCS | Mod: AI,,, | Performed by: FAMILY MEDICINE

## 2023-01-01 PROCEDURE — G0180 PR HOME HEALTH MD CERTIFICATION: ICD-10-PCS | Mod: ,,, | Performed by: FAMILY MEDICINE

## 2023-01-01 PROCEDURE — 97161 PT EVAL LOW COMPLEX 20 MIN: CPT

## 2023-01-01 PROCEDURE — 99284 PR EMERGENCY DEPT VISIT,LEVEL IV: ICD-10-PCS | Mod: ,,, | Performed by: EMERGENCY MEDICINE

## 2023-01-01 PROCEDURE — 83605 ASSAY OF LACTIC ACID: CPT | Mod: 91 | Performed by: EMERGENCY MEDICINE

## 2023-01-01 PROCEDURE — 51798 US URINE CAPACITY MEASURE: CPT

## 2023-01-01 PROCEDURE — 11000001 HC ACUTE MED/SURG PRIVATE ROOM

## 2023-01-01 PROCEDURE — 87186 SC STD MICRODIL/AGAR DIL: CPT | Performed by: STUDENT IN AN ORGANIZED HEALTH CARE EDUCATION/TRAINING PROGRAM

## 2023-01-01 PROCEDURE — 86140 C-REACTIVE PROTEIN: CPT | Performed by: FAMILY MEDICINE

## 2023-01-01 PROCEDURE — 96366 THER/PROPH/DIAG IV INF ADDON: CPT

## 2023-01-01 PROCEDURE — 63600175 PHARM REV CODE 636 W HCPCS: Performed by: NURSE PRACTITIONER

## 2023-01-01 PROCEDURE — 84145 PROCALCITONIN (PCT): CPT | Performed by: PHYSICIAN ASSISTANT

## 2023-01-01 PROCEDURE — 83605 ASSAY OF LACTIC ACID: CPT

## 2023-01-01 PROCEDURE — 94640 AIRWAY INHALATION TREATMENT: CPT | Mod: XB

## 2023-01-01 PROCEDURE — 87086 URINE CULTURE/COLONY COUNT: CPT | Performed by: STUDENT IN AN ORGANIZED HEALTH CARE EDUCATION/TRAINING PROGRAM

## 2023-01-01 PROCEDURE — 96365 THER/PROPH/DIAG IV INF INIT: CPT

## 2023-01-01 PROCEDURE — 36415 COLL VENOUS BLD VENIPUNCTURE: CPT | Mod: PN | Performed by: FAMILY MEDICINE

## 2023-01-01 PROCEDURE — 99497 ADVNCD CARE PLAN 30 MIN: CPT | Mod: ,,, | Performed by: STUDENT IN AN ORGANIZED HEALTH CARE EDUCATION/TRAINING PROGRAM

## 2023-01-01 PROCEDURE — 80053 COMPREHEN METABOLIC PANEL: CPT

## 2023-01-01 PROCEDURE — 84145 PROCALCITONIN (PCT): CPT | Performed by: HOSPITALIST

## 2023-01-01 PROCEDURE — 87449 NOS EACH ORGANISM AG IA: CPT | Performed by: FAMILY MEDICINE

## 2023-01-01 PROCEDURE — 87389 HIV-1 AG W/HIV-1&-2 AB AG IA: CPT | Performed by: PHYSICIAN ASSISTANT

## 2023-01-01 PROCEDURE — 99233 PR SUBSEQUENT HOSPITAL CARE,LEVL III: ICD-10-PCS | Mod: ,,, | Performed by: INTERNAL MEDICINE

## 2023-01-01 PROCEDURE — 99215 PR OFFICE/OUTPT VISIT, EST, LEVL V, 40-54 MIN: ICD-10-PCS | Mod: S$PBB,,, | Performed by: FAMILY MEDICINE

## 2023-01-01 PROCEDURE — 97116 GAIT TRAINING THERAPY: CPT | Mod: CQ

## 2023-01-01 PROCEDURE — 82550 ASSAY OF CK (CPK): CPT | Mod: 91 | Performed by: EMERGENCY MEDICINE

## 2023-01-01 PROCEDURE — 25000242 PHARM REV CODE 250 ALT 637 W/ HCPCS: Performed by: STUDENT IN AN ORGANIZED HEALTH CARE EDUCATION/TRAINING PROGRAM

## 2023-01-01 PROCEDURE — 83605 ASSAY OF LACTIC ACID: CPT | Performed by: EMERGENCY MEDICINE

## 2023-01-01 PROCEDURE — 80053 COMPREHEN METABOLIC PANEL: CPT | Performed by: HOSPITALIST

## 2023-01-01 PROCEDURE — 99233 SBSQ HOSP IP/OBS HIGH 50: CPT | Mod: ,,, | Performed by: INTERNAL MEDICINE

## 2023-01-01 PROCEDURE — 99222 PR INITIAL HOSPITAL CARE,LEVL II: ICD-10-PCS | Mod: ,,, | Performed by: PSYCHIATRY & NEUROLOGY

## 2023-01-01 PROCEDURE — 99239 PR HOSPITAL DISCHARGE DAY,>30 MIN: ICD-10-PCS | Mod: ,,, | Performed by: FAMILY MEDICINE

## 2023-01-01 PROCEDURE — 82728 ASSAY OF FERRITIN: CPT | Performed by: EMERGENCY MEDICINE

## 2023-01-01 PROCEDURE — 99291 CRITICAL CARE FIRST HOUR: CPT | Mod: ,,, | Performed by: EMERGENCY MEDICINE

## 2023-01-01 PROCEDURE — 87077 CULTURE AEROBIC IDENTIFY: CPT | Performed by: STUDENT IN AN ORGANIZED HEALTH CARE EDUCATION/TRAINING PROGRAM

## 2023-01-01 PROCEDURE — 99222 1ST HOSP IP/OBS MODERATE 55: CPT | Mod: ,,, | Performed by: PSYCHIATRY & NEUROLOGY

## 2023-01-01 PROCEDURE — 85610 PROTHROMBIN TIME: CPT | Performed by: HOSPITALIST

## 2023-01-01 PROCEDURE — 0241U SARS-COV2 (COVID) WITH FLU/RSV BY PCR: CPT

## 2023-01-01 PROCEDURE — 99231 PR SUBSEQUENT HOSPITAL CARE,LEVL I: ICD-10-PCS | Mod: ,,, | Performed by: NURSE PRACTITIONER

## 2023-01-01 PROCEDURE — 96360 HYDRATION IV INFUSION INIT: CPT

## 2023-01-01 PROCEDURE — 99232 PR SUBSEQUENT HOSPITAL CARE,LEVL II: ICD-10-PCS | Mod: ,,, | Performed by: STUDENT IN AN ORGANIZED HEALTH CARE EDUCATION/TRAINING PROGRAM

## 2023-01-01 PROCEDURE — 85730 THROMBOPLASTIN TIME PARTIAL: CPT | Performed by: HOSPITALIST

## 2023-01-01 PROCEDURE — 63700000 PHARM REV CODE 250 ALT 637 W/O HCPCS: Performed by: STUDENT IN AN ORGANIZED HEALTH CARE EDUCATION/TRAINING PROGRAM

## 2023-01-01 PROCEDURE — 99231 SBSQ HOSP IP/OBS SF/LOW 25: CPT | Mod: ,,, | Performed by: NURSE PRACTITIONER

## 2023-01-01 PROCEDURE — 84100 ASSAY OF PHOSPHORUS: CPT | Performed by: INTERNAL MEDICINE

## 2023-01-01 PROCEDURE — 83690 ASSAY OF LIPASE: CPT | Performed by: EMERGENCY MEDICINE

## 2023-01-01 PROCEDURE — 87449 NOS EACH ORGANISM AG IA: CPT | Performed by: STUDENT IN AN ORGANIZED HEALTH CARE EDUCATION/TRAINING PROGRAM

## 2023-01-01 PROCEDURE — 99214 OFFICE O/P EST MOD 30 MIN: CPT | Mod: PBBFAC,PN,25 | Performed by: INTERNAL MEDICINE

## 2023-01-01 PROCEDURE — 99285 EMERGENCY DEPT VISIT HI MDM: CPT | Mod: 25,27

## 2023-01-01 PROCEDURE — 27100171 HC OXYGEN HIGH FLOW UP TO 24 HOURS

## 2023-01-01 PROCEDURE — 85610 PROTHROMBIN TIME: CPT | Performed by: STUDENT IN AN ORGANIZED HEALTH CARE EDUCATION/TRAINING PROGRAM

## 2023-01-01 PROCEDURE — 99214 PR OFFICE/OUTPT VISIT, EST, LEVL IV, 30-39 MIN: ICD-10-PCS | Mod: S$PBB,,, | Performed by: PSYCHIATRY & NEUROLOGY

## 2023-01-01 PROCEDURE — 80202 ASSAY OF VANCOMYCIN: CPT | Performed by: HOSPITALIST

## 2023-01-01 PROCEDURE — 99214 OFFICE O/P EST MOD 30 MIN: CPT | Mod: PBBFAC,PN | Performed by: FAMILY MEDICINE

## 2023-01-01 PROCEDURE — 85025 COMPLETE CBC W/AUTO DIFF WBC: CPT

## 2023-01-01 PROCEDURE — 99214 OFFICE O/P EST MOD 30 MIN: CPT | Mod: S$PBB,,, | Performed by: PSYCHIATRY & NEUROLOGY

## 2023-01-01 PROCEDURE — 73590 X-RAY EXAM OF LOWER LEG: CPT | Mod: TC,PN,RT

## 2023-01-01 PROCEDURE — 97112 NEUROMUSCULAR REEDUCATION: CPT

## 2023-01-01 PROCEDURE — 63600175 PHARM REV CODE 636 W HCPCS: Performed by: STUDENT IN AN ORGANIZED HEALTH CARE EDUCATION/TRAINING PROGRAM

## 2023-01-01 PROCEDURE — 87040 BLOOD CULTURE FOR BACTERIA: CPT | Mod: 59

## 2023-01-01 PROCEDURE — 99239 HOSP IP/OBS DSCHRG MGMT >30: CPT | Mod: ,,, | Performed by: FAMILY MEDICINE

## 2023-01-01 PROCEDURE — 84484 ASSAY OF TROPONIN QUANT: CPT | Mod: 91 | Performed by: STUDENT IN AN ORGANIZED HEALTH CARE EDUCATION/TRAINING PROGRAM

## 2023-01-01 PROCEDURE — 0241U SARS-COV2 (COVID) WITH FLU/RSV BY PCR: CPT | Performed by: INTERNAL MEDICINE

## 2023-01-01 PROCEDURE — 84466 ASSAY OF TRANSFERRIN: CPT | Performed by: HOSPITALIST

## 2023-01-01 PROCEDURE — 83615 LACTATE (LD) (LDH) ENZYME: CPT | Performed by: EMERGENCY MEDICINE

## 2023-01-01 PROCEDURE — 82550 ASSAY OF CK (CPK): CPT | Performed by: EMERGENCY MEDICINE

## 2023-01-01 PROCEDURE — 99285 EMERGENCY DEPT VISIT HI MDM: CPT | Mod: ,,, | Performed by: EMERGENCY MEDICINE

## 2023-01-01 PROCEDURE — 99223 1ST HOSP IP/OBS HIGH 75: CPT | Mod: GC,,, | Performed by: EMERGENCY MEDICINE

## 2023-01-01 PROCEDURE — 63700000 PHARM REV CODE 250 ALT 637 W/O HCPCS: Performed by: FAMILY MEDICINE

## 2023-01-01 PROCEDURE — 90677 PCV20 VACCINE IM: CPT | Mod: PBBFAC,PN

## 2023-01-01 PROCEDURE — 36415 COLL VENOUS BLD VENIPUNCTURE: CPT | Performed by: HOSPITALIST

## 2023-01-01 PROCEDURE — G0180 MD CERTIFICATION HHA PATIENT: HCPCS | Mod: ,,, | Performed by: FAMILY MEDICINE

## 2023-01-01 PROCEDURE — 99223 PR INITIAL HOSPITAL CARE,LEVL III: ICD-10-PCS | Mod: GC,,, | Performed by: EMERGENCY MEDICINE

## 2023-01-01 PROCEDURE — 83735 ASSAY OF MAGNESIUM: CPT | Performed by: EMERGENCY MEDICINE

## 2023-01-01 PROCEDURE — 63600175 PHARM REV CODE 636 W HCPCS

## 2023-01-01 PROCEDURE — 82962 GLUCOSE BLOOD TEST: CPT

## 2023-01-01 PROCEDURE — 25500020 PHARM REV CODE 255: Performed by: EMERGENCY MEDICINE

## 2023-01-01 PROCEDURE — 99999 PR PBB SHADOW E&M-EST. PATIENT-LVL III: CPT | Mod: PBBFAC,,, | Performed by: FAMILY MEDICINE

## 2023-01-01 PROCEDURE — 99999 PR PBB SHADOW E&M-EST. PATIENT-LVL IV: CPT | Mod: PBBFAC,,, | Performed by: INTERNAL MEDICINE

## 2023-01-01 PROCEDURE — 96372 THER/PROPH/DIAG INJ SC/IM: CPT | Performed by: STUDENT IN AN ORGANIZED HEALTH CARE EDUCATION/TRAINING PROGRAM

## 2023-01-01 PROCEDURE — 99214 PR OFFICE/OUTPT VISIT, EST, LEVL IV, 30-39 MIN: ICD-10-PCS | Mod: S$PBB,,, | Performed by: INTERNAL MEDICINE

## 2023-01-01 PROCEDURE — 87070 CULTURE OTHR SPECIMN AEROBIC: CPT | Performed by: FAMILY MEDICINE

## 2023-01-01 PROCEDURE — 99223 1ST HOSP IP/OBS HIGH 75: CPT | Mod: AI,,, | Performed by: FAMILY MEDICINE

## 2023-01-01 PROCEDURE — 85652 RBC SED RATE AUTOMATED: CPT | Performed by: STUDENT IN AN ORGANIZED HEALTH CARE EDUCATION/TRAINING PROGRAM

## 2023-01-01 PROCEDURE — 99214 PR OFFICE/OUTPT VISIT, EST, LEVL IV, 30-39 MIN: ICD-10-PCS | Mod: S$PBB,,, | Performed by: FAMILY MEDICINE

## 2023-01-01 PROCEDURE — 99213 OFFICE O/P EST LOW 20 MIN: CPT | Mod: PBBFAC,PN,25 | Performed by: FAMILY MEDICINE

## 2023-01-01 PROCEDURE — 73590 XR TIBIA FIBULA 2 VIEW RIGHT: ICD-10-PCS | Mod: 26,RT,, | Performed by: RADIOLOGY

## 2023-01-01 PROCEDURE — 74018 RADEX ABDOMEN 1 VIEW: CPT | Mod: 26,,, | Performed by: RADIOLOGY

## 2023-01-01 PROCEDURE — 99999 PR PBB SHADOW E&M-EST. PATIENT-LVL III: ICD-10-PCS | Mod: PBBFAC,,, | Performed by: FAMILY MEDICINE

## 2023-01-01 PROCEDURE — 84100 ASSAY OF PHOSPHORUS: CPT

## 2023-01-01 PROCEDURE — 81001 URINALYSIS AUTO W/SCOPE: CPT | Performed by: HOSPITALIST

## 2023-01-01 PROCEDURE — 85730 THROMBOPLASTIN TIME PARTIAL: CPT | Performed by: STUDENT IN AN ORGANIZED HEALTH CARE EDUCATION/TRAINING PROGRAM

## 2023-01-01 PROCEDURE — 84443 ASSAY THYROID STIM HORMONE: CPT

## 2023-01-01 PROCEDURE — 99999 PR PBB SHADOW E&M-EST. PATIENT-LVL IV: ICD-10-PCS | Mod: PBBFAC,,, | Performed by: INTERNAL MEDICINE

## 2023-01-01 PROCEDURE — C9113 INJ PANTOPRAZOLE SODIUM, VIA: HCPCS | Performed by: STUDENT IN AN ORGANIZED HEALTH CARE EDUCATION/TRAINING PROGRAM

## 2023-01-01 PROCEDURE — 84145 PROCALCITONIN (PCT): CPT | Performed by: EMERGENCY MEDICINE

## 2023-01-01 RX ORDER — ENOXAPARIN SODIUM 100 MG/ML
30 INJECTION SUBCUTANEOUS EVERY 24 HOURS
Status: DISCONTINUED | OUTPATIENT
Start: 2023-01-01 | End: 2023-01-01

## 2023-01-01 RX ORDER — GLUCAGON 1 MG
1 KIT INJECTION
Status: DISCONTINUED | OUTPATIENT
Start: 2023-01-01 | End: 2023-01-01 | Stop reason: HOSPADM

## 2023-01-01 RX ORDER — CARBIDOPA AND LEVODOPA 25; 100 MG/1; MG/1
1 TABLET ORAL 4 TIMES DAILY
Status: DISCONTINUED | OUTPATIENT
Start: 2023-01-01 | End: 2023-01-01 | Stop reason: HOSPADM

## 2023-01-01 RX ORDER — AZITHROMYCIN 250 MG/1
500 TABLET, FILM COATED ORAL DAILY
Status: DISCONTINUED | OUTPATIENT
Start: 2023-01-01 | End: 2023-01-01 | Stop reason: HOSPADM

## 2023-01-01 RX ORDER — FUROSEMIDE 10 MG/ML
40 INJECTION INTRAMUSCULAR; INTRAVENOUS ONCE
Status: COMPLETED | OUTPATIENT
Start: 2023-01-01 | End: 2023-01-01

## 2023-01-01 RX ORDER — SODIUM CHLORIDE 9 MG/ML
INJECTION, SOLUTION INTRAVENOUS CONTINUOUS
Status: DISCONTINUED | OUTPATIENT
Start: 2023-01-01 | End: 2023-01-01

## 2023-01-01 RX ORDER — MAGNESIUM SULFATE 1 G/100ML
1 INJECTION INTRAVENOUS ONCE
Status: COMPLETED | OUTPATIENT
Start: 2023-01-01 | End: 2023-01-01

## 2023-01-01 RX ORDER — TALC
6 POWDER (GRAM) TOPICAL NIGHTLY
Refills: 0
Start: 2023-01-01

## 2023-01-01 RX ORDER — BALSAM PERU/CASTOR OIL
OINTMENT (GRAM) TOPICAL 2 TIMES DAILY
Status: DISCONTINUED | OUTPATIENT
Start: 2023-01-01 | End: 2023-01-01 | Stop reason: HOSPADM

## 2023-01-01 RX ORDER — GLYCOPYRROLATE 0.2 MG/ML
0.1 INJECTION INTRAMUSCULAR; INTRAVENOUS ONCE
Status: COMPLETED | OUTPATIENT
Start: 2023-01-01 | End: 2023-01-01

## 2023-01-01 RX ORDER — TOBRAMYCIN AND DEXAMETHASONE 3; 1 MG/ML; MG/ML
1 SUSPENSION/ DROPS OPHTHALMIC 4 TIMES DAILY
Status: DISCONTINUED | OUTPATIENT
Start: 2023-01-01 | End: 2023-01-01

## 2023-01-01 RX ORDER — BISACODYL 10 MG
10 SUPPOSITORY, RECTAL RECTAL DAILY PRN
Status: DISCONTINUED | OUTPATIENT
Start: 2023-01-01 | End: 2023-01-01 | Stop reason: HOSPADM

## 2023-01-01 RX ORDER — MODAFINIL 100 MG/1
100 TABLET ORAL 2 TIMES DAILY
Qty: 60 TABLET | Refills: 3 | Status: SHIPPED | OUTPATIENT
Start: 2023-01-01 | End: 2023-01-01

## 2023-01-01 RX ORDER — INDOMETHACIN 25 MG/1
50 CAPSULE ORAL
Status: COMPLETED | OUTPATIENT
Start: 2023-01-01 | End: 2023-01-01

## 2023-01-01 RX ORDER — MUPIROCIN 20 MG/G
OINTMENT TOPICAL 2 TIMES DAILY
Status: DISCONTINUED | OUTPATIENT
Start: 2023-01-01 | End: 2023-01-01

## 2023-01-01 RX ORDER — MODAFINIL 100 MG/1
100 TABLET ORAL 2 TIMES DAILY
Qty: 60 TABLET | Refills: 2 | Status: SHIPPED | OUTPATIENT
Start: 2023-01-01 | End: 2023-01-01

## 2023-01-01 RX ORDER — IBUPROFEN 200 MG
24 TABLET ORAL
Status: DISCONTINUED | OUTPATIENT
Start: 2023-01-01 | End: 2023-01-01 | Stop reason: HOSPADM

## 2023-01-01 RX ORDER — HYDROXYZINE HYDROCHLORIDE 25 MG/1
25 TABLET, FILM COATED ORAL 3 TIMES DAILY PRN
Status: DISCONTINUED | OUTPATIENT
Start: 2023-01-01 | End: 2023-01-01 | Stop reason: HOSPADM

## 2023-01-01 RX ORDER — TALC
6 POWDER (GRAM) TOPICAL NIGHTLY
Status: DISCONTINUED | OUTPATIENT
Start: 2023-01-01 | End: 2023-01-01 | Stop reason: HOSPADM

## 2023-01-01 RX ORDER — ALBUTEROL SULFATE 90 UG/1
2 AEROSOL, METERED RESPIRATORY (INHALATION) EVERY 6 HOURS PRN
Status: DISCONTINUED | OUTPATIENT
Start: 2023-01-01 | End: 2023-01-01 | Stop reason: HOSPADM

## 2023-01-01 RX ORDER — HEPARIN SODIUM 5000 [USP'U]/ML
5000 INJECTION, SOLUTION INTRAVENOUS; SUBCUTANEOUS EVERY 8 HOURS
Status: DISCONTINUED | OUTPATIENT
Start: 2023-01-01 | End: 2023-01-01 | Stop reason: HOSPADM

## 2023-01-01 RX ORDER — POLYETHYLENE GLYCOL 3350 17 G/17G
17 POWDER, FOR SOLUTION ORAL 2 TIMES DAILY PRN
Status: DISCONTINUED | OUTPATIENT
Start: 2023-01-01 | End: 2023-01-01 | Stop reason: HOSPADM

## 2023-01-01 RX ORDER — IBUPROFEN 200 MG
24 TABLET ORAL
Status: DISCONTINUED | OUTPATIENT
Start: 2023-01-01 | End: 2023-01-01

## 2023-01-01 RX ORDER — FINASTERIDE 5 MG/1
5 TABLET, FILM COATED ORAL DAILY
Status: DISCONTINUED | OUTPATIENT
Start: 2023-01-01 | End: 2023-01-01 | Stop reason: HOSPADM

## 2023-01-01 RX ORDER — SODIUM CHLORIDE 9 MG/ML
INJECTION, SOLUTION INTRAVENOUS CONTINUOUS
Status: ACTIVE | OUTPATIENT
Start: 2023-01-01 | End: 2023-01-01

## 2023-01-01 RX ORDER — HEPARIN SODIUM 5000 [USP'U]/ML
5000 INJECTION, SOLUTION INTRAVENOUS; SUBCUTANEOUS EVERY 8 HOURS
Status: DISCONTINUED | OUTPATIENT
Start: 2023-01-01 | End: 2023-01-01

## 2023-01-01 RX ORDER — MODAFINIL 100 MG/1
100 TABLET ORAL 2 TIMES DAILY
Status: DISCONTINUED | OUTPATIENT
Start: 2023-01-01 | End: 2023-01-01 | Stop reason: HOSPADM

## 2023-01-01 RX ORDER — FINASTERIDE 5 MG/1
5 TABLET, FILM COATED ORAL DAILY
Qty: 90 TABLET | Refills: 3 | Status: SHIPPED | OUTPATIENT
Start: 2023-01-01

## 2023-01-01 RX ORDER — LOPERAMIDE HYDROCHLORIDE 2 MG/1
2 CAPSULE ORAL 3 TIMES DAILY
Status: COMPLETED | OUTPATIENT
Start: 2023-01-01 | End: 2023-01-01

## 2023-01-01 RX ORDER — FUROSEMIDE 20 MG/1
10 TABLET ORAL DAILY
Qty: 45 TABLET | Refills: 1 | Status: ON HOLD | OUTPATIENT
Start: 2023-01-01 | End: 2023-01-01 | Stop reason: SDUPTHER

## 2023-01-01 RX ORDER — ONDANSETRON 2 MG/ML
4 INJECTION INTRAMUSCULAR; INTRAVENOUS EVERY 8 HOURS PRN
Status: DISCONTINUED | OUTPATIENT
Start: 2023-01-01 | End: 2023-01-01 | Stop reason: HOSPADM

## 2023-01-01 RX ORDER — FUROSEMIDE 20 MG/1
10 TABLET ORAL DAILY
Qty: 45 TABLET | Refills: 1
Start: 2023-01-01 | End: 2024-06-04

## 2023-01-01 RX ORDER — LORAZEPAM 2 MG/ML
2 INJECTION INTRAMUSCULAR EVERY 4 HOURS PRN
Status: DISCONTINUED | OUTPATIENT
Start: 2023-01-01 | End: 2023-01-01 | Stop reason: HOSPADM

## 2023-01-01 RX ORDER — CALCIUM GLUCONATE 20 MG/ML
1 INJECTION, SOLUTION INTRAVENOUS
Status: COMPLETED | OUTPATIENT
Start: 2023-01-01 | End: 2023-01-01

## 2023-01-01 RX ORDER — AMOXICILLIN AND CLAVULANATE POTASSIUM 875; 125 MG/1; MG/1
1 TABLET, FILM COATED ORAL 2 TIMES DAILY
Qty: 8 TABLET | Refills: 0
Start: 2023-01-01 | End: 2023-01-01

## 2023-01-01 RX ORDER — CARBIDOPA AND LEVODOPA 25; 100 MG/1; MG/1
1 TABLET ORAL 4 TIMES DAILY
Status: DISCONTINUED | OUTPATIENT
Start: 2023-01-01 | End: 2023-01-01

## 2023-01-01 RX ORDER — CARBIDOPA AND LEVODOPA 25; 100 MG/1; MG/1
1 TABLET ORAL 3 TIMES DAILY
Status: DISCONTINUED | OUTPATIENT
Start: 2023-01-01 | End: 2023-01-01 | Stop reason: HOSPADM

## 2023-01-01 RX ORDER — PANTOPRAZOLE SODIUM 40 MG/10ML
40 INJECTION, POWDER, LYOPHILIZED, FOR SOLUTION INTRAVENOUS 2 TIMES DAILY
Status: DISCONTINUED | OUTPATIENT
Start: 2023-01-01 | End: 2023-01-01

## 2023-01-01 RX ORDER — ASCORBIC ACID 500 MG
500 TABLET ORAL 2 TIMES DAILY
Status: DISCONTINUED | OUTPATIENT
Start: 2023-01-01 | End: 2023-01-01 | Stop reason: HOSPADM

## 2023-01-01 RX ORDER — TALC
6 POWDER (GRAM) TOPICAL NIGHTLY PRN
Status: DISCONTINUED | OUTPATIENT
Start: 2023-01-01 | End: 2023-01-01

## 2023-01-01 RX ORDER — IBUPROFEN 200 MG
16 TABLET ORAL
Status: DISCONTINUED | OUTPATIENT
Start: 2023-01-01 | End: 2023-01-01

## 2023-01-01 RX ORDER — CEFDINIR 250 MG/5ML
300 POWDER, FOR SUSPENSION ORAL 2 TIMES DAILY
Qty: 24 ML | Refills: 0
Start: 2023-01-01 | End: 2023-01-01

## 2023-01-01 RX ORDER — AZITHROMYCIN 200 MG/5ML
500 POWDER, FOR SUSPENSION ORAL ONCE
Status: COMPLETED | OUTPATIENT
Start: 2023-01-01 | End: 2023-01-01

## 2023-01-01 RX ORDER — AZITHROMYCIN 250 MG/1
250 TABLET, FILM COATED ORAL DAILY
Status: DISCONTINUED | OUTPATIENT
Start: 2023-01-01 | End: 2023-01-01

## 2023-01-01 RX ORDER — FUROSEMIDE 20 MG/1
20 TABLET ORAL DAILY
Qty: 5 TABLET | Refills: 0 | Status: SHIPPED | OUTPATIENT
Start: 2023-01-01 | End: 2023-01-01

## 2023-01-01 RX ORDER — FINASTERIDE 5 MG/1
5 TABLET, FILM COATED ORAL DAILY
Status: DISCONTINUED | OUTPATIENT
Start: 2023-01-01 | End: 2023-01-01

## 2023-01-01 RX ORDER — LOPERAMIDE HYDROCHLORIDE 2 MG/1
2 CAPSULE ORAL 4 TIMES DAILY PRN
Status: DISCONTINUED | OUTPATIENT
Start: 2023-01-01 | End: 2023-01-01

## 2023-01-01 RX ORDER — NIRMATRELVIR AND RITONAVIR 300-100 MG
KIT ORAL
Qty: 30 TABLET | Refills: 0 | Status: ON HOLD | OUTPATIENT
Start: 2023-01-01 | End: 2023-01-01 | Stop reason: HOSPADM

## 2023-01-01 RX ORDER — MORPHINE SULFATE 4 MG/ML
4 INJECTION, SOLUTION INTRAMUSCULAR; INTRAVENOUS EVERY 4 HOURS PRN
Status: DISCONTINUED | OUTPATIENT
Start: 2023-01-01 | End: 2023-01-01 | Stop reason: HOSPADM

## 2023-01-01 RX ORDER — SODIUM CHLORIDE 0.9 % (FLUSH) 0.9 %
10 SYRINGE (ML) INJECTION EVERY 12 HOURS PRN
Status: DISCONTINUED | OUTPATIENT
Start: 2023-01-01 | End: 2023-01-01 | Stop reason: HOSPADM

## 2023-01-01 RX ORDER — HYDRALAZINE HYDROCHLORIDE 25 MG/1
25 TABLET, FILM COATED ORAL EVERY 8 HOURS PRN
Status: DISCONTINUED | OUTPATIENT
Start: 2023-01-01 | End: 2023-01-01

## 2023-01-01 RX ORDER — SODIUM CHLORIDE, SODIUM LACTATE, POTASSIUM CHLORIDE, CALCIUM CHLORIDE 600; 310; 30; 20 MG/100ML; MG/100ML; MG/100ML; MG/100ML
INJECTION, SOLUTION INTRAVENOUS CONTINUOUS
Status: ACTIVE | OUTPATIENT
Start: 2023-01-01 | End: 2023-01-01

## 2023-01-01 RX ORDER — ACETAMINOPHEN 325 MG/1
650 TABLET ORAL EVERY 6 HOURS PRN
Status: DISCONTINUED | OUTPATIENT
Start: 2023-01-01 | End: 2023-01-01 | Stop reason: HOSPADM

## 2023-01-01 RX ORDER — IBUPROFEN 200 MG
16 TABLET ORAL
Status: DISCONTINUED | OUTPATIENT
Start: 2023-01-01 | End: 2023-01-01 | Stop reason: HOSPADM

## 2023-01-01 RX ORDER — MIRTAZAPINE 7.5 MG/1
7.5 TABLET, FILM COATED ORAL NIGHTLY PRN
Status: DISCONTINUED | OUTPATIENT
Start: 2023-01-01 | End: 2023-01-01 | Stop reason: HOSPADM

## 2023-01-01 RX ORDER — ALBUTEROL SULFATE 90 UG/1
2 AEROSOL, METERED RESPIRATORY (INHALATION) EVERY 6 HOURS
Status: DISCONTINUED | OUTPATIENT
Start: 2023-01-01 | End: 2023-01-01

## 2023-01-01 RX ORDER — POLYETHYLENE GLYCOL 3350 17 G/17G
17 POWDER, FOR SOLUTION ORAL 2 TIMES DAILY PRN
Qty: 60 EACH | Refills: 0
Start: 2023-01-01

## 2023-01-01 RX ORDER — MORPHINE SULFATE 2 MG/ML
2 INJECTION, SOLUTION INTRAMUSCULAR; INTRAVENOUS EVERY 4 HOURS PRN
Status: DISCONTINUED | OUTPATIENT
Start: 2023-01-01 | End: 2023-01-01 | Stop reason: HOSPADM

## 2023-01-01 RX ORDER — SODIUM CHLORIDE 0.9 % (FLUSH) 0.9 %
10 SYRINGE (ML) INJECTION
Status: DISCONTINUED | OUTPATIENT
Start: 2023-01-01 | End: 2023-01-01 | Stop reason: HOSPADM

## 2023-01-01 RX ORDER — PANTOPRAZOLE SODIUM 40 MG/1
40 TABLET, DELAYED RELEASE ORAL DAILY
Status: DISCONTINUED | OUTPATIENT
Start: 2023-01-01 | End: 2023-01-01 | Stop reason: HOSPADM

## 2023-01-01 RX ORDER — AZITHROMYCIN 250 MG/1
500 TABLET, FILM COATED ORAL ONCE
Status: DISCONTINUED | OUTPATIENT
Start: 2023-01-01 | End: 2023-01-01

## 2023-01-01 RX ORDER — NALOXONE HCL 0.4 MG/ML
0.02 VIAL (ML) INJECTION
Status: DISCONTINUED | OUTPATIENT
Start: 2023-01-01 | End: 2023-01-01 | Stop reason: HOSPADM

## 2023-01-01 RX ORDER — CEFPODOXIME PROXETIL 100 MG/1
100 TABLET, FILM COATED ORAL EVERY 12 HOURS
Qty: 12 TABLET | Refills: 0
Start: 2023-01-01 | End: 2023-01-01

## 2023-01-01 RX ORDER — LEVOFLOXACIN 5 MG/ML
750 INJECTION, SOLUTION INTRAVENOUS
Status: DISCONTINUED | OUTPATIENT
Start: 2023-01-01 | End: 2023-01-01

## 2023-01-01 RX ORDER — NALOXONE HCL 0.4 MG/ML
0.02 VIAL (ML) INJECTION
Status: DISCONTINUED | OUTPATIENT
Start: 2023-01-01 | End: 2023-01-01

## 2023-01-01 RX ORDER — MODAFINIL 100 MG/1
100 TABLET ORAL 2 TIMES DAILY
Qty: 60 TABLET | Refills: 3 | Status: CANCELLED | OUTPATIENT
Start: 2023-01-01 | End: 2023-01-01

## 2023-01-01 RX ORDER — SODIUM CHLORIDE 0.9 % (FLUSH) 0.9 %
10 SYRINGE (ML) INJECTION EVERY 12 HOURS PRN
Status: DISCONTINUED | OUTPATIENT
Start: 2023-01-01 | End: 2023-01-01

## 2023-01-01 RX ORDER — MUPIROCIN 20 MG/G
OINTMENT TOPICAL 2 TIMES DAILY
Status: DISCONTINUED | OUTPATIENT
Start: 2023-01-01 | End: 2023-01-01 | Stop reason: HOSPADM

## 2023-01-01 RX ORDER — ACETAMINOPHEN 500 MG
1000 TABLET ORAL EVERY 8 HOURS PRN
Status: DISCONTINUED | OUTPATIENT
Start: 2023-01-01 | End: 2023-01-01 | Stop reason: HOSPADM

## 2023-01-01 RX ORDER — FUROSEMIDE 20 MG/1
20 TABLET ORAL DAILY
Qty: 30 TABLET | Refills: 1 | Status: SHIPPED | OUTPATIENT
Start: 2023-01-01 | End: 2023-01-01

## 2023-01-01 RX ORDER — OLANZAPINE 10 MG/2ML
2.5 INJECTION, POWDER, FOR SOLUTION INTRAMUSCULAR ONCE
Status: COMPLETED | OUTPATIENT
Start: 2023-01-01 | End: 2023-01-01

## 2023-01-01 RX ORDER — LOPERAMIDE HYDROCHLORIDE 2 MG/1
2 CAPSULE ORAL 4 TIMES DAILY
Status: DISCONTINUED | OUTPATIENT
Start: 2023-01-01 | End: 2023-01-01 | Stop reason: HOSPADM

## 2023-01-01 RX ORDER — AMOXICILLIN 250 MG
1 CAPSULE ORAL 2 TIMES DAILY
Status: DISCONTINUED | OUTPATIENT
Start: 2023-01-01 | End: 2023-01-01

## 2023-01-01 RX ORDER — LEVALBUTEROL 1.25 MG/.5ML
1.25 SOLUTION, CONCENTRATE RESPIRATORY (INHALATION) EVERY 12 HOURS
Status: DISCONTINUED | OUTPATIENT
Start: 2023-01-01 | End: 2023-01-01

## 2023-01-01 RX ORDER — GLUCAGON 1 MG
1 KIT INJECTION
Status: DISCONTINUED | OUTPATIENT
Start: 2023-01-01 | End: 2023-01-01

## 2023-01-01 RX ADMIN — CARBIDOPA AND LEVODOPA 1 TABLET: 25; 100 TABLET ORAL at 12:07

## 2023-01-01 RX ADMIN — FINASTERIDE 5 MG: 5 TABLET, FILM COATED ORAL at 09:07

## 2023-01-01 RX ADMIN — CARBIDOPA AND LEVODOPA 1 TABLET: 25; 100 TABLET ORAL at 10:06

## 2023-01-01 RX ADMIN — LOPERAMIDE HYDROCHLORIDE 2 MG: 2 CAPSULE ORAL at 02:07

## 2023-01-01 RX ADMIN — CARBIDOPA AND LEVODOPA 1 TABLET: 25; 100 TABLET ORAL at 09:06

## 2023-01-01 RX ADMIN — HEPARIN SODIUM 5000 UNITS: 5000 INJECTION INTRAVENOUS; SUBCUTANEOUS at 10:07

## 2023-01-01 RX ADMIN — THERA TABS 1 TABLET: TAB at 10:07

## 2023-01-01 RX ADMIN — AZITHROMYCIN MONOHYDRATE 500 MG: 500 INJECTION, POWDER, LYOPHILIZED, FOR SOLUTION INTRAVENOUS at 08:06

## 2023-01-01 RX ADMIN — MODAFINIL 100 MG: 100 TABLET ORAL at 08:06

## 2023-01-01 RX ADMIN — HEPARIN SODIUM 5000 UNITS: 5000 INJECTION INTRAVENOUS; SUBCUTANEOUS at 11:07

## 2023-01-01 RX ADMIN — MUPIROCIN: 20 OINTMENT TOPICAL at 10:07

## 2023-01-01 RX ADMIN — Medication 500 MG: at 09:07

## 2023-01-01 RX ADMIN — VANCOMYCIN HYDROCHLORIDE 2000 MG: 10 INJECTION, POWDER, LYOPHILIZED, FOR SOLUTION INTRAVENOUS at 11:07

## 2023-01-01 RX ADMIN — SODIUM CHLORIDE: 9 INJECTION, SOLUTION INTRAVENOUS at 01:06

## 2023-01-01 RX ADMIN — SODIUM BICARBONATE 50 MEQ: 84 INJECTION, SOLUTION INTRAVENOUS at 10:07

## 2023-01-01 RX ADMIN — INSULIN HUMAN 5 UNITS: 100 INJECTION, SOLUTION PARENTERAL at 11:07

## 2023-01-01 RX ADMIN — Medication 500 MG: at 11:07

## 2023-01-01 RX ADMIN — CARBIDOPA AND LEVODOPA 1 TABLET: 25; 100 TABLET ORAL at 10:07

## 2023-01-01 RX ADMIN — SODIUM CHLORIDE 2001 ML: 9 INJECTION, SOLUTION INTRAVENOUS at 10:07

## 2023-01-01 RX ADMIN — PIPERACILLIN SODIUM AND TAZOBACTAM SODIUM 4.5 G: 4; .5 INJECTION, POWDER, LYOPHILIZED, FOR SOLUTION INTRAVENOUS at 06:06

## 2023-01-01 RX ADMIN — PIPERACILLIN AND TAZOBACTAM 4.5 G: 4; .5 INJECTION, POWDER, LYOPHILIZED, FOR SOLUTION INTRAVENOUS; PARENTERAL at 03:06

## 2023-01-01 RX ADMIN — HEPARIN SODIUM 5000 UNITS: 5000 INJECTION INTRAVENOUS; SUBCUTANEOUS at 05:07

## 2023-01-01 RX ADMIN — CARBIDOPA AND LEVODOPA 1 TABLET: 25; 100 TABLET ORAL at 03:06

## 2023-01-01 RX ADMIN — PIPERACILLIN AND TAZOBACTAM 4.5 G: 4; .5 INJECTION, POWDER, LYOPHILIZED, FOR SOLUTION INTRAVENOUS; PARENTERAL at 11:06

## 2023-01-01 RX ADMIN — SODIUM CHLORIDE: 9 INJECTION, SOLUTION INTRAVENOUS at 11:06

## 2023-01-01 RX ADMIN — CARBIDOPA AND LEVODOPA 1 TABLET: 25; 100 TABLET ORAL at 09:07

## 2023-01-01 RX ADMIN — Medication 500 MG: at 12:07

## 2023-01-01 RX ADMIN — FINASTERIDE 5 MG: 5 TABLET, FILM COATED ORAL at 09:06

## 2023-01-01 RX ADMIN — CARBIDOPA AND LEVODOPA 1 TABLET: 25; 100 TABLET ORAL at 05:07

## 2023-01-01 RX ADMIN — Medication: at 09:07

## 2023-01-01 RX ADMIN — Medication: at 10:07

## 2023-01-01 RX ADMIN — PIPERACILLIN SODIUM AND TAZOBACTAM SODIUM 4.5 G: 4; .5 INJECTION, POWDER, LYOPHILIZED, FOR SOLUTION INTRAVENOUS at 10:07

## 2023-01-01 RX ADMIN — HYDROXYZINE HYDROCHLORIDE 25 MG: 25 TABLET, FILM COATED ORAL at 09:07

## 2023-01-01 RX ADMIN — MUPIROCIN: 20 OINTMENT TOPICAL at 09:07

## 2023-01-01 RX ADMIN — PIPERACILLIN SODIUM AND TAZOBACTAM SODIUM 4.5 G: 4; .5 INJECTION, POWDER, LYOPHILIZED, FOR SOLUTION INTRAVENOUS at 11:06

## 2023-01-01 RX ADMIN — Medication 6 MG: at 09:06

## 2023-01-01 RX ADMIN — FINASTERIDE 5 MG: 5 TABLET, FILM COATED ORAL at 11:07

## 2023-01-01 RX ADMIN — LOPERAMIDE HYDROCHLORIDE 2 MG: 2 CAPSULE ORAL at 10:07

## 2023-01-01 RX ADMIN — Medication: at 12:07

## 2023-01-01 RX ADMIN — DEXTROSE MONOHYDRATE 250 MG: 50 INJECTION, SOLUTION INTRAVENOUS at 05:07

## 2023-01-01 RX ADMIN — THERA TABS 1 TABLET: TAB at 11:07

## 2023-01-01 RX ADMIN — Medication: at 11:07

## 2023-01-01 RX ADMIN — AZITHROMYCIN 500 MG: 200 POWDER, FOR SUSPENSION ORAL at 01:07

## 2023-01-01 RX ADMIN — DEXTROSE MONOHYDRATE 250 ML: 100 INJECTION, SOLUTION INTRAVENOUS at 10:07

## 2023-01-01 RX ADMIN — TOBRAMYCIN AND DEXAMETHASONE 1 DROP: 3; 1 SUSPENSION/ DROPS OPHTHALMIC at 04:06

## 2023-01-01 RX ADMIN — CEFTRIAXONE 1 G: 1 INJECTION, POWDER, FOR SOLUTION INTRAMUSCULAR; INTRAVENOUS at 08:06

## 2023-01-01 RX ADMIN — CARBIDOPA AND LEVODOPA 1 TABLET: 25; 100 TABLET ORAL at 01:07

## 2023-01-01 RX ADMIN — SODIUM CHLORIDE, POTASSIUM CHLORIDE, SODIUM LACTATE AND CALCIUM CHLORIDE 1100 ML: 600; 310; 30; 20 INJECTION, SOLUTION INTRAVENOUS at 03:06

## 2023-01-01 RX ADMIN — CARBIDOPA AND LEVODOPA 1 TABLET: 25; 100 TABLET ORAL at 08:07

## 2023-01-01 RX ADMIN — MODAFINIL 100 MG: 100 TABLET ORAL at 09:06

## 2023-01-01 RX ADMIN — CARBIDOPA AND LEVODOPA 1 TABLET: 25; 100 TABLET ORAL at 08:06

## 2023-01-01 RX ADMIN — PANTOPRAZOLE SODIUM 40 MG: 40 TABLET, DELAYED RELEASE ORAL at 10:07

## 2023-01-01 RX ADMIN — CARBIDOPA AND LEVODOPA 1 TABLET: 25; 100 TABLET ORAL at 02:07

## 2023-01-01 RX ADMIN — Medication 500 MG: at 10:07

## 2023-01-01 RX ADMIN — CARBIDOPA AND LEVODOPA 1 TABLET: 25; 100 TABLET ORAL at 07:07

## 2023-01-01 RX ADMIN — CARBIDOPA AND LEVODOPA 1 TABLET: 25; 100 TABLET ORAL at 11:07

## 2023-01-01 RX ADMIN — FINASTERIDE 5 MG: 5 TABLET, FILM COATED ORAL at 08:06

## 2023-01-01 RX ADMIN — CEFTRIAXONE 1 G: 1 INJECTION, POWDER, FOR SOLUTION INTRAMUSCULAR; INTRAVENOUS at 11:07

## 2023-01-01 RX ADMIN — SENNOSIDES AND DOCUSATE SODIUM 1 TABLET: 50; 8.6 TABLET ORAL at 09:06

## 2023-01-01 RX ADMIN — CEFTRIAXONE 1 G: 1 INJECTION, POWDER, FOR SOLUTION INTRAMUSCULAR; INTRAVENOUS at 12:07

## 2023-01-01 RX ADMIN — HEPARIN SODIUM 5000 UNITS: 5000 INJECTION INTRAVENOUS; SUBCUTANEOUS at 04:07

## 2023-01-01 RX ADMIN — REMDESIVIR 100 MG: 100 INJECTION, POWDER, LYOPHILIZED, FOR SOLUTION INTRAVENOUS at 10:07

## 2023-01-01 RX ADMIN — FINASTERIDE 5 MG: 5 TABLET, FILM COATED ORAL at 10:07

## 2023-01-01 RX ADMIN — ALBUTEROL SULFATE 2 PUFF: 108 INHALANT RESPIRATORY (INHALATION) at 02:07

## 2023-01-01 RX ADMIN — LOPERAMIDE HYDROCHLORIDE 2 MG: 2 CAPSULE ORAL at 12:07

## 2023-01-01 RX ADMIN — Medication 6 MG: at 10:07

## 2023-01-01 RX ADMIN — MUPIROCIN: 20 OINTMENT TOPICAL at 12:07

## 2023-01-01 RX ADMIN — MUPIROCIN: 20 OINTMENT TOPICAL at 11:07

## 2023-01-01 RX ADMIN — PANTOPRAZOLE SODIUM 40 MG: 40 TABLET, DELAYED RELEASE ORAL at 12:07

## 2023-01-01 RX ADMIN — PIPERACILLIN SODIUM AND TAZOBACTAM SODIUM 4.5 G: 4; .5 INJECTION, POWDER, LYOPHILIZED, FOR SOLUTION INTRAVENOUS at 09:06

## 2023-01-01 RX ADMIN — HEPARIN SODIUM 5000 UNITS: 5000 INJECTION INTRAVENOUS; SUBCUTANEOUS at 02:07

## 2023-01-01 RX ADMIN — CARBIDOPA AND LEVODOPA 1 TABLET: 25; 100 TABLET ORAL at 04:06

## 2023-01-01 RX ADMIN — THERA TABS 1 TABLET: TAB at 12:07

## 2023-01-01 RX ADMIN — PANTOPRAZOLE SODIUM 40 MG: 40 INJECTION, POWDER, FOR SOLUTION INTRAVENOUS at 01:07

## 2023-01-01 RX ADMIN — MAGNESIUM SULFATE HEPTAHYDRATE 1 G: 500 INJECTION, SOLUTION INTRAMUSCULAR; INTRAVENOUS at 05:07

## 2023-01-01 RX ADMIN — Medication 6 MG: at 09:07

## 2023-01-01 RX ADMIN — AZITHROMYCIN MONOHYDRATE 500 MG: 500 INJECTION, POWDER, LYOPHILIZED, FOR SOLUTION INTRAVENOUS at 11:06

## 2023-01-01 RX ADMIN — LOPERAMIDE HYDROCHLORIDE 2 MG: 2 CAPSULE ORAL at 01:06

## 2023-01-01 RX ADMIN — AZITHROMYCIN 500 MG: 250 TABLET, FILM COATED ORAL at 11:07

## 2023-01-01 RX ADMIN — FINASTERIDE 5 MG: 5 TABLET, FILM COATED ORAL at 12:07

## 2023-01-01 RX ADMIN — SODIUM ZIRCONIUM CYCLOSILICATE 5 G: 5 POWDER, FOR SUSPENSION ORAL at 09:06

## 2023-01-01 RX ADMIN — ALBUTEROL SULFATE 2 PUFF: 108 INHALANT RESPIRATORY (INHALATION) at 07:07

## 2023-01-01 RX ADMIN — ENOXAPARIN SODIUM 30 MG: 30 INJECTION SUBCUTANEOUS at 07:07

## 2023-01-01 RX ADMIN — VANCOMYCIN HYDROCHLORIDE 1250 MG: 1.25 INJECTION, POWDER, LYOPHILIZED, FOR SOLUTION INTRAVENOUS at 06:06

## 2023-01-01 RX ADMIN — LEVOFLOXACIN 750 MG: 750 INJECTION, SOLUTION INTRAVENOUS at 03:07

## 2023-01-01 RX ADMIN — FUROSEMIDE 40 MG: 10 INJECTION, SOLUTION INTRAMUSCULAR; INTRAVENOUS at 06:06

## 2023-01-01 RX ADMIN — HEPARIN SODIUM 5000 UNITS: 5000 INJECTION INTRAVENOUS; SUBCUTANEOUS at 09:07

## 2023-01-01 RX ADMIN — SODIUM CHLORIDE, POTASSIUM CHLORIDE, SODIUM LACTATE AND CALCIUM CHLORIDE: 600; 310; 30; 20 INJECTION, SOLUTION INTRAVENOUS at 01:06

## 2023-01-01 RX ADMIN — CARBIDOPA AND LEVODOPA 1 TABLET: 25; 100 TABLET ORAL at 01:06

## 2023-01-01 RX ADMIN — GLYCOPYRROLATE 0.1 MG: 0.2 INJECTION INTRAMUSCULAR; INTRAVENOUS at 02:07

## 2023-01-01 RX ADMIN — CARBIDOPA AND LEVODOPA 1 TABLET: 25; 100 TABLET ORAL at 02:06

## 2023-01-01 RX ADMIN — VANCOMYCIN HYDROCHLORIDE 750 MG: 750 INJECTION, POWDER, LYOPHILIZED, FOR SOLUTION INTRAVENOUS at 03:07

## 2023-01-01 RX ADMIN — PANTOPRAZOLE SODIUM 40 MG: 40 TABLET, DELAYED RELEASE ORAL at 11:07

## 2023-01-01 RX ADMIN — SODIUM CHLORIDE, POTASSIUM CHLORIDE, SODIUM LACTATE AND CALCIUM CHLORIDE 500 ML: 600; 310; 30; 20 INJECTION, SOLUTION INTRAVENOUS at 02:06

## 2023-01-01 RX ADMIN — ACETAMINOPHEN 650 MG: 325 TABLET ORAL at 12:06

## 2023-01-01 RX ADMIN — HEPARIN SODIUM 5000 UNITS: 5000 INJECTION INTRAVENOUS; SUBCUTANEOUS at 06:07

## 2023-01-01 RX ADMIN — LOPERAMIDE HYDROCHLORIDE 2 MG: 2 CAPSULE ORAL at 04:07

## 2023-01-01 RX ADMIN — HEPARIN SODIUM 5000 UNITS: 5000 INJECTION INTRAVENOUS; SUBCUTANEOUS at 03:07

## 2023-01-01 RX ADMIN — SODIUM CHLORIDE, POTASSIUM CHLORIDE, SODIUM LACTATE AND CALCIUM CHLORIDE 500 ML: 600; 310; 30; 20 INJECTION, SOLUTION INTRAVENOUS at 05:07

## 2023-01-01 RX ADMIN — LOPERAMIDE HYDROCHLORIDE 2 MG: 2 CAPSULE ORAL at 09:07

## 2023-01-01 RX ADMIN — Medication 6 MG: at 11:07

## 2023-01-01 RX ADMIN — INSULIN HUMAN 5 UNITS: 100 INJECTION, SOLUTION PARENTERAL at 11:06

## 2023-01-01 RX ADMIN — CARBIDOPA AND LEVODOPA 1 TABLET: 25; 100 TABLET ORAL at 04:07

## 2023-01-01 RX ADMIN — OLANZAPINE 2.5 MG: 10 INJECTION, POWDER, FOR SOLUTION INTRAMUSCULAR at 11:07

## 2023-01-01 RX ADMIN — MORPHINE SULFATE 2 MG: 2 INJECTION, SOLUTION INTRAMUSCULAR; INTRAVENOUS at 02:07

## 2023-01-01 RX ADMIN — DEXTROSE MONOHYDRATE 250 MG: 50 INJECTION, SOLUTION INTRAVENOUS at 02:07

## 2023-01-01 RX ADMIN — FINASTERIDE 5 MG: 5 TABLET, FILM COATED ORAL at 08:07

## 2023-01-01 RX ADMIN — HEPARIN SODIUM 5000 UNITS: 5000 INJECTION INTRAVENOUS; SUBCUTANEOUS at 01:07

## 2023-01-01 RX ADMIN — HYDROXYZINE HYDROCHLORIDE 25 MG: 25 TABLET, FILM COATED ORAL at 05:07

## 2023-01-01 RX ADMIN — METHYLPREDNISOLONE SODIUM SUCCINATE 60 MG: 40 INJECTION, POWDER, FOR SOLUTION INTRAMUSCULAR; INTRAVENOUS at 02:07

## 2023-01-01 RX ADMIN — CALCIUM GLUCONATE 1 G: 20 INJECTION, SOLUTION INTRAVENOUS at 11:07

## 2023-01-01 RX ADMIN — CEFTRIAXONE 1 G: 1 INJECTION, POWDER, FOR SOLUTION INTRAMUSCULAR; INTRAVENOUS at 09:06

## 2023-01-01 RX ADMIN — IOHEXOL 75 ML: 350 INJECTION, SOLUTION INTRAVENOUS at 05:06

## 2023-01-01 RX ADMIN — TOBRAMYCIN AND DEXAMETHASONE 1 DROP: 3; 1 SUSPENSION/ DROPS OPHTHALMIC at 02:06

## 2023-01-01 RX ADMIN — REMDESIVIR 200 MG: 100 INJECTION, POWDER, LYOPHILIZED, FOR SOLUTION INTRAVENOUS at 09:07

## 2023-01-01 RX ADMIN — PIPERACILLIN SODIUM AND TAZOBACTAM SODIUM 4.5 G: 4; .5 INJECTION, POWDER, LYOPHILIZED, FOR SOLUTION INTRAVENOUS at 02:06

## 2023-01-01 RX ADMIN — CARBIDOPA AND LEVODOPA 1 TABLET: 25; 100 TABLET ORAL at 06:07

## 2023-01-01 RX ADMIN — DEXTROSE MONOHYDRATE 250 ML: 100 INJECTION, SOLUTION INTRAVENOUS at 10:06

## 2023-01-01 RX ADMIN — SODIUM CHLORIDE 1000 ML: 9 INJECTION, SOLUTION INTRAVENOUS at 09:06

## 2023-01-01 RX ADMIN — MODAFINIL 100 MG: 100 TABLET ORAL at 02:06

## 2023-02-01 PROBLEM — G47.19 EXCESSIVE DAYTIME SLEEPINESS: Status: ACTIVE | Noted: 2023-01-01

## 2023-02-01 NOTE — PROGRESS NOTES
Name: Giselle Lemus  MRN: [id number]   CSN: [id number]      Date: 02/01/2023    Referring physician:  No referring provider defined for this encounter.    Chief Complaint: PD     Interval History:  - stopped rasagiline due to side effects/daytime sleepiness   - taking cd/ld 1 tab QID   - son in law thinks sleepiness improved with stopping the rasagiline   - after he eats, he goes to sleep   - has not checked his BP after eating   - sleepiness is most bothersome symptom to him, not keen on taking stimulants for wakefulness         From Oct 2022  - added rasagiline last visit   - PT/OT   - sleeps a lot during the day, worse since rasagiline was added   - 1 tab QID + 1 mg rasagiline   - walking has improved some but not much   - has to be careful whenever he wakes up in the morning         From March 2022  - not better or worse, but notes the medication is not acting completely  - if he delays the medication, he notices wearing off with ldopa  - an average day, takes first dose at 7a, 1p, 6p, bedtime  - but also having dose and meal-related sleepiness, also still leg swelling which is not better - swelling of legs is the same to worse  - willing to take once daily, or change ldopa (or both)  - feels slower on his left side, but evidence more on the R side  - dysarhtria is improved, but still some swallow issues with solids and quick thin liquids, no reported clinical aspiration    From Nov 2021 with [facility]:  - hearing aids not working well today, accompanied by brother in law today   - tremor is stable   - main issue is the swelling in the bilateral LE, not on amantadine or dopamine agonist  - currently on cd/ld 1.5 tab QID   - feels like he has some loss of sensation in his toes   - did not have leg swelling prior to starting cd/ld  - last dose prior to office visit, takes doses 4-6 hours apart   - some issues with short term memory   - some mild issues with dysphagia     From August 2021  - last seen by [facility]BR, new to me   -  diagnosed with PD by dr. gerber in 2019  - tremor and gait are his major issues.  - taking cd/ld 1.5 tab 4-6 times a day; prescribed QID but takes the extra doses when his tremors are worse. Tolerating the medication well  - has been more 'shaky'. Rarely needs assitive devices around the house. Needs a walker when he leaves his house.  - last fall was a few months ago. Happened when he 'turned too soon'.  - helps take care of his wife.   - mood, sleep and appetite are good.  - swelling of both his lower extremities. Cardiac workup has been unremarkable.      From June 2021  - increased cd/ld to 1.5 tab QID last visit   - feels tired   - right shoulder feels stiff   - more edema in b/l LE   - saw cardiologist, did echo   - increase is helping a little bit   - sometimes shakes more than other times  - tremor has improved some   - feels like gait is about the same   - no falls   - accompanied by son in law   - last dose was at 7 am   - falls asleep more easily now, sits down to watch TV and falls asleep  - having some double vision, told son-in-law on the way here, also asks if he can drive   - has not scheduled to follow up with ophthalmologist   - waiting to get prescription filled   - most falls happened previously with turns   - has to take his time when getting up   - has to get one of his hearing aids fixed        From 4/2021: Giselle Lemus is a R HANDED 89 y.o. male with a medical issues significant for PD and BPH who presents to establish care for PD. Previously followed by Dr. Gerber at . Accompanied by brother in law. First noticed tremor 3-4 years ago in the R hand. Not much tremor in the L hand. In 2019, diagnosed by Dr. Gerber for parkinsons. Wants to establish PD care with Dr. Britton. Started him on cd/ld 25/100 1 tab TID, now on 1 tab 4 times a day (10 am, 2pm, 6p, 10p). Thinks that he responds to the medicine. Last dose of cd/ld was at 10 am. Balance issues more when he turns. Starting using a walker  3-4 months ago. Several months ago, had two falls and then started using the walker. + Shuffling. No hallucinations.   Slow gets up because endorses some dizziness/feeling lightheaded -- very seldom.   Did big and loud therapy at . Patient plans to do exercises at home.     Originally from [facility]. Retired .       Family History: no family history of PD or tremors that he knows of     Neuroleptic Exposure: none     Nonmotor/Premotor ROS:  Anosmia: poor   Dysarthria/Hypophonia: yes hypophonia   Dysphagia/Sialorrhea: occasional -- no choking   Cognitive slowing: long term issues   Hallucinations: none   Urinary changes: none  Constipation: none   Orthostasis: very seldom   Falls: as above   Micrographia: yes    Sleep issues:  -LEONEL: none  -RBD: none   Vision Changes:       Review of Systems:   Review of Systems   Constitutional:  Negative for chills, fever and malaise/fatigue.   HENT:  Negative for hearing loss.    Eyes:  Negative for blurred vision and double vision.   Respiratory:  Negative for cough, shortness of breath and stridor.    Cardiovascular:  Negative for chest pain and leg swelling.   Gastrointestinal:  Negative for constipation, diarrhea and nausea.   Genitourinary:  Negative for frequency and urgency.   Musculoskeletal:  Positive for falls.   Skin:  Negative for itching and rash.   Neurological:  Positive for tremors. Negative for dizziness, loss of consciousness and weakness.   Psychiatric/Behavioral:  Negative for hallucinations and memory loss.          Past Medical History: The patient  has a past medical history of BPH (benign prostatic hyperplasia), Parkinsons (09/2019), Ulcerative colitis, and Unspecified chronic bronchitis.    Social History: The patient  reports that he has never smoked. He has never used smokeless tobacco. He reports that he does not drink alcohol.    Family History: Their family history is not on file.    Allergies: Heparin analogues     Meds:   Current  "Outpatient Medications on File Prior to Visit   Medication Sig Dispense Refill    balsam peru-castor oiL Oint Apply topically 2 (two) times a day. Apply to buttocks      carbidopa-levodopa  mg (SINEMET)  mg per tablet TAKE 1 TABLET BY MOUTH 4 (FOUR) TIMES A DAY FOR 30 DAYS. 120 tablet 11    finasteride (PROSCAR) 5 mg tablet Take 5 mg by mouth once daily.      furosemide (LASIX) 20 MG tablet Take 1 tablet (20 mg total) by mouth once daily. 360 tablet 0    sodium chloride (OCEAN) 0.65 % nasal spray 1 spray by Nasal route as needed (irritation).  12    TOBRADEX 0.3-0.1 % DrpS Place 1 drop into both eyes 4 (four) times daily.      [DISCONTINUED] rasagiline (AZILECT) 1 mg Tab Take 1 tablet (1 mg total) by mouth once daily at 6am. 90 tablet 3     No current facility-administered medications on file prior to visit.       Exam:  BP (!) 148/62 (BP Location: Right arm, Patient Position: Sitting, BP Method: Medium (Automatic))   Pulse (!) 56   Ht 5' 5" (1.651 m)   Wt 55.6 kg (122 lb 7.5 oz)   BMI 20.38 kg/m²     Constitutional  Well-developed, well-nourished, appears stated age   Ophthalmoscopic  No papilledema with no hemorrhages or exudates bilaterally   Cardiovascular  Radial pulses 2+ and symmetric   Pitting LE edema bilaterally+   Neurological    * Mental status  MOCA =      - Orientation  Oriented to person, place, time, and situation     - Memory   Intact recent and remote     - Attention/concentration  Attentive, vigilant during exam     - Language  Naming & repetition intact, +2-step commands     - Fund of knowledge  Aware of current events     - Executive  Well-organized thoughts     - Other     * Cranial nerves       - CN II  PERRL, visual fields full to confrontation     - CN III, IV, VI  Extraocular movements full, normal pursuits and saccades     - CN V  Sensation V1 - V3 intact     - CN VII  Face strong and symmetric bilaterally     - CN VIII  Hearing intact bilaterally     - CN IX, X  Palate " raises midline and symmetric     - CN XI  SCM and trapezius 5/5 bilaterally     - CN XII  Tongue midline   * Motor  Muscle bulk normal, strength 5/5 throughout   * Sensory   Intact to temperature and vibration throughout   * Coordination  No dysmetria with finger-to-nose or heel-to-shin   * Gait  See below.   * Deep tendon reflexes  NOt tested today   Babinski downgoing bilaterally   * Specialized movement exam  + hypophonic speech.    + facial masking.   R > L ++ cogwheel rigidity.     R > L bradykinesia.   resting tremor of R hand, regular and during most the exam   No other dystonia, chorea, athetosis, myoclonus, or tics.   No motor impersistence.   narrow-based, shuffling gait.   ++ shortened stride length.   Able to walk without a walker, but has sig PI.   moderate anterior stoop      Laboratory/Radiological:  - Results:  No visits with results within 3 Month(s) from this visit.   Latest known visit with results is:   Lab Visit on 2022   Component Date Value Ref Range Status    WBC 2022 10.30  3.90 - 12.70 K/uL Final    RBC 2022 3.75 (L)  4.60 - 6.20 M/uL Final    Hemoglobin 2022 11.5 (L)  14.0 - 18.0 g/dL Final    Hematocrit 2022 36.0 (L)  40.0 - 54.0 % Final    MCV 2022 96  82 - 98 fL Final    MCH 2022 30.7  27.0 - 31.0 pg Final    MCHC 2022 31.9 (L)  32.0 - 36.0 g/dL Final    RDW 2022 16.7 (H)  11.5 - 14.5 % Final    Platelets 2022 195  150 - 450 K/uL Final    MPV 2022 10.5  9.2 - 12.9 fL Final       - Independent review of images: none new, but reviewed cxr results, no aspiration    Diagnoses:          1. PD, HY 3.  2. Cognitive change, stable   3. Gait instability 2/2 #1  4. Dysphagia, stable, but will watch closely  5) excessive daytime sleepiness     Medical Decision Makin) Keep cd/ld to 1 tab QID, but more off time coupled with likely swelling worse  2) now off of rasagiline, amplified daytime sleepiness   3) monitor BP after meals  -- at this time he is more sleepy   4) considering xadago or nourianz but he would like to hold off for now   5) discussed trial of provigil vs ritalin -- proceed with trial of provigil 100 mg BID       [facility]       Collaborating Physician, Dr. Britton, was available during today's encounter. Any change to plan along with cosign to appear in the EMR.          Janina Richardson PA-C   [facility]  Department of Neurology  Movement Disorders            Toro Britton MD, MPH  Division of Movement and Memory Disorders  [facility]

## 2023-02-02 NOTE — TELEPHONE ENCOUNTER
----- Message from Janina Richardson PA-C sent at 2/2/2023  9:26 AM CST -----  Regarding: FW: pharmacy  Contact: @ [phone]  Not sure what is confusing about it lol its a BID medication with quantity of 60...   ----- Message -----  From: French Govea  Sent: 2/2/2023   9:03 AM CST  To: Dylan CLARK Staff  Subject: pharmacy                                         [facility] is calling in regards to modafiniL (PROVIGIL) 100 MG Tab to get clarification on the quanity...Please call and adv @ [phone]

## 2023-02-03 NOTE — TELEPHONE ENCOUNTER
----- Message from Gardenia Mercedes sent at 2/2/2023 11:44 AM CST -----  Regarding: refill  Contact: [phone]  Eng Allan wife Solo calling regarding Refills  (message) for #modafiniL (PROVIGIL) 100 MG Tab. Pharmacy says they don't have rx. please call [phone]      [facility]/pharmacy #[id number] - [place] - [address]  [address]  [place] [address]  Phone: [phone] Fax: [phone]

## 2023-05-16 NOTE — PROGRESS NOTES
"Subjective     Patient ID: Giselle Lemus is a 89 y.o. male.    Chief Complaint: Arm Swelling and Leg Swelling    Patient of Dr. Torres - last seen by PCP 9 months ago, last labs 11 months ago. Scheduled a 15 minute Same Day appointment today for "foot swelling", no triage. 89 yo gentleman with Parkinson's disease not able to give much history himself, care overseen by sister who is not present. Driven here by his brother-in-law today who can not add to the history. Upon arrival, noted to have significant edema in both legs right > left, both upper extremities right > left, and in face. No traumatic injury. He denies chest pain, SOB or pain in limbs. Endorses productive cough.     PMH: Parkinson's, BPH, Renal Insufficiency on labs 6/22, h/o Malnutrition, H/O Edema - last prescribed a huge quantity of Lasix a year ago - ?compliance.   Review of Systems   Constitutional:  Negative for fever.   Respiratory:  Positive for cough.    Cardiovascular:  Positive for leg swelling. Negative for chest pain.   Gastrointestinal:  Negative for nausea and vomiting.   Musculoskeletal:  Positive for gait problem.        Objective   Vitals:    05/16/23 1552   BP: 128/70   Pulse: 60   Temp: 97.5 °F (36.4 °C)   SpO2: 95%   Weight: Comment: unable to stand   Height: 5' 5" (1.651 m)     Physical Exam  Constitutional:       General: He is not in acute distress.     Appearance: He is ill-appearing.   HENT:      Head:      Comments: Facial edema, symmetrical.   Cardiovascular:      Rate and Rhythm: Normal rate and regular rhythm.   Pulmonary:      Effort: Pulmonary effort is normal. No respiratory distress.      Breath sounds: Wheezing, rhonchi and rales present.   Abdominal:      General: There is no distension.      Palpations: Abdomen is soft.      Tenderness: There is no abdominal tenderness.   Musculoskeletal:      Right lower leg: Edema present.      Left lower leg: Edema present.      Comments: Pitting edema both hands, right forearm, " left lower extremity to mid-calf, right lower extremity to knee.    Skin:     General: Skin is warm and dry.      Findings: No erythema or rash.      Comments: No evidence of infection in limbs, no erythema, induration or open sores including feet.    Neurological:      Mental Status: He is alert.   Psychiatric:         Behavior: Behavior normal.        Assessment and Plan     Problem List Items Addressed This Visit       Sequelae of protein-calorie malnutrition      Other Visit Diagnoses       Anasarca    -  Primary    Relevant Orders    Refer to Emergency Dept.    Lung field abnormal finding on examination        Relevant Orders    Refer to Emergency Dept.    Renal insufficiency        Parkinson's disease                Anasarca  -  urgent need for labs, chest imaging, cardiac eval.  -     Refer to Emergency Dept.  -     stable for transport by private vehicle escorted by family.   -     report called to ER of patient's choice, [facility].    Lung field abnormal finding on examination  -     Refer to Emergency Dept.    Renal insufficiency    Sequelae of protein-calorie malnutrition    Parkinson's disease

## 2023-05-17 NOTE — TELEPHONE ENCOUNTER
----- Message from Jordana Tiwari sent at 5/17/2023  1:59 PM CDT -----  Contact: [phone]  Pt wife is calling about prescription refill. Couldn't really make out the issue. Pt wife says he only received a 30 day tablet last year and nothing yet. Pt would like a refill but not sure who to reach out to to refill.     Requesting an RX refill or new RX.  Is this a refill or new RX: Refill  RX name and strength (copy/paste from chart):  furosemide (LASIX) 20 MG tablet  Is this a 30 day or 90 day RX: 90  Pharmacy name and phone # (copy/paste from chart):    [facility]/pharmacy #[id number] - [place] - [address]  [address]  [place] [address]  Phone: [phone] Fax: [phone]      The doctors have asked that we provide their patients with the following 2 reminders -- prescription refills can take up to 72 hours, and a friendly reminder that in the future you can use your [facility] account to request refills:

## 2023-05-17 NOTE — TELEPHONE ENCOUNTER
----- Message from Sheree Bryson sent at 5/17/2023 12:35 PM CDT -----  Contact: [phone]  Pt called to advise that he had called earlier today in reference to getting the Lasix he needed and was prescribed at the ED on yesterday. Please Advise

## 2023-05-17 NOTE — PROVIDER PROGRESS NOTES - EMERGENCY DEPT.
Signout Note    I received signout from the previous provider.     Chief complaint:  Hypervolemia (Sent from Dr. Cardoso's office for progressive swelling to BLE)      Pertinent history &exam:  Giselle Lemus is a 89 y.o. male with pertinent PMH of difficulty hearing, sinus bradycardia, peripheral edema, BPH, chronic ulcerative proctitis, who presented to emergency department with complaint of lower extremity swelling.  Patient afebrile and hemodynamically stable without hypoxia or increased work of breathing.  BNP is much lower than prior.  No elevation of troponin.  EKG without ischemic changes.  Chest x-ray without significant pulmonary edema.  Had some asymmetric swelling of the lower extremities so an ultrasound was ordered.  He is already taking Lasix 20 mg daily.  Signed out pending ultrasound for final disposition    Vitals:    05/16/23 2000   BP: (!) 171/74   Pulse: 69   Resp: 15   Temp: 97.7 °F (36.5 °C)       Imaging Studies:    US Lower Extremity Veins Bilateral   Final Result      1. No evidence of deep venous thrombosis in either lower extremity.   2. Findings suggestive of bilateral popliteal fossa cysts measuring 4.2 x 2.5 x 2.8 cm on the right and 3.5 x 1.7 x 2.4 cm on the left.   3. Bilateral lower extremity edema.         Electronically signed by: Ericka Huff MD   Date:    05/16/2023   Time:    21:22      X-Ray Chest AP Portable   Final Result      Bibasilar pleural effusions with associated left basilar atelectasis/airspace disease.      Mild diffuse interstitial changes could represent mild edema.      Overall improvement from the prior study.         Electronically signed by: Nicho Pelletier   Date:    05/16/2023   Time:    21:02          Medications Given:  Medications - No data to display    Pending Items/ MDM:  89 y.o. male with above complaint.  Ultrasound shows bilateral popliteal fossa cysts but no evidence of DVT.  Patient reassessed, son and patient both comfortable with discharge home  with 5 days of additional Lasix.  Return precautions discussed at bedside    Diagnostic Impression:    1. Leg swelling    2. SOB (shortness of breath)         ED Disposition Condition    Discharge Stable          ED Prescriptions       Medication Sig Dispense Start Date End Date Auth. Provider    furosemide (LASIX) 20 MG tablet Take 1 tablet (20 mg total) by mouth once daily. for 5 days 5 tablet 5/16/2023 5/21/2023 Vera Ashraf MD          Follow-up Information       Follow up With Specialties Details Why Contact Info    Tl Torres MD Family Medicine Call in 1 day  [address] [facility] [id number] [phone]              Patient and family understand the plan and in agreement, verbalized understanding, questions answered    Vera Ashraf MD  Emergency Medicine

## 2023-05-17 NOTE — DISCHARGE INSTRUCTIONS
I recommend increasing your Lasix dose for the next several days.  I will provide do a short prescription so that you can take 40 mg daily.    The ultrasound showed cysts in the back of both of your knees, which can be followed up by your primary care doctor.    Tests today showed:   Labs Reviewed   CBC W/ AUTO DIFFERENTIAL - Abnormal; Notable for the following components:       Result Value    RBC 3.36 (*)     Hemoglobin 9.3 (*)     Hematocrit 30.6 (*)     MCHC 30.4 (*)     RDW 19.3 (*)     Immature Granulocytes 0.6 (*)     Gran # (ANC) 8.9 (*)     Immature Grans (Abs) 0.06 (*)     Gran % 82.2 (*)     Lymph % 9.2 (*)     All other components within normal limits   B-TYPE NATRIURETIC PEPTIDE - Abnormal; Notable for the following components:     (*)     All other components within normal limits   COMPREHENSIVE METABOLIC PANEL - Abnormal; Notable for the following components:    Albumin 3.1 (*)     ALT <5 (*)     All other components within normal limits   MAGNESIUM   TROPONIN I     US Lower Extremity Veins Bilateral   Final Result      1. No evidence of deep venous thrombosis in either lower extremity.   2. Findings suggestive of bilateral popliteal fossa cysts measuring 4.2 x 2.5 x 2.8 cm on the right and 3.5 x 1.7 x 2.4 cm on the left.   3. Bilateral lower extremity edema.         Electronically signed by: Ericka Huff MD   Date:    05/16/2023   Time:    21:22      X-Ray Chest AP Portable   Final Result      Bibasilar pleural effusions with associated left basilar atelectasis/airspace disease.      Mild diffuse interstitial changes could represent mild edema.      Overall improvement from the prior study.         Electronically signed by: Nicho Pelletier   Date:    05/16/2023   Time:    21:02          Treatments you had today:   Medications - No data to display    Follow-Up Plan:  - Follow-up with primary care doctor within 3 - 5 days  - Additional testing and/or evaluation as directed by your primary  doctor    Return to the Emergency Department for symptoms including but not limited to: worsening symptoms, shortness of breath or chest pain, vomiting with inability to hold down fluids, fevers greater than 100.4°F, passing out/fainting/unconsciousness, or other concerning symptoms.

## 2023-05-17 NOTE — TELEPHONE ENCOUNTER
----- Message from Rosalia Leo sent at 5/17/2023  3:28 PM CDT -----  Contact: Obi/Spouse [phone]  Patient was seen yesterday by Dr Ramos and sent to the ED. Rx furosemide (LASIX) 20 MG tablet was written by the ED but not sent to pharmacy    Please assist    [id number] - [place] - [address]  [address]  [facility] [address]  Phone: [phone] Fax: [phone]    Please contact the patient with the status of this request.     Thank You

## 2023-05-17 NOTE — TELEPHONE ENCOUNTER
----- Message from Megan Ramirez sent at 5/17/2023 10:49 AM CDT -----  Contact: [phone]  Pt is needing a refill on his furosemide (LASIX) 20 MG tablet 5 tablet. Pt is using   [facility]/pharmacy #[id number]  [place] [id number] [facility]  [address] [facility] [address]  Phone: [phone] Fax: [phone]              Thank you

## 2023-05-17 NOTE — TELEPHONE ENCOUNTER
----- Message from Jordana Tiwari sent at 5/17/2023  1:59 PM CDT -----  Contact: [phone]  Pt wife is calling about prescription refill. Couldn't really make out the issue. Pt wife says he only received a 30 day tablet last year and nothing yet. Pt would like a refill but not sure who to reach out to to refill.     Requesting an RX refill or new RX.  Is this a refill or new RX: Refill  RX name and strength (copy/paste from chart):  furosemide (LASIX) 20 MG tablet  Is this a 30 day or 90 day RX: 90  Pharmacy name and phone # (copy/paste from chart):    [facility]/pharmacy #[id number] - [place] - [address]  [address]  [place] [address]  Phone: [phone] Fax: [phone]      The doctors have asked that we provide their patients with the following 2 reminders -- prescription refills can take up to 72 hours, and a friendly reminder that in the future you can use your [facility] account to request refills:

## 2023-05-17 NOTE — TELEPHONE ENCOUNTER
----- Message from Rosalia Leo sent at 5/17/2023  3:28 PM CDT -----  Contact: Obi/Spouse [phone]  Patient was seen yesterday by Dr Ramos and sent to the ED. Rx furosemide (LASIX) 20 MG tablet was written by the ED but not sent to pharmacy    Please assist    [id number] - [place] - [address]  [address]  [facility] [address]  Phone: [phone] Fax: [phone]    Please contact the patient with the status of this request.     Thank You

## 2023-05-17 NOTE — ED TRIAGE NOTES
Giselle Lemus, a 89 y.o. male presents to the ED w/ complaint of BLE swelling that started 2 days ago, told to come to ER for eval by primary care dr.    Triage note:  Chief Complaint   Patient presents with    Hypervolemia     Sent from Dr. Cardoso's office for progressive swelling to BLE     Review of patient's allergies indicates:   Allergen Reactions    Heparin     Heparin analogues Other (See Comments)     Not true allergy - but patient developed large spontaneous RP hematoma and concurrent severe epistaxis while on DVT prophylactic dose heparin - consider mechanical DVT ppx in future     Past Medical History:   Diagnosis Date    BPH (benign prostatic hyperplasia)     Parkinsons 09/2019    R hand tremor starting in 2017, diagnosed Sept 2019 by Dr. Angeles at     Ulcerative colitis     s/p colectomy    Unspecified chronic bronchitis     Patient identifiers for Giselle Lemus checked and correct.    LOC: The patient is awake, alert and aware of environment with an appropriate affect, the patient is oriented x 4 and speaking appropriately.    APPEARANCE: Patient resting comfortably and in no acute distress, patient is clean and well groomed, patient's clothing is properly fastened.    SKIN: The skin is flaky, warm and dry, color consistent with ethnicity, patient has normal skin turgor and moist mucus membranes, skin intact, no breakdown or bruising noted.    MUSCULOSKELETAL: Patient moving all extremities well, swelling on BLE but no deformities noted.    RESPIRATORY: Airway is open and patent, respirations are spontaneous and even, patient has a normal effort and rate.    CARDIAC: Patient has a normal rate and rhythm, no periphreal edema noted, capillary refill < 3 seconds. Normal +2 pedal pulses present.    ABDOMEN: Soft and non tender to palpation, no distention noted. Patient denies any nausea, vomiting, diarrhea, or constipation.     NEUROLOGIC: Eyes open spontaneously, PERRL, behavior appropriate to situation,  follows commands, facial expression symmetrical, bilateral hand grasp equal and even, purposeful motor response noted, normal sensation in all extremities.     HEENT: No abnormalities noted. White sclera and pupils equal round and reactive to light. Denies headache, dizziness.     : Pt voids independently, denies dysuria, hematuria, frequency.

## 2023-05-17 NOTE — ED PROVIDER NOTES
Encounter Date: 5/16/2023       History     Chief Complaint   Patient presents with    Hypervolemia     Sent from Dr. Cardoso's office for progressive swelling to BLE     89-year-old male, history of Parkinson's, BPH, lives with daughter, seen by PCP today and there was concerns about new lower extremity edema, right-greater-than-left so PCP referred the patient to the ED for further evaluation.  Patient's son is with him now who does not live with him and is not certain about a lot of the elements of the patient's recent history.  Patient denies any shortness of breath, dyspnea on exertion, chest pain, fevers chills.  States that his left lower extremity was typically swollen however now his right lower extremities much more swollen this is new.  Neither are certain about the timeframe of the symptoms.  It does seem that patient is prescribed Lasix 20 mg daily    The history is provided by the patient and a relative.   Review of patient's allergies indicates:   Allergen Reactions    Heparin     Heparin analogues Other (See Comments)     Not true allergy - but patient developed large spontaneous RP hematoma and concurrent severe epistaxis while on DVT prophylactic dose heparin - consider mechanical DVT ppx in future     Past Medical History:   Diagnosis Date    BPH (benign prostatic hyperplasia)     Parkinsons 09/2019    R hand tremor starting in 2017, diagnosed Sept 2019 by Dr. Angeles at     Ulcerative colitis     s/p colectomy    Unspecified chronic bronchitis      Past Surgical History:   Procedure Laterality Date    TOTAL COLECTOMY       No family history on file.  Social History     Tobacco Use    Smoking status: Never    Smokeless tobacco: Never   Substance Use Topics    Alcohol use: Never     Review of Systems    Physical Exam     Initial Vitals [05/16/23 1734]   BP Pulse Resp Temp SpO2   (!) 201/77 (!) 57 20 97.7 °F (36.5 °C) 96 %      MAP       --         Physical Exam    Nursing note and vitals  reviewed.  Constitutional: Vital signs are normal. He appears well-developed and well-nourished. He is not diaphoretic.  Non-toxic appearance. He does not appear ill. No distress.   HENT:   Head: Normocephalic and atraumatic.   Mouth/Throat: Mucous membranes are normal. Mucous membranes are not dry.   Eyes: Conjunctivae and lids are normal.   Neck: Neck supple.   Normal range of motion.  Cardiovascular:  Normal rate.           Pulmonary/Chest: Breath sounds normal. No respiratory distress.   Abdominal: Abdomen is soft. He exhibits distension. There is no abdominal tenderness. There is no rebound and no guarding.   Musculoskeletal:         General: Edema present.      Cervical back: Normal range of motion and neck supple.      Comments: Significant edema to the bilateral lower extremities right-greater-than-left     Neurological: He is alert and oriented to person, place, and time. He has normal strength.   Skin: Skin is dry and intact. No pallor.   Psychiatric: He has a normal mood and affect. His speech is normal and behavior is normal.       ED Course   Procedures  Labs Reviewed   CBC W/ AUTO DIFFERENTIAL - Abnormal; Notable for the following components:       Result Value    RBC 3.36 (*)     Hemoglobin 9.3 (*)     Hematocrit 30.6 (*)     MCHC 30.4 (*)     RDW 19.3 (*)     Immature Granulocytes 0.6 (*)     Gran # (ANC) 8.9 (*)     Immature Grans (Abs) 0.06 (*)     Gran % 82.2 (*)     Lymph % 9.2 (*)     All other components within normal limits   B-TYPE NATRIURETIC PEPTIDE - Abnormal; Notable for the following components:     (*)     All other components within normal limits   COMPREHENSIVE METABOLIC PANEL - Abnormal; Notable for the following components:    Albumin 3.1 (*)     ALT <5 (*)     All other components within normal limits   MAGNESIUM   TROPONIN I        ECG Results              EKG 12-lead (Final result)  Result time 05/17/23 08:47:55      Final result by Interface, Lab In [facility] (05/17/23  08:47:55)                   Narrative:    Test Reason : R06.02,    Vent. Rate : 067 BPM     Atrial Rate : 067 BPM     P-R Int : 162 ms          QRS Dur : 116 ms      QT Int : 452 ms       P-R-T Axes : 095 122 068 degrees     QTc Int : 477 ms    Normal sinus rhythm  Right bundle branch block  Abnormal ECG  When compared with ECG of 10-MAY-2022 13:35,  Vent. rate has increased BY  32 BPM  QRS duration has decreased  Criteria for Septal infarct are no longer Present  T wave inversion no longer evident in Inferior leads  QT has lengthened  Confirmed by Jack ALFORD, Eli ([id number]) on 5/17/2023 8:47:51 AM    Referred By: [email or web address]   SELF           Confirmed By:Eli Santiago MD                                  Imaging Results              US Lower Extremity Veins Bilateral (Final result)  Result time 05/16/23 21:22:45      Final result by Ericka Huff MD (05/16/23 21:22:45)                   Impression:      1. No evidence of deep venous thrombosis in either lower extremity.  2. Findings suggestive of bilateral popliteal fossa cysts measuring 4.2 x 2.5 x 2.8 cm on the right and 3.5 x 1.7 x 2.4 cm on the left.  3. Bilateral lower extremity edema.      Electronically signed by: Ericka Huff MD  Date:    05/16/2023  Time:    21:22               Narrative:    EXAMINATION:  US LOWER EXTREMITY VEINS BILATERAL    CLINICAL HISTORY:  Other specified soft tissue disorders    TECHNIQUE:  Duplex and color flow Doppler and dynamic compression was performed of the bilateral lower extremity veins was performed.  Please note this was a technically difficult exam due to patient's inability to tolerate proper positioning.    COMPARISON:  None    FINDINGS:  Right thigh veins: The common femoral, femoral, popliteal, upper greater saphenous, and deep femoral veins are patent and free of thrombus. The veins are normally compressible and have normal phasic flow and augmentation response.    Right calf veins: The visualized calf veins are  patent.    Left thigh veins: The common femoral, femoral, popliteal, upper greater saphenous, and deep femoral veins are patent and free of thrombus. The veins are normally compressible and have normal phasic flow and augmentation response.    Left calf veins: The visualized calf veins are patent.    Miscellaneous: There are anechoic structures in bilateral popliteal fossa measuring 4.2 x 2.5 x 2.8 cm on the right and 3.5 x 1.7 x 2.4 cm on the left suggestive of bilateral popliteal fossa cysts.  There is bilateral lower extremity edema.                                       X-Ray Chest AP Portable (Final result)  Result time 05/16/23 21:02:16      Final result by Nicho German MD (05/16/23 21:02:16)                   Impression:      Bibasilar pleural effusions with associated left basilar atelectasis/airspace disease.    Mild diffuse interstitial changes could represent mild edema.    Overall improvement from the prior study.      Electronically signed by: Nicho German  Date:    05/16/2023  Time:    21:02               Narrative:    EXAMINATION:  XR CHEST AP PORTABLE    CLINICAL HISTORY:  sob;    TECHNIQUE:  Single frontal view of the chest was performed.    COMPARISON:  05/13/2022    FINDINGS:  Cardiac silhouette is within normal limits.    Bibasilar effusions with associated left basilar airspace disease.    There is improved aeration at the lung bases right greater than left.    Mild diffuse interstitial changes.    No evidence of pneumothorax.  No acute osseous abnormality.                                       Medications - No data to display  Medical Decision Making:   History:   Old Medical Records: I decided to obtain old medical records.  Old Records Summarized: records from clinic visits and records from previous admission(s).  Initial Assessment:   Patient appears frail and weak but hemodynamically stable and nontoxic  Differential Diagnosis:   CHF  Renal failure  Hypoalbuminemia  DVTs  Clinical  Tests:   Lab Tests: Ordered and Reviewed  Radiological Study: Reviewed and Ordered  Medical Tests: Ordered and Reviewed  ED Management:  Labs including CBC, CMP, BNP  Chest x-ray  Bilateral lower extremity Dopplers  Dispo uncertain pending ED workup           ED Course as of 05/18/23 0824   Tue May 16, 2023   2123 Hemoglobin(!): 9.3  stable [AS]   2123 CXR improved from prior study [AS]      ED Course User Index  [AS] Vera Segura MD                 Clinical Impression:   Final diagnoses:  [R06.02] SOB (shortness of breath)  [M79.89] Leg swelling (Primary)        ED Disposition Condition    Discharge Stable          ED Prescriptions       Medication Sig Dispense Start Date End Date Auth. Provider    furosemide (LASIX) 20 MG tablet Take 1 tablet (20 mg total) by mouth once daily. for 5 days 5 tablet 5/16/2023 5/21/2023 Vera Ashraf MD          Follow-up Information       Follow up With Specialties Details Why Contact Info    Tl Torres MD Family Medicine Call in 1 day  [address] [facility] [id number]  [phone]               Vera Segura MD  05/18/23 0824

## 2023-05-17 NOTE — TELEPHONE ENCOUNTER
No care due was identified.  [facility] Embedded Care Due Messages. Reference number: [id number].   5/17/2023 11:00:19 AM CDT

## 2023-05-17 NOTE — TELEPHONE ENCOUNTER
Spoke with patient spouse, she states they were not given printed prescriptions nor were the prescriptions sent electronically during his visit to the ER on 05/16/2023. Advised Dr. Torres has received her request for the furosemide prescription and he will address it. Patient spouse agreed and verbalized understanding.

## 2023-05-18 NOTE — TELEPHONE ENCOUNTER
MAX ALEXIS BEE  - 1933 MRN #[id number]     REQUESTING HOME [facility] SKILLED NURSING ORDERS FOR PATIENT COMPLETE COMPREHENSIVE ASSESSMENT INCLUDING ROUTINE VITAL SIGNS.   REVIEW/VERIFY MEDICATION AND ISNTRUCT PATIENT /CAREGIVER AS NEEDED.     THANK YOU!     Julianna Carrington   [facility]     [phone]       Patient sent to the ED on 23 by Dr. Ramos. D/C same day. No future appt scheduled for follow up. Above  orders being requested. Neuro follow up scheduled for 23.

## 2023-05-23 NOTE — TELEPHONE ENCOUNTER
----- Message from Arianna Jewell sent at 5/23/2023 12:23 PM CDT -----  Pt spouse requesting call back , wouldn't say why     Confirmed patient's contact info below:  Contact Name: Giselle Lemus  Phone Number: [phone]

## 2023-05-24 PROBLEM — I70.0 AORTIC ATHEROSCLEROSIS: Status: ACTIVE | Noted: 2023-01-01

## 2023-05-24 NOTE — PROGRESS NOTES
Name: Giselle Lemus  MRN: [id number]   CSN: [id number]      Date: 05/24/2023    Referring physician:  No referring provider defined for this encounter.    Chief Complaint: PD     Interval History:  - trial of provigil 100 mg BID last visit, but never got the Rx?    - does not have hearing aids in today   - accompanied by son in law   - a few falls lately, fell trying to get up   - one fall while asleep on the couch, fell off the side of the couch  - went to the ED for leg swelling, r/o DVT   - he was put back on lasix   - sister is on the phone, also hard of hearing             From Feb 2023  - stopped rasagiline due to side effects/daytime sleepiness   - taking cd/ld 1 tab QID   - son in law thinks sleepiness improved with stopping the rasagiline   - after he eats, he goes to sleep   - has not checked his BP after eating   - sleepiness is most bothersome symptom to him, not keen on taking stimulants for wakefulness         From Oct 2022  - added rasagiline last visit   - PT/OT   - sleeps a lot during the day, worse since rasagiline was added   - 1 tab QID + 1 mg rasagiline   - walking has improved some but not much   - has to be careful whenever he wakes up in the morning         From March 2022  - not better or worse, but notes the medication is not acting completely  - if he delays the medication, he notices wearing off with ldopa  - an average day, takes first dose at 7a, 1p, 6p, bedtime  - but also having dose and meal-related sleepiness, also still leg swelling which is not better - swelling of legs is the same to worse  - willing to take once daily, or change ldopa (or both)  - feels slower on his left side, but evidence more on the R side  - dysarhtria is improved, but still some swallow issues with solids and quick thin liquids, no reported clinical aspiration    From Nov 2021 with [facility]:  - hearing aids not working well today, accompanied by brother in law today   - tremor is stable   - main issue is the  swelling in the bilateral LE, not on amantadine or dopamine agonist  - currently on cd/ld 1.5 tab QID   - feels like he has some loss of sensation in his toes   - did not have leg swelling prior to starting cd/ld  - last dose prior to office visit, takes doses 4-6 hours apart   - some issues with short term memory   - some mild issues with dysphagia     From August 2021  - last seen by [facility], new to me   - diagnosed with PD by dr. gerber in 2019  - tremor and gait are his major issues.  - taking cd/ld 1.5 tab 4-6 times a day; prescribed QID but takes the extra doses when his tremors are worse. Tolerating the medication well  - has been more 'shaky'. Rarely needs assitive devices around the house. Needs a walker when he leaves his house.  - last fall was a few months ago. Happened when he 'turned too soon'.  - helps take care of his wife.   - mood, sleep and appetite are good.  - swelling of both his lower extremities. Cardiac workup has been unremarkable.      From June 2021  - increased cd/ld to 1.5 tab QID last visit   - feels tired   - right shoulder feels stiff   - more edema in b/l LE   - saw cardiologist, did echo   - increase is helping a little bit   - sometimes shakes more than other times  - tremor has improved some   - feels like gait is about the same   - no falls   - accompanied by son in law   - last dose was at 7 am   - falls asleep more easily now, sits down to watch TV and falls asleep  - having some double vision, told son-in-law on the way here, also asks if he can drive   - has not scheduled to follow up with ophthalmologist   - waiting to get prescription filled   - most falls happened previously with turns   - has to take his time when getting up   - has to get one of his hearing aids fixed        From 4/2021: Giselle Lemus is a R HANDED 89 y.o. male with a medical issues significant for PD and BPH who presents to establish care for PD. Previously followed by Dr. Gerber at . Accompanied by  brother in law. First noticed tremor 3-4 years ago in the R hand. Not much tremor in the L hand. In 2019, diagnosed by Dr. Angeles for parkinsons. Wants to establish PD care with Dr. Britton. Started him on cd/ld 25/100 1 tab TID, now on 1 tab 4 times a day (10 am, 2pm, 6p, 10p). Thinks that he responds to the medicine. Last dose of cd/ld was at 10 am. Balance issues more when he turns. Starting using a walker 3-4 months ago. Several months ago, had two falls and then started using the walker. + Shuffling. No hallucinations.   Slow gets up because endorses some dizziness/feeling lightheaded -- very seldom.   Did big and loud therapy at . Patient plans to do exercises at home.     Originally from [facility]. Retired .       Family History: no family history of PD or tremors that he knows of     Neuroleptic Exposure: none     Nonmotor/Premotor ROS:  Anosmia: poor   Dysarthria/Hypophonia: yes hypophonia   Dysphagia/Sialorrhea: occasional -- no choking   Cognitive slowing: long term issues   Hallucinations: none   Urinary changes: none  Constipation: none   Orthostasis: very seldom   Falls: as above   Micrographia: yes    Sleep issues:  -LEONEL: none  -RBD: none   Vision Changes:       Review of Systems:   Review of Systems   Constitutional:  Negative for chills, fever and malaise/fatigue.   HENT:  Negative for hearing loss.    Eyes:  Negative for blurred vision and double vision.   Respiratory:  Negative for cough, shortness of breath and stridor.    Cardiovascular:  Negative for chest pain and leg swelling.   Gastrointestinal:  Negative for constipation, diarrhea and nausea.   Genitourinary:  Negative for frequency and urgency.   Musculoskeletal:  Positive for falls.   Skin:  Negative for itching and rash.   Neurological:  Positive for tremors. Negative for dizziness, loss of consciousness and weakness.   Psychiatric/Behavioral:  Negative for hallucinations and memory loss.          Past Medical History: The  "patient  has a past medical history of BPH (benign prostatic hyperplasia), Parkinsons (09/2019), Ulcerative colitis, and Unspecified chronic bronchitis.    Social History: The patient  reports that he has never smoked. He has never used smokeless tobacco. He reports that he does not drink alcohol.    Family History: Their family history is not on file.    Allergies: Heparin and Heparin analogues     Meds:   Current Outpatient Medications on File Prior to Visit   Medication Sig Dispense Refill    balsam peru-castor oiL Oint Apply topically 2 (two) times a day. Apply to buttocks      carbidopa-levodopa  mg (SINEMET)  mg per tablet TAKE 1 TABLET BY MOUTH 4 (FOUR) TIMES A DAY FOR 30 DAYS. 120 tablet 11    finasteride (PROSCAR) 5 mg tablet Take 5 mg by mouth once daily.      furosemide (LASIX) 20 MG tablet Take 1 tablet (20 mg total) by mouth once daily. 30 tablet 1    sodium chloride (OCEAN) 0.65 % nasal spray 1 spray by Nasal route as needed (irritation).  12    TOBRADEX 0.3-0.1 % DrpS Place 1 drop into both eyes 4 (four) times daily.       No current facility-administered medications on file prior to visit.       Exam:  /66 (BP Location: Left arm, Patient Position: Sitting, BP Method: Medium (Automatic))   Pulse (!) 58   Ht 5' 5" (1.651 m)   Wt 55.6 kg (122 lb 9.2 oz)   BMI 20.40 kg/m²     Constitutional  Well-developed, well-nourished, appears stated age   Ophthalmoscopic  No papilledema with no hemorrhages or exudates bilaterally   Cardiovascular  Radial pulses 2+ and symmetric   Pitting LE edema bilaterally+   Neurological    * Mental status  MOCA =      - Orientation  Oriented to person, place, time, and situation     - Memory   Intact recent and remote     - Attention/concentration  Attentive, vigilant during exam     - Language  Naming & repetition intact, +2-step commands     - Fund of knowledge  Aware of current events     - Executive  Well-organized thoughts     - Other     * Cranial " nerves       - CN II  PERRL, visual fields full to confrontation     - CN III, IV, VI  Extraocular movements full, normal pursuits and saccades     - CN V  Sensation V1 - V3 intact     - CN VII  Face strong and symmetric bilaterally     - CN VIII  Hearing intact bilaterally     - CN IX, X  Palate raises midline and symmetric     - CN XI  SCM and trapezius 5/5 bilaterally     - CN XII  Tongue midline   * Motor  Muscle bulk normal, strength 5/5 throughout   * Sensory   Intact to temperature and vibration throughout   * Coordination  No dysmetria with finger-to-nose or heel-to-shin   * Gait  See below.   * Deep tendon reflexes  NOt tested today   Babinski downgoing bilaterally   * Specialized movement exam  + hypophonic speech.    + facial masking.   R > L ++ cogwheel rigidity.     R > L bradykinesia.   resting tremor of R hand, regular and during most the exam   No other dystonia, chorea, athetosis, myoclonus, or tics.   No motor impersistence.   narrow-based, shuffling gait.   ++ shortened stride length.   Able to walk without a walker, but has sig PI.   moderate anterior stoop      Laboratory/Radiological:  - Results:  Admission on 05/16/2023, Discharged on 05/16/2023   Component Date Value Ref Range Status    WBC 05/16/2023 10.78  3.90 - 12.70 K/uL Final    RBC 05/16/2023 3.36 (L)  4.60 - 6.20 M/uL Final    Hemoglobin 05/16/2023 9.3 (L)  14.0 - 18.0 g/dL Final    Hematocrit 05/16/2023 30.6 (L)  40.0 - 54.0 % Final    MCV 05/16/2023 91  82 - 98 fL Final    MCH 05/16/2023 27.7  27.0 - 31.0 pg Final    MCHC 05/16/2023 30.4 (L)  32.0 - 36.0 g/dL Final    RDW 05/16/2023 19.3 (H)  11.5 - 14.5 % Final    Platelets 05/16/2023 311  150 - 450 K/uL Final    MPV 05/16/2023 10.4  9.2 - 12.9 fL Final    Immature Granulocytes 05/16/2023 0.6 (H)  0.0 - 0.5 % Final    Gran # (ANC) 05/16/2023 8.9 (H)  1.8 - 7.7 K/uL Final    Immature Grans (Abs) 05/16/2023 0.06 (H)  0.00 - 0.04 K/uL Final    Lymph # 05/16/2023 1.0  1.0 - 4.8 K/uL  Final    Mono # 2023 0.8  0.3 - 1.0 K/uL Final    Eos # 2023 0.0  0.0 - 0.5 K/uL Final    Baso # 2023 0.04  0.00 - 0.20 K/uL Final    nRBC 2023 0  0 /100 WBC Final    Gran % 2023 82.2 (H)  38.0 - 73.0 % Final    Lymph % 2023 9.2 (L)  18.0 - 48.0 % Final    Mono % 2023 7.4  4.0 - 15.0 % Final    Eosinophil % 2023 0.2  0.0 - 8.0 % Final    Basophil % 2023 0.4  0.0 - 1.9 % Final    Differential Method 2023 Automated   Final    BNP 2023 310 (H)  0 - 99 pg/mL Final    Sodium 2023 138  136 - 145 mmol/L Final    Potassium 2023 4.8  3.5 - 5.1 mmol/L Final    Chloride 2023 100  95 - 110 mmol/L Final    CO2 2023 29  23 - 29 mmol/L Final    Glucose 2023 101  70 - 110 mg/dL Final    BUN 2023 20  8 - 23 mg/dL Final    Creatinine 2023 0.9  0.5 - 1.4 mg/dL Final    Calcium 2023 9.4  8.7 - 10.5 mg/dL Final    Total Protein 2023 7.6  6.0 - 8.4 g/dL Final    Albumin 2023 3.1 (L)  3.5 - 5.2 g/dL Final    Total Bilirubin 2023 0.4  0.1 - 1.0 mg/dL Final    Alkaline Phosphatase 2023 89  55 - 135 U/L Final    AST 2023 14  10 - 40 U/L Final    ALT 2023 <5 (L)  10 - 44 U/L Final    Anion Gap 2023 9  8 - 16 mmol/L Final    eGFR 2023 >60.0  >60 mL/min/1.73 m^2 Final    Magnesium 2023 2.0  1.6 - 2.6 mg/dL Final    Troponin I 2023 0.011  0.000 - 0.026 ng/mL Final       - Independent review of images: none new, but reviewed cxr results, no aspiration    Diagnoses:          1. PD, HY 3.  2. Cognitive change, stable   3. Gait instability 2/2 #1  4. Dysphagia, stable, but will watch closely  5) excessive daytime sleepiness     Medical Decision Makin) Keep cd/ld to 1 tab QID, but more off time coupled with likely swelling worse  2) now off of rasagiline, amplified daytime sleepiness   3) monitor BP after meals -- at this time he is more sleepy   4) considering xadago or  nourianz but he would like to hold off for now   5) discussed trial of provigil vs ritalin -- proceed with trial of provigil 100 mg BID (again)       F/u in 6 months with [facility]       Collaborating Physician, Dr. Britton, was available during today's encounter. Any change to plan along with cosign to appear in the EMR.          Janina Richardson PA-C   [facility]  Department of Neurology  Movement Disorders            Toro Britton MD, MPH  Division of Movement and Memory Disorders  [facility]

## 2023-05-25 NOTE — ASSESSMENT & PLAN NOTE
Presents with SBO, initially admitted to General Surgery.   Hx of UC s/p total colectomy  Resolved    -- Regular diet  -- Monitor for bowel movements, abdominal pain   Topical Retinoid counseling:  Patient advised to apply a pea-sized amount only at bedtime and wait 30 minutes after washing their face before applying.  If too drying, patient may add a non-comedogenic moisturizer. The patient verbalized understanding of the proper use and possible adverse effects of retinoids.  All of the patient's questions and concerns were addressed.

## 2023-06-02 PROBLEM — R65.20 SEVERE SEPSIS: Status: ACTIVE | Noted: 2023-01-01

## 2023-06-02 PROBLEM — J18.9 PNEUMONIA DUE TO INFECTIOUS ORGANISM: Status: ACTIVE | Noted: 2023-01-01

## 2023-06-02 PROBLEM — A41.9 SEVERE SEPSIS: Status: ACTIVE | Noted: 2023-01-01

## 2023-06-02 NOTE — ED PROVIDER NOTES
Encounter Date: 6/2/2023       History     Chief Complaint   Patient presents with    Altered Mental Status     Family reports decline in mental status since wed now  more confused     Mr. Lemus is a 89yoM with pmhx of difficulty hearing, sinus bradycardia, peripheral edema, BPH, chronic ulcerative proctitis, and parkinson's disease who presented to ED with his family & caregiver due to AMS. Family stated AMS started spontaneously approximately 48hours ago. Wife reported patient eating less and not taking his medications. Patient reported to be less alert and more confuse. Stated at baseline, patient alert and oriented x3. Able to ambulate with a walker. Hx limited due to patient being a poor historian due to AMS. Of significance, family reported urinary frequency with lasix use and decreased PO intake.     The history is provided by the patient and a relative. No  was used.   Review of patient's allergies indicates:   Allergen Reactions    Heparin     Heparin analogues Other (See Comments)     Not true allergy - but patient developed large spontaneous RP hematoma and concurrent severe epistaxis while on DVT prophylactic dose heparin - consider mechanical DVT ppx in future     Past Medical History:   Diagnosis Date    BPH (benign prostatic hyperplasia)     Parkinsons 09/2019    R hand tremor starting in 2017, diagnosed Sept 2019 by Dr. Angeles at     Ulcerative colitis     s/p colectomy    Unspecified chronic bronchitis      Past Surgical History:   Procedure Laterality Date    TOTAL COLECTOMY       No family history on file.  Social History     Tobacco Use    Smoking status: Never    Smokeless tobacco: Never   Substance Use Topics    Alcohol use: Never     Review of Systems   Unable to perform ROS: Mental status change   Constitutional:  Positive for activity change and appetite change.   Psychiatric/Behavioral:  Positive for confusion.      Physical Exam     Initial Vitals [06/02/23 1214]   BP  Pulse Resp Temp SpO2   (!) 119/57 61 18 97.6 °F (36.4 °C) 96 %      MAP       --         Physical Exam    Nursing note and vitals reviewed.  Constitutional: He is not diaphoretic. He appears cachectic. No distress.   HENT:   Head: Normocephalic and atraumatic.   Mouth/Throat: Mucous membranes are dry.   Eyes: Conjunctivae and EOM are normal. Pupils are equal, round, and reactive to light.   Neck: Neck supple.   Normal range of motion.  Cardiovascular:  Normal rate and regular rhythm.           Murmur heard.  Pulmonary/Chest: Breath sounds normal. No respiratory distress. He has no wheezes. He exhibits no tenderness.   Abdominal: Abdomen is soft. Bowel sounds are normal. He exhibits no distension. There is no abdominal tenderness. There is no rebound and no guarding.   Musculoskeletal:         General: Tenderness (diffuse tenderness of L thigh) present. No edema.      Cervical back: Normal range of motion and neck supple.     Neurological:   Hard of hearing. Intermittently follows commands and answer questions.    Skin: Skin is warm and dry.   Stage I decubitus ulcer       ED Course   Critical Care    Date/Time: 6/2/2023 2:45 PM  Performed by: Amilcar Maki DO  Authorized by: Amilcar Maki DO   Direct patient critical care time: 12 minutes  Additional history critical care time: 12 minutes  Ordering / reviewing critical care time: 25 minutes  Documentation critical care time: 8 minutes  Consulting other physicians critical care time: 5 minutes  Total critical care time (exclusive of procedural time) : 62 minutes  Critical care was necessary to treat or prevent imminent or life-threatening deterioration of the following conditions: sepsis and CNS failure or compromise.  Critical care was time spent personally by me on the following activities: blood draw for specimens, development of treatment plan with patient or surrogate, discussions with consultants, examination of patient, evaluation of patient's response  to treatment, obtaining history from patient or surrogate, ordering and performing treatments and interventions, ordering and review of laboratory studies, ordering and review of radiographic studies, pulse oximetry, re-evaluation of patient's condition and review of old charts.      Labs Reviewed   CBC W/ AUTO DIFFERENTIAL - Abnormal; Notable for the following components:       Result Value    WBC 21.81 (*)     RBC 3.26 (*)     Hemoglobin 8.7 (*)     Hematocrit 29.1 (*)     MCH 26.7 (*)     MCHC 29.9 (*)     RDW 20.5 (*)     Gran # (ANC) 20.1 (*)     Immature Grans (Abs) 0.11 (*)     Lymph # 0.8 (*)     Gran % 92.2 (*)     Lymph % 3.5 (*)     Mono % 3.6 (*)     All other components within normal limits   COMPREHENSIVE METABOLIC PANEL - Abnormal; Notable for the following components:    CO2 30 (*)     BUN 25 (*)     Albumin 2.9 (*)     ALT <5 (*)     Anion Gap 6 (*)     All other components within normal limits   URINALYSIS, REFLEX TO URINE CULTURE - Abnormal; Notable for the following components:    Leukocytes, UA 1+ (*)     All other components within normal limits    Narrative:     Specimen Source->Urine   IRON AND TIBC - Abnormal; Notable for the following components:    Transferrin 149 (*)     TIBC 221 (*)     All other components within normal limits   ISTAT PROCEDURE - Abnormal; Notable for the following components:    POC PH 7.312 (*)     POC PCO2 57.6 (*)     POC PO2 33 (*)     POC HCO3 29.2 (*)     POC SATURATED O2 57 (*)     POC TCO2 31 (*)     All other components within normal limits   ISTAT PROCEDURE - Abnormal; Notable for the following components:    POC Hematocrit 29 (*)     All other components within normal limits   CULTURE, BLOOD   CULTURE, BLOOD   MAGNESIUM   PHOSPHORUS   SARS-COV2 (COVID) WITH FLU/RSV BY PCR   TSH   PROCALCITONIN   URINALYSIS MICROSCOPIC    Narrative:     Specimen Source->Urine   HIV 1 / 2 ANTIBODY   HEPATITIS C ANTIBODY   FOLATE   VITAMIN B12   POCT GLUCOSE   ISTAT LACTATE    POCT GLUCOSE MONITORING CONTINUOUS   POCT GLUCOSE MONITORING CONTINUOUS   ISTAT CHEM8     EKG Readings: (Independently Interpreted)   Initial Reading: No STEMI. Previous EKG: Compared with most recent EKG Rhythm: Sinus Bradycardia. Heart Rate: 59. Conduction: RBBB. ST Segments: Normal ST Segments. T Waves: Normal.   Sinus bradycardia with old RBBB   ECG Results              EKG 12-lead (Final result)  Result time 06/02/23 16:18:06      Final result by Interface, Lab In [facility] (06/02/23 16:18:06)                   Narrative:    Test Reason : R41.82,    Vent. Rate : 059 BPM     Atrial Rate : 059 BPM     P-R Int : 162 ms          QRS Dur : 114 ms      QT Int : 460 ms       P-R-T Axes : 086 106 061 degrees     QTc Int : 455 ms    Sinus bradycardia with Premature atrial complexes  Possible Left atrial enlargement  Right bundle branch block  Abnormal ECG  When compared with ECG of 16-MAY-2023 21:38,  Premature atrial complexes are now Present  Confirmed by RT NAVA MD ([id number]) on 6/2/2023 4:17:55 PM    Referred By: [email or web address]   SELF           Confirmed By:TR NAVA MD                                  Imaging Results               CT Abdomen Pelvis With Contrast (Final result)  Result time 06/02/23 19:25:35      Final result by Nicho German MD (06/02/23 19:25:35)                   Impression:      1. Bilateral consolidation and infiltrates most prominent left lower lobe and right upper lobe.  Findings could represent infection/pneumonia.  See above comments.  Follow-up recommended.  Minimal effusion on the right also.  2. There is dilation of the intrahepatic and extrahepatic biliary ducts with common duct measuring approximately 1.5 cm.  No obstruction is detected.  ERCP may better characterize.  3. Multiple bilateral renal cysts.  4. Prostatomegaly.  5. Postoperative changes of colectomy associated ulcerative colitis.  There is distension and air-fluid levels throughout the distal and mid bowel.  No evidence of  any focal mass or obstruction in the rectal equivalent or anus.  GI follow-up may better characterize.  6.  This report was flagged in Epic as abnormal.      Electronically signed by: Nicho Pelletier  Date:    06/02/2023  Time:    19:25               Narrative:    EXAMINATION:  CT ABDOMEN PELVIS WITH CONTRAST; CT CHEST WITH CONTRAST    CLINICAL HISTORY:  Abdominal pain, acute, nonlocalized;; Sepsis;f/u region of opacity on cxr;    TECHNIQUE:  Low dose axial images, sagittal and coronal reformations were obtained from the lung bases to the pubic symphysis following the IV administration of 75 mL of Omnipaque 350 .  Oral contrast was not administered.    Apparent CT chest with contrast is submitted under the heading CT abdomen and pelvis with contrast.    COMPARISON:  None.    FINDINGS:  Chest:    There is mild consolidation the right upper lobe could represent pneumonia.  Mild adjacent infiltrates.    Minimal adjacent infiltrate in the right middle lobe.  There is minimal infiltrate in the right lower lobe also.  Small layering pleural effusion posterior to the right lower lobe.    Minimal fluid or thickening along the major fissure on the left.  Minimal atelectasis in the lingula.  Mild interstitial infiltrate in the left upper lobe.    There is patchy alveolar infiltrate and consolidation the left lower lobe.  Probable component of atelectasis also.    Coronary atherosclerosis.    No evidence of aortic aneurysm or dissection.  No evidence of pulmonary embolism on limited visualization.    Abdomen:    - Liver: No focal mass.    - Gallbladder: No calcified gallstones.  No evidence of acute cholecystitis.  Mild distension.    - Bile Ducts: Mild dilation of the intrahepatic and extrahepatic biliary ducts.  Common duct measures approximately 1.5 cm.  ERCP may better characterize.  No mass or obstruction is detected.    - Spleen: Negative.    - Kidneys: Kidneys are normal in size.  There are numerous predominantly  subcentimeter cysts bilaterally.  There is a 2.7 cm cyst at the lower pole on the left and 4.2 cm left parapelvic cyst also.  No stone, soft tissue mass or hydronephrosis bilaterally.    - Adrenals: Unremarkable.    - Pancreas: No mass or peripancreatic fat stranding.  Limited visualization.    - Retroperitoneum:  No significant adenopathy.    - Vascular: No abdominal aortic aneurysm.    - Abdominal wall:  Unremarkable.    Pelvis:    Prostate enlargement 7.3 cm.    Mild distension of the urinary bladder.  No focal abnormality.    Bowel/Mesentery:    There is fluid distention of the rectum/gato rectum.  Postoperative changes of the rectum.  Status post colectomy.    There is gaseous dilation of loops of small bowel with a few air-fluid levels also.    Bones:  No acute osseous abnormality and no suspicious lytic or blastic lesion.                                        CT Chest With Contrast (Final result)  Result time 06/02/23 19:25:35   Procedure changed from CT Chest Without Contrast     Final result by Nicho German MD (06/02/23 19:25:35)                   Impression:      1. Bilateral consolidation and infiltrates most prominent left lower lobe and right upper lobe.  Findings could represent infection/pneumonia.  See above comments.  Follow-up recommended.  Minimal effusion on the right also.  2. There is dilation of the intrahepatic and extrahepatic biliary ducts with common duct measuring approximately 1.5 cm.  No obstruction is detected.  ERCP may better characterize.  3. Multiple bilateral renal cysts.  4. Prostatomegaly.  5. Postoperative changes of colectomy associated ulcerative colitis.  There is distension and air-fluid levels throughout the distal and mid bowel.  No evidence of any focal mass or obstruction in the rectal equivalent or anus.  GI follow-up may better characterize.  6.  This report was flagged in Epic as abnormal.      Electronically signed by: Nicho  Prabhu  Date:    06/02/2023  Time:    19:25               Narrative:    EXAMINATION:  CT ABDOMEN PELVIS WITH CONTRAST; CT CHEST WITH CONTRAST    CLINICAL HISTORY:  Abdominal pain, acute, nonlocalized;; Sepsis;f/u region of opacity on cxr;    TECHNIQUE:  Low dose axial images, sagittal and coronal reformations were obtained from the lung bases to the pubic symphysis following the IV administration of 75 mL of Omnipaque 350 .  Oral contrast was not administered.    Apparent CT chest with contrast is submitted under the heading CT abdomen and pelvis with contrast.    COMPARISON:  None.    FINDINGS:  Chest:    There is mild consolidation the right upper lobe could represent pneumonia.  Mild adjacent infiltrates.    Minimal adjacent infiltrate in the right middle lobe.  There is minimal infiltrate in the right lower lobe also.  Small layering pleural effusion posterior to the right lower lobe.    Minimal fluid or thickening along the major fissure on the left.  Minimal atelectasis in the lingula.  Mild interstitial infiltrate in the left upper lobe.    There is patchy alveolar infiltrate and consolidation the left lower lobe.  Probable component of atelectasis also.    Coronary atherosclerosis.    No evidence of aortic aneurysm or dissection.  No evidence of pulmonary embolism on limited visualization.    Abdomen:    - Liver: No focal mass.    - Gallbladder: No calcified gallstones.  No evidence of acute cholecystitis.  Mild distension.    - Bile Ducts: Mild dilation of the intrahepatic and extrahepatic biliary ducts.  Common duct measures approximately 1.5 cm.  ERCP may better characterize.  No mass or obstruction is detected.    - Spleen: Negative.    - Kidneys: Kidneys are normal in size.  There are numerous predominantly subcentimeter cysts bilaterally.  There is a 2.7 cm cyst at the lower pole on the left and 4.2 cm left parapelvic cyst also.  No stone, soft tissue mass or hydronephrosis bilaterally.    -  Adrenals: Unremarkable.    - Pancreas: No mass or peripancreatic fat stranding.  Limited visualization.    - Retroperitoneum:  No significant adenopathy.    - Vascular: No abdominal aortic aneurysm.    - Abdominal wall:  Unremarkable.    Pelvis:    Prostate enlargement 7.3 cm.    Mild distension of the urinary bladder.  No focal abnormality.    Bowel/Mesentery:    There is fluid distention of the rectum/gato rectum.  Postoperative changes of the rectum.  Status post colectomy.    There is gaseous dilation of loops of small bowel with a few air-fluid levels also.    Bones:  No acute osseous abnormality and no suspicious lytic or blastic lesion.                                       X-Ray Abdomen AP 1 View (KUB) (Final result)  Result time 06/02/23 17:37:23      Final result by Justin Gonzalez MD (06/02/23 17:37:23)                   Impression:      Mild gaseous distention of small bowel and right colon.  This could reflect ileus; although, developing bowel obstruction not excluded on the basis of this single view study.  Further evaluation/follow-up as warranted.    Electronically signed by resident: Gertrude Estrella  Date:    06/02/2023  Time:    17:20    Electronically signed by: Justin Gonzalez MD  Date:    06/02/2023  Time:    17:37               Narrative:    EXAMINATION:  XR ABDOMEN AP 1 VIEW    CLINICAL HISTORY:  Pain, unspecified    TECHNIQUE:  AP View of the abdomen was performed.    COMPARISON:  CTA abdomen pelvis 05/15/2022, abdominal radiograph 05/09/2022    FINDINGS:  Mild gaseous distention of small bowel and right colon.  No free air is visualized.    Small left pleural effusion and left lung base atelectasis or consolidation.                                       CT Head Without Contrast (Final result)  Result time 06/02/23 15:04:03      Final result by Justin Gonzalez MD (06/02/23 15:04:03)                   Impression:      1. No acute large vascular territory infarct or intracranial hemorrhage  identified.  If persistent neurologic deficit, MRI brain can be obtained.  2. Suspected sequela of chronic microvascular ischemic change and senescent change.  3. Right maxillary suspected chronic sinusitis and remote postoperative change of the left mastoid air cells/antrum which is partially opacified with soft tissue attenuation.  Clinical correlation advised.      Electronically signed by: Justin Gonzalez MD  Date:    06/02/2023  Time:    15:04               Narrative:    EXAMINATION:  CT HEAD WITHOUT CONTRAST    CLINICAL HISTORY:  Mental status change, unknown cause;    TECHNIQUE:  Low dose axial CT images obtained throughout the head without intravenous contrast. Sagittal and coronal reconstructions were performed.    COMPARISON:  None.    FINDINGS:  Patient is rotated and tilted within the scanner.  Beam hardening with streak artifact somewhat limits evaluation.    Intracranial compartment: Brain appears normally formed.    Age-related generalized cerebral volume loss.  Ventricles are midline without distortion by mass effect or acute hydrocephalus noting cavum septum pellucidum.  No extra-axial blood or fluid collections.    Mild patchy nonspecific hypoattenuation of the periventricular and subcortical white matter likely sequela of chronic microvascular ischemic change.  Basal ganglia and skull base atherosclerotic vascular calcifications noted.  No parenchymal mass, hemorrhage, edema or major vascular distribution infarct definitively seen.    Skull/extracranial contents (limited evaluation): No fracture. There is mucosal thickening with hyperostosis of the partially imaged right maxillary sinus likely sequela of chronic sinusitis.  Patchy opacification of few anterior ethmoidal air cells bilaterally.  Suspected remote postoperative change of the left mastoid air cells/antrum which is partially opacified with soft tissue density.  Right mastoid air cells are clear.  Remaining paranasal sinuses appear clear.                                        X-Ray Pelvis Complete min 3 views (Final result)  Result time 06/02/23 15:55:23      Final result by Justin Gonzalez MD (06/02/23 15:55:23)                   Impression:      No acute displaced fracture-dislocation definitively seen.      Electronically signed by: Justin Gonzalez MD  Date:    06/02/2023  Time:    15:55               Narrative:    EXAMINATION:  XR PELVIS COMPLETE MIN 3 VIEWS    CLINICAL HISTORY:  Pain, unspecified    TECHNIQUE:  Six views    COMPARISON:  CT abdomen and pelvis 05/14/2022    FINDINGS:  No displaced fracture, dislocation or destructive osseous process definitively seen, allowing for suboptimal positioning with patient rotation.  Mildly prominent osseous convexity at the superolateral aspect of the left femoral head and neck junction similar to previous CT.  Age-related degenerative change at the imaged lower lumbosacral spine, pubic symphysis and bilateral sacroiliac and hip joints, not significantly progressed from previous CT.  Multiple pelvic phleboliths and scattered atherosclerotic vascular calcifications with right lower quadrant and pelvic bowel postoperative changes again noted.  No subcutaneous emphysema.                                       X-Ray Tibia Fibula 2 View Left (Final result)  Result time 06/02/23 15:05:08      Final result by Justin Gonzalez MD (06/02/23 15:05:08)                   Impression:      No acute displaced fracture-dislocation identified.      Electronically signed by: Justin Gonzalez MD  Date:    06/02/2023  Time:    15:05               Narrative:    EXAMINATION:  XR TIBIA FIBULA 2 VIEW LEFT    CLINICAL HISTORY:  Pain, unspecified    TECHNIQUE:  AP and lateral views of the left tibia and fibula were performed.    COMPARISON:  None.    FINDINGS:  Overall alignment is within normal limits. No displaced fracture dislocation or destructive osseous process. Joint spaces appear relatively maintained. No subcutaneous emphysema or  radiodense retained foreign body.  Scattered atherosclerotic vascular calcifications noted.                                        X-Ray Chest AP Portable (Final result)  Result time 06/02/23 15:21:44      Final result by Darnell Zapien MD (06/02/23 15:21:44)                   Impression:      New right mid lung zone density, differential diagnosis includes atelectasis, loculated fluid minor fusion, focal area of pneumonitis anterior segment right upper lobe.    Left lung base remote densities stable.    Possible GI track distension with air.    This report was flagged in Epic as abnormal.      Electronically signed by: Darnell Zapien MD  Date:    06/02/2023  Time:    15:21               Narrative:    EXAMINATION:  XR CHEST AP PORTABLE    CLINICAL HISTORY:  Sepsis;    TECHNIQUE:  Single frontal view of the chest was performed.    COMPARISON:  05/16/2023    FINDINGS:  Rotation chest left, horizontal tear drop shaped opacity right midlung zone minor fissure region.  Left lung base pleural reaction atelectasis consolidation deformity obscuring left hemidiaphragm cardiac margin stable.  Pulmonary vascular tree appears intact.  Evidence of dilation of included GI track lumen.                                    X-Rays:   Independently Interpreted Readings:   Chest X-Ray: Normal heart size. New right mid lung zone density   Head CT: No hemorrhage.  No skull fracture.  No acute stroke.   Medications   carbidopa-levodopa  mg per tablet 1 tablet (1 tablet Oral Given 6/2/23 2227)   finasteride tablet 5 mg (has no administration in time range)   modafiniL tablet 100 mg (100 mg Oral Not Given 6/2/23 2200)   tobramycin-dexAMETHasone 0.3-0.1% ophthalmic suspension 1 drop (1 drop Both Eyes Not Given 6/2/23 2200)   piperacillin-tazobactam (ZOSYN) 4.5 g in dextrose 5 % in water (D5W) 5 % 100 mL IVPB (MB+) (has no administration in time range)   0.9%  NaCl infusion (has no administration in time range)   lactated  ringers bolus 500 mL (0 mLs Intravenous Stopped 6/2/23 1527)   lactated ringers bolus 1,100 mL (0 mLs Intravenous Stopped 6/2/23 1645)   vancomycin 1,250 mg in dextrose 5 % (D5W) 250 mL IVPB (Vial-Mate) (0 mg Intravenous Stopped 6/2/23 2003)   iohexoL (OMNIPAQUE 350) injection 75 mL (75 mLs Intravenous Given 6/2/23 1742)     Medical Decision Making:   History:   I obtained history from: someone other than patient.       <> Summary of History: Hx obtained by patient's family and caregiver  Old Medical Records: I decided to obtain old medical records.  Old Records Summarized: records from previous admission(s).       <> Summary of Records: 5/18/23:  ED visit for shortness of breath and lower extremity edema, placed on Lasix and discharged  Initial Assessment:   Mr. Lemus presented to ED with complaint of AMS that started two days ago. Ordered septic and neurologic work up.   Differential Diagnosis:   1. Stroke  2. ICH  3. Sepsis  4. Dehydration  5. Electrolyte abnormalities  6. Worsening dementia  7. Failure to thrive  Independently Interpreted Test(s):   I have ordered and independently interpreted X-rays - see prior notes.  I have ordered and independently interpreted EKG Reading(s) - see prior notes  Clinical Tests:   Lab Tests: Ordered and Reviewed  The following lab test(s) were unremarkable: Lactate  Radiological Study: Ordered and Reviewed  Medical Tests: Ordered and Reviewed  ED Management:  Leukocytosis (21.8). Lactate and procal negative. Ordered vanc, zosyn, and IVF. CXR showed new right mid lung zone density. Ordered CT chest for further investigation. Pelvic and tib/fib x-rays showed no acute changes. CTH revealed no acute large vascular territory infarct or hemorrhage. Significant colon distension seen on KUB. Ordered CT abdomen/pelvis for further investigation.     Discussed recommendation for admission to hospital for further management of sepsis. Family agreeable to plan.     Signed out to   [place] due to shift change. Pending CT abdomen/pelvis and CT chest  Other:   I discussed test(s) with the performing physician.  I have discussed this case with another health care provider.          Attending Attestation:   Physician Attestation Statement for Resident:  As the supervising MD   Physician Attestation Statement: I have personally seen and examined this patient.   I agree with the above history.  -:   As the supervising MD I agree with the above PE.     As the supervising MD I agree with the above treatment, course, plan, and disposition.                  I have reviewed and concur with the resident's history, physical, assessment, and plan.  I have personally interviewed and examined the patient at bedside.   I did supervise any and all procedures and was present for any critical portion, and was always immediately available for help and as a resource.     The above history physical, review of symptoms, HPI and physical exam reflect my independent interpretation and evaluation.    Complexity: critical care    Final diagnoses:  [R41.82] AMS (altered mental status)  [R52] Pain  [A41.9] Sepsis, due to unspecified organism, unspecified whether acute organ dysfunction present (Primary)     Amilcar Maki DO, [facility]  Emergency Staff Physician   Dept of Emergency Medicine   [facility]  Spectralink: [id number]        Disclaimer: This note has been generated using voice-recognition software. There may be typographical errors that have been missed during proof-reading.            ED Course as of 06/02/23 2317 Fri Jun 02, 2023   1658 WBC(!): 21.81 [CW]   1912 X-Ray Chest AP Portable(!) [CW]   1932 CT Chest With Contrast(!) [CW]   2007 Leukocytes, UA(!): 1+ [CW]   2007 CT Abdomen Pelvis With Contrast(!) [CW]      ED Course User Index  [CW] Matt Laughlin DO                 Clinical Impression:   Final diagnoses:  [R41.82] AMS (altered mental status)  [R52] Pain  [A41.9] Sepsis, due to  unspecified organism, unspecified whether acute organ dysfunction present (Primary)        ED Disposition Condition    Admit                 Victoria Estrella DO  Resident  06/02/23 2493       Amilcar Maki DO  06/02/23 0249

## 2023-06-02 NOTE — ED NOTES
iSTAT Chem 8    Na+ 137   K+ 4.8   Cl 100   iCa 1.16   TCO2 28   Glu 108   BUN 27   Creat 1.1   Hct% 29

## 2023-06-02 NOTE — TELEPHONE ENCOUNTER
----- Message from Che Ramirez sent at 6/2/2023 10:29 AM CDT -----  Contact: [phone]  Patient is calling for Medical Advice regarding: Patient wife would like a call back to discuss the patient condition, he is not eating and refusing to take his medication.

## 2023-06-02 NOTE — PROGRESS NOTES
"    /72 (BP Location: Left arm, Patient Position: Sitting, BP Method: Medium (Manual))   Pulse 65   Temp 97.8 °F (36.6 °C) (Oral)   Ht 5' 5" (1.651 m)   Wt 55 kg (121 lb 4.1 oz)   SpO2 97%   BMI 20.18 kg/m²       ===========    Chief Complaint: No chief complaint on file.          ERICKSON Lemus is a 89 y.o. male     here with his son to discuss possible home health.  Patient has Parkinson's disease.  He is had frequent falls.  Patient's son is pain for 24 hour care for the patient at his home but he may be needing to look into nursing home situation soon     Patient complains of some right leg pain today.      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  Past Surgical History:   Procedure Laterality Date    TOTAL COLECTOMY       ALLERGIES updated and reviewed.  Review of patient's allergies indicates:   Allergen Reactions    Heparin     Heparin analogues Other (See Comments)     Not true allergy - but patient developed large spontaneous RP hematoma and concurrent severe epistaxis while on DVT prophylactic dose heparin - consider mechanical DVT ppx in future       CURRENT OUTPATIENT MEDICATIONS updated and reviewed    Current Outpatient Medications:     balsam peru-castor oiL Oint, Apply topically 2 (two) times a day. Apply to " buttocks, Disp: , Rfl:     carbidopa-levodopa  mg (SINEMET)  mg per tablet, TAKE 1 TABLET BY MOUTH 4 (FOUR) TIMES A DAY FOR 30 DAYS., Disp: 120 tablet, Rfl: 11    modafiniL (PROVIGIL) 100 MG Tab, Take 1 tablet (100 mg total) by mouth 2 (two) times daily. (Morning and early afternoon), Disp: 60 tablet, Rfl: 2    sodium chloride (OCEAN) 0.65 % nasal spray, 1 spray by Nasal route as needed (irritation)., Disp: , Rfl: 12    TOBRADEX 0.3-0.1 % DrpS, Place 1 drop into both eyes 4 (four) times daily., Disp: , Rfl:     finasteride (PROSCAR) 5 mg tablet, Take 1 tablet (5 mg total) by mouth once daily., Disp: 90 tablet, Rfl: 3    furosemide (LASIX) 20 MG tablet, Take 0.5 tablets (10 mg total) by mouth once daily., Disp: 45 tablet, Rfl: 1    Review of Systems   Constitutional:  Negative for activity change, appetite change, chills, diaphoresis, fatigue, fever and unexpected weight change.   HENT:  Negative for congestion, ear discharge, ear pain, facial swelling, hearing loss, nosebleeds, postnasal drip, rhinorrhea, sinus pressure, sneezing, sore throat, tinnitus, trouble swallowing and voice change.    Eyes:  Negative for photophobia, pain, discharge, redness, itching and visual disturbance.   Respiratory:  Negative for cough, chest tightness, shortness of breath and wheezing.    Cardiovascular:  Negative for chest pain, palpitations and leg swelling.   Gastrointestinal:  Negative for abdominal distention, abdominal pain, anal bleeding, blood in stool, constipation, diarrhea, nausea, rectal pain and vomiting.   Endocrine: Negative for cold intolerance, heat intolerance, polydipsia, polyphagia and polyuria.   Genitourinary:  Negative for difficulty urinating, dysuria and flank pain.   Musculoskeletal:  Positive for arthralgias (negative except as in HPI). Negative for back pain, joint swelling, myalgias and neck pain.   Skin:  Negative for rash.   Neurological:  Negative for dizziness, tremors, seizures, syncope,  "speech difficulty, weakness, light-headedness, numbness and headaches.   Psychiatric/Behavioral:  Negative for behavioral problems, confusion, decreased concentration, dysphoric mood, sleep disturbance and suicidal ideas. The patient is not nervous/anxious and is not hyperactive.      /72 (BP Location: Left arm, Patient Position: Sitting, BP Method: Medium (Manual))   Pulse 65   Temp 97.8 °F (36.6 °C) (Oral)   Ht 5' 5" (1.651 m)   Wt 55 kg (121 lb 4.1 oz)   SpO2 97%   BMI 20.18 kg/m²   Physical Exam  Vitals and nursing note reviewed.   Constitutional:       General: He is not in acute distress.     Appearance: He is well-developed. He is not ill-appearing (very thin and weak appearing but nontoxic appearing) or toxic-appearing.   HENT:      Head: Normocephalic and atraumatic.      Right Ear: Tympanic membrane, ear canal and external ear normal.      Left Ear: Tympanic membrane, ear canal and external ear normal.      Nose: Nose normal.      Mouth/Throat:      Lips: Pink.      Mouth: Mucous membranes are moist.      Pharynx: No oropharyngeal exudate or posterior oropharyngeal erythema.   Eyes:      General: No scleral icterus.        Right eye: No discharge.         Left eye: No discharge.      Extraocular Movements: Extraocular movements intact.      Conjunctiva/sclera: Conjunctivae normal.   Cardiovascular:      Rate and Rhythm: Normal rate and regular rhythm.      Pulses: Normal pulses.      Heart sounds: Normal heart sounds. No murmur heard.  Pulmonary:      Effort: Pulmonary effort is normal. No respiratory distress.      Breath sounds: Normal breath sounds. No wheezing or rales.   Abdominal:      General: Bowel sounds are normal. There is no distension.      Palpations: Abdomen is soft. There is no mass.      Tenderness: There is no abdominal tenderness. There is no right CVA tenderness, left CVA tenderness, guarding or rebound.      Hernia: No hernia is present.   Musculoskeletal:      Cervical " back: Normal range of motion and neck supple. No rigidity or tenderness.   Lymphadenopathy:      Cervical: No cervical adenopathy.   Skin:     General: Skin is warm and dry.   Neurological:      General: No focal deficit present.      Mental Status: He is alert. Mental status is at baseline.   Psychiatric:         Mood and Affect: Mood normal.         Behavior: Behavior normal. Behavior is cooperative.       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  -     furosemide (LASIX) 20 MG tablet; Take 0.5 tablets (10 mg total) by mouth once daily.      Patient definitely meets criteria for home health.  Home health to include physical therapy, occupational therapy, speech therapy, nursing assistance and nursing aide assistance.  Follow-up in 3-6 months.      All lab results over past 2 years available reviewed inc labs and any cardiology or radiology studies.  Any new prescription medications gone over in detail including reason for taking the medication, the general mechanism of action, most common possible side effects and possible costs, etcetera.    Chronic conditions updated. Other than changes or additions as above, cont current medications and maintain follow-up with specialists if indicated.     Tl Torres MD  A dictation  device was used to produce this document. Use of such devices sometimes results in grammatical errors or replacement of words that sound similarly.      Long discussion about possible nursing home placement about nature of home health, about DME equipment that he has.    Vaccines declined     Est5  Time spent in the evaluation and management of this patient exceeded 40min and greater than 50% of this time was in face-to-face time with the patient on day of the clinic visit.   This includes face-to-face time and non face-to-face time and includes the following:  --preparing to see the patient (eg, obtaining and/or reviewing old records such as, when applicable, primary care notes, specialist notes, hospital notes, review of laboratory tests, and/or radiographic and/or cardiology or other studies)  --performing a medically appropriate review of systems and examination and/or evaluation  --reviewing and independently interpreting results (not separately reported; eg, lab results) and communicating results to the patient and/or family/caregiver  --placing orders and/or reviewing other physician's orders which can both include medications, laboratory studies, radiographic studies, procedures, referrals etcetera and   --counseling and educating the patient and/or family member/caregiver regarding the treatment plan  --documentation of the visit in the electronic health record  --communicating with other health care providers regarding referrals, studies, follow-up, etc

## 2023-06-02 NOTE — ED TRIAGE NOTES
\Eng Ary Lemus, a 89 y.o. male presents to the ED w/ family complaint of AMS x2 days. Pt. Has difficulty hearing and communicating.     Triage note:  Chief Complaint   Patient presents with    Altered Mental Status     Family reports decline in mental status since wed now  more confused     Review of patient's allergies indicates:   Allergen Reactions    Heparin     Heparin analogues Other (See Comments)     Not true allergy - but patient developed large spontaneous RP hematoma and concurrent severe epistaxis while on DVT prophylactic dose heparin - consider mechanical DVT ppx in future     Past Medical History:   Diagnosis Date    BPH (benign prostatic hyperplasia)     Parkinsons 09/2019    R hand tremor starting in 2017, diagnosed Sept 2019 by Dr. Angeles at     Ulcerative colitis     s/p colectomy    Unspecified chronic bronchitis

## 2023-06-02 NOTE — PROVIDER PROGRESS NOTES - EMERGENCY DEPT.
Encounter Date: 6/2/2023    ED Physician Progress Notes          Assuming care from resident Dr. Estrella.  Patient has leukocytosis white blood cell count over 20, started on fluid resuscitation and broad-spectrum antibiotics.  CT abdominal pelvis and chest significant for potential pneumonia, dilation of biliary system. Will be admitted to Hospital Medicine for further workup, evaluation, and treatment of sepsis.

## 2023-06-02 NOTE — TELEPHONE ENCOUNTER
Spoke with the pt's wife, she advised that the pt refused medication last night. This morning se is having trouble waking him up and getting to stay awake. Pt is refusing to eat or take medication this morning. Wife is having trouble getting him to respond while when she is able to wake him.     Advised ED immediately, call 911 if unable to awaken pt for personal transport. Wife VU & feels they are able to use personal transport. Again advised, ED immediately & to call 911 if unable to awaken pt for personal transport.

## 2023-06-03 PROBLEM — J18.9 MULTIFOCAL PNEUMONIA: Status: ACTIVE | Noted: 2023-01-01

## 2023-06-03 PROBLEM — K83.8 DILATION OF BILIARY TRACT: Status: ACTIVE | Noted: 2023-01-01

## 2023-06-03 PROBLEM — G20.A1 PARKINSON'S DISEASE: Status: ACTIVE | Noted: 2023-01-01

## 2023-06-03 PROBLEM — H57.89 EYE IRRITATION: Status: ACTIVE | Noted: 2023-01-01

## 2023-06-03 PROBLEM — I10 HYPERTENSION: Status: ACTIVE | Noted: 2023-01-01

## 2023-06-03 PROBLEM — G93.41 ACUTE METABOLIC ENCEPHALOPATHY: Status: ACTIVE | Noted: 2023-01-01

## 2023-06-03 NOTE — NURSING
Pt alert and oriented to self. Pt extremely confused, not following commands. Pt moved upper and Lower extremely spontaneously. Pt tried getting out bed multiple times wanting to go home.     Wife called to check up on pt and  stated that pt has difficulty hearing due to hearing aid not available. Also mentioned pt cannot walk.     Avasys not available at this time. Safety precautions maintained, bed alarm set and call light within reach, instructed to call when need help.

## 2023-06-03 NOTE — ASSESSMENT & PLAN NOTE
Patient has multilobar pneumonia.  On broad-spectrum antibiotics.  Follow up on cultures.  Will keep him on IV fluids for now.

## 2023-06-03 NOTE — ASSESSMENT & PLAN NOTE
Patient has leukocytosis tachycardia on presentation.  Sources pneumonia.  Received IV fluids.  Currently on broad-spectrum antibiotics.  Check blood cultures sputum , respiratory cultures..  Consult speech to evaluate for aspiration  Though CT abdomen suggestive of intrahepatic and extrahepatic dilation there is no elevated T bilirubin.

## 2023-06-03 NOTE — SUBJECTIVE & OBJECTIVE
Interval History:  Patient seen and examined with family at bedside.  Patient is extremely hard of hearing but responds with loud questioning.  Most of the patient's responses do not seem appropriate currently but unsure if that is because the patient is unable to hear.  Family does report bowel movements up to day of admission with no evidence of abdominal pain, nausea/vomiting.  Family does report frequent dry cough over the last several days.  No evidence of shortness of breaths per family.      Contacted wife who said that the patient has recently experienced declining mentation and ability to walk, thought likely due to advancing Parkinson's.  Wife also says that the patient frequently has issues with secretions in his eyes in the morning.  Patient also has frequent styes.  Wife says patient has been instructed to use warm compresses and baby shampoo to help with eyes in morning.  Patient was previously treated for eye infection with TobraDex drops with resolution.  Patient has not taken TobraDex since last year according to the wife.  This morning, patient was noncompliant with medication as it appeared to irritate his eyes and eyelids.  No erythema noted to conjunctiva, although difficult to examine.  Will try saline drops and warm towels to wipe away any secretions.    Labs reviewed:  WBC improved from 21.81 to 11.89.  Hemoglobin low at 7.9, sodium normal at 137, potassium normal at 4.4, CO2 normal at 26, creatinine normal at 0.8.    Discussed with Gastroenterology regarding intra and extrahepatic dilatation as well as small bowel loops and air-fluid levels with normal LFT.  Consider MRCP.     Review of Systems  Objective:     Vital Signs (Most Recent):  Temp: 96 °F (35.6 °C) (06/03/23 1539)  Pulse: 74 (06/03/23 1539)  Resp: 16 (06/03/23 1539)  BP: 135/76 (06/03/23 1539)  SpO2: (!) 94 % (06/03/23 1539) Vital Signs (24h Range):  Temp:  [96 °F (35.6 °C)-97.4 °F (36.3 °C)] 96 °F (35.6 °C)  Pulse:  []  74  Resp:  [13-25] 16  SpO2:  [92 %-100 %] 94 %  BP: (107-173)/() 135/76     Weight: 54.9 kg (121 lb)  Body mass index is 20.14 kg/m².    Intake/Output Summary (Last 24 hours) at 6/3/2023 1830  Last data filed at 6/3/2023 1800  Gross per 24 hour   Intake 1289.95 ml   Output 350 ml   Net 939.95 ml         Physical Exam  Vitals and nursing note reviewed.   Constitutional:       Appearance: He is well-developed.      Comments: Asleep on exam, wakes to loud questioning as he is very hard of hearing   Eyes:      Comments: Patient with secretions and crusting at eyelids and patient not very compliant with exam.  Patient did open eyes briefly, did not note erythematous conjunctiva   Cardiovascular:      Rate and Rhythm: Normal rate and regular rhythm.   Pulmonary:      Effort: Pulmonary effort is normal.      Comments: Decreased breath sounds bilaterally but patient noncompliant with inspiration  Abdominal:      Palpations: Abdomen is soft.      Tenderness: There is no abdominal tenderness. There is no guarding or rebound.   Skin:     General: Skin is warm and dry.   Neurological:      Comments: Unclear if oriented.  Unclear if patient not following commands.  Patient extremely hard of hearing and may just not be able to hear what we are asking of him.  Moves all 4 extremities spontaneously.  No facial asymmetry noted.   Psychiatric:         Behavior: Behavior normal.           Significant Labs: All pertinent labs within the past 24 hours have been reviewed.  CBC:   Recent Labs   Lab 06/02/23  1350 06/02/23  1446 06/03/23  0819   WBC 21.81*  --  11.89   HGB 8.7*  --  7.9*   HCT 29.1* 29* 26.1*     --  227     CMP:   Recent Labs   Lab 06/02/23  1350 06/03/23  0819    137   K 4.9 4.4    104   CO2 30* 26    73   BUN 25* 17   CREATININE 1.0 0.8   CALCIUM 9.1 8.6*   PROT 7.2 5.8*   ALBUMIN 2.9* 2.2*   BILITOT 0.5 0.5   ALKPHOS 113 94   AST 13 11   ALT <5* <5*   ANIONGAP 6* 7*       Significant  Imaging: I have reviewed all pertinent imaging results/findings within the past 24 hours.

## 2023-06-03 NOTE — HPI
89-year-old male with past medical history of parkinsonism, multiple falls was brought by family to the emergency room for confusion.  Patient was seen by his primary care physician 2 days back for a fall and femur x-ray was ordered.  Patient today was verbal but it does not make sense what he is talking.  Workup in the emergency room showed leukocytosis and pan  CT showed consolidation .  Spoke to patient's brother beckyquita wants him to be full code.

## 2023-06-03 NOTE — PLAN OF CARE
Problem: Skin Injury Risk Increased  Goal: Skin Health and Integrity  Outcome: Ongoing, Progressing     Problem: Adult Inpatient Plan of Care  Goal: Plan of Care Review  Outcome: Ongoing, Progressing  Goal: Patient-Specific Goal (Individualized)  Outcome: Ongoing, Progressing  Goal: Absence of Hospital-Acquired Illness or Injury  Outcome: Ongoing, Progressing  Goal: Optimal Comfort and Wellbeing  Outcome: Ongoing, Progressing  Goal: Readiness for Transition of Care  Outcome: Ongoing, Progressing     Problem: Adjustment to Illness (Sepsis/Septic Shock)  Goal: Optimal Coping  Outcome: Ongoing, Progressing     Problem: Bleeding (Sepsis/Septic Shock)  Goal: Absence of Bleeding  Outcome: Ongoing, Progressing     Problem: Glycemic Control Impaired (Sepsis/Septic Shock)  Goal: Blood Glucose Level Within Desired Range  Outcome: Ongoing, Progressing     Problem: Infection Progression (Sepsis/Septic Shock)  Goal: Absence of Infection Signs and Symptoms  Outcome: Ongoing, Progressing     Problem: Nutrition Impaired (Sepsis/Septic Shock)  Goal: Optimal Nutrition Intake  Outcome: Ongoing, Progressing     Problem: Fluid Imbalance (Pneumonia)  Goal: Fluid Balance  Outcome: Ongoing, Progressing     Problem: Infection (Pneumonia)  Goal: Resolution of Infection Signs and Symptoms  Outcome: Ongoing, Progressing     Problem: Respiratory Compromise (Pneumonia)  Goal: Effective Oxygenation and Ventilation  Outcome: Ongoing, Progressing     Problem: Impaired Wound Healing  Goal: Optimal Wound Healing  Outcome: Ongoing, Progressing     Problem: Fall Injury Risk  Goal: Absence of Fall and Fall-Related Injury  Outcome: Ongoing, Progressing

## 2023-06-03 NOTE — ED NOTES
Nurse bedside to complete pericare.  Patient able to turn and maintain positioning with good posture.  Condom catheter removed, linen changed, mepolex placed as preventative measure due to decrease activity and inpatient hospitalization stay.

## 2023-06-03 NOTE — SUBJECTIVE & OBJECTIVE
Past Medical History:   Diagnosis Date    BPH (benign prostatic hyperplasia)     Parkinsons 09/2019    R hand tremor starting in 2017, diagnosed Sept 2019 by Dr. Angeles at     Ulcerative colitis     s/p colectomy    Unspecified chronic bronchitis        Past Surgical History:   Procedure Laterality Date    TOTAL COLECTOMY         Review of patient's allergies indicates:   Allergen Reactions    Heparin     Heparin analogues Other (See Comments)     Not true allergy - but patient developed large spontaneous RP hematoma and concurrent severe epistaxis while on DVT prophylactic dose heparin - consider mechanical DVT ppx in future       No current facility-administered medications on file prior to encounter.     Current Outpatient Medications on File Prior to Encounter   Medication Sig    balsam peru-castor oiL Oint Apply topically 2 (two) times a day. Apply to buttocks    carbidopa-levodopa  mg (SINEMET)  mg per tablet TAKE 1 TABLET BY MOUTH 4 (FOUR) TIMES A DAY FOR 30 DAYS.    finasteride (PROSCAR) 5 mg tablet Take 1 tablet (5 mg total) by mouth once daily.    furosemide (LASIX) 20 MG tablet Take 0.5 tablets (10 mg total) by mouth once daily.    modafiniL (PROVIGIL) 100 MG Tab Take 1 tablet (100 mg total) by mouth 2 (two) times daily. (Morning and early afternoon)    sodium chloride (OCEAN) 0.65 % nasal spray 1 spray by Nasal route as needed (irritation).    TOBRADEX 0.3-0.1 % DrpS Place 1 drop into both eyes 4 (four) times daily.     Family History    None       Tobacco Use    Smoking status: Never    Smokeless tobacco: Never   Substance and Sexual Activity    Alcohol use: Never    Drug use: Not on file    Sexual activity: Not on file     Review of Systems   Unable to perform ROS: Mental status change   Objective:     Vital Signs (Most Recent):  Temp: 96.9 °F (36.1 °C) (06/02/23 2027)  Pulse: 94 (06/02/23 2027)  Resp: 18 (06/02/23 2027)  BP: (!) 142/64 (06/02/23 2027)  SpO2: 100 % (06/02/23 2027) Vital  Signs (24h Range):  Temp:  [96.8 °F (36 °C)-97.6 °F (36.4 °C)] 96.9 °F (36.1 °C)  Pulse:  [59-94] 94  Resp:  [9-21] 18  SpO2:  [96 %-100 %] 100 %  BP: (119-157)/(52-68) 142/64     Weight: 54.9 kg (121 lb)  Body mass index is 20.14 kg/m².     Physical Exam  HENT:      Head: Normocephalic and atraumatic.   Cardiovascular:      Rate and Rhythm: Normal rate and regular rhythm.   Pulmonary:      Effort: Pulmonary effort is normal.      Breath sounds: Normal breath sounds.   Musculoskeletal:         General: Normal range of motion.      Cervical back: Normal range of motion.   Skin:     General: Skin is dry.      Capillary Refill: Capillary refill takes less than 2 seconds.   Neurological:      Mental Status: He is alert.      Comments: Alert and O@0              Significant Labs: All pertinent labs within the past 24 hours have been reviewed.  None    Significant Imaging: I have reviewed all pertinent imaging results/findings within the past 24 hours.

## 2023-06-03 NOTE — H&P
[facility] - Emergency Dept  Hospital Medicine  History & Physical    Patient Name: Giselle Lemus  MRN: [id number]  Patient Class: IP- Inpatient  Admission Date: 6/2/2023  Attending Physician: Justin Tam MD   Primary Care Provider: Tl Torres MD         Patient information was obtained from patient and ER records.     Subjective:     Principal Problem:<principal problem not specified>    Chief Complaint:   Chief Complaint   Patient presents with    Altered Mental Status     Family reports decline in mental status since wed now  more confused        HPI: 89-year-old male with past medical history of parkinsonism, multiple falls was brought by family to the emergency room for confusion.  Patient was seen by his primary care physician 2 days back for a fall and femur x-ray was ordered.  Patient today was verbal but it does not make sense what he is talking.  Workup in the emergency room showed leukocytosis and pan  CT showed consolidation .  Spoke to patient's brother inlaw wants him to be full code.      Past Medical History:   Diagnosis Date    BPH (benign prostatic hyperplasia)     Parkinsons 09/2019    R hand tremor starting in 2017, diagnosed Sept 2019 by Dr. Angeles at     Ulcerative colitis     s/p colectomy    Unspecified chronic bronchitis        Past Surgical History:   Procedure Laterality Date    TOTAL COLECTOMY         Review of patient's allergies indicates:   Allergen Reactions    Heparin     Heparin analogues Other (See Comments)     Not true allergy - but patient developed large spontaneous RP hematoma and concurrent severe epistaxis while on DVT prophylactic dose heparin - consider mechanical DVT ppx in future       No current facility-administered medications on file prior to encounter.     Current Outpatient Medications on File Prior to Encounter   Medication Sig    balsam peru-castor oiL Oint Apply topically 2 (two) times a day. Apply to buttocks    carbidopa-levodopa  mg  (SINEMET)  mg per tablet TAKE 1 TABLET BY MOUTH 4 (FOUR) TIMES A DAY FOR 30 DAYS.    finasteride (PROSCAR) 5 mg tablet Take 1 tablet (5 mg total) by mouth once daily.    furosemide (LASIX) 20 MG tablet Take 0.5 tablets (10 mg total) by mouth once daily.    modafiniL (PROVIGIL) 100 MG Tab Take 1 tablet (100 mg total) by mouth 2 (two) times daily. (Morning and early afternoon)    sodium chloride (OCEAN) 0.65 % nasal spray 1 spray by Nasal route as needed (irritation).    TOBRADEX 0.3-0.1 % DrpS Place 1 drop into both eyes 4 (four) times daily.     Family History    None       Tobacco Use    Smoking status: Never    Smokeless tobacco: Never   Substance and Sexual Activity    Alcohol use: Never    Drug use: Not on file    Sexual activity: Not on file     Review of Systems   Unable to perform ROS: Mental status change   Objective:     Vital Signs (Most Recent):  Temp: 96.9 °F (36.1 °C) (06/02/23 2027)  Pulse: 94 (06/02/23 2027)  Resp: 18 (06/02/23 2027)  BP: (!) 142/64 (06/02/23 2027)  SpO2: 100 % (06/02/23 2027) Vital Signs (24h Range):  Temp:  [96.8 °F (36 °C)-97.6 °F (36.4 °C)] 96.9 °F (36.1 °C)  Pulse:  [59-94] 94  Resp:  [9-21] 18  SpO2:  [96 %-100 %] 100 %  BP: (119-157)/(52-68) 142/64     Weight: 54.9 kg (121 lb)  Body mass index is 20.14 kg/m².     Physical Exam  HENT:      Head: Normocephalic and atraumatic.   Cardiovascular:      Rate and Rhythm: Normal rate and regular rhythm.   Pulmonary:      Effort: Pulmonary effort is normal.      Breath sounds: Normal breath sounds.   Musculoskeletal:         General: Normal range of motion.      Cervical back: Normal range of motion.   Skin:     General: Skin is dry.      Capillary Refill: Capillary refill takes less than 2 seconds.   Neurological:      Mental Status: He is alert.      Comments: Alert and O@0              Significant Labs: All pertinent labs within the past 24 hours have been reviewed.  None    Significant Imaging: I have reviewed all  pertinent imaging results/findings within the past 24 hours.    Assessment/Plan:     Pneumonia due to infectious organism    Patient has multilobar pneumonia.  On broad-spectrum antibiotics.  Follow up on cultures.  Will keep him on IV fluids for now.    Severe sepsis  Patient has leukocytosis tachycardia on presentation.  Sources pneumonia.  Received IV fluids.  Currently on broad-spectrum antibiotics.  Check blood cultures sputum , respiratory cultures..  Consult speech to evaluate for aspiration  Though CT abdomen suggestive of intrahepatic and extrahepatic dilation there is no elevated T bilirubin.    Multiple falls  Workup negative for any acute fractures.  Consult PTOT    Parkinson disease    On carbidopa      Normocytic anemia.  Check folate, vitamin B12, Iron     VTE Risk Mitigation (From admission, onward)    None                     Bam Garza MD  Department of Hospital Medicine  [facility] - Emergency Dept

## 2023-06-03 NOTE — ED NOTES
Telemetry Verification   Patient placed on Telemetry Box  Verified with War Room  Box #  [id number]   Monitor Repair Report Meghann   Rate 63   Rhythm Utd(artifact)

## 2023-06-03 NOTE — ED NOTES
While bedside to administer medication, patient became confused and attempted to grab at nurse to prevent care.  Patient redirected and re-oriented to time and situation, patient demonstrated understanding by complying.

## 2023-06-04 NOTE — PLAN OF CARE
Problem: Skin Injury Risk Increased  Goal: Skin Health and Integrity  Outcome: Ongoing, Progressing     Problem: Adult Inpatient Plan of Care  Goal: Plan of Care Review  Outcome: Ongoing, Progressing  Goal: Patient-Specific Goal (Individualized)  Outcome: Ongoing, Progressing  Goal: Absence of Hospital-Acquired Illness or Injury  Outcome: Ongoing, Progressing  Goal: Optimal Comfort and Wellbeing  Outcome: Ongoing, Progressing

## 2023-06-04 NOTE — ASSESSMENT & PLAN NOTE
-wife says patient has frequent issues with secretions especially in morning and has frequent styes.  No obvious conjunctivitis currently but patient noncompliant with exam.  -per wife, outpatient ophthalmology recommended use warm compresses with baby shampoo to assist with clearing secretions and crusting.  -has not been on TobraDex since 2022 for eye infection  -TobraDex seems irritating the patient.  Trial of saline drops with warm compresses p.r.n. to assist with clearing secretions.  Low threshold to start antibiotic drops if appears infectious.

## 2023-06-04 NOTE — PLAN OF CARE
Evaluation completed today. PT goals appropriate.    Patient is safe to perform bed <> chair transfer with assist x1 with nursing staff.    Please continue Progressive Mobility Protocol as appropriate.    Jannie Oakes, PT, DPT  2023  Pager: [phone]    Problem: Physical Therapy  Goal: Physical Therapy Goal  Description: Goals to be met by: 2023     Patient will increase functional independence with mobility by performin. Sit to stand transfer with Stand-by Assistance  2. Bed to chair transfer with Contact Guard Assistance using RW or LRAD  3. Gait  x 40 feet with Contact Guard Assistance using RW or LRAD.   4. Lower extremity exercise program x30 reps per handout, with independence    Outcome: Ongoing, Progressing

## 2023-06-04 NOTE — NURSING
Nurses Note -- 4 Eyes      6/3/2023      Skin assessed during: Admit      [x] No Altered Skin Integrity Present    [x]Prevention Measures Documented      [] Yes- Altered Skin Integrity Present or Discovered   [] LDA Added if Not in Epic (Describe Wound)   [] New Altered Skin Integrity was Present on Admit and Documented in LDA   [] Wound Image Taken    Wound Care Consulted? No    Attending Nurse:  Lyn Denny RN     Second RN/Staff Member:  KATELYNN Cortes

## 2023-06-04 NOTE — PLAN OF CARE
Problem: Skin Injury Risk Increased  Goal: Skin Health and Integrity  Outcome: Ongoing, Progressing     Problem: Skin Injury Risk Increased  Goal: Skin Health and Integrity  6/4/2023 0407 by Lyn Denny RN  Outcome: Ongoing, Progressing  6/4/2023 0357 by Lyn Denny, RN  Outcome: Ongoing, Progressing     POC reviewed with the patient , pt alert and oriented to self and confused. Bed locked in lowest position with bed alarm set, call light within reach. Safety precautions maintained. VSS, see flowsheets. No events this shift. Will continue to monitor for changes to POC and clinical condition.

## 2023-06-04 NOTE — PLAN OF CARE
Problem: Occupational Therapy  Goal: Occupational Therapy Goal  Description: Goals to be met by: 7/4/2023     Patient will increase functional independence with ADLs by performing:    UE Dressing with Minimal Assistance.  LE Dressing with Minimal Assistance.  Grooming while EOB with Contact Guard Assistance.  Toileting from bedside commode with Moderate Assistance for hygiene and clothing management.   Rolling to Bilateral with Contact Guard Assistance.   Supine to sit with Moderate Assistance.  Step transfer with Minimal Assistance  Toilet transfer to bedside commode with Minimal Assistance.    Outcome: Ongoing, Progressing

## 2023-06-04 NOTE — ASSESSMENT & PLAN NOTE
-iron studies not indicative of deficiency  -B12 and folate normal  -no signs of active bleeding  -monitor H&H with morning labs, transfuse hemoglobin less than 7

## 2023-06-04 NOTE — PLAN OF CARE
Problem: SLP  Goal: SLP Goal  Description: Speech Language Pathology Goals  Goals expected to be met by 6/18    1. Pt will tolerate puree solids and nectar thick liquids without any overt s/sx of airway compromise,   2. Pt will participate in ongoing swallow assessment to determine least restrictive PO diet.       Outcome: Ongoing, Progressing      Left message for patient to call and schedule.    Return in about 5 months (around 1/2/2018) for physical exam . Last 2016

## 2023-06-04 NOTE — PROGRESS NOTES
[facility] ([facility])  [facility] Medicine  Progress Note    Patient Name: Giselle Lemus  MRN: [id number]  Patient Class: IP- Inpatient   Admission Date: 6/2/2023  Length of Stay: 1 days  Attending Physician: Tyrone Sepulveda III,*  Primary Care Provider: Tl Torres MD        Subjective:     Principal Problem:<principal problem not specified>        HPI:  89-year-old male with past medical history of parkinsonism, multiple falls was brought by family to the emergency room for confusion.  Patient was seen by his primary care physician 2 days back for a fall and femur x-ray was ordered.  Patient today was verbal but it does not make sense what he is talking.  Workup in the emergency room showed leukocytosis and pan  CT showed consolidation .  Spoke to patient's brother sarah wants him to be full code.      Overview/Hospital Course:  No notes on file    Interval History:  Patient seen and examined with family at bedside.  Patient is extremely hard of hearing but responds with loud questioning.  Most of the patient's responses do not seem appropriate currently but unsure if that is because the patient is unable to hear.  Family does report bowel movements up to day of admission with no evidence of abdominal pain, nausea/vomiting.  Family does report frequent dry cough over the last several days.  No evidence of shortness of breaths per family.      Contacted wife who said that the patient has recently experienced declining mentation and ability to walk, thought likely due to advancing Parkinson's.  Wife also says that the patient frequently has issues with secretions in his eyes in the morning.  Patient also has frequent styes.  Wife says patient has been instructed to use warm compresses and baby shampoo to help with eyes in morning.  Patient was previously treated for eye infection with TobraDex drops with resolution.  Patient has not taken TobraDex since last year according to the wife.  This morning,  patient was noncompliant with medication as it appeared to irritate his eyes and eyelids.  No erythema noted to conjunctiva, although difficult to examine.  Will try saline drops and warm towels to wipe away any secretions.    Labs reviewed:  WBC improved from 21.81 to 11.89.  Hemoglobin low at 7.9, sodium normal at 137, potassium normal at 4.4, CO2 normal at 26, creatinine normal at 0.8.    Discussed with Gastroenterology regarding intra and extrahepatic dilatation as well as small bowel loops and air-fluid levels with normal LFT.  Consider MRCP.     Review of Systems  Objective:     Vital Signs (Most Recent):  Temp: 96 °F (35.6 °C) (06/03/23 1539)  Pulse: 74 (06/03/23 1539)  Resp: 16 (06/03/23 1539)  BP: 135/76 (06/03/23 1539)  SpO2: (!) 94 % (06/03/23 1539) Vital Signs (24h Range):  Temp:  [96 °F (35.6 °C)-97.4 °F (36.3 °C)] 96 °F (35.6 °C)  Pulse:  [] 74  Resp:  [13-25] 16  SpO2:  [92 %-100 %] 94 %  BP: (107-173)/() 135/76     Weight: 54.9 kg (121 lb)  Body mass index is 20.14 kg/m².    Intake/Output Summary (Last 24 hours) at 6/3/2023 1830  Last data filed at 6/3/2023 1800  Gross per 24 hour   Intake 1289.95 ml   Output 350 ml   Net 939.95 ml         Physical Exam  Vitals and nursing note reviewed.   Constitutional:       Appearance: He is well-developed.      Comments: Asleep on exam, wakes to loud questioning as he is very hard of hearing   Eyes:      Comments: Patient with secretions and crusting at eyelids and patient not very compliant with exam.  Patient did open eyes briefly, did not note erythematous conjunctiva   Cardiovascular:      Rate and Rhythm: Normal rate and regular rhythm.   Pulmonary:      Effort: Pulmonary effort is normal.      Comments: Decreased breath sounds bilaterally but patient noncompliant with inspiration  Abdominal:      Palpations: Abdomen is soft.      Tenderness: There is no abdominal tenderness. There is no guarding or rebound.   Skin:     General: Skin is warm and  dry.   Neurological:      Comments: Unclear if oriented.  Unclear if patient not following commands.  Patient extremely hard of hearing and may just not be able to hear what we are asking of him.  Moves all 4 extremities spontaneously.  No facial asymmetry noted.   Psychiatric:         Behavior: Behavior normal.           Significant Labs: All pertinent labs within the past 24 hours have been reviewed.  CBC:   Recent Labs   Lab 06/02/23  1350 06/02/23  1446 06/03/23  0819   WBC 21.81*  --  11.89   HGB 8.7*  --  7.9*   HCT 29.1* 29* 26.1*     --  227     CMP:   Recent Labs   Lab 06/02/23  1350 06/03/23  0819    137   K 4.9 4.4    104   CO2 30* 26    73   BUN 25* 17   CREATININE 1.0 0.8   CALCIUM 9.1 8.6*   PROT 7.2 5.8*   ALBUMIN 2.9* 2.2*   BILITOT 0.5 0.5   ALKPHOS 113 94   AST 13 11   ALT <5* <5*   ANIONGAP 6* 7*       Significant Imaging: I have reviewed all pertinent imaging results/findings within the past 24 hours.      Assessment/Plan:      Sepsis  Community-acquired multifocal pneumonia  -leukocytosis tachycardia on presentation with imaging consistent with pneumonia.   -currently on room air, monitor pulse ox with vitals  -continue IV fluids.    -WBC improving, continue Rocephin and azithromycin   -lactate normal  -sputum culture pending  -blood culture NGTD  -Consulted speech to evaluate for aspiration, appreciate assistance.    Acute metabolic encephalopathy  Parkinson's disease with cognitive decline  -likely related to infectious process in the settings of PD.  -some of this may be due to fact the patient is extremely hard of hearing with no hearing aid present.  -continue home Sinemet  -continue antibiotics  -frequent redirection  -optimize sleep/wake cycle.    Dilation of biliary tract  Bowel dilatation  Air-fluid levels  -normal transaminases and bilirubin with no constipation according to family.  -given decreased p.o. intake and images consistent with intra and  extrahepatic biliary dilatation with CBD dilatation as well, order MRCP.  -discussed with Gastroenterology who were not formally consulted.  Consider further discussion with GI pending MRCP results.  -discussed with wife 6/3.  No known metal in patient's body.      Eye irritation  -wife says patient has frequent issues with secretions especially in morning and has frequent styes.  No obvious conjunctivitis currently but patient noncompliant with exam.  -per wife, outpatient ophthalmology recommended use warm compresses with baby shampoo to assist with clearing secretions and crusting.  -has not been on TobraDex since 2022 for eye infection  -TobraDex seems irritating the patient.  Trial of saline drops with warm compresses p.r.n. to assist with clearing secretions.  Low threshold to start antibiotic drops if appears infectious.      Hypertension  -no known history of HTN and not on any meds per family.  -will monitor closely.      Sinus bradycardia  Right bundle-branch block  -SB and RBBB both chronic  -will continue to monitor on telemetry    Normocytic anemia  -iron studies not indicative of deficiency  -B12 and folate normal  -no signs of active bleeding  -monitor H&H with morning labs, transfuse hemoglobin less than 7      Multiple falls  Workup negative for any acute fractures.  Consult PTOT      VTE Risk Mitigation (From admission, onward)         Ordered     Place sequential compression device  Until discontinued         06/03/23 0725              Patient has listed allergy to heparin and heparin analogues.    Discharge Planning   JOHAN: 6/6/2023     Code Status: Full Code   Is the patient medically ready for discharge?: No    Reason for patient still in hospital (select all that apply): Patient trending condition, Laboratory test, Treatment, Imaging, PT / OT recommendations and Pending disposition                     Tyrone Sepulveda III, MD  Department of Hospital Medicine   [facility] - Neurosurgery  ([facility])     Estimated Blood Loss (Cc): minimal

## 2023-06-04 NOTE — PT/OT/SLP EVAL
Physical Therapy Co-Evaluation and Co-Treatment    Patient Name:  Giselle Lemus   MRN:  [id number]  Admit Date: 6/2/2023  Admitting Diagnosis:  <principal problem not specified>   Length of Stay: 2 days  Recent Surgery: * No surgery found *      Recommendations:     Discharge Recommendations:  other (see comments)   Discharge Equipment Recommendations: none   Barriers to discharge: None    Plan:     During this hospitalization, patient to be seen 3 x/week to address the identified rehab impairments via gait training, therapeutic activities, therapeutic exercises, neuromuscular re-education and progress towards the established goals.  Plan of Care Expires:  07/04/23  Plan of Care Reviewed with: patient    Assessment:     Giselle Lemus is a 89 y.o. male admitted with a medical diagnosis of <principal problem not specified>. Pt tolerated initial evaluation well today. [place] [facility] [place]2 [place]na. Pt's PMH significant for parkinsons. Pt is very [place] and due to this has difficulty understanding multi-step instructions, but is able to complete tasks with short, simple commands. He was able to stand and take steps with minimal assistance and use of RW with consistent posterior lean in both sitting and standing. Impaired standing balance and overall orientation to upright, midline posture likely 2/2 parkinsons diagnosis. Pt will continue to benefit from skilled PT services during this hospital admit to continue to improve transfer ability and efficiency as well as continue to progress pt's ambulation distance and cardiopulmonary endurance to maximize pt's functional independence and return to PLOF.    Problem List: weakness, impaired endurance, impaired self care skills, impaired functional mobility, gait instability, impaired balance, decreased upper extremity function, decreased lower extremity function, abnormal tone, impaired coordination, impaired cardiopulmonary response to activity.  Rehab Prognosis: Good;  patient would benefit from acute skilled PT services to address these deficits and reach maximum level of function.      Goals:   Multidisciplinary Problems       Physical Therapy Goals          Problem: Physical Therapy    Goal Priority Disciplines Outcome Goal Variances Interventions   Physical Therapy Goal     PT, PT/OT Ongoing, Progressing     Description: Goals to be met by: 2023     Patient will increase functional independence with mobility by performin. Sit to stand transfer with Stand-by Assistance  2. Bed to chair transfer with Contact Guard Assistance using RW or LRAD  3. Gait  x 40 feet with Contact Guard Assistance using RW or LRAD.   4. Lower extremity exercise program x30 reps per handout, with independence                         Subjective     RN notified prior to session. Caregiver present upon PT entrance into room. Patient agreeable to PT evaluation.    Chief Complaint: Altered Mental Status (Family reports decline in mental status since wed now  more confused)  Patient/Family Comments/goals: pt hopeful to return home   Pain/Comfort:  Pain Rating 1: 0/10  Pain Rating Post-Intervention 1: 0/10    Social History:  Residence: lives with their spouse 1-story house with 0 BRENDA and no HR. Pt's bathroom has a tub/shower.  Support available: family and sitters  Equipment Used: walker, rolling  Equipment Owned (not using): None  Prior level of function: pt was able to ambulate with CGA/SBA and RW around the home with assist from sitters. Was able to perform toileting and bathing without assist.   Hand Dominance: right.   Work: Retired   Drive: no.   Managing Medicines/Managing Home: no.     Patient reports they will have assistance from sitters ( assist available at home) upon discharge.    Objective:     Additional staff present: OT; OT for co-evaluation due to suspected patient need for two skilled therapists to appropriately assess patient's functional deficits as well as  "ensure patient safety, accommodate for limited activity tolerance, and provide appropriate, skilled assistance to maximize functional potential during evaluation.    Patient found HOB elevated with: telemetry, peripheral IV, PureWick     General Precautions: Standard, Cardiac aspiration, fall, pureed diet, nectar thick   Orthopedic Precautions:N/A   Braces: N/A   Body mass index is 20.14 kg/m².  Oxygen Device: Room Air  Vitals: BP (!) 144/73 (Patient Position: Lying)   Pulse 68   Temp 98.6 °F (37 °C) (Axillary)   Resp 18   Ht 5' 5" (1.651 m)   Wt 54.9 kg (121 lb)   SpO2 100%   BMI 20.14 kg/m²     Exams:  Cognition:   Alert and Cooperative  AxOx3  Command following: Follows one-step verbal commands  Fluency: clear/fluent  Hearing: Impaired: pt is very [place], wears hearing aids but they are broken at this time. Hears better out of Rt ear.  Vision:  Intact  Skin Integrity: Visible skin intact  Postural Assessment: slouched posture, rounded shoulders, forward head, poor midline awareness, and posterior trunk lean  Physical Exam:    Left UE Left LE Right UE Right LE   Edema absent absent absent absent   ROM AROM WFL AROM WFL AROM WFL AROM WFL   Strength adequate ROM, adequate strength 4/5 grossly adequate ROM, adequate strength 4/5 grossly   Sensation intact to light touch intact to light touch intact to light touch intact to light touch   Coordination abnormal abnormal abnormal abnormal     Outcome Measures:  AM-PAC 6 CLICK MOBILITY  Turning over in bed (including adjusting bedclothes, sheets and blankets)?: 3  Sitting down on and standing up from a chair with arms (e.g., wheelchair, bedside commode, etc.): 3  Moving from lying on back to sitting on the side of the bed?: 2  Moving to and from a bed to a chair (including a wheelchair)?: 3  Need to walk in hospital room?: 3  Climbing 3-5 steps with a railing?: 2  Basic Mobility Total Score: 16     Functional Mobility:    Bed Mobility:   Rolling/Turning to Left: " minimum assistance  Supine to Sit: maximal assistance; from Lt side of bed  Scooting anteriorly to EOB to have both feet planted on floor: maximal assistance  Sit to Supine: moderate assistance; to Lt side of bed    Sitting Balance at Edge of Bed:  Static Sitting Balance: Poor : unable to maintain balance and requires moderate to maximal assistance from individual or chair  Dynamic Sitting Balance: Poor: able to sit unsupported with moderate assistance and reach ipsilaterally or anteriorly. Unable to cross midline.  Assistance Level Required: Maximum Assistance, progressing to CGA with increased time  Time: 15 minutes  Postural deviations noted: slouched posture, rounded shoulders, and forward head  Comments: Time EOB focused primarily on tolerance to upright positioning, cardiopulmonary response and endurance for activities, and strength of postural musculature to perform dynamic sitting to prepare for tasks in the home. Pt initially with posterior trunk lean requiring max A to remain upright. However with continued facilitation of anterior weight shift and midline orientation he was able to progress to CGA while EOB and with BUE support.    Transfers:   Sit <> Stand Transfer: minimum assistance with rolling walker   Stand <> Sit Transfer: minimum assistance with rolling walker   [id number] from EOB    Standing Balance:  Static Standing Balance: Fair- : requires minimal assistance or UE support in order to stand without LOB  Dynamic Standing Balance: Poor+ : able to stand with minimal assistance and reach ipsilaterally. Unable to weight shift.  Assistance Level Required: Minimal Assistance  Patient used: rolling walker   Time: ~3 minutes  Postural deviations noted: slouched posture and rounded shoulders  Comments: Time in unsupported standing focused on cardiopulmonary tolerance to unsupported standing as well as strength/endurance to perform dynamic balance activity safely with continued posterior trunk lean  requiring Min A and RW to prevent LOB and vital signs.  Bilateral UE support needed while performing standing balance task of toileting and pericare at bedside. PT facilitating appropriate reactive response and preventing posterior LOB as OT assisted with ADLs.        Gait:   Patient ambulated: 6 ft fwd/ 6 ft bwd x 2 trials; 3 side steps to the Lt   Patient required: minimal assist  Patient used:  rolling walker   Gait Pattern observed: reciprocal gait  Gait Deviation(s): decreased step length, narrow base of support, decreased weight shift, shuffle gait, festinating gait, flexed posture, and decreased afshan  Impairments due to: impaired balance, impaired postural control, and impaired motor control  all lines remained intact throughout ambulation trial  Comments: Pt with FFP, narrow BRANDON, and short shuffling steps. VC's given for taking bigger steps but pt unable to hear despite PT speaking in Rt ear. Slight festinating pattern noted with FFP and minimal to no foot clearance in swing. Continued posterior trunk lean requiring Min A to prevent LOB and keep COM anteriorly over BRANDON. Pt given cues for maintaining upright midline posture but pt either unable to complete 2/2 [place] or unable to functionally perform.    Education:  Time provided for education, counseling and discussion of health disposition in regards to patient's current status  All questions answered within PT scope of practice and to patient's satisfaction  PT role in POC to address current functional deficits  Pt educated on proper body mechanics, safety techniques, and energy conservation with PT facilitation and cueing throughout session  Call nursing/pct to transfer to chair/use bathroom. Pt stated understanding.  Whiteboard updated with therapist name and pt's current mobility status documented above  Safe to perform stand pivot transfer to/from chair/bedside commode assist x 1 and HH w/ nursing/PCT present  Importance of OOB tolerance prn hrs/day to  improve lung ventilation and expansion as well as strengthen postural musculature    Patient left HOB elevated with all lines intact, call button in reach, RN notified, and caregiver present.      History:     Past Medical History:   Diagnosis Date    BPH (benign prostatic hyperplasia)     Parkinsons 09/2019    R hand tremor starting in 2017, diagnosed Sept 2019 by Dr. Angeles at     Ulcerative colitis     s/p colectomy    Unspecified chronic bronchitis        Past Surgical History:   Procedure Laterality Date    TOTAL COLECTOMY         No family history on file.    Social History     Socioeconomic History    Marital status:    Tobacco Use    Smoking status: Never    Smokeless tobacco: Never   Substance and Sexual Activity    Alcohol use: Never   Social History Narrative    ** Merged History Encounter **            Time Tracking:     PT Received On: 06/04/23  PT Start Time: 1317     PT Stop Time: 1354  PT Total Time (min): 37 min     Billable Minutes: Evaluation 8 minutes, Therapeutic Activity 15 minutes, and Neuromuscular Re-education 10 minutes    6/4/2023     Statement Selected

## 2023-06-04 NOTE — PT/OT/SLP EVAL
Speech Language Pathology Evaluation  Bedside Swallow    Patient Name:  Giselle Lemus   MRN:  [id number]  Admitting Diagnosis: <principal problem not specified>    Recommendations:                 General Recommendations:  Dysphagia therapy  Diet recommendations:  Puree Diet - IDDSI Level 4, Mildly thick/Nectar thick liquids - IDDSI Level 2   Aspiration Precautions: 1 bite/sip at a time, Assistance with meals, Feed only when awake/alert, Frequent oral care, HOB to 90 degrees, Meds crushed in puree, Small bites/sips, and Strict aspiration precautions   General Precautions: Standard, aspiration, fall, nectar thick, pureed diet  Communication strategies:  yes/no questions only, provide increased time to answer, and [place]    Assessment:     Giselle Lemus is a 89 y.o. male with an SLP diagnosis of Dysphagia.  SLP will continue to follow.     History:     Past Medical History:   Diagnosis Date    BPH (benign prostatic hyperplasia)     Parkinsons 09/2019    R hand tremor starting in 2017, diagnosed Sept 2019 by Dr. Angeles at     Ulcerative colitis     s/p colectomy    Unspecified chronic bronchitis      Past Surgical History:   Procedure Laterality Date    TOTAL COLECTOMY       Principal Problem:<principal problem not specified>     Chief Complaint:        Chief Complaint   Patient presents with    Altered Mental Status       Family reports decline in mental status since wed now  more confused      HPI: 89-year-old male with past medical history of parkinsonism, multiple falls was brought by family to the emergency room for confusion.  Patient was seen by his primary care physician 2 days back for a fall and femur x-ray was ordered.  Patient today was verbal but it does not make sense what he is talking.  Workup in the emergency room showed leukocytosis and pan  CT showed consolidation .  Spoke to patient's brother inlaw wants him to be full code.    Prior Intubation HX:  none    Modified Barium Swallow: none    Chest X-Rays:  "6/2:"New right mid lung zone density, differential diagnosis includes atelectasis, loculated fluid minor fusion, focal area of pneumonitis anterior segment right upper lobe."    Prior diet: pt unable to state     Subjective     Spoke with RN prior to session. Pt awake, confused.    "I wish we had more time"     Pain/Comfort:  Pain Rating 1:  (pt did not indicate)    Respiratory Status: Room air    Objective:     Oral Musculature Evaluation  Oral Musculature: general weakness, unable to assess due to poor participation/comprehension  Dentition: present and adequate  Oral Labial Strength and Mobility: impaired coordination, impaired pursing, impaired retraction  Lingual Strength and Mobility: impaired strength  Volitional Cough: unable to elicit  Volitional Swallow: unable to elcit  Voice Prior to PO Intake: clear, low volume    Bedside Swallow Eval:   Consistencies Assessed:  Thin liquids ice chip x2, 2 oz tsp, cup and straw  Nectar thick liquids 2 oz cup and straw  Puree tsp x5  Solids cracker x2 bites      Oral Phase:   Decreased closure around utensil  Prolonged mastication  Oral residue  Slow oral transit time    Pharyngeal Phase:   delayed swallow initation w/ thins, improved with NTL  Intermittent eructations  Intermittent wet vocal quality and throat clearing w/ thins.  Coughing w/ ice chips.     Compensatory Strategies  Pt unable to implement despite cuing.    Treatment: Repositioned pt to be sitting upright in bed prior to PO trials. He required 1:1 feed assist. He benefited from small single bites/sips, pureed solids  for adequate oral clearance and nectar thick liquids to improve timeliness of the swallow. Recommend puree solids and NTL at this time, meds crushed in puree. RN and team update on impressions and recommendations posts session. Education provided re: role of SLP, diet recs, swallow precs, s/s aspiration and POC.  Pt requires reinforcement.     Goals:   Multidisciplinary Problems       SLP " Goals          Problem: SLP    Goal Priority Disciplines Outcome   SLP Goal     SLP Ongoing, Progressing   Description: Speech Language Pathology Goals  Goals expected to be met by 6/18    1. Pt will tolerate puree solids and nectar thick liquids without any overt s/sx of airway compromise,   2. Pt will participate in ongoing swallow assessment to determine least restrictive PO diet.                              Plan:     Patient to be seen:  4 x/week   Plan of Care expires:  07/04/23  Plan of Care reviewed with:  patient   SLP Follow-Up:  Yes       Discharge recommendations:   (tbd)   Barriers to Discharge:  Level of Skilled Assistance Needed      Time Tracking:     SLP Treatment Date:   06/04/23  Speech Start Time:  0914  Speech Stop Time:  0927     Speech Total Time (min):  13 min    Billable Minutes: Eval Swallow and Oral Function 13    06/04/2023

## 2023-06-04 NOTE — PT/OT/SLP EVAL
"Occupational Therapy  Co-Evaluation/Co-treatment    Name: Giselle Lemus  MRN: [id number]  Admitting Diagnosis: <principal problem not specified>  Recent Surgery: * No surgery found *    Co-treatment performed due to patient's multiple deficits requiring two skilled therapists to appropriately and safely assess patient's strength and endurance while facilitating functional tasks in addition to accommodating for patient's activity tolerance.       Recommendations:     Discharge Recommendations: other (see comments)  Discharge Equipment Recommendations:  none  Barriers to discharge:       Assessment:     Giselle Lemus is a 89 y.o. male with a medical diagnosis of <principal problem not specified>.  He presents with performance deficits affecting function: weakness, impaired endurance, impaired self care skills, impaired functional mobility, gait instability, impaired balance, impaired cognition, decreased coordination, decreased upper extremity function, decreased lower extremity function, decreased safety awareness, impaired coordination.      Rehab Prognosis: Good; patient would benefit from acute skilled OT services to address these deficits and reach maximum level of function.       Plan:     Patient to be seen 3 x/week to address the above listed problems via self-care/home management, therapeutic activities, therapeutic exercises, neuromuscular re-education  Plan of Care Expires: 07/04/23  Plan of Care Reviewed with: patient, caregiver    Subjective     Chief Complaint: altered mental status  Patient/Family Comments/goals: return to home, "my hearing is not good"    Occupational Profile:  Living Environment: Pt lives with his wife in a [facility], 1 BRENDA. Pt has a tub/shower combo with grab bars and a shower chair.   Previous level of function: pt used rollator for functional mobility with CGA/SBA. Pt was able to complete most ADLs, as per caregiver report  Roles and Routines: pt used to be a   Equipment Used at " Home: wheelchair, rollator  Assistance upon Discharge: 24/7 caregivers    Pain/Comfort:  Pain Rating 1: 0/10    Patients cultural, spiritual, Ten[place] conflicts given the current situation: no    Objective:     Communicated with: RN prior to session.  Patient found HOB elevated with peripheral IV, telemetry, PureWick upon OT entry to room.    General Precautions: Standard, fall, aspiration, nectar thick, pureed diet  Orthopedic Precautions: N/A  Braces: N/A  Respiratory Status: Room air    Occupational Performance:    Bed Mobility:    Patient completed Rolling/Turning to Left with  minimum assistance  Patient completed Scooting/Bridging with maximal assistance x2 persons with drawsheet. Pt complete taking x3 lateral steps to the L towards HOB  Patient completed Supine to Sit with maximal assistance  Patient completed Sit to Supine with moderate assistance    Functional Mobility/Transfers:  Patient completed Sit <> Stand Transfer with contact guard assistance  with  rolling walker   Functional Mobility: pt completed walking forwards and backwards, ~5 steps each with RW    Activities of Daily Living:  Grooming: pt completed toothbrushing while seated EOB, pt required maxA for postural support initially, but was able to sit with [place]/CGA with time  Toileting: purewick utilized, pt had soiled brief, required totalA for pericare while standing with RW at EOB, totalA to don brief while standing.     Cognitive/Visual Perceptual:  Cognitive/Psychosocial Skills:     -       Oriented to: Person, Place, and Time   -       Follows Commands/attention:Follows one-step commands, however is hard of hearing which makes following commands difficult  -       Safety awareness/insight to disability: impaired     Physical Exam:  Upper Extremity Range of Motion:     -       Right Upper Extremity: WFL  -       Left Upper Extremity: WFL  Upper Extremity Strength:    -       Right Upper Extremity: WFL  -       Left Upper Extremity:  WFL   Strength:    -       Right Upper Extremity: WFL  -       Left Upper Extremity: WFL    AMPAC 6 Click ADL:  AMPAC Total Score: 14    Treatment & Education:  Pt educated on role of OT, POC, safety during ADLs. Pt given call light and instructed to ask for assistance with needs/transfers when needed. Whiteboard updated.     Patient left HOB elevated with all lines intact and call button in reach    GOALS:   Multidisciplinary Problems       Occupational Therapy Goals          Problem: Occupational Therapy    Goal Priority Disciplines Outcome Interventions   Occupational Therapy Goal     OT, PT/OT Ongoing, Progressing    Description: Goals to be met by: 7/4/2023     Patient will increase functional independence with ADLs by performing:    UE Dressing with Minimal Assistance.  LE Dressing with Minimal Assistance.  Grooming while EOB with Contact Guard Assistance.  Toileting from bedside commode with Moderate Assistance for hygiene and clothing management.   Rolling to Bilateral with Contact Guard Assistance.   Supine to sit with Moderate Assistance.  Step transfer with Minimal Assistance  Toilet transfer to bedside commode with Minimal Assistance.                         History:     Past Medical History:   Diagnosis Date    BPH (benign prostatic hyperplasia)     Parkinsons 09/2019    R hand tremor starting in 2017, diagnosed Sept 2019 by Dr. Angeles at     Ulcerative colitis     s/p colectomy    Unspecified chronic bronchitis          Past Surgical History:   Procedure Laterality Date    TOTAL COLECTOMY         Time Tracking:     OT Date of Treatment: 06/04/23  OT Start Time: 1317  OT Stop Time: 1354  OT Total Time (min): 37 min    Billable Minutes:Evaluation 8  Self Care/Home Management 10  Therapeutic Activity 10    6/4/2023

## 2023-06-04 NOTE — ASSESSMENT & PLAN NOTE
Community-acquired multifocal pneumonia  -leukocytosis tachycardia on presentation with imaging consistent with pneumonia.   -currently on room air, monitor pulse ox with vitals  -continue IV fluids.    -WBC improving, continue Rocephin and azithromycin   -lactate normal  -sputum culture pending  -blood culture NGTD  -Consulted speech to evaluate for aspiration, appreciate assistance.

## 2023-06-04 NOTE — ASSESSMENT & PLAN NOTE
Bowel dilatation  Air-fluid levels  -normal transaminases and bilirubin with no constipation according to family.  -given decreased p.o. intake and images consistent with intra and extrahepatic biliary dilatation with CBD dilatation as well, order MRCP.  -discussed with Gastroenterology who were not formally consulted.  Consider further discussion with GI pending MRCP results.  -discussed with wife 6/3.  No known metal in patient's body.

## 2023-06-05 NOTE — TELEPHONE ENCOUNTER
----- Message from Maribeth Wooten MA sent at 6/5/2023 10:48 AM CDT -----  Contact: [phone]  Pt's wife requesting to speak with nurse/ma. She states pt is currently in the hospital. Please reach back out to the pt wife at [phone].

## 2023-06-05 NOTE — PT/OT/SLP PROGRESS
Speech Language Pathology      Eng Ary Lemus  MRN: [id number]    Patient not seen today secondary to lethargy. Pt unable to be roused to minimal acceptable levels of alertness despite ongoing MAX cueing from clinician. SLP unable to follow up in PM. Will follow-up per SLP POC.      6/5/2023

## 2023-06-05 NOTE — PROGRESS NOTES
[facility] - Neurosurgery ([facility])  [facility] Medicine  Progress Note    Patient Name: Giselle Lemus  MRN: [id number]  Patient Class: IP- Inpatient   Admission Date: 6/2/2023  Length of Stay: 2 days  Attending Physician: Tyrone Sepulveda III,*  Primary Care Provider: Tl Torres MD        Subjective:     Principal Problem:<principal problem not specified>        HPI:  89-year-old male with past medical history of parkinsonism, multiple falls was brought by family to the emergency room for confusion.  Patient was seen by his primary care physician 2 days back for a fall and femur x-ray was ordered.  Patient today was verbal but it does not make sense what he is talking.  Workup in the emergency room showed leukocytosis and pan  CT showed consolidation .  Spoke to patient's brother sarah wants him to be full code.      Overview/Hospital Course:  No notes on file    Interval History:  Patient seen and examined with family at bedside.  Patient more alert this morning and more conversive.  He is extremely hard of hearing.  Patient denies any chest pain or shortness the breath.  Patient denies any eye pain.  He is oriented to person but may be totally oriented and just can not hear the question.  Family told me that the patient is a  and when asked patient told me that he used to work on blood thinner medications.  Difficulties hearing are definitely a barrier to communication and proper assessment.  I tried writing questions on paper but it seems that the patient has fairly poor eyesight as well.  Family says the patient is eating better today.    Labs reviewed:  WBC normal at 9.35, hemoglobin low but stable at 8.1, potassium normal 4.5, creatinine normal at 0.7       Review of Systems: As above  Objective:     Vital Signs (Most Recent):  Temp: 97.9 °F (36.6 °C) (06/04/23 1933)  Pulse: 62 (06/04/23 1933)  Resp: 16 (06/04/23 1933)  BP: (!) 118/58 (06/04/23 1933)  SpO2: 97 % (06/04/23 1933) Vital Signs (24h  Range):  Temp:  [97.6 °F (36.4 °C)-98.6 °F (37 °C)] 97.9 °F (36.6 °C)  Pulse:  [61-83] 62  Resp:  [16-20] 16  SpO2:  [95 %-100 %] 97 %  BP: (112-156)/(58-80) 118/58     Weight: 54.9 kg (121 lb)  Body mass index is 20.14 kg/m².    Intake/Output Summary (Last 24 hours) at 6/4/2023 2009  Last data filed at 6/4/2023 1418  Gross per 24 hour   Intake 360 ml   Output 701 ml   Net -341 ml           Physical Exam  Vitals and nursing note reviewed.   Constitutional:       Appearance: He is well-developed.   Eyes:      Comments: No crusting or secretions that eyelids.  No conjunctivitis noted   Cardiovascular:      Rate and Rhythm: Normal rate and regular rhythm.   Pulmonary:      Effort: Pulmonary effort is normal.      Comments: Decreased breath sounds bilaterally but patient noncompliant with inspiration  Abdominal:      Palpations: Abdomen is soft.      Tenderness: There is no abdominal tenderness. There is no guarding or rebound.   Skin:     General: Skin is warm and dry.   Neurological:      Mental Status: He is alert.      Comments: Oriented to at least person but difficult to assess due to extreme difficulty hearing.  Patient follows commands when he can understand them.  Moves all 4 extremities spontaneously.  No facial asymmetry noted.   Psychiatric:         Behavior: Behavior normal.           Significant Labs: All pertinent labs within the past 24 hours have been reviewed.  CBC:   Recent Labs   Lab 06/03/23  0819 06/04/23  0406   WBC 11.89 9.35   HGB 7.9* 8.1*   HCT 26.1* 26.1*    230       CMP:   Recent Labs   Lab 06/03/23  0819 06/04/23  0406    137   K 4.4 4.5    103   CO2 26 30*   GLU 73 89   BUN 17 13   CREATININE 0.8 0.7   CALCIUM 8.6* 8.8   PROT 5.8* 6.1   ALBUMIN 2.2* 2.4*   BILITOT 0.5 0.3   ALKPHOS 94 98   AST 11 11   ALT <5* <5*   ANIONGAP 7* 4*         Significant Imaging: I have reviewed all pertinent imaging results/findings within the past 24 hours.      Assessment/Plan:       Sepsis  Community-acquired multifocal pneumonia  -leukocytosis tachycardia on presentation with imaging consistent with pneumonia.   -currently on room air, monitor pulse ox with vitals  -continue IV fluids.    -WBC improving, continue Rocephin and azithromycin   -lactate normal  -sputum culture pending  -blood culture NGTD  -Consulted speech to evaluate for aspiration, appreciate assistance.    Acute metabolic encephalopathy  Parkinson's disease with cognitive decline  -likely related to infectious process in the settings of PD.  -some of this may be due to fact the patient is extremely hard of hearing with no hearing aid present.  -continue home Sinemet  -continue antibiotics  -frequent redirection  -optimize sleep/wake cycle.    Dilation of biliary tract  Bowel dilatation  Air-fluid levels  -normal transaminases and bilirubin with no constipation according to family.  -given decreased p.o. intake and images consistent with intra and extrahepatic biliary dilatation with CBD dilatation as well, order MRCP.  -discussed with Gastroenterology who were not formally consulted.  Consider further discussion with GI pending MRCP results.  -discussed with wife 6/3.  No known metal in patient's body.      Eye irritation  -improving  -wife says patient has frequent issues with secretions especially in morning and has frequent styes.  No obvious conjunctivitis currently but patient noncompliant with exam.  -per wife, outpatient ophthalmology recommended use warm compresses with baby shampoo to assist with clearing secretions and crusting.  -has not been on TobraDex since 2022 for eye infection  -TobraDex seems irritating the patient.  Trial of saline drops with warm compresses p.r.n. to assist with clearing secretions.  Low threshold to start antibiotic drops if appears infectious.      Hypertension  -no known history of HTN and not on any meds per family.  -will monitor closely.      Sinus bradycardia  Right bundle-branch  block  -SB and RBBB both chronic  -will continue to monitor on telemetry    Normocytic anemia  -iron studies not indicative of deficiency  -B12 and folate normal  -no signs of active bleeding  -monitor H&H with morning labs, transfuse hemoglobin less than 7      Multiple falls  Workup negative for any acute fractures.  Consult PTOT      VTE Risk Mitigation (From admission, onward)         Ordered     Place sequential compression device  Until discontinued         06/03/23 0725            Patient is allergic to heparin products so we will continue with SCDs only    Discharge Planning   JOHAN: 6/6/2023     Code Status: Full Code   Is the patient medically ready for discharge?: No    Reason for patient still in hospital (select all that apply): Patient trending condition, Laboratory test, Treatment, Imaging, PT / OT recommendations and Pending disposition        PT/OT consulted.  Family considering possible placement at SNF or other facility        Tyrone Sepulveda III, MD  Department of Hospital Medicine   [facility] - Neurosurgery ([facility])

## 2023-06-05 NOTE — ASSESSMENT & PLAN NOTE
-improving  -wife says patient has frequent issues with secretions especially in morning and has frequent styes.  No obvious conjunctivitis currently but patient noncompliant with exam.  -per wife, outpatient ophthalmology recommended use warm compresses with baby shampoo to assist with clearing secretions and crusting.  -has not been on TobraDex since 2022 for eye infection  -TobraDex seems irritating the patient.  Trial of saline drops with warm compresses p.r.n. to assist with clearing secretions.  Low threshold to start antibiotic drops if appears infectious.

## 2023-06-05 NOTE — HOSPITAL COURSE
89-year-old male admitted to the hospitalist service for further evaluation and treatment of altered mental status in the setting of known Parkinson's disease who presented with sepsis likely due to multifocal pneumonia.  Patient is started on IV antibiotics with improved mentation and appetite.  Patient remained on room air throughout admission.  Blood culture NGTD.  Speech therapy was consulted and gave recommendations on diet.      Level of AMS/encephalopathy unclear as patient is extremely hard of hearing.  When directly next to his right ear speaking very loudly, patient capable of understanding and responding appropriately.  Referral made to ENT and audiology on discharge Attempted to communicate through writing, but patient also seems to have poor vision.  Sinemet was continued.  Wife and brother-in-law both reported decline in cognitive function over the last several weeks.  Also patient reportedly increasingly somnolent during the day.  Patient recently saw Neurology who added Provigil but this was not picked up yet prior to admission.  Provigil ordered throughout admission.    Incidentally, patient found to have dilatation of intra and extrahepatic biliary ducts and CBD.  Also small bowel loops with air-fluid levels.  Patient with multiple bowel movements throughout admission with no reports of any melena or hematochezia.  No hematuria.  MRCP negative for obstruction.  Discussed with Gastroenterology and no further workup indicated at this time given normal liver function.  Recommend routine outpatient surveillance.  Consider gastroenterology referral as outpatient.    Patient had TobraDex drops on med list, initially started, however, they seemed to irritate the patient's eyes more.  Wife said patient not taking this medication for some time now.  Patient did have some crusting and secretions in his eyes prior to administration of t these drops.  This medication was held and gentle cleansing of the eyes  with normal saline was ordered.  Wife reported frequent episodes similar to this at home that she manages with warm compresses and ABG.  Patient denied any eye pain and there was no evident infection.  Referral made to ophthalmology.    Sinus bradycardia evident throughout admission. Chronic condition as SB and RBBB present on previous EKGs.  Heart rate slower on day of discharge with stable blood pressure.  Bradycardia  has been rarely associated with remeron, which was started for appetite.  Sinus Farhan on repeat EKG.  Discontinued Remeron as it may have also been contributing to daytime somnolence.    Hb low but multiple Bms per staff with no melena or hematochezia and no hematuria present.  Repeat labs should be drawn on Wednesday 6/7 and reported to PCP.  Patient's chronic medical conditions were managed appropriately and he has reached maximal benefit of hospitalization at this time.  Patient is medically stable for discharge to inpatient rehab with instructions to continue antibiotics for pneumonia for 2 more days.  Following discharge from rehab, patient should follow up with PCP, Neurology, ENT with audiology and Ophthalmology.

## 2023-06-05 NOTE — PLAN OF CARE
[facility] - Neurosurgery (Hospital)  Initial Discharge Assessment       Primary Care Provider: Tl Torres MD    Admission Diagnosis: Pain [R52]  AMS (altered mental status) [R41.82]  Sepsis, due to unspecified organism, unspecified whether acute organ dysfunction present [A41.9]    Admission Date: 6/2/2023  Expected Discharge Date: 6/6/2023         Payor: MEDICARE / Plan: MEDICARE PART A & B / Product Type: Government /     Extended Emergency Contact Information  Primary Emergency Contact: Obi Lemus  Mobile Phone: [phone]  Relation: Spouse  Preferred language: English   needed? No  Secondary Emergency Contact: CalosLincoln  Mobile Phone: [phone]  Relation: Brother    Discharge Plan A: (P) Rehab  Discharge Plan B: (P) Home Health      [id number] [place] - [address]  [address]  [place] [address]  Phone: [phone] Fax: [phone]      Initial Assessment (most recent)       Adult Discharge Assessment - 06/05/23 1221          Discharge Assessment    Assessment Type Discharge Planning Assessment (P)      Confirmed/corrected address, phone number and insurance Yes (P)      Confirmed Demographics Correct on Facesheet (P)      Source of Information family (P)      If unable to respond/provide information was family/caregiver contacted? Yes (P)      Contact Name/Number Brother in law [place] [phone] (P)      When was your last doctors appointment? 05/31/23 (P)      Communicated JOHAN with patient/caregiver Yes (P)      Reason For Admission sepsis (P)      People in Home spouse (P)      Do you expect to return to your current living situation? No (P)      Do you have help at home or someone to help you manage your care at home? No (P)      Prior to hospitilization cognitive status: Unable to Assess (P)      Current cognitive status: Unable to Assess (P)      Walking or Climbing Stairs ambulation difficulty, requires equipment (P)      Mobility Management  walker and WC (P)      Home Layout Able to live on 1st floor (P)      Equipment Currently Used at Home wheelchair;walker, rolling (P)      Readmission within 30 days? No (P)      Patient currently being followed by outpatient case management? No (P)      Do you currently have service(s) that help you manage your care at home? Yes (P)      How Many hours does patient receive services 2 (P)      Name and Contact number of agency private caregiver (P)      Is the pt/caregiver preference to resume services with current agency No (P)      Do you take prescription medications? Yes (P)      Do you have prescription coverage? Yes (P)      Coverage Medicare and [facility] (P)      Do you have any problems affording any of your prescribed medications? No (P)      Is the patient taking medications as prescribed? yes (P)      How do you get to doctors appointments? family or friend will provide (P)      Are you on dialysis? No (P)      Do you take coumadin? No (P)      Discharge Plan A Rehab (P)      Discharge Plan B Home Health (P)         Physical Activity    On average, how many days per week do you engage in moderate to strenuous exercise (like a brisk walk)? Patient refused (P)      On average, how many minutes do you engage in exercise at this level? Patient refused (P)         Financial Resource Strain    How hard is it for you to pay for the very basics like food, housing, medical care, and heating? Not hard at all (P)         Housing Stability    In the last 12 months, was there a time when you were not able to pay the mortgage or rent on time? No (P)      In the last 12 months, was there a time when you did not have a steady place to sleep or slept in a shelter (including now)? No (P)         Transportation Needs    In the past 12 months, has lack of transportation kept you from medical appointments or from getting medications? No (P)      In the past 12 months, has lack of transportation kept you from meetings, work, or  from getting things needed for daily living? No (P)         Food Insecurity    Within the past 12 months, you worried that your food would run out before you got the money to buy more. Never true (P)      Within the past 12 months, the food you bought just didn't last and you didn't have money to get more. Never true (P)         Stress    Do you feel stress - tense, restless, nervous, or anxious, or unable to sleep at night because your mind is troubled all the time - these days? Patient refused (P)         Social Connections    In a typical week, how many times do you talk on the phone with family, friends, or neighbors? Patient refused (P)      How often do you get together with friends or relatives? Patient refused (P)      How often do you attend Anabaptist or Alevism services? Patient refused (P)      Do you belong to any clubs or organizations such as Anabaptist groups, unions, fraternal or athletic groups, or school groups? Patient refused (P)      How often do you attend meetings of the clubs or organizations you belong to? Patient refused (P)      Are you , , , , never , or living with a partner?  (P)         Alcohol Use    Q1: How often do you have a drink containing alcohol? Never (P)      Q2: How many drinks containing alcohol do you have on a typical day when you are drinking? Patient does not drink (P)      Q3: How often do you have six or more drinks on one occasion? Never (P)         OTHER    Name(s) of People in Home Wife Obi (P)                    CRISTINA met with patient's brother in law Stark at bedside to complete assessment.  Patient lives in a [facility] with 2 steps up.  He lives with his wife, who is disabled.  He has been getting HH services with [facility] and has a private caregiver that comes in 2-3 hours a day.  Patient has a WC and Walker at home.  Patient is not on HD or Coumadin.  Patient will d/c to IPR when med cleared and [facility] is there  preference.      Mirtha Terry, LMSW [facility]  [phone]

## 2023-06-05 NOTE — PLAN OF CARE
Problem: Skin Injury Risk Increased  Goal: Skin Health and Integrity  Outcome: Ongoing, Progressing     Problem: Adult Inpatient Plan of Care  Goal: Plan of Care Review  Outcome: Ongoing, Progressing  Goal: Patient-Specific Goal (Individualized)  Outcome: Ongoing, Progressing  Goal: Absence of Hospital-Acquired Illness or Injury  Outcome: Ongoing, Progressing  Goal: Optimal Comfort and Wellbeing  Outcome: Ongoing, Progressing  Goal: Readiness for Transition of Care  Outcome: Ongoing, Progressing   POC updated and reviewed with the patient at bedside. Questions regarding POC were encouraged and addressed. VSS, see flowsheets. Patient is AO to self.. Pt verbal but very confused. Tele maintained. .No signs of injury noted over night. Patient was repositioned for comfort with bed locked in low position, side rails up x 3, bed alarm set, and call light within reach. Instructed patient to call staff for mobility, verbalized understanding. No acute signs of distress noted at this time.      MRI completed this AM.

## 2023-06-05 NOTE — PLAN OF CARE
Inpatient rehab orders    06/05/2023  [facility]  [facility] - NEUROSURGERY (HOSPITAL)  [address]  [facility] [address]  Dept: [phone]  Loc: [phone]     Admit to Nursing Home:  Rehab Facility    Diagnoses:  Active Hospital Problems    Diagnosis  POA    *Sepsis [A41.9]  Yes     Priority: 1 - High    Acute metabolic encephalopathy [G93.41]  Yes     Priority: 5     Dilation of biliary tract [K83.8]  Yes     Priority: 10     Eye irritation [H57.89]  Yes     Priority: 15     Hypertension [I10]  Yes     Priority: 20     Sinus bradycardia [R00.1]  Yes     Priority: 22     Normocytic anemia [D64.9]  Yes     Priority: 24     Multifocal pneumonia [J18.9]  Yes    Parkinson's disease [G20]  Yes    Multiple falls [R29.6]  Not Applicable      Resolved Hospital Problems   No resolved problems to display.       Patient is homebound due to:  Sepsis    Allergies:  Review of patient's allergies indicates:   Allergen Reactions    Heparin     Heparin analogues Other (See Comments)     Not true allergy - but patient developed large spontaneous RP hematoma and concurrent severe epistaxis while on DVT prophylactic dose heparin - consider mechanical DVT ppx in future       Vitals:  Routine    Diet: pureed diet nectar thick meds crushed in puree    Activities:   Activity as tolerated    Goals of Care Treatment Preferences:  Code Status: Full Code      Labs:  CBC without a differential 6/7 sent to PCP.  To evaluate hemoglobin or and WBC.    Nursing Precautions:  Aspiration , Fall, and Pressure ulcer prevention    Consults:   PT to evaluate and treat- 3 times a week and OT to evaluate and treat- 3 times a week     Miscellaneous Care: Routine Skin for Bedridden Patients:  Apply moisture barrier cream to all               Medications: Discontinue all previous medication orders, if any. See new list below.     Medication List        START taking these medications      artificial tears 0.5 % ophthalmic  solution  Commonly known as: ISOPTO TEARS  Place 2 drops into both eyes 4 (four) times daily as needed.     cefdinir 250 mg/5 mL suspension  Commonly known as: OMNICEF  Take 6 mLs (300 mg total) by mouth 2 (two) times daily. for 2 days     melatonin 3 mg tablet  Commonly known as: MELATIN  Take 2 tablets (6 mg total) by mouth nightly.     polyethylene glycol 17 gram Pwpk  Commonly known as: GLYCOLAX  Take 17 g by mouth 2 (two) times daily as needed (constipation).            CHANGE how you take these medications      furosemide 20 MG tablet  Commonly known as: LASIX  Take 0.5 tablets (10 mg total) by mouth once daily. As needed for weight gain of 3 lb in 1 day, 5 lb in 1 week, or significant leg edema  What changed: additional instructions            CONTINUE taking these medications      balsam peru-castor oiL Oint  Apply topically 2 (two) times a day. Apply to buttocks     carbidopa-levodopa  mg  mg per tablet  Commonly known as: SINEMET  TAKE 1 TABLET BY MOUTH 4 (FOUR) TIMES A DAY FOR 30 DAYS.     finasteride 5 mg tablet  Commonly known as: PROSCAR  Take 1 tablet (5 mg total) by mouth once daily.     modafiniL 100 MG Tab  Commonly known as: PROVIGIL  Take 1 tablet (100 mg total) by mouth 2 (two) times daily. (Morning and early afternoon)     sodium chloride 0.65 % nasal spray  Commonly known as: OCEAN  1 spray by Nasal route as needed (irritation).            STOP taking these medications      Tobradex 0.3-0.1 % Drps  Generic drug: tobramycin-dexAMETHasone 0.3-0.1%                Immunizations Administered as of 6/5/2023       Name Date Dose VIS Date Route Exp Date    COVID-19, MRNA LN-S, PF (Pfizer) (Purple Cap) 1/31/2021  1:14 PM 0.3 mL 12/12/2020 Intramuscular 5/31/2021    Site: Left deltoid     Given By: Angy Choi RN     : [employer]     Lot: [id number]     COVID-19, MRNA LN-S, PF (Pfizer) (Purple Cap) 1/10/2021  1:24 PM 0.3 mL 12/12/2020 Intramuscular 4/30/2021    Site: Left  deltoid     Given By: Olya Mcfadden, RN     : Pf[employer]     Lot: [id number]             This patient has had both covid vaccinations    Some patients may experience side effects after vaccination.  These may include fever, headache, muscle or joint aches.  Most symptoms resolve with 24-48 hours and do not require urgent medical evaluation unless they persist for more than 72 hours or symptoms are concerning for an unrelated medical condition.          _________________________________  Tyrone Sepulveda III, MD  06/05/2023

## 2023-06-05 NOTE — CONSULTS
Inpatient consult to Physical Medicine Rehab  Consult performed by: Sabine Lopez NP  Consult ordered by: Tyrone Sepulveda III, MD  Reason for consult: Assess rehab needs    Reviewed patient history and current admission.  Rehab team following.  Full consult to follow.    RODERICK Baker, FNP-C  Physical Medicine & Rehabilitation   06/05/2023

## 2023-06-05 NOTE — SUBJECTIVE & OBJECTIVE
Interval History:  Patient seen and examined with family at bedside.  Patient more alert this morning and more conversive.  He is extremely hard of hearing.  Patient denies any chest pain or shortness the breath.  Patient denies any eye pain.  He is oriented to person but may be totally oriented and just can not hear the question.  Family told me that the patient is a  and when asked patient told me that he used to work on blood thinner medications.  Difficulties hearing are definitely a barrier to communication and proper assessment.  I tried writing questions on paper but it seems that the patient has fairly poor eyesight as well.  Family says the patient is eating better today.    Labs reviewed:  WBC normal at 9.35, hemoglobin low but stable at 8.1, potassium normal 4.5, creatinine normal at 0.7       Review of Systems: As above  Objective:     Vital Signs (Most Recent):  Temp: 97.9 °F (36.6 °C) (06/04/23 1933)  Pulse: 62 (06/04/23 1933)  Resp: 16 (06/04/23 1933)  BP: (!) 118/58 (06/04/23 1933)  SpO2: 97 % (06/04/23 1933) Vital Signs (24h Range):  Temp:  [97.6 °F (36.4 °C)-98.6 °F (37 °C)] 97.9 °F (36.6 °C)  Pulse:  [61-83] 62  Resp:  [16-20] 16  SpO2:  [95 %-100 %] 97 %  BP: (112-156)/(58-80) 118/58     Weight: 54.9 kg (121 lb)  Body mass index is 20.14 kg/m².    Intake/Output Summary (Last 24 hours) at 6/4/2023 2009  Last data filed at 6/4/2023 1418  Gross per 24 hour   Intake 360 ml   Output 701 ml   Net -341 ml           Physical Exam  Vitals and nursing note reviewed.   Constitutional:       Appearance: He is well-developed.   Eyes:      Comments: No crusting or secretions that eyelids.  No conjunctivitis noted   Cardiovascular:      Rate and Rhythm: Normal rate and regular rhythm.   Pulmonary:      Effort: Pulmonary effort is normal.      Comments: Decreased breath sounds bilaterally but patient noncompliant with inspiration  Abdominal:      Palpations: Abdomen is soft.      Tenderness: There is  no abdominal tenderness. There is no guarding or rebound.   Skin:     General: Skin is warm and dry.   Neurological:      Mental Status: He is alert.      Comments: Oriented to at least person but difficult to assess due to extreme difficulty hearing.  Patient follows commands when he can understand them.  Moves all 4 extremities spontaneously.  No facial asymmetry noted.   Psychiatric:         Behavior: Behavior normal.           Significant Labs: All pertinent labs within the past 24 hours have been reviewed.  CBC:   Recent Labs   Lab 06/03/23  0819 06/04/23  0406   WBC 11.89 9.35   HGB 7.9* 8.1*   HCT 26.1* 26.1*    230       CMP:   Recent Labs   Lab 06/03/23  0819 06/04/23  0406    137   K 4.4 4.5    103   CO2 26 30*   GLU 73 89   BUN 17 13   CREATININE 0.8 0.7   CALCIUM 8.6* 8.8   PROT 5.8* 6.1   ALBUMIN 2.2* 2.4*   BILITOT 0.5 0.3   ALKPHOS 94 98   AST 11 11   ALT <5* <5*   ANIONGAP 7* 4*         Significant Imaging: I have reviewed all pertinent imaging results/findings within the past 24 hours.

## 2023-06-05 NOTE — ASSESSMENT & PLAN NOTE
Parkinson's disease with cognitive decline  -likely related to infectious process in the settings of PD.  -some of this may be due to fact the patient is extremely hard of hearing with no hearing aid present.  -continue home Sinemet  -continue antibiotics  -frequent redirection  -optimize sleep/wake cycle.

## 2023-06-06 NOTE — ASSESSMENT & PLAN NOTE
- Work up negative for any acute fractures.     - Related to prolonged/acute hospital course.     Recommendations  -  Encourage mobility, OOB in chair at least 3 hours per day, and early ambulation as appropriate  -  PT/OT evaluate and treat  -  Pain management  -  Monitor for and prevent skin breakdown and pressure ulcers  · Early mobility, repositioning/weight shifting every 20-30 minutes when sitting, turn patient every 2 hours, proper mattress/overlay and chair cushioning, pressure relief/heel protector boots  -  DVT prophylaxis    -  Reviewed discharge options (IP rehab, SNF, HH therapy, and OP therapy)

## 2023-06-06 NOTE — SUBJECTIVE & OBJECTIVE
Past Medical History:   Diagnosis Date    BPH (benign prostatic hyperplasia)     Parkinsons 09/2019    R hand tremor starting in 2017, diagnosed Sept 2019 by Dr. Angeles at     Ulcerative colitis     s/p colectomy    Unspecified chronic bronchitis      Past Surgical History:   Procedure Laterality Date    TOTAL COLECTOMY       Review of patient's allergies indicates:   Allergen Reactions    Heparin     Heparin analogues Other (See Comments)     Not true allergy - but patient developed large spontaneous RP hematoma and concurrent severe epistaxis while on DVT prophylactic dose heparin - consider mechanical DVT ppx in future       Scheduled Medications:    carbidopa-levodopa  mg  1 tablet Oral TID    cefTRIAXone (ROCEPHIN) IVPB  1 g Intravenous Q24H    finasteride  5 mg Oral Daily    melatonin  6 mg Oral Nightly    modafiniL  100 mg Oral BID       PRN Medications: acetaminophen, artificial tears, mirtazapine    Family History    None       Tobacco Use    Smoking status: Never    Smokeless tobacco: Never   Substance and Sexual Activity    Alcohol use: Never    Drug use: Not on file    Sexual activity: Not on file     Review of Systems   Constitutional:  Positive for activity change. Negative for fatigue and fever.   HENT:  Positive for trouble swallowing.    Musculoskeletal:  Positive for gait problem.   Neurological:  Positive for weakness.   Psychiatric/Behavioral:  Positive for confusion. Negative for behavioral problems.    Objective:     Vital Signs (Most Recent):  Temp: 98 °F (36.7 °C) (06/05/23 1111)  Pulse: (!) 43 (06/05/23 1134)  Resp: 18 (06/05/23 1111)  BP: (!) 124/56 (06/05/23 1111)  SpO2: 95 % (06/05/23 1111)    Vital Signs (24h Range):  Temp:  [98 °F (36.7 °C)-98.9 °F (37.2 °C)] 98 °F (36.7 °C)  Pulse:  [43-60] 43  Resp:  [16-18] 18  SpO2:  [95 %-98 %] 95 %  BP: (111-129)/(54-60) 124/56     Body mass index is 20.14 kg/m².     Physical Exam  Vitals and nursing note reviewed.   Constitutional:        Appearance: He is well-developed.      Comments: Frail   Eyes:      General:         Right eye: No discharge.         Left eye: No discharge.      Pupils: Pupils are equal, round, and reactive to light.   Pulmonary:      Effort: Pulmonary effort is normal. No respiratory distress.   Abdominal:      General: There is no distension.      Palpations: Abdomen is soft.      Tenderness: There is no abdominal tenderness.   Musculoskeletal:         General: No deformity.      Comments: BUE & BLE 5/5   deconditioned   Skin:     General: Skin is warm and dry.   Neurological:      Sensory: No sensory deficit.      Motor: Weakness present. No abnormal muscle tone.      Gait: Gait abnormal.      Comments: Confusion present   Oriented to self and caregiver at bedside  Following commands   Psychiatric:      Comments: Camera sitter  calm        NEUROLOGICAL EXAMINATION:     CRANIAL NERVES     CN III, IV, VI   Pupils are equal, round, and reactive to light.    Diagnostic Results: Labs: Reviewed  ECG: Reviewed  CT: Reviewed

## 2023-06-06 NOTE — HPI
Giselle Lemus is a 89-year-old male with PMHx of Parkinson (on Carbidopa), BPH, R hand tremor, UC s/p total colectomy, and chronic bronchitis. [place] [facility] [place] for multiple falls at home and confusion. Work up negative for any acute fractures. Imaging revealed multilobar pneumonia and on Cefdinir.     Functional History: Patient lives with wife in a single story home with 0 steps to enter.  Prior to admission, [facility]. DME: RW. Has caregivers at home.

## 2023-06-06 NOTE — DISCHARGE SUMMARY
[facility] - Neurosurgery ([facility])  [facility] Medicine  Discharge Summary      Patient Name: Giselle Lemus  MRN: [id number]  EMILE: [id number]  Patient Class: IP- Inpatient  Admission Date: 6/2/2023  Hospital Length of Stay: 3 days  Discharge Date and Time: 6/5/2023  7:40 PM  Attending Physician: No att. providers found   Discharging Provider: Tyrone Sepulveda III, MD  Primary Care Provider: Tl Torres MD  [facility] Medicine Team: [facility]OSP MED B Tyrone Sepulveda III, MD  Primary Care Team: [facility]    HPI:   89-year-old male with past medical history of parkinsonism, multiple falls was brought by family to the emergency room for confusion.  Patient was seen by his primary care physician 2 days back for a fall and femur x-ray was ordered.  Patient today was verbal but it does not make sense what he is talking.  Workup in the emergency room showed leukocytosis and pan  CT showed consolidation .  Spoke to patient's brother beckyaw wants him to be full code.      * No surgery found *      Hospital Course:   89-year-old male admitted to the hospitalist service for further evaluation and treatment of altered mental status in the setting of known Parkinson's disease who presented with sepsis likely due to multifocal pneumonia.  Patient is started on IV antibiotics with improved mentation and appetite.  Patient remained on room air throughout admission.  Blood culture NGTD.  Speech therapy was consulted and gave recommendations on diet.      Level of AMS/encephalopathy unclear as patient is extremely hard of hearing.  When directly next to his right ear speaking very loudly, patient capable of understanding and responding appropriately.  Referral made to ENT and audiology on discharge Attempted to communicate through writing, but patient also seems to have poor vision.  Sinemet was continued.  Wife and brother-in-law both reported decline in cognitive function over the last several weeks.  Also patient reportedly  increasingly somnolent during the day.  Patient recently saw Neurology who added Provigil but this was not picked up yet prior to admission.  Provigil ordered throughout admission.    Incidentally, patient found to have dilatation of intra and extrahepatic biliary ducts and CBD.  Also small bowel loops with air-fluid levels.  Patient with multiple bowel movements throughout admission with no reports of any melena or hematochezia.  No hematuria.  MRCP negative for obstruction.  Discussed with Gastroenterology and no further workup indicated at this time given normal liver function.  Recommend routine outpatient surveillance.  Consider gastroenterology referral as outpatient.    Patient had TobraDex drops on med list, initially started, however, they seemed to irritate the patient's eyes more.  Wife said patient not taking this medication for some time now.  Patient did have some crusting and secretions in his eyes prior to administration of t these drops.  This medication was held and gentle cleansing of the eyes with normal saline was ordered.  Wife reported frequent episodes similar to this at home that she manages with warm compresses and ABG.  Patient denied any eye pain and there was no evident infection.  Referral made to ophthalmology.    Sinus bradycardia evident throughout admission. Chronic condition as SB and RBBB present on previous EKGs.  Heart rate slower on day of discharge with stable blood pressure.  Bradycardia  has been rarely associated with remeron, which was started for appetite.  Sinus Farhan on repeat EKG.  Discontinued Remeron as it may have also been contributing to daytime somnolence.    Hb low but multiple Bms per staff with no melena or hematochezia and no hematuria present.  Repeat labs should be drawn on Wednesday 6/7 and reported to PCP.  Patient's chronic medical conditions were managed appropriately and he has reached maximal benefit of hospitalization at this time.  Patient is  medically stable for discharge to inpatient rehab with instructions to continue antibiotics for pneumonia for 2 more days.  Following discharge from rehab, patient should follow up with PCP, Neurology, ENT with audiology and Ophthalmology.       Goals of Care Treatment Preferences:  Code Status: Full Code      Consults:   Consults (From admission, onward)        Status Ordering Provider     Inpatient consult to Physical Medicine Rehab  Once        Provider:  Sabine Lopez NP    Completed DOUGIE CARDONA III          No new Assessment & Plan notes have been filed under this hospital service since the last note was generated.  Service: Hospital Medicine    Final Active Diagnoses:    Diagnosis Date Noted POA    PRINCIPAL PROBLEM:  Sepsis [A41.9] 06/02/2023 Yes    Acute metabolic encephalopathy [G93.41] 06/03/2023 Yes    Dilation of biliary tract [K83.8] 06/03/2023 Yes    Eye irritation [H57.89] 06/03/2023 Yes    Hypertension [I10] 06/03/2023 Yes    Sinus bradycardia [R00.1] 05/10/2022 Yes    Normocytic anemia [D64.9] 06/23/2022 Yes    Multifocal pneumonia [J18.9] 06/03/2023 Yes    Parkinson's disease [G20] 06/03/2023 Yes    Multiple falls [R29.6] 08/16/2022 Not Applicable      Problems Resolved During this Admission:       Discharged Condition: stable    Disposition: Rehab Facility    Follow Up:    Patient Instructions:      Ambulatory referral/consult to ENT   Standing Status: Future   Referral Priority: Urgent Referral Type: Consultation   Referral Reason: Specialty Services Required   Requested Specialty: Otolaryngology   Number of Visits Requested: 1     Ambulatory referral/consult to Ophthalmology   Standing Status: Future   Referral Priority: Urgent Referral Type: Consultation   Referral Reason: Specialty Services Required   Requested Specialty: Ophthalmology   Number of Visits Requested: 1     Ambulatory referral/consult to Audiology   Standing Status: Future   Referral Priority: Urgent  Referral Type: Audiology Exam   Referral Reason: Specialty Services Required   Requested Specialty: Audiology   Number of Visits Requested: 1     Diet Dysphagia Pureed   Order Comments: Nectar thick liquids and meds crush in puree     Activity as tolerated       Significant Diagnostic Studies: Labs:   CMP   Recent Labs   Lab 06/04/23  0406 06/05/23  0233    137   K 4.5 4.3    103   CO2 30* 33*   GLU 89 69*   BUN 13 12   CREATININE 0.7 0.7   CALCIUM 8.8 8.4*   PROT 6.1  --    ALBUMIN 2.4*  --    BILITOT 0.3  --    ALKPHOS 98  --    AST 11  --    ALT <5*  --    ANIONGAP 4* 1*    and CBC   Recent Labs   Lab 06/04/23  0406 06/05/23  0233   WBC 9.35 10.88   HGB 8.1* 7.1*   HCT 26.1* 23.6*    216       Pending Diagnostic Studies:     Procedure Component Value Units Date/Time    HIV 1/2 Ag/Ab (4th Gen) [[id number]]     Order Status: Sent Lab Status: No result     Specimen: Blood     Hepatitis C Antibody [[id number]]     Order Status: Sent Lab Status: No result     Specimen: Blood          Medications:  Reconciled Home Medications:      Medication List      START taking these medications    artificial tears 0.5 % ophthalmic solution  Commonly known as: ISOPTO TEARS  Place 2 drops into both eyes 4 (four) times daily as needed.     cefdinir 250 mg/5 mL suspension  Commonly known as: OMNICEF  Take 6 mLs (300 mg total) by mouth 2 (two) times daily. for 2 days     melatonin 3 mg tablet  Commonly known as: MELATIN  Take 2 tablets (6 mg total) by mouth nightly.     polyethylene glycol 17 gram Pwpk  Commonly known as: GLYCOLAX  Take 17 g by mouth 2 (two) times daily as needed (constipation).        CHANGE how you take these medications    furosemide 20 MG tablet  Commonly known as: LASIX  Take 0.5 tablets (10 mg total) by mouth once daily. As needed for weight gain of 3 lb in 1 day, 5 lb in 1 week, or significant leg edema  What changed: additional instructions        CONTINUE taking these medications    balsam  peru-castor oiL Oint  Apply topically 2 (two) times a day. Apply to buttocks     carbidopa-levodopa  mg  mg per tablet  Commonly known as: SINEMET  TAKE 1 TABLET BY MOUTH 4 (FOUR) TIMES A DAY FOR 30 DAYS.     finasteride 5 mg tablet  Commonly known as: PROSCAR  Take 1 tablet (5 mg total) by mouth once daily.     modafiniL 100 MG Tab  Commonly known as: PROVIGIL  Take 1 tablet (100 mg total) by mouth 2 (two) times daily. (Morning and early afternoon)     sodium chloride 0.65 % nasal spray  Commonly known as: OCEAN  1 spray by Nasal route as needed (irritation).        STOP taking these medications    Tobradex 0.3-0.1 % Drps  Generic drug: tobramycin-dexAMETHasone 0.3-0.1%            Indwelling Lines/Drains at time of discharge:   Lines/Drains/Airways     Drain  Duration           Male External Urinary Catheter 06/02/23 1554 Small 3 days                Time spent on the discharge of patient: 50 minutes         Tyrone Sepulveda III, MD  Department of Hospital Medicine  [facility] - Neurosurgery ([facility])

## 2023-06-06 NOTE — CONSULTS
[facility]z - Neurosurgery ([facility])  Physical Medicine & Rehab  Consult Note    Patient Name: Giselle Lemus  MRN: [id number]  Admission Date: 6/2/2023  Hospital Length of Stay: 3 days  Attending Physician:    Tyrone Stinson III, MD    Inpatient consult to Physical Medicine & Rehabilitation  Consult performed by: Sabine Lopez NP  Consult requested by:    Tyrone Stinson III, MD  Collaborating Physician: Krystal Tinsley MD  Reason for Consult:  Assess rehabilitation needs     Consults  Subjective:     Principal Problem: Sepsis    HPI: Giselle Lemus is a 89-year-old male with PMHx of Parkinson (on Carbidopa), BPH, R hand tremor, UC s/p total colectomy, and chronic bronchitis. Patient presented to [facility] on 6/2/23 for multiple falls at home and confusion. Work up negative for any acute fractures. Imaging revealed multilobar pneumonia and on Cefdinir.     Functional History: Patient lives with wife in a single story home with 0 steps to enter.  Prior to admission, [facility]. DME: RW. Has caregivers at home.       Hospital Course: 6/4/23: participated w/ OT. Bed mob min- maxA. Sit to stand CGA w/ RW. Ambulated 5 steps w/ RW.       Past Medical History:   Diagnosis Date    BPH (benign prostatic hyperplasia)     Parkinsons 09/2019    R hand tremor starting in 2017, diagnosed Sept 2019 by Dr. Angeles at     Ulcerative colitis     s/p colectomy    Unspecified chronic bronchitis      Past Surgical History:   Procedure Laterality Date    TOTAL COLECTOMY       Review of patient's allergies indicates:   Allergen Reactions    Heparin     Heparin analogues Other (See Comments)     Not true allergy - but patient developed large spontaneous RP hematoma and concurrent severe epistaxis while on DVT prophylactic dose heparin - consider mechanical DVT ppx in future       Scheduled Medications:    carbidopa-levodopa  mg  1 tablet Oral TID    cefTRIAXone (ROCEPHIN) IVPB  1 g Intravenous Q24H    finasteride  5 mg Oral Daily     melatonin  6 mg Oral Nightly    modafiniL  100 mg Oral BID       PRN Medications: acetaminophen, artificial tears, mirtazapine    Family History    None       Tobacco Use    Smoking status: Never    Smokeless tobacco: Never   Substance and Sexual Activity    Alcohol use: Never    Drug use: Not on file    Sexual activity: Not on file     Review of Systems   Constitutional:  Positive for activity change. Negative for fatigue and fever.   HENT:  Positive for trouble swallowing.    Musculoskeletal:  Positive for gait problem.   Neurological:  Positive for weakness.   Psychiatric/Behavioral:  Positive for confusion. Negative for behavioral problems.      Objective:     Vital Signs (Most Recent):  Temp: 98 °F (36.7 °C) (06/05/23 1111)  Pulse: (!) 43 (06/05/23 1134)  Resp: 18 (06/05/23 1111)  BP: (!) 124/56 (06/05/23 1111)  SpO2: 95 % (06/05/23 1111)    Vital Signs (24h Range):  Temp:  [98 °F (36.7 °C)-98.9 °F (37.2 °C)] 98 °F (36.7 °C)  Pulse:  [43-60] 43  Resp:  [16-18] 18  SpO2:  [95 %-98 %] 95 %  BP: (111-129)/(54-60) 124/56     Body mass index is 20.14 kg/m².     Physical Exam  Vitals and nursing note reviewed.   Constitutional:       Appearance: He is well-developed.      Comments: Frail   Eyes:      General:         Right eye: No discharge.         Left eye: No discharge.      Pupils: Pupils are equal, round, and reactive to light.   Pulmonary:      Effort: Pulmonary effort is normal. No respiratory distress.   Abdominal:      General: There is no distension.      Palpations: Abdomen is soft.      Tenderness: There is no abdominal tenderness.   Musculoskeletal:         General: No deformity.      Comments: BUE & BLE 5/5   deconditioned   Skin:     General: Skin is warm and dry.   Neurological:      Sensory: No sensory deficit.      Motor: Weakness present. No abnormal muscle tone.      Gait: Gait abnormal.      Comments: Confusion present   Oriented to self and caregiver at bedside  Following commands    Psychiatric:      Comments: Camera sitter  calm     Diagnostic Results:   Labs: Reviewed  ECG: Reviewed  CT: Reviewed    Assessment/Plan:     * Sepsis  - Imaging revealed multilobar pneumonia and on Cefdinir.     Parkinson's disease  - on Levidopa    Multiple falls  - Work up negative for any acute fractures.     - Related to prolonged/acute hospital course.     Recommendations  -  Encourage mobility, OOB in chair at least 3 hours per day, and early ambulation as appropriate  -  PT/OT evaluate and treat  -  Pain management  -  Monitor for and prevent skin breakdown and pressure ulcers  · Early mobility, repositioning/weight shifting every 20-30 minutes when sitting, turn patient every 2 hours, proper mattress/overlay and chair cushioning, pressure relief/heel protector boots  -  DVT prophylaxis    -  Reviewed discharge options (IP rehab, SNF, HH therapy, and OP therapy)    PM&R Recommendation:     At this time, the PM&R team has reviewed this patient's ongoing medical case including inpatient diagnosis, medical history, clinical examination, labs, vitals, current social and functional history to provide the post-acute recommendation as follows:     RECOMMENDATIONS: inpatient rehabilitation due to good motivation/participation with therapies, has been determined to tolerate 3 hours of therapy and good potential for recovery.     The patient will be admitted for comprehensive interdisciplinary inpatient rehabilitation to address the impairments due to medical diagnosis of Debility. The patient will benefit from an inpatient rehabilitation program to promote functional recovery, implement compensatory strategies and will undergo assessment for needs for durable medical equipment for safe discharge to the community. This patient will benefit from a coordinated interdisciplinary rehabilitation program services that require close monitoring and treatment with 24-hour rehabilitative nursing and physical/occupational  therapies for 3 hours/day for 5 days/week.This interdisciplinary program will be performed under the direction of a physiatrist.    MEDICAL STABILITY:     At this time, no barriers for post-acute rehab admission.    We will continue to follow.    Thank you for your consult.     Sabine Lopez NP  Department of Physical Medicine & Rehab  [facility] Neurosurgery [facility])

## 2023-06-09 NOTE — TELEPHONE ENCOUNTER
Called and spoke to pt's wife and she states the patient is in the rehab. Will follow up once d/c from rehab

## 2023-06-15 NOTE — TELEPHONE ENCOUNTER
----- Message from Kari Pulido sent at 6/15/2023 11:04 AM CDT -----  Contact: [phone]  Patient needs a Hosp follow up appt with their PCP only.       When is the next available appointment:  7/5/23    Symptoms:  Parkinson    Discharge date:6/19/23    Needs to be seen by: 6/27/23    Would you prefer an answer via [employer]?: call     Comments:

## 2023-06-15 NOTE — PROGRESS NOTES
Patient due for the following    TETANUS VACCINE     Shingles Vaccine (1 of 2)    Pneumococcal Vaccines (Age 65+) (2 - PPSV23 if available, else PCV20)    COVID-19 Vaccine (4 - Pfizer series)      Immunizations: reviewed and updated  Care Everywhere: triggered  Care Teams: up to date  Outreach: none needed    [facility] outreach completed.  Appointment completed 5/31/2023

## 2023-06-17 PROBLEM — G93.40 ENCEPHALOPATHY: Status: ACTIVE | Noted: 2023-01-01

## 2023-06-17 PROBLEM — E87.5 HYPERKALEMIA: Status: ACTIVE | Noted: 2023-01-01

## 2023-06-18 PROBLEM — J69.0 ASPIRATION PNEUMONITIS: Status: ACTIVE | Noted: 2023-01-01

## 2023-06-18 NOTE — PLAN OF CARE
Problem: Adult Inpatient Plan of Care  Goal: Plan of Care Review  Outcome: Ongoing, Not Progressing  Goal: Patient-Specific Goal (Individualized)  Outcome: Ongoing, Not Progressing  Goal: Absence of Hospital-Acquired Illness or Injury  Outcome: Ongoing, Not Progressing  Goal: Optimal Comfort and Wellbeing  Outcome: Ongoing, Not Progressing  Goal: Readiness for Transition of Care  Outcome: Ongoing, Not Progressing     Problem: Adjustment to Illness (Sepsis/Septic Shock)  Goal: Optimal Coping  Outcome: Ongoing, Not Progressing     Problem: Bleeding (Sepsis/Septic Shock)  Goal: Absence of Bleeding  Outcome: Ongoing, Not Progressing     Problem: Glycemic Control Impaired (Sepsis/Septic Shock)  Goal: Blood Glucose Level Within Desired Range  Outcome: Ongoing, Not Progressing     Problem: Infection Progression (Sepsis/Septic Shock)  Goal: Absence of Infection Signs and Symptoms  Outcome: Ongoing, Not Progressing     Problem: Nutrition Impaired (Sepsis/Septic Shock)  Goal: Optimal Nutrition Intake  Outcome: Ongoing, Not Progressing     Problem: Fluid Imbalance (Pneumonia)  Goal: Fluid Balance  Outcome: Ongoing, Not Progressing     Problem: Infection (Pneumonia)  Goal: Resolution of Infection Signs and Symptoms  Outcome: Ongoing, Not Progressing     Problem: Respiratory Compromise (Pneumonia)  Goal: Effective Oxygenation and Ventilation  Outcome: Ongoing, Not Progressing     Problem: Impaired Wound Healing  Goal: Optimal Wound Healing  Outcome: Ongoing, Not Progressing     Problem: Fall Injury Risk  Goal: Absence of Fall and Fall-Related Injury  Outcome: Ongoing, Not Progressing     Problem: Infection  Goal: Absence of Infection Signs and Symptoms  Outcome: Ongoing, Not Progressing     Problem: Confusion Chronic  Goal: Optimal Cognitive Function  Outcome: Ongoing, Not Progressing     Problem: Hypertension Acute  Goal: Blood Pressure Within Desired Range  Outcome: Ongoing, Not Progressing     Problem: Skin Injury Risk  Increased  Goal: Skin Health and Integrity  Outcome: Ongoing, Not Progressing

## 2023-06-18 NOTE — ASSESSMENT & PLAN NOTE
Likely due to acute aspiration pneumonia versus pneumonitis  At baseline per brother-in-law  Discussed with neurology. Given he is at baseline, unlikely their evaluation would add anything at this time. Will defer MRI of brain and EEG at this time  Patient at risk of developing delirium in acute illness due to his parkinson's and dementia  Delirium precautions

## 2023-06-18 NOTE — PLAN OF CARE
Problem: Occupational Therapy  Goal: Occupational Therapy Goal  Description: Goals to be met by: 7/18/23     Patient will increase functional independence with ADLs by performing:    UE Dressing with Modified [place].  LE Dressing with Minimal Assistance.  Grooming while standing at sink with Contact Guard Assistance.  Toileting from toilet with Minimal Assistance for hygiene and clothing management.   Supine to sit with Minimal Assistance.  Step transfer with Minimal Assistance  Toilet transfer to toilet with Minimal Assistance.  [place] with BUE HEP to improve activity tolerance to complete ADLs and IADLs  Within home ambulation distances with minimal assistance and LRAD necessary to complete ADLs.      Outcome: Ongoing, Progressing     Patient tolerated OT eval. Continue POC

## 2023-06-18 NOTE — ED PROVIDER NOTES
Encounter Date: 6/17/2023       History     Chief Complaint   Patient presents with    Altered Mental Status     [facility] reports increased AMS today     ERICKSON Lemus is an 89-year-old male with a past medical history of parkinsonism, BPH, difficulty hearing, multiple falls, history of ulcerative colitis status post colectomy, recent hospital admission presenting with altered mental status.  Patient is currently at the [facility] and is now presenting via EMS for altered mental status for 1 day.  His family members at bedside providing history who states he has been more confused, with concern for delirium and altered mental status.  No reports of falls, trauma noted.  No reported fevers or chills.  Per EMS vital signs are stable, blood sugar was not obtained.  Much of the history is obtained through medical records family member and EMS records as patient is unable to provide any history secondary to altered mental status and confusion.    Review of patient's allergies indicates:   Allergen Reactions    Heparin     Heparin analogues Other (See Comments)     Not true allergy - but patient developed large spontaneous RP hematoma and concurrent severe epistaxis while on DVT prophylactic dose heparin - consider mechanical DVT ppx in future     Past Medical History:   Diagnosis Date    BPH (benign prostatic hyperplasia)     Parkinsons 09/2019    R hand tremor starting in 2017, diagnosed Sept 2019 by Dr. Angeles at     Ulcerative colitis     s/p colectomy    Unspecified chronic bronchitis      Past Surgical History:   Procedure Laterality Date    TOTAL COLECTOMY       History reviewed. No pertinent family history.  Social History     Tobacco Use    Smoking status: Never    Smokeless tobacco: Never   Substance Use Topics    Alcohol use: Never     Review of Systems   Unable to perform ROS: Mental status change     Physical Exam     Initial Vitals   BP Pulse Resp Temp SpO2   06/17/23 2033 06/17/23 2033 06/17/23  2033 06/17/23 2034 06/17/23 2033   131/86 (!) 57 18 98.5 °F (36.9 °C) 100 %      MAP       --                Physical Exam    Nursing note and vitals reviewed.    Gen/Constitutional:  Altered, confused, appears cachectic no acute distress but chronically ill-appearing.  Head: Normocephalic, Atraumatic  Neck: supple, no masses or LAD, no JVD  Eyes: PERRLA, conjunctiva clear  Ears, Nose and Throat: No rhinorrhea or stridor.  Mucous membranes were dry.  Cardiac:  Regular rate, Reg Rhythm, there is a positive murmur heard.  Pulmonary: CTA Bilat, no wheezes, rhonchi, rales.  No increased work of breathing.  GI: Abdomen soft, non-tender, non-distended; no rebound or guarding  : No CVA tenderness.  Musculoskeletal: Extremities warm, well perfused, no erythema, no edema  Skin: No rashes, cyanosis or jaundice.  There is a stage 1 to 2 decubitus ulcer on the sacral approximately 3 x 4 cm.  Neuro:  Hard of hearing, intermittently follows commands, appears confused and disoriented  Psych: Normal affect      ED Course   Procedures  Labs Reviewed   CBC W/ AUTO DIFFERENTIAL - Abnormal; Notable for the following components:       Result Value    RBC 3.65 (*)     Hemoglobin 10.0 (*)     Hematocrit 32.8 (*)     MCHC 30.5 (*)     RDW 22.7 (*)     Lymph # 0.9 (*)     Gran % 83.7 (*)     Lymph % 9.5 (*)     All other components within normal limits    Narrative:     Release to patient->Immediate   COMPREHENSIVE METABOLIC PANEL - Abnormal; Notable for the following components:    Potassium 6.2 (*)     CO2 22 (*)     BUN 29 (*)     Albumin 2.8 (*)     Alkaline Phosphatase 149 (*)     ALT <5 (*)     All other components within normal limits    Narrative:     Release to patient->Immediate   URINALYSIS, REFLEX TO URINE CULTURE - Abnormal; Notable for the following components:    Protein, UA Trace (*)     Occult Blood UA Trace (*)     Leukocytes, UA 1+ (*)     All other components within normal limits    Narrative:     Specimen  Source->Urine   URINALYSIS MICROSCOPIC - Abnormal; Notable for the following components:    WBC, UA 9 (*)     All other components within normal limits    Narrative:     Specimen Source->Urine   ISTAT PROCEDURE - Abnormal; Notable for the following components:    POC BUN 32 (*)     POC Potassium 5.7 (*)     POC Hematocrit 34 (*)     All other components within normal limits   POCT GLUCOSE - Abnormal; Notable for the following components:    POCT Glucose 195 (*)     All other components within normal limits   CULTURE, RESPIRATORY   HIV 1 / 2 ANTIBODY    Narrative:     Release to patient->Immediate   HEPATITIS C ANTIBODY    Narrative:     Release to patient->Immediate   TSH    Narrative:     Release to patient->Immediate   MAGNESIUM    Narrative:     Release to patient->Immediate   TROPONIN I    Narrative:     Release to patient->Immediate   LACTIC ACID, PLASMA    Narrative:     Release to patient->Immediate   PROCALCITONIN   ISTAT LACTATE   ISTAT CHEM8     EKG Readings: (Independently Interpreted)   Initial Reading: No STEMI. Previous EKG: Compared with most recent EKG Rhythm: Normal Sinus Rhythm. Heart Rate: 99. ST Segments: Non-Specific ST Segment Depression.   ECG Results              EKG 12-lead (Final result)  Result time 06/18/23 09:12:30      Final result by Interface, Lab In [facility] (06/18/23 09:12:30)                   Narrative:    Test Reason : R41.82,    Vent. Rate : 099 BPM     Atrial Rate : 099 BPM     P-R Int : 152 ms          QRS Dur : 110 ms      QT Int : 444 ms       P-R-T Axes : 105 083 083 degrees     QTc Int : 569 ms    Baseline Artifact  repeat EKG  Confirmed by AASHISH KEMP MD ([id number]) on 6/18/2023 9:12:24 AM    Referred By: System System           Confirmed By:AASHISH KEMP MD                                  Imaging Results              X-Ray Chest AP Portable (Final result)  Result time 06/17/23 21:58:16      Final result by Godwin Fishman MD (06/17/23 21:58:16)                    Impression:      Focal opacity right mid lung zone has diminished from prior.    Continued airspace opacity and pleural effusion/reaction at the left base, similar to prior.    Otherwise, no significant change from prior.      Electronically signed by: Godwin Fishman MD  Date:    06/17/2023  Time:    21:58               Narrative:    EXAMINATION:  XR CHEST AP PORTABLE    CLINICAL HISTORY:  ams;    TECHNIQUE:  Single frontal view of the chest was performed.    COMPARISON:  06/02/2023.    FINDINGS:  Focal opacity right mid lung zone has diminished from prior.    Continued airspace opacity and pleural effusion/reaction at the left base, similar to prior.    Heart and lungs otherwise appear unchanged when allowing for differences in technique and positioning.                                       CT Head Without Contrast (Final result)  Result time 06/17/23 21:25:53      Final result by Godwin Fishman MD (06/17/23 21:25:53)                   Impression:      No acute intracranial abnormalities.    Chronic changes, as above, similar to prior.      Electronically signed by: Godwin Fishman MD  Date:    06/17/2023  Time:    21:25               Narrative:    EXAMINATION:  CT HEAD WITHOUT CONTRAST    CLINICAL HISTORY:  Mental status change, unknown cause;    TECHNIQUE:  Low dose axial images were obtained through the head.  Coronal and sagittal reformations were also performed. Contrast was not administered.    COMPARISON:  06/02/2023.    FINDINGS:  The brain parenchyma appears normal for age with good corticomedullary differentiation.  There is no evidence of acute infarct, hemorrhage, or mass.  There is ventricular and sulcal enlargement consistent with generalized atrophy.  Mild confluent decreased supratentorial white matter attenuation most likely related to chronic nonspecific small vessel disease.   No mass-effect or midline shift.  There are no abnormal extra-axial fluid collections.  Chronic changes involving  the paranasal sinuses and mastoids, similar compared to prior.  .  The calvarium appears intact.  .                                    X-Rays:   Independently Interpreted Readings:   Chest X-Ray: There is continued airspace opacity and pleural effusion on the left lung field, improved right mid lung zone opacity from previous x-ray, there is no pneumothorax or free air   Head CT: No hemorrhage.  No skull fracture.  No acute stroke.   Medications   carbidopa-levodopa  mg per tablet 1 tablet (1 tablet Oral Given 6/18/23 0939)   finasteride tablet 5 mg (5 mg Oral Given 6/18/23 0939)   modafiniL tablet 100 mg (100 mg Oral Given 6/18/23 0939)   polyethylene glycol packet 17 g (has no administration in time range)   sodium zirconium cyclosilicate packet 10 g (10 g Oral Not Given 6/17/23 2215)   piperacillin-tazobactam (ZOSYN) 4.5 g in dextrose 5 % in water (D5W) 5 % 100 mL IVPB (MB+) (0 g Intravenous Stopped 6/18/23 1027)   sodium chloride 0.9% flush 10 mL (has no administration in time range)   naloxone 0.4 mg/mL injection 0.02 mg (has no administration in time range)   glucose chewable tablet 16 g (has no administration in time range)   glucose chewable tablet 24 g (has no administration in time range)   glucagon (human recombinant) injection 1 mg (has no administration in time range)   acetaminophen tablet 1,000 mg (has no administration in time range)   morphine injection 4 mg (has no administration in time range)   senna-docusate 8.6-50 mg per tablet 1 tablet (1 tablet Oral Not Given 6/18/23 0939)   ondansetron injection 4 mg (has no administration in time range)   bisacodyL suppository 10 mg (has no administration in time range)   dextrose 10% bolus 125 mL 125 mL (has no administration in time range)   dextrose 10% bolus 250 mL 250 mL (has no administration in time range)   0.9%  NaCl infusion ( Intravenous New Bag 6/18/23 1118)   sodium chloride 0.9% bolus 1,000 mL 1,000 mL (0 mLs Intravenous Stopped 6/17/23  "2207)   insulin regular injection 5 Units 0.05 mL (5 Units Intravenous Given 6/17/23 2302)   dextrose 10% bolus 250 mL 250 mL (0 mLs Intravenous Stopped 6/17/23 2251)     Medical Decision Making:   History:   I obtained history from: EMS provider.       <> Summary of History: Brought in via EMS from [facility] for evaluation for altered mental status  Old Medical Records: I decided to obtain old medical records.  Old Records Summarized: records from previous admission(s).       <> Summary of Records: 6/2/23:  Admitted for pneumonia, sepsis evaluation -broad-spectrum antibiotics administered. Patient is medically stable for discharge to inpatient rehab with instructions to continue antibiotics for pneumonia for 2 more days.   Initial Assessment:   Giselle Lemus is an 89-year-old male with a past medical history of parkinsonism, BPH, difficulty hearing, multiple falls, history of ulcerative colitis status post colectomy, recent hospital admission presenting with altered mental status.    Differential Diagnosis:   Altered mental status, pneumonia, UTI, wound sepsis, bacteremia, intracranial process  Independently Interpreted Test(s):   I have ordered and independently interpreted X-rays - see prior notes.  I have ordered and independently interpreted EKG Reading(s) - see prior notes  Clinical Tests:   Lab Tests: Ordered and Reviewed  Radiological Study: Ordered and Reviewed  Medical Tests: Ordered and Reviewed  Sepsis Perfusion Assessment: "I attest a sepsis perfusion exam was performed within 6 hours of sepsis, severe sepsis, or septic shock presentation, following fluid resuscitation."  Other:   I have discussed this case with another health care provider.       <> Summary of the Discussion: HM                 Emergent evaluation of patient presenting chronically ill-appearing, with altered mental status, confusion in the setting of recent discharge from the hospital for pneumonia and sepsis.  Physical exam findings " remarkable for emaciated and cachectic male with altered mental status, confusion but able to maintain airway.  A family members present at bedside providing much of the history as unable to obtain secondary to altered mental status, confusion.  Broad workup including sepsis workup, altered mental status workup with ECG, chest x-ray and head CT.  He has a stage 1-2 decubitus ulcer on his sacrum and is having loose stools without blood.  A Mepilex wound dressing was placed and documented wound with nursing team.  Full labs, UA obtained.  Family at bedside confirming increased delirium altered mental status and more confusion over the last 24 hours.  Labs show slight leukocytosis with left shift.  Chest x-ray shows continued evidence of right middle lobe and lower lobe consolidations but some improvement in the right lobe.  GCS currently 11.  CT head negative for acute intracranial finding.  UA without evidence of acute UTI.  Discussed case with hospital medicine, will admit to observation for further management of this cachectic and chronically ill-appearing patient.  Will defer antibiotics to admitting team for concern for aspiration.    Complexity:  High-risk       Clinical Impression:   Final diagnoses:  [R41.82] Altered mental status, unspecified altered mental status type (Primary)  [R41.82] AMS (altered mental status)        ED Disposition Condition    Observation Stable               Amilcar Maki DO, FAAEM  Emergency Staff Physician   Dept of Emergency Medicine   [facility]  Spectralink: [id number]        Disclaimer: This note has been generated using voice-recognition software. There may be typographical errors that have been missed during proof-reading.       Amilcar Maki DO  06/18/23 1238

## 2023-06-18 NOTE — ASSESSMENT & PLAN NOTE
- K+ 6.2 on presentation and shifted in ED  - EKG on admitted non-interpretable  - unclear etiology. Creatinine wnl. Patient appears dehydrated and suspect inadequate po intake may be contributing.   - intermittent in past without known etiology  - consider renal evaluation as outpatient  - lokelma 10 g daily for now. Can likely discharge on low doses as outpatient

## 2023-06-18 NOTE — PLAN OF CARE
Plan of Care:  PT evaluation completed- see note for details. Goals and POC established.     2023    Physical Therapy Treatment Goals:  Multidisciplinary Problems       Physical Therapy Goals          Problem: Physical Therapy    Goal Priority Disciplines Outcome Goal Variances Interventions   Physical Therapy Goal     PT, PT/OT Ongoing, Progressing     Description: Goals to be met by: 23     Patient will increase functional independence with mobility by performin. Supine to sit with Minimal Assistance  2. Sit to supine with Minimal Assistance  3. Sit to stand transfer with Minimal Assistance  4. Gait  x 50 feet with Minimal Assistance using Rolling Walker.   5. Lower extremity exercise program x20 reps per handout, with assistance as needed

## 2023-06-18 NOTE — ASSESSMENT & PLAN NOTE
- concern for recurrent aspiration  - start zosyn  - pureed nectar thick diet  - follow up respiratory and blood cultures

## 2023-06-18 NOTE — ED NOTES
I-STAT Chem-8+ Results:   Value Reference Range   Sodium 137 136-145 mmol/L   Potassium  5.7 3.5-5.1 mmol/L   Chloride 107  mmol/L   Ionized Calcium 1.15 1.06-1.42 mmol/L   CO2 (measured) 26 23-29 mmol/L   Glucose 107  mg/dL   BUN 32 6-30 mg/dL   Creatinine 0.9 0.5-1.4 mg/dL   Hematocrit 34 36-54%

## 2023-06-18 NOTE — ASSESSMENT & PLAN NOTE
- K+ 6.2 on presentation  - shifted in ED  - monitor tele  - unclear etiology. Creatinine wnl. Patient appears dehydrated and suspect inadequate po intake may be contributing.   - continue to monitor and further management pending clinical course

## 2023-06-18 NOTE — PROGRESS NOTES
Hospital Medicine  Progress note    Team: [facility] R Poppy Murillo MD  Admit Date: 6/17/2023    Principal Problem:  Encephalopathy    Interval hx:  Patient improved this morning and at baseline per brother-in-law    ROS   Respiratory: neg for cough neg for shortness of breath  Cardiovascular: neg for chest pain neg for palpitations  Gastrointestinal: neg for nausea neg for vomiting, neg for abdominal pain neg for diarrhea neg for constipation   Behavioral/Psych: neg for depression neg for anxiety    PEx  Temp:  [97.3 °F (36.3 °C)-98.5 °F (36.9 °C)]   Pulse:  []   Resp:  [12-18]   BP: ()/(54-86)   SpO2:  [69 %-100 %]     Intake/Output Summary (Last 24 hours) at 6/18/2023 1630  Last data filed at 6/17/2023 2251  Gross per 24 hour   Intake 1251.79 ml   Output --   Net 1251.79 ml       General Appearance: no acute distress, WD, elderly, frail-appearing  Heart: regular rate and rhythm, no heave  Respiratory: Normal respiratory effort, symmetric excursion, bilateral vesicular breath sounds   Abdomen: Soft, non-tender; bowel sounds active  Skin: intact, no rash, no ulcers  Neurologic:  No focal numbness or weakness  Mental status: Alert, oriented to name and place, affect appropriate     Recent Labs   Lab 06/17/23 2056 06/17/23 2113 06/18/23 0413   WBC 9.17  --  9.64   HGB 10.0*  --  7.7*   HCT 32.8* 34* 25.5*     --  348     Recent Labs   Lab 06/17/23 2056 06/18/23  0413    141   K 6.2* 5.5*    110   CO2 22* 25   BUN 29* 26*   CREATININE 0.8 0.8    62*   CALCIUM 9.2 8.6*   MG 2.2 1.9   PHOS  --  3.0     Recent Labs   Lab 06/17/23 2056 06/18/23  0413   ALKPHOS 149* 118   ALT <5* 5*   AST 19 16   ALBUMIN 2.8* 2.4*   PROT 7.1 5.9*   BILITOT 0.1 0.2        Recent Labs   Lab 06/17/23 2254   POCTGLUCOSE 195*       Scheduled Meds:   carbidopa-levodopa  mg  1 tablet Oral QID    finasteride  5 mg Oral Daily    modafiniL  100 mg Oral BID    piperacillin-tazobactam (Zosyn) IV  (PEDS and ADULTS) (extended infusion is not appropriate)  4.5 g Intravenous Q8H    senna-docusate 8.6-50 mg  1 tablet Oral BID     Continuous Infusions:   sodium chloride 0.9% 100 mL/hr at 06/18/23 1118     As Needed:  acetaminophen, bisacodyL, dextrose 10%, dextrose 10%, glucagon (human recombinant), glucose, glucose, morphine, naloxone, ondansetron, polyethylene glycol, sodium chloride 0.9%    Assessment and Plan  / Problems managed today    Acute metabolic encephalopathy  Likely due to acute aspiration pneumonia versus pneumonitis  At baseline per brother-in-law  Discussed with neurology. Given he is at baseline, unlikely their evaluation would add anything at this time. Will defer MRI of brain and EEG at this time  Patient at risk of developing delirium in acute illness due to his parkinson's and dementia  Delirium precautions    Multifocal pneumonia  Aspiration pneumonitis  Patient on RA  CXR somewhat improved previous admission. Do not suspect untreated or even newly acute pneumonia. Given improvement overnight, likely patient experiencing acute pneumonitis.   - concern for recurrent aspiration   SLP consult   MBSS  - continue zosyn empirically  - follow up respiratory and blood cultures  - pleural effusion and IVF resuscitation - give furosemide 40 mg IV once    Hyperkalemia  - K+ 6.2 on presentation and shifted in ED  - EKG on admitted non-interpretable  - unclear etiology. Creatinine wnl. Patient appears dehydrated and suspect inadequate po intake may be contributing.   - intermittent in past without known etiology  - consider renal evaluation as outpatient  - lokelma 10 g daily for now. Can likely discharge on low doses as outpatient    Parkinson's disease  - continue home sinemet    Excessive daytime sleepiness  - continue home modafinil    Benign prostatic hyperplasia  - continue home proscar      Discharge Planning   JOHAN:      Code Status: Full Code   Is the patient medically ready for discharge?:      Reason for patient still in hospital (select all that apply): Patient trending condition  Discharge Plan A: Rehab      Diet:  low potassium diet  GI PPx: not needed  DVT PPx:  SCD/KULWANT, AC held due to history of retroperitoneal hematoma  Airways: room air  Wounds: none    Goals of Care:  Return to prior functional status     Time (minutes) spent in care of the patient including review of tests, flow sheets and notes since last visit, face to face contact, placing orders, communicating with consultants if needed, care coordination, and documentation: 35 min.    Poppy Murillo MD

## 2023-06-18 NOTE — H&P
[facility] - Emergency Dept  [facility] Medicine  History & Physical    Patient Name: Giselle Lemus  MRN: [id number]  Patient Class: OP- Observation  Admission Date: 6/17/2023  Attending Physician: Justin Tam MD   Primary Care Provider: Tl Torres MD         Patient information was obtained from patient, past medical records and ER records.     Subjective:     Principal Problem:Encephalopathy    Chief Complaint:   Chief Complaint   Patient presents with    Altered Mental Status     [facility] reports increased AMS today        HPI: Giselle Lemus is an 89-year-old male with a past medical history of parkinsonism, dementia, BPH, difficulty hearing, multiple falls, history of ulcerative colitis status post colectomy, recent hospital admission presenting with altered mental status.  Patient is currently at the [facility] and is now presenting via EMS for altered mental status for 1 day.  His family members at bedside providing history who states he has been more confused, with concern for delirium and altered mental status.  No reports of falls, trauma noted.  No reported fevers or chills.  Per EMS vital signs are stable, blood sugar was not obtained.  Much of the history is obtained through medical records family member and EMS records as patient is unable to provide any history secondary to altered mental status and confusion.    In the ED patient afebrile and hemodynamically stable saturating well on room air. WBC 9.17 with left shift. CXR performed and with continued evidence of right middle lobe and lower lobe consolidations similar to recent. Patient seen by SLP during recent admission who advised pureed nectar thick diet. Patient with continued productive cough since discharge per family. CTH without acute process. Patient cachectic and chronically ill appearing. GCS 11. Patient started on iv zosyn for concern of aspiration. MRI and EEG pending to further evaluate cause of AMS. Admitted to the care of  medicine for further evaluation and management.       Past Medical History:   Diagnosis Date    BPH (benign prostatic hyperplasia)     Parkinsons 09/2019    R hand tremor starting in 2017, diagnosed Sept 2019 by Dr. Angeles at     Ulcerative colitis     s/p colectomy    Unspecified chronic bronchitis        Past Surgical History:   Procedure Laterality Date    TOTAL COLECTOMY         Review of patient's allergies indicates:   Allergen Reactions    Heparin     Heparin analogues Other (See Comments)     Not true allergy - but patient developed large spontaneous RP hematoma and concurrent severe epistaxis while on DVT prophylactic dose heparin - consider mechanical DVT ppx in future       Current Facility-Administered Medications on File Prior to Encounter   Medication    [DISCONTINUED] polyethylene glycol powder    [DISCONTINUED] simethicone chewable tablet     Current Outpatient Medications on File Prior to Encounter   Medication Sig    artificial tears (ISOPTO TEARS) 0.5 % ophthalmic solution Place 2 drops into both eyes 4 (four) times daily as needed.    balsam peru-castor oiL Oint Apply topically 2 (two) times a day. Apply to buttocks    carbidopa-levodopa  mg (SINEMET)  mg per tablet TAKE 1 TABLET BY MOUTH 4 (FOUR) TIMES A DAY FOR 30 DAYS.    finasteride (PROSCAR) 5 mg tablet Take 1 tablet (5 mg total) by mouth once daily.    furosemide (LASIX) 20 MG tablet Take 0.5 tablets (10 mg total) by mouth once daily. As needed for weight gain of 3 lb in 1 day, 5 lb in 1 week, or significant leg edema    melatonin (MELATIN) 3 mg tablet Take 2 tablets (6 mg total) by mouth nightly.    modafiniL (PROVIGIL) 100 MG Tab Take 1 tablet (100 mg total) by mouth 2 (two) times daily. (Morning and early afternoon)    polyethylene glycol (GLYCOLAX) 17 gram PwPk Take 17 g by mouth 2 (two) times daily as needed (constipation).    sodium chloride (OCEAN) 0.65 % nasal spray 1 spray by Nasal route as needed  (irritation).     Family History    None       Tobacco Use    Smoking status: Never    Smokeless tobacco: Never   Substance and Sexual Activity    Alcohol use: Never    Drug use: Not on file    Sexual activity: Not on file     Review of Systems   Unable to perform ROS: Mental status change   Objective:     Vital Signs (Most Recent):  Temp: 98.5 °F (36.9 °C) (06/17/23 2034)  Pulse: 60 (06/17/23 2131)  Resp: 17 (06/17/23 2131)  BP: (!) 142/63 (06/17/23 2131)  SpO2: 97 % (06/17/23 2131) Vital Signs (24h Range):  Temp:  [98.5 °F (36.9 °C)] 98.5 °F (36.9 °C)  Pulse:  [57-60] 60  Resp:  [17-18] 17  SpO2:  [97 %-100 %] 97 %  BP: (131-142)/(63-86) 142/63     Weight: 54.4 kg (120 lb)  Body mass index is 19.97 kg/m².     Physical Exam  Constitutional:       General: He is not in acute distress.     Appearance: He is ill-appearing. He is not toxic-appearing or diaphoretic.      Comments: Cachectic. Chronically ill appearing   HENT:      Head: Normocephalic and atraumatic.      Comments: Temporal wasting     Nose: Nose normal.   Eyes:      General: No scleral icterus.     Extraocular Movements: Extraocular movements intact.      Pupils: Pupils are equal, round, and reactive to light.   Cardiovascular:      Rate and Rhythm: Normal rate and regular rhythm.   Pulmonary:      Effort: Pulmonary effort is normal. No respiratory distress.      Breath sounds: No wheezing or rales.   Abdominal:      General: Abdomen is flat. There is no distension.      Palpations: Abdomen is soft.      Tenderness: There is no abdominal tenderness. There is no guarding.   Musculoskeletal:      Cervical back: Normal range of motion and neck supple. No rigidity.      Right lower leg: No edema.      Left lower leg: No edema.   Skin:     General: Skin is warm and dry.      Coloration: Skin is not jaundiced.   Neurological:      Mental Status: He is alert. He is disoriented.      Comments: Alert. Sparse of speech. Disoriented. GCS 11.            CRANIAL  NERVES     CN III, IV, VI   Pupils are equal, round, and reactive to light.     Significant Labs: All pertinent labs within the past 24 hours have been reviewed.  CBC:   Recent Labs   Lab 06/17/23 2056 06/17/23 2113   WBC 9.17  --    HGB 10.0*  --    HCT 32.8* 34*     --      CMP:   Recent Labs   Lab 06/17/23 2056      K 6.2*      CO2 22*      BUN 29*   CREATININE 0.8   CALCIUM 9.2   PROT 7.1   ALBUMIN 2.8*   BILITOT 0.1   ALKPHOS 149*   AST 19   ALT <5*   ANIONGAP 10     Lactic Acid:   Recent Labs   Lab 06/17/23 2056   LACTATE 1.1     Urine Studies:   Recent Labs   Lab 06/17/23 2051   COLORU Yellow   APPEARANCEUA Clear   PHUR 5.0   SPECGRAV 1.020   PROTEINUA Trace*   GLUCUA Negative   KETONESU Negative   BILIRUBINUA Negative   OCCULTUA Trace*   NITRITE Negative   LEUKOCYTESUR 1+*   RBCUA 3   WBCUA 9*   BACTERIA Rare   SQUAMEPITHEL 1       Significant Imaging: I have reviewed all pertinent imaging results/findings within the past 24 hours.    Assessment/Plan:     * Encephalopathy  - patient significantly encephalopathic in setting of known parkinson's dementia. Chronically ill appearing, cachectic. Uclear etiology, possibly due to recurrent aspiration? Vs atypical seizure/post ictal vs advancing dementia vs other.  - zosyn for concern of aspiration  - neurochecks q4h  - CTH without acute process  - MRI Brain pending  - EEG pending  - Neurology consult in am  - PT/OT consulted  - Patient and family would likely greatly benefit from palliative care consult. Confirmed Full code status with patient family.      Hyperkalemia  - K+ 6.2 on presentation  - shifted in ED  - monitor tele  - unclear etiology. Creatinine wnl. Patient appears dehydrated and suspect inadequate po intake may be contributing.   - continue to monitor and further management pending clinical course      Parkinson's disease  - continue home sinemet      Multifocal pneumonia  - concern for recurrent aspiration  - start  zosyn  - pureed nectar thick diet  - follow up respiratory and blood cultures      Excessive daytime sleepiness  - continue home modafinil      Benign prostatic hyperplasia  - continue home proscar        VTE Risk Mitigation (From admission, onward)         Ordered     IP VTE HIGH RISK PATIENT  Once         06/17/23 2222     Place sequential compression device  Until discontinued         06/17/23 2222                   On 06/17/2023, patient should be placed in hospital observation services under my care.        Justin Tam MD  Department of Hospital Medicine  [facility] - Emergency Dept

## 2023-06-18 NOTE — PT/OT/SLP EVAL
Physical Therapy Co-Evaluation with OT and Treatment     Patient Name:  Giselle Lemus  MRN: [id number]    Admit Date: 6/17/2023  Admitting Diagnosis:  Encephalopathy  Length of Stay: 0 days  Recent Surgery: * No surgery found *      Recommendations:     Discharge Recommendations: Inpatient Rehabilitation Facility   Equipment recommendations:  TBD at next level of care  Barriers to discharge: Increased level of skilled assistance required and Fall risk     Assessment:     Giselle Lemus is a 89 y.o. male admitted to [facility] (as transfer from inpatient rehab) on 6/17/2023 with medical diagnosis of Encephalopathy. Pt presents with weakness, impaired endurance, impaired self care skills, impaired functional mobility, impaired balance, gait instability, impaired cognition, decreased lower extremity function, decreased safety awareness, pain.  Due to AMS and hearing deficits, pt with difficulty consistently following commands and responding to cueing from therapist. Pt is not at PLOF, and remains at risk for falls. Giselle Lemus would benefit from acute PT intervention to improve quality of life, focus on recovery of impairments, provide patient/caregiver education, reduce fall risk, and maximize (I) and safety with functional mobility.     Rehab Prognosis: Good    Plan:     During this hospitalization, patient to be seen 3 x/week to address the identified rehab impairments via gait training, therapeutic activities, therapeutic exercises, neuromuscular re-education and progress towards stated goals.     Plan of Care Expires:  07/18/23  Plan of Care reviewed with: patient    This plan of care has been discussed with the patient/caregiver, who was included in its development and is in agreement with the identified goals and treatment plan.     Subjective     Communicated with RN prior to session.  Patient found HOB elevated upon PT entry to room, agreeable to evaluation. Pt's brother-in-law present during session.    Chief Complaint:  Altered Mental Status ([facility] reports increased AMS today)    Patient/Family Comments/goals: no goals stated     Pain/Comfort:  Pain Rating 1:  (unrated abdominal and R foot pain reported)  Pain Addressed 1: Reposition, Distraction, Nurse notified    Patients cultural, spiritual, Yarsanism conflicts given the current situation: None identified     Patient History: information obtained from pt chart and pt's brother-in-law (pt with difficulty providing history due to hearing deficits and impaired cognition)      Living Environment: Pt lives with spouse in single level home  with no BRENDA. Bathroom set-up: tub/shower combo  Prior Level of Function: modified (I) for mobility and ADLs using RW as AD   DME owned: rolling walker and raised toilet seat  Support Available/Caregiver Assistance:  assist from family and hired sitters  (pt has 24/7 assist)     Objective:     Patient found with:  condom cath not in place and pt soiled (RN notified).     Recent Surgery: * No surgery found *    General Precautions: Standard, fall   Orthopedic Precautions:N/A   Braces: N/A   Oxygen Device: room air      Exams:    Cognition:  Alert when provided with stimuli, returned to sleep quickly after return to supine at end of session   Command following: inconsistently able to follow single step commands with increased cueing   Pt is very [place]; pt's hearing aides need to  be fixed per brother-in-law    Sensation:   Light touch sensation: grossly intact     Edema/Skin Integrity: None noted; Visible skin intact    Postural examination/scapula alignment: Rounded shoulder and Head forward    Lower Extremity Range of Motion:  Right Lower Extremity: WFL  Left Lower Extremity: WFL    Lower Extremity Strength:  Grossly 3/5 bilaterally; formal MMT difficult to determine due to cognitive and hearing deficits     Functional Mobility:    Bed Mobility:  Scooting with total assistance  Supine > Sit with maximal assistance  Sit > Supine with maximal  assistance    Transfers:   Sit <> Stand Transfer:  Moderate Assistance on 1st trials from EOB with no AD   Minimal assistance on 2nd trial from EOB with no AD            *RN present and assisted with cleaning pt during standing trials 2/2 pt soiled. Pt cleaned and linens changed.      Gait:  Distance: ~ 2 side steps to L along bedside, requiring moderate assistance for weight shifting and to maintain balance. Distance limited due to impaired balance, LE weakness and pt AMS.   Gait Assessment: narrow base of support and unsteady gait     Balance:  Dynamic Sitting: FAIR+: Maintains balance through MINIMAL excursions of active trunk motion  Standing:  Static: POOR+: Needs MINIMAL assist to maintain   Dynamic: POOR: Needs MOD (moderate) assist during gait    Outcome Measure: AM-PAC 6 CLICK MOBILITY  Total Score:11     Patient/Caregiver Education:     Therapist educated pt/caregiver regarding:   PT POC and goals for therapy   Safety with mobility and fall risk   Instruction on use of call button and importance of calling nursing staff for assistance with mobility   Time provided for therapeutic counseling and discussion of current health disposition. All questions answered to satisfaction, within scope of PT practice     Patient/caregiver able to verbalize understanding and expressed no further questions this visit; will follow-up with pt/caregiver during current admit for additional questions/concerns within scope of practice.     White board updated.     Patient left HOB elevated with all lines intact, call button in reach, RN notified, and brother-in-law present.    GOALS:   Multidisciplinary Problems       Physical Therapy Goals          Problem: Physical Therapy    Goal Priority Disciplines Outcome Goal Variances Interventions   Physical Therapy Goal     PT, PT/OT Ongoing, Progressing     Description: Goals to be met by: 23     Patient will increase functional independence with mobility by performin.  Supine to sit with Minimal Assistance  2. Sit to supine with Minimal Assistance  3. Sit to stand transfer with Minimal Assistance  4. Gait  x 50 feet with Minimal Assistance using Rolling Walker.   5. Lower extremity exercise program x20 reps per handout, with assistance as needed                           History:     Past Medical History:   Diagnosis Date    BPH (benign prostatic hyperplasia)     Parkinsons 09/2019    R hand tremor starting in 2017, diagnosed Sept 2019 by Dr. Angeles at     Ulcerative colitis     s/p colectomy    Unspecified chronic bronchitis        Past Surgical History:   Procedure Laterality Date    TOTAL COLECTOMY         Time Tracking:     PT Received On: 06/18/23  PT Start Time: 0925     PT Stop Time: 0951  PT Total Time (min): 26 min     Billable Minutes: Evaluation 16 min and Therapeutic Activity 10 min    06/18/2023

## 2023-06-18 NOTE — SUBJECTIVE & OBJECTIVE
Past Medical History:   Diagnosis Date    BPH (benign prostatic hyperplasia)     Parkinsons 09/2019    R hand tremor starting in 2017, diagnosed Sept 2019 by Dr. Angeles at     Ulcerative colitis     s/p colectomy    Unspecified chronic bronchitis        Past Surgical History:   Procedure Laterality Date    TOTAL COLECTOMY         Review of patient's allergies indicates:   Allergen Reactions    Heparin     Heparin analogues Other (See Comments)     Not true allergy - but patient developed large spontaneous RP hematoma and concurrent severe epistaxis while on DVT prophylactic dose heparin - consider mechanical DVT ppx in future       Current Facility-Administered Medications on File Prior to Encounter   Medication    [DISCONTINUED] polyethylene glycol powder    [DISCONTINUED] simethicone chewable tablet     Current Outpatient Medications on File Prior to Encounter   Medication Sig    artificial tears (ISOPTO TEARS) 0.5 % ophthalmic solution Place 2 drops into both eyes 4 (four) times daily as needed.    balsam peru-castor oiL Oint Apply topically 2 (two) times a day. Apply to buttocks    carbidopa-levodopa  mg (SINEMET)  mg per tablet TAKE 1 TABLET BY MOUTH 4 (FOUR) TIMES A DAY FOR 30 DAYS.    finasteride (PROSCAR) 5 mg tablet Take 1 tablet (5 mg total) by mouth once daily.    furosemide (LASIX) 20 MG tablet Take 0.5 tablets (10 mg total) by mouth once daily. As needed for weight gain of 3 lb in 1 day, 5 lb in 1 week, or significant leg edema    melatonin (MELATIN) 3 mg tablet Take 2 tablets (6 mg total) by mouth nightly.    modafiniL (PROVIGIL) 100 MG Tab Take 1 tablet (100 mg total) by mouth 2 (two) times daily. (Morning and early afternoon)    polyethylene glycol (GLYCOLAX) 17 gram PwPk Take 17 g by mouth 2 (two) times daily as needed (constipation).    sodium chloride (OCEAN) 0.65 % nasal spray 1 spray by Nasal route as needed (irritation).     Family History    None       Tobacco Use    Smoking  status: Never    Smokeless tobacco: Never   Substance and Sexual Activity    Alcohol use: Never    Drug use: Not on file    Sexual activity: Not on file     Review of Systems   Unable to perform ROS: Mental status change   Objective:     Vital Signs (Most Recent):  Temp: 98.5 °F (36.9 °C) (06/17/23 2034)  Pulse: 60 (06/17/23 2131)  Resp: 17 (06/17/23 2131)  BP: (!) 142/63 (06/17/23 2131)  SpO2: 97 % (06/17/23 2131) Vital Signs (24h Range):  Temp:  [98.5 °F (36.9 °C)] 98.5 °F (36.9 °C)  Pulse:  [57-60] 60  Resp:  [17-18] 17  SpO2:  [97 %-100 %] 97 %  BP: (131-142)/(63-86) 142/63     Weight: 54.4 kg (120 lb)  Body mass index is 19.97 kg/m².     Physical Exam  Constitutional:       General: He is not in acute distress.     Appearance: He is ill-appearing. He is not toxic-appearing or diaphoretic.      Comments: Cachectic. Chronically ill appearing   HENT:      Head: Normocephalic and atraumatic.      Comments: Temporal wasting     Nose: Nose normal.   Eyes:      General: No scleral icterus.     Extraocular Movements: Extraocular movements intact.      Pupils: Pupils are equal, round, and reactive to light.   Cardiovascular:      Rate and Rhythm: Normal rate and regular rhythm.   Pulmonary:      Effort: Pulmonary effort is normal. No respiratory distress.      Breath sounds: No wheezing or rales.   Abdominal:      General: Abdomen is flat. There is no distension.      Palpations: Abdomen is soft.      Tenderness: There is no abdominal tenderness. There is no guarding.   Musculoskeletal:      Cervical back: Normal range of motion and neck supple. No rigidity.      Right lower leg: No edema.      Left lower leg: No edema.   Skin:     General: Skin is warm and dry.      Coloration: Skin is not jaundiced.   Neurological:      Mental Status: He is alert. He is disoriented.      Comments: Alert. Sparse of speech. Disoriented. GCS 11.            CRANIAL NERVES     CN III, IV, VI   Pupils are equal, round, and reactive to  light.     Significant Labs: All pertinent labs within the past 24 hours have been reviewed.  CBC:   Recent Labs   Lab 06/17/23 2056 06/17/23 2113   WBC 9.17  --    HGB 10.0*  --    HCT 32.8* 34*     --      CMP:   Recent Labs   Lab 06/17/23 2056      K 6.2*      CO2 22*      BUN 29*   CREATININE 0.8   CALCIUM 9.2   PROT 7.1   ALBUMIN 2.8*   BILITOT 0.1   ALKPHOS 149*   AST 19   ALT <5*   ANIONGAP 10     Lactic Acid:   Recent Labs   Lab 06/17/23 2056   LACTATE 1.1     Urine Studies:   Recent Labs   Lab 06/17/23 2051   COLORU Yellow   APPEARANCEUA Clear   PHUR 5.0   SPECGRAV 1.020   PROTEINUA Trace*   GLUCUA Negative   KETONESU Negative   BILIRUBINUA Negative   OCCULTUA Trace*   NITRITE Negative   LEUKOCYTESUR 1+*   RBCUA 3   WBCUA 9*   BACTERIA Rare   SQUAMEPITHEL 1       Significant Imaging: I have reviewed all pertinent imaging results/findings within the past 24 hours.

## 2023-06-18 NOTE — PT/OT/SLP EVAL
Occupational Therapy   Co-Evaluation & Co-Treatment    Name: Giselle Lemus  MRN: [id number]  Admitting Diagnosis: Encephalopathy  Recent Surgery: * No surgery found *      Recommendations:     Discharge Recommendations: rehabilitation facility  Discharge Equipment Recommendations:  none  Barriers to discharge:  Decreased caregiver support    Assessment:     Giselle Lemus is a 89 y.o. male with a medical diagnosis of Encephalopathy.  Patient admitted from [facility] due to change in medical status. He presents with confusion limiting ability to obtain thorough occupational profile, with information obtained from brother-in-law in room. Patient is able to stand with minimal-moderate assistance and maintain balance while OT completes levar hygiene following bowel movement. Difficulty with patient following directives potentially impacting occupational performance. At patient's current status, post-acute placement necessary to gain functional skills necessary to regain independence with ADLs and reduce caregiver burden. Performance deficits affecting function: weakness, impaired endurance, impaired self care skills, impaired functional mobility, impaired cognition, decreased upper extremity function, decreased lower extremity function, decreased coordination, decreased safety awareness, pain, decreased ROM, impaired skin.      Rehab Prognosis: Good; patient would benefit from acute skilled OT services to address these deficits and reach maximum level of function.       Plan:     Patient to be seen 3 x/week to address the above listed problems via self-care/home management, therapeutic activities, therapeutic exercises, neuromuscular re-education  Plan of Care Expires: 07/18/23  Plan of Care Reviewed with: patient, family    Subjective     Chief Complaint: patient pleasant and cooperative   Patient/Family Comments/goals: return to rehab    Occupational Profile:  Living Environment: patient lives with spouse in a one story  house with no steps to enter. Patient uses a bathtub shower combo.   Previous level of function: prior to recent hospitalizations, patient received some assistance with ADLs.   Roles and Routines:   Equipment Used at Home: walker, rolling, raised toilet  Assistance upon Discharge: patient has occasional sitters; patient's wife is unable to physically assist patient due to own physical limitations    Pain/Comfort:  Pain Rating 1: 0/10 (unrated)  Location 1: foot  Pain Addressed 1: Reposition, Distraction, Nurse notified  Pain Rating Post-Intervention 1: 0/10    Patients cultural, spiritual, Yazdanism conflicts given the current situation: no    Objective:     Communicated with: nursing prior to session.  Patient found HOB elevated with SCD, Condom Catheter (condom cath falling off of patient; SCD from previous facility) upon OT entry to room. Brother in law present in room intermittently throughout session.    General Precautions: Standard, fall  Orthopedic Precautions: N/A  Braces: N/A  Respiratory Status: Room air    Occupational Performance:    Bed Mobility:    Patient completed Scooting/Bridging with total assistance  Patient completed Supine to Sit with moderate assistance and HOB raised  Patient completed Sit to Supine with maximal assistance  Patient sat EOB with contact guard assistance     Functional Mobility/Transfers:  Patient completed Sit <> Stand Transfer with moderate assistance  with  hand-held assist on first attempt and minimal assistance on second attempt   Functional Mobility: Patient able to take 2 lateral steps at the side of the bed with moderate assistance     Activities of Daily Living:  Upper Body Dressing: total assistance to don gown  Lower Body Dressing: total assistance to don socks    Cognitive/Visual Perceptual:  Cognitive/Psychosocial Skills:     -       Oriented to: Person   -       Follows Commands/attention:Follows one-step commands although difficulty assessing due to  patient's [place] and broken hearing aids  -       Communication: patient [place] and thus written communication verbalized; patient with clear voice  -       Memory: Impaired STM and Impaired LTM  -       Safety awareness/insight to disability: impaired     Physical Exam:  Skin integrity: Wound on scrotum and rear levar  Edema:  None noted  Sensation: unable to obtain information from patient  Dominant hand:    -       R  Upper Extremity Range of Motion:     -       Right Upper Extremity: WFL  -       Left Upper Extremity: WFL  Upper Extremity Strength: unable to test    Strength:    -       Right Upper Extremity: WFL  -       Left Upper Extremity: WFL    AMPAC 6 Click ADL:  AMPAC Total Score: 14    Treatment & Education:    Patient educated on:   -purpose of OT and OT POC  -facilitation and education on proper body mechanics, energy conservation, and safety  -importance of early mobility and out of bed activities with staff assist  -overall benefits of therapy       Patient left HOB elevated with all lines intact, call button in reach, nursing notified, and brother in law present    GOALS:   Multidisciplinary Problems       Occupational Therapy Goals          Problem: Occupational Therapy    Goal Priority Disciplines Outcome Interventions   Occupational Therapy Goal     OT, PT/OT Ongoing, Progressing    Description: Goals to be met by: 7/18/23     Patient will increase functional independence with ADLs by performing:    UE Dressing with Modified [place].  LE Dressing with Minimal Assistance.  Grooming while standing at sink with Contact Guard Assistance.  Toileting from toilet with Minimal Assistance for hygiene and clothing management.   Supine to sit with Minimal Assistance.  Step transfer with Minimal Assistance  Toilet transfer to toilet with Minimal Assistance.  [place] with BUE HEP to improve activity tolerance to complete ADLs and IADLs  Within home ambulation distances with minimal assistance and LRAD  necessary to complete ADLs.                           History:     Past Medical History:   Diagnosis Date    BPH (benign prostatic hyperplasia)     Parkinsons 09/2019    R hand tremor starting in 2017, diagnosed Sept 2019 by Dr. Angeles at     Ulcerative colitis     s/p colectomy    Unspecified chronic bronchitis          Past Surgical History:   Procedure Laterality Date    TOTAL COLECTOMY         Time Tracking:     OT Date of Treatment: 06/18/23  OT Start Time: 0925  OT Stop Time: 0951  OT Total Time (min): 26 min    Billable Minutes:Evaluation 10  Self Care/Home Management 16    6/18/2023

## 2023-06-18 NOTE — CONSULTS
Inpatient consult to Physical Medicine Rehab  Consult performed by: Court Tadeo NP  Consult ordered by: Poppy Murillo MD    Consult received.  Reviewed patient history and current admission.  PM&R following.    Court Tadeo NP  Physical Medicine & Rehabilitation   06/18/2023

## 2023-06-18 NOTE — ASSESSMENT & PLAN NOTE
Aspiration pneumonitis  Patient on RA  CXR somewhat improved previous admission. Do not suspect untreated or even newly acute pneumonia. Given improvement overnight, likely patient experiencing acute pneumonitis.   - concern for recurrent aspiration   SLP consult   MBSS  - continue zosyn empirically  - follow up respiratory and blood cultures  - pleural effusion and IVF resuscitation - give furosemide 40 mg IV once

## 2023-06-18 NOTE — PLAN OF CARE
[facility] - Med Surg  Initial Discharge Assessment       Primary Care Provider: Tl Torres MD    Admission Diagnosis: Chest pain [R07.9]  Altered mental status, unspecified altered mental status type [R41.82]  AMS (altered mental status) [R41.82]    Admission Date: 6/17/2023  Expected Discharge Date:     Transition of Care Barriers: None    Payor: MEDICARE / Plan: MEDICARE PART A & B / Product Type: Government /     Extended Emergency Contact Information  Primary Emergency Contact: Obi Lemus  Mobile Phone: [phone]  Relation: Spouse  Preferred language: English   needed? No  Secondary Emergency Contact: CalosLincoln  Mobile Phone: [phone]  Relation: Brother    Discharge Plan A: Rehab  Discharge Plan B: Home Health      [id number] [place] - [address]  [address]  [place] [address]  Phone: [phone] Fax: [phone]      Initial Assessment (most recent)       Adult Discharge Assessment - 06/18/23 1040          Discharge Assessment    Assessment Type Discharge Planning Assessment     Confirmed/corrected address, phone number and insurance Yes     Confirmed Demographics Correct on Facesheet     Source of Information health care advocate     If unable to respond/provide information was family/caregiver contacted? Yes     Does patient/caregiver understand observation status Yes     Communicated JOHAN with patient/caregiver Yes     Reason For Admission AMS     People in Home spouse     Facility Arrived From: [facility]     Do you expect to return to your current living situation? Yes     Do you have help at home or someone to help you manage your care at home? Yes     Who are your caregiver(s) and their phone number(s)? Spouse     Prior to hospitilization cognitive status: Unable to Assess     Current cognitive status: Unable to Assess     Walking or Climbing Stairs ambulation difficulty, requires equipment     Home Accessibility wheelchair accessible     Home  Layout Able to live on 1st floor     Equipment Currently Used at Home wheelchair;walker, rolling     Readmission within 30 days? No     Patient currently being followed by outpatient case management? No     Do you currently have service(s) that help you manage your care at home? Yes     How Many hours does patient receive services 2     Name and Contact number of agency Private sitters/[facility]     Is the pt/caregiver preference to resume services with current agency Yes     Do you take prescription medications? Yes     Do you have prescription coverage? Yes     Do you have any problems affording any of your prescribed medications? No     Is the patient taking medications as prescribed? yes     Who is going to help you get home at discharge? Family     How do you get to doctors appointments? family or friend will provide     Are you on dialysis? No     Do you take coumadin? No     Discharge Plan A Rehab     Discharge Plan B Home Health     DME Needed Upon Discharge  none     Discharge Plan discussed with: Spouse/sig other     Transition of Care Barriers None        Physical Activity    On average, how many days per week do you engage in moderate to strenuous exercise (like a brisk walk)? Patient refused     On average, how many minutes do you engage in exercise at this level? Patient refused        Financial Resource Strain    How hard is it for you to pay for the very basics like food, housing, medical care, and heating? Not very hard        Housing Stability    In the last 12 months, was there a time when you were not able to pay the mortgage or rent on time? Yes     In the last 12 months, how many places have you lived? 1     In the last 12 months, was there a time when you did not have a steady place to sleep or slept in a shelter (including now)? No        Transportation Needs    In the past 12 months, has lack of transportation kept you from medical appointments or from getting medications? No     In the past 12  months, has lack of transportation kept you from meetings, work, or from getting things needed for daily living? No        Food Insecurity    Within the past 12 months, you worried that your food would run out before you got the money to buy more. Never true     Within the past 12 months, the food you bought just didn't last and you didn't have money to get more. Never true        Stress    Do you feel stress - tense, restless, nervous, or anxious, or unable to sleep at night because your mind is troubled all the time - these days? Only a little        Social Connections    In a typical week, how many times do you talk on the phone with family, friends, or neighbors? Patient refused     How often do you get together with friends or relatives? Patient refused     How often do you attend Quaker or Muslim services? Patient refused     Do you belong to any clubs or organizations such as Quaker groups, unions, fraternal or athletic groups, or school groups? Patient refused     How often do you attend meetings of the clubs or organizations you belong to? Patient refused     Are you , , , , never , or living with a partner?         Alcohol Use    Q1: How often do you have a drink containing alcohol? Never     Q2: How many drinks containing alcohol do you have on a typical day when you are drinking? Patient does not drink     Q3: How often do you have six or more drinks on one occasion? Never

## 2023-06-18 NOTE — ASSESSMENT & PLAN NOTE
- patient significantly encephalopathic in setting of known parkinson's dementia. Chronically ill appearing, cachectic. Uclear etiology, possibly due to recurrent aspiration? Vs atypical seizure/post ictal vs advancing dementia vs other.  - zosyn for concern of aspiration  - neurochecks q4h  - CTH without acute process  - MRI Brain pending  - EEG pending  - Neurology consult in am  - PT/OT consulted  - Patient and family would likely greatly benefit from palliative care consult. Confirmed Full code status with patient family.

## 2023-06-18 NOTE — HPI
Giselle Lemus is an 89-year-old male with a past medical history of parkinsonism, dementia, BPH, difficulty hearing, multiple falls, history of ulcerative colitis status post colectomy, recent hospital admission presenting with altered mental status.  Patient is currently at the [facility] and is now presenting via EMS for altered mental status for 1 day.  His family members at bedside providing history who states he has been more confused, with concern for delirium and altered mental status.  No reports of falls, trauma noted.  No reported fevers or chills.  Per EMS vital signs are stable, blood sugar was not obtained.  Much of the history is obtained through medical records family member and EMS records as patient is unable to provide any history secondary to altered mental status and confusion.    In the ED patient afebrile and hemodynamically stable saturating well on room air. WBC 9.17 with left shift. CXR performed and with continued evidence of right middle lobe and lower lobe consolidations similar to recent. Patient seen by SLP during recent admission who advised pureed nectar thick diet. Patient with continued productive cough since discharge per family. CTH without acute process. Patient cachectic and chronically ill appearing. GCS 11. Patient started on iv zosyn for concern of aspiration. MRI and EEG pending to further evaluate cause of AMS. Admitted to the care of medicine for further evaluation and management.

## 2023-06-18 NOTE — PLAN OF CARE
Problem: Adult Inpatient Plan of Care  Goal: Plan of Care Review  6/18/2023 0545 by Irene Miller RN  Outcome: Ongoing, Not Progressing  6/18/2023 0544 by Irene Miller RN  Outcome: Ongoing, Not Progressing  Goal: Patient-Specific Goal (Individualized)  6/18/2023 0545 by Irene Miller RN  Outcome: Ongoing, Not Progressing  6/18/2023 0544 by Irene Miller RN  Outcome: Ongoing, Not Progressing  Goal: Absence of Hospital-Acquired Illness or Injury  6/18/2023 0545 by Irene Miller RN  Outcome: Ongoing, Not Progressing  6/18/2023 0544 by Irene Miller RN  Outcome: Ongoing, Not Progressing  Goal: Optimal Comfort and Wellbeing  6/18/2023 0545 by Irene Miller RN  Outcome: Ongoing, Not Progressing  6/18/2023 0544 by Irene Miller RN  Outcome: Ongoing, Not Progressing  Goal: Readiness for Transition of Care  6/18/2023 0545 by Irene Miller RN  Outcome: Ongoing, Not Progressing  6/18/2023 0544 by Irene Miller RN  Outcome: Ongoing, Not Progressing     Problem: Adjustment to Illness (Sepsis/Septic Shock)  Goal: Optimal Coping  6/18/2023 0545 by Irene Miller RN  Outcome: Ongoing, Not Progressing  6/18/2023 0544 by Irene Miller RN  Outcome: Ongoing, Not Progressing     Problem: Bleeding (Sepsis/Septic Shock)  Goal: Absence of Bleeding  6/18/2023 0545 by Irene Miller RN  Outcome: Ongoing, Not Progressing  6/18/2023 0544 by Irene Miller RN  Outcome: Ongoing, Not Progressing     Problem: Glycemic Control Impaired (Sepsis/Septic Shock)  Goal: Blood Glucose Level Within Desired Range  6/18/2023 0545 by Irene Miller RN  Outcome: Ongoing, Not Progressing  6/18/2023 0544 by Irene Miller RN  Outcome: Ongoing, Not Progressing     Problem: Infection Progression (Sepsis/Septic Shock)  Goal: Absence of Infection Signs and Symptoms  6/18/2023 0545 by Irene Miller RN  Outcome: Ongoing, Not Progressing  6/18/2023 0544 by Irene  KATELYNN Miller  Outcome: Ongoing, Not Progressing     Problem: Nutrition Impaired (Sepsis/Septic Shock)  Goal: Optimal Nutrition Intake  6/18/2023 0545 by Irene Miller RN  Outcome: Ongoing, Not Progressing  6/18/2023 0544 by Irene Miller RN  Outcome: Ongoing, Not Progressing     Problem: Fluid Imbalance (Pneumonia)  Goal: Fluid Balance  6/18/2023 0545 by Irene Miller RN  Outcome: Ongoing, Not Progressing  6/18/2023 0544 by Irene Mliler RN  Outcome: Ongoing, Not Progressing     Problem: Infection (Pneumonia)  Goal: Resolution of Infection Signs and Symptoms  6/18/2023 0545 by Irene Miller RN  Outcome: Ongoing, Not Progressing  6/18/2023 0544 by Irene Miller RN  Outcome: Ongoing, Not Progressing     Problem: Respiratory Compromise (Pneumonia)  Goal: Effective Oxygenation and Ventilation  6/18/2023 0545 by Irene Miller RN  Outcome: Ongoing, Not Progressing  6/18/2023 0544 by Irene Miller RN  Outcome: Ongoing, Not Progressing     Problem: Impaired Wound Healing  Goal: Optimal Wound Healing  6/18/2023 0545 by Irene Miller RN  Outcome: Ongoing, Not Progressing  6/18/2023 0544 by Irene Miller RN  Outcome: Ongoing, Not Progressing     Problem: Fall Injury Risk  Goal: Absence of Fall and Fall-Related Injury  6/18/2023 0545 by Irene Miller RN  Outcome: Ongoing, Not Progressing  6/18/2023 0544 by Irene Miller RN  Outcome: Ongoing, Not Progressing     Problem: Infection  Goal: Absence of Infection Signs and Symptoms  6/18/2023 0545 by Irene Miller RN  Outcome: Ongoing, Not Progressing  6/18/2023 0544 by Irene Miller RN  Outcome: Ongoing, Not Progressing     Problem: Confusion Chronic  Goal: Optimal Cognitive Function  6/18/2023 0545 by Irene Miller RN  Outcome: Ongoing, Not Progressing  6/18/2023 0544 by Irene Miller RN  Outcome: Ongoing, Not Progressing     Problem: Hypertension Acute  Goal: Blood Pressure Within  Desired Range  6/18/2023 0545 by Irene Miller RN  Outcome: Ongoing, Not Progressing  6/18/2023 0544 by Irene Miller RN  Outcome: Ongoing, Not Progressing     Problem: Skin Injury Risk Increased  Goal: Skin Health and Integrity  6/18/2023 0545 by Irene Miller RN  Outcome: Ongoing, Not Progressing  6/18/2023 0544 by Irene Miller RN  Outcome: Ongoing, Not Progressing

## 2023-06-19 NOTE — PT/OT/SLP EVAL
Speech Language Pathology Evaluation  Bedside Swallow  Discharge    Patient Name:  Giselle Lemus   MRN:  [id number]  Admitting Diagnosis: Acute metabolic encephalopathy    Recommendations:                 General Recommendations:  Follow-up not indicated  Diet recommendations:  Regular, Thin   Aspiration Precautions: Standard aspiration precautions   General Precautions: Standard, fall  Communication strategies:  pt [place]    Assessment:     Giselle Lemus is a 89 y.o. male with oropharyngeal swallow deemed WFL.     History:     Past Medical History:   Diagnosis Date    BPH (benign prostatic hyperplasia)     Parkinsons 09/2019    R hand tremor starting in 2017, diagnosed Sept 2019 by Dr. Angeles at     Ulcerative colitis     s/p colectomy    Unspecified chronic bronchitis        Past Surgical History:   Procedure Laterality Date    TOTAL COLECTOMY         Social History: Patient lives with family    Chest X-Rays: 6/17 Focal opacity right mid lung zone has diminished from prior.     Continued airspace opacity and pleural effusion/reaction at the left base, similar to prior.     Otherwise, no significant change from prior.    Prior diet: regular/thin.      Subjective     Awake/alert    Pain/Comfort:  Pain Rating 1: 0/10  Pain Rating Post-Intervention 1: 0/10    Respiratory Status: Room air    Objective:     Oral Musculature Evaluation  Oral Musculature: WFL  Dentition: present and adequate  Oral Labial Strength and Mobility: WFL  Lingual Strength and Mobility: WFL  Voice Prior to PO Intake: clear    Bedside Swallow Eval:   Consistencies Assessed:  Thin liquids x6 oz straw  Solids x5      Oral Phase:   WFL    Pharyngeal Phase:   no overt clinical signs/symptoms of aspiration    Goals:   Multidisciplinary Problems       SLP Goals       Not on file                    Plan:       Plan of Care reviewed with:  patient, family   SLP Follow-Up:  No           Time Tracking:     SLP Treatment Date:   06/19/23  Speech Start Time:   1025  Speech Stop Time:  1031     Speech Total Time (min):  6 min    Billable Minutes: Eval Swallow and Oral Function 6    06/19/2023

## 2023-06-19 NOTE — PLAN OF CARE
[facility]     Department of Hospital Medicine     [address] [place] [address]     [phone] after hours  [phone] fax                                        FACILITY TRANSFER ORDERS     06/19/2023    Admit to:  per rehab    Diagnoses:  Active Hospital Problems    Diagnosis  POA    *Acute metabolic encephalopathy [G93.41]  Yes     Priority: 1 - High    Aspiration pneumonitis [J69.0]  Yes     Priority: 2     Multifocal pneumonia [J18.9]  Yes     Priority: 2     Hyperkalemia [E87.5]  Yes     Priority: 3     Parkinson's disease [G20]  Yes     Priority: 4     Excessive daytime sleepiness [G47.19]  Yes    Benign prostatic hyperplasia [N40.0]  Yes      Resolved Hospital Problems   No resolved problems to display.       Vital Signs: Routine.    Allergies:  Review of patient's allergies indicates:   Allergen Reactions    Heparin     Heparin analogues Other (See Comments)     Not true allergy - but patient developed large spontaneous RP hematoma and concurrent severe epistaxis while on DVT prophylactic dose heparin - consider mechanical DVT ppx in future       Diet: level 5 dysphagia regular diet with thin liquids  Boost plus with meals TID    Acitivities: per unit routine    Nursing: per unit routine    Labs: per unit routine    Consults: PT/OT/ST    Medications:   Current Discharge Medication List        START taking these medications    Details   amoxicillin-clavulanate 875-125mg (AUGMENTIN) 875-125 mg per tablet Take 1 tablet by mouth 2 (two) times daily. for 4 days  Qty: 8 tablet, Refills: 0      sodium zirconium cyclosilicate (LOKELMA) 5 gram packet Take 1 packet (5 g total) by mouth every Mon, Wed, Fri. Mix entire contents of packet(s) into drinking glass containing 3 tablespoons of water; stir well and drink immediately. Add water and repeat until no powder remains to receive entire dose.  Qty: 12 packet, Refills: 3           CONTINUE these medications  which have NOT CHANGED    Details   artificial tears (ISOPTO TEARS) 0.5 % ophthalmic solution Place 2 drops into both eyes 4 (four) times daily as needed.      balsam peru-castor oiL Oint Apply topically 2 (two) times a day. Apply to buttocks      carbidopa-levodopa  mg (SINEMET)  mg per tablet TAKE 1 TABLET BY MOUTH 4 (FOUR) TIMES A DAY FOR 30 DAYS.  Qty: 120 tablet, Refills: 11      finasteride (PROSCAR) 5 mg tablet Take 1 tablet (5 mg total) by mouth once daily.  Qty: 90 tablet, Refills: 3      furosemide (LASIX) 20 MG tablet Take 0.5 tablets (10 mg total) by mouth once daily. As needed for weight gain of 3 lb in 1 day, 5 lb in 1 week, or significant leg edema  Qty: 45 tablet, Refills: 1      melatonin (MELATIN) 3 mg tablet Take 2 tablets (6 mg total) by mouth nightly.  Refills: 0      modafiniL (PROVIGIL) 100 MG Tab Take 1 tablet (100 mg total) by mouth 2 (two) times daily. (Morning and early afternoon)  Qty: 60 tablet, Refills: 2    Associated Diagnoses: Parkinsons; Excessive daytime sleepiness      polyethylene glycol (GLYCOLAX) 17 gram PwPk Take 17 g by mouth 2 (two) times daily as needed (constipation).  Qty: 60 each, Refills: 0      sodium chloride (OCEAN) 0.65 % nasal spray 1 spray by Nasal route as needed (irritation).  Refills: 12           Loperamide orally 2 g QID x 2 days, then loperamide 2 g QID prn loose stools    Wound care - thick barrier cream to buttocks, sacrum and scrotal area  _________________________________  Poppy Murillo MD  06/19/2023

## 2023-06-19 NOTE — PLAN OF CARE
Due to PCP full schedule Theresa will send  a message to office. They will call the patient        Marie Givens [facility]  Case Management   [phone]

## 2023-06-19 NOTE — CONSULTS
[facility] - Med S[facility]  Adult Nutrition  Consult Note    SUMMARY     Recommendations    1. Continue mech soft diet. Consistency per speech or MD.     2. Continue Boost Plus any flavor at all meals to optimize kcal/pro intake.    3. Please re-consult if TF rec's warranted.    Goals: Meet >75% EEN by RD f/u.  Nutrition Goal Status: new  Communication of RD Recs: other (comment) (POC)    Assessment and Plan    Nutrition Problem  Inadequate oral intake    Related to (etiology):   Inability to consume sufficient calories    Signs and Symptoms (as evidenced by):   Poor PO intake  Weight loss of 3 lbs x 10 months     Interventions/Recommendations (treatment strategy):  Collaboration of nutrition care with other providers  ONS    Nutrition Diagnosis Status:   New     Malnutrition Assessment                 Orbital Region (Subcutaneous Fat Loss): severe depletion   [place] Region (Muscle Loss): severe depletion  Clavicle Bone Region (Muscle Loss): severe depletion                 Reason for Assessment    Reason For Assessment: consult  Diagnosis: other (see comments) (Acute metabolic encephalopathy)  Relevant Medical History: BPH, Parkinson's disease, HTN, dementia, UC status post colectomy  Interdisciplinary Rounds: did not attend    General Information Comments: Consult for poor PO. RD unable to perform NFPE without verbal consent. Patient has dementia and did not give verbal responses when RD spoke. Visual NFPE performed with severe fat and muscle loss in the orbital, clavicle, and temple regions. No caregiver at bedside to obtain subjective information. ONS ordered: Boost Plus TID. Per weight hx, patient has lost 3 lbs x 10 months. Weight ranges between 120-123 lbs. LBM noted on 6/19 per flow sheets. Suspect severe malnutrition, unable to make diagnosis at this time.    Nutrition Discharge Planning: ONS Boost Plus TID    Nutrition Risk Screen    Nutrition Risk Screen: difficulty chewing/swallowing, reduced oral intake over  "the last month    Nutrition/Diet History    Spiritual, Cultural Beliefs, Temple Practices, Values that Affect Care: no    Anthropometrics    Temp: 98.5 °F (36.9 °C)  Height Method: Estimated  Height: 5' 5" (165.1 cm)  Height (inches): 65 in  Weight Method: Bed Scale  Weight: 54.4 kg (120 lb)  Weight (lb): 120 lb  Ideal Body Weight (IBW), Male: 136 lb  % Ideal Body Weight, Male (lb): 88.24 %  BMI (Calculated): 20  BMI Grade: 18.5-24.9 - normal       Lab/Procedures/Meds    Pertinent Labs Reviewed: reviewed  Pertinent Labs Comments: CO2 22, BUN 24, Glu 67, Alb 2.4, ALT 6, RBC 3.11, H/H 8.4/27.8  Pertinent Medications Reviewed: reviewed    Estimated/Assessed Needs    Weight Used For Calorie Calculations: 54.4 kg (119 lb 14.9 oz)  Energy Calorie Requirements (kcal): 8045-5012 kcal (25-30 kcal/kg)  Energy Need Method: Kcal/kg  Protein Requirements: 55 g (1 g/kg)  Weight Used For Protein Calculations: 54.4 kg (119 lb 14.9 oz)  Fluid Requirements (mL): 1 ml/kcal or per MD     RDA Method (mL): 1360         Nutrition Prescription Ordered    Current Diet Order: Dysphagia [facility] Soft  Oral Nutrition Supplement: Boost Plus TID    Evaluation of Received Nutrient/Fluid Intake    I/O: +1.3L since admit  % Intake of Estimated Energy Needs: 0 - 25 %  % Meal Intake: 0 - 25 %    Nutrition Risk    Level of Risk/Frequency of Follow-up:  (1x/wk)       Monitor and Evaluation    Food and Nutrient Intake: energy intake  Food and Nutrient Adminstration: diet order  Anthropometric Measurements: height/length, weight, weight change, body mass index  Biochemical Data, Medical Tests and Procedures: electrolyte and renal panel, gastrointestinal profile, glucose/endocrine profile, inflammatory profile, lipid profile  Nutrition-Focused Physical Findings: overall appearance, extremities, muscles and bones, head and eyes, skin       Nutrition Follow-Up    RD Follow-up?: Yes    "

## 2023-06-19 NOTE — PROGRESS NOTES
[facility]  Physical Medicine & Rehab  Progress Note    Patient Name: Giselle Lemus  MRN: [id number]  Admission Date: 6/17/2023  Length of Stay: 0 days  Attending Physician: Poppy Murillo MD     Inpatient consult to Physical Medicine & Rehabilitation  Consult performed by: Sabine Lopez NP  Consult requested by:  Poppy Murillo MD    Collaborating Physician: Krystal Tinsley MD  Reason for Consult:  Assess rehabilitation needs       Subjective:     Principal Problem:Acute metabolic encephalopathy    Hospital Course:   6/18/23: Participated w/ OT. Bed mob CGA- totalA.     Interval History 6/19/2023:  Patient is seen for follow-up PM&R evaluation and recommendations: C diff pending. Evaluated by PT & OT and recommending to go back to rehab.     HPI, Past Medical, Family, and Social History remains the same as documented in the initial encounter.    Scheduled Medications:    carbidopa-levodopa  mg  1 tablet Oral QID    finasteride  5 mg Oral Daily    loperamide  2 mg Oral QID    modafiniL  100 mg Oral BID    piperacillin-tazobactam (Zosyn) IV (PEDS and ADULTS) (extended infusion is not appropriate)  4.5 g Intravenous Q8H    sodium zirconium cyclosilicate  5 g Oral Daily     Diagnostic Results:   Labs: Reviewed  ECG: Reviewed  CT: Reviewed    PRN Medications: acetaminophen, bisacodyL, dextrose 10%, dextrose 10%, glucagon (human recombinant), glucose, glucose, morphine, naloxone, ondansetron, polyethylene glycol, sodium chloride 0.9%    Review of Systems   Constitutional:  Positive for activity change. Negative for fatigue and fever.   HENT:  Positive for hearing loss.    Gastrointestinal:  Positive for diarrhea.   Musculoskeletal:  Positive for gait problem.   Neurological:  Positive for weakness.   Psychiatric/Behavioral:  Positive for confusion (at baseline). Negative for agitation and behavioral problems.      Objective:     Vital Signs (Most Recent):  Temp: 98.5 °F (36.9 °C) (06/19/23  0752)  Pulse: (!) 53 (06/19/23 0752)  Resp: 18 (06/19/23 0752)  BP: (!) 145/67 (06/19/23 0752)  SpO2: (!) 94 % (06/19/23 0752)    Vital Signs (24h Range):  Temp:  [96.3 °F (35.7 °C)-98.5 °F (36.9 °C)] 98.5 °F (36.9 °C)  Pulse:  [49-54] 53  Resp:  [14-18] 18  SpO2:  [69 %-96 %] 94 %  BP: ()/(53-67) 145/67         Physical Exam  Vitals and nursing note reviewed.   Constitutional:       Comments: frail   Eyes:      Extraocular Movements: Extraocular movements intact.      Pupils: Pupils are equal, round, and reactive to light.   Pulmonary:      Effort: Pulmonary effort is normal. No respiratory distress.   Musculoskeletal:      Comments: Deconditioned and generalized weakness   Skin:     General: Skin is warm.   Neurological:      Motor: Weakness present.      Gait: Gait abnormal.      Comments: Following commands  Mild confusion appears at baseline   Psychiatric:      Comments: calm     Assessment/Plan:      * Acute metabolic encephalopathy  - CTH without acute process.     Aspiration pneumonitis  - on Zosyn and plan to transition to Augmentin    Impaired mobility and activities of daily living  - Related to prolonged/acute hospital course.     Recommendations  -  Encourage mobility, OOB in chair at least 3 hours per day, and early ambulation as appropriate  -  PT/OT evaluate and treat  -  Pain management  -  Monitor for and prevent skin breakdown and pressure ulcers  · Early mobility, repositioning/weight shifting every 20-30 minutes when sitting, turn patient every 2 hours, proper mattress/overlay and chair cushioning, pressure relief/heel protector boots  -  DVT prophylaxis    -  Reviewed discharge options (IP rehab, SNF, HH therapy, and OP therapy)    PM&R Recommendation:     At this time, the PM&R team has reviewed this patient's ongoing medical case including inpatient diagnosis, medical history, clinical examination, labs, vitals, current social and functional history to provide the post-acute  recommendation as follows:     RECOMMENDATIONS: inpatient rehabilitation due to good motivation/participation with therapies, has been determined to tolerate 3 hours of therapy and good potential for recovery.     The patient will be admitted for comprehensive interdisciplinary inpatient rehabilitation to address the impairments due to medical diagnosis of Debility. The patient will benefit from an inpatient rehabilitation program to promote functional recovery, implement compensatory strategies and will undergo assessment for needs for durable medical equipment for safe discharge to the community. This patient will benefit from a coordinated interdisciplinary rehabilitation program services that require close monitoring and treatment with 24-hour rehabilitative nursing and physical/occupational therapies for 3 hours/day for 5 days/week.This interdisciplinary program will be performed under the direction of a physiatrist.    MEDICAL STABILITY:     At this time, no barriers for post-acute rehab admission.     We will continue to follow.     Sabine Lopez NP  Department of Physical Medicine & Rehab   [facility]

## 2023-06-19 NOTE — PLAN OF CARE
Pt stable to dc to [facility] today and is on an MO and is able to return. Orders to be placed and Sw setup stretcher transport for 1pm. Nurse to call report to [facility] at [phone], nurse updated.

## 2023-06-19 NOTE — SUBJECTIVE & OBJECTIVE
Interval History 6/19/2023:  Patient is seen for follow-up PM&R evaluation and recommendations: C diff pending. Evaluated by PT & OT and recommending to go back to rehab.     HPI, Past Medical, Family, and Social History remains the same as documented in the initial encounter.    Scheduled Medications:    carbidopa-levodopa  mg  1 tablet Oral QID    finasteride  5 mg Oral Daily    loperamide  2 mg Oral QID    modafiniL  100 mg Oral BID    piperacillin-tazobactam (Zosyn) IV (PEDS and ADULTS) (extended infusion is not appropriate)  4.5 g Intravenous Q8H    sodium zirconium cyclosilicate  5 g Oral Daily       Diagnostic Results:   Labs: Reviewed  ECG: Reviewed  CT: Reviewed    PRN Medications: acetaminophen, bisacodyL, dextrose 10%, dextrose 10%, glucagon (human recombinant), glucose, glucose, morphine, naloxone, ondansetron, polyethylene glycol, sodium chloride 0.9%    Review of Systems   Constitutional:  Positive for activity change. Negative for fatigue and fever.   HENT:  Positive for hearing loss.    Gastrointestinal:  Positive for diarrhea.   Musculoskeletal:  Positive for gait problem.   Neurological:  Positive for weakness.   Psychiatric/Behavioral:  Positive for confusion (at baseline). Negative for agitation and behavioral problems.    Objective:     Vital Signs (Most Recent):  Temp: 98.5 °F (36.9 °C) (06/19/23 0752)  Pulse: (!) 53 (06/19/23 0752)  Resp: 18 (06/19/23 0752)  BP: (!) 145/67 (06/19/23 0752)  SpO2: (!) 94 % (06/19/23 0752)    Vital Signs (24h Range):  Temp:  [96.3 °F (35.7 °C)-98.5 °F (36.9 °C)] 98.5 °F (36.9 °C)  Pulse:  [49-54] 53  Resp:  [14-18] 18  SpO2:  [69 %-96 %] 94 %  BP: ()/(53-67) 145/67         Physical Exam  Vitals and nursing note reviewed.   Constitutional:       Comments: frail   Eyes:      Extraocular Movements: Extraocular movements intact.      Pupils: Pupils are equal, round, and reactive to light.   Pulmonary:      Effort: Pulmonary effort is normal. No  respiratory distress.   Musculoskeletal:      Comments: Deconditioned and generalized weakness   Skin:     General: Skin is warm.   Neurological:      Motor: Weakness present.      Gait: Gait abnormal.      Comments: Following commands  Mild confusion appears at baseline   Psychiatric:      Comments: calm        NEUROLOGICAL EXAMINATION:     CRANIAL NERVES     CN III, IV, VI   Pupils are equal, round, and reactive to light.

## 2023-06-19 NOTE — PLAN OF CARE
Recommendations     1. Continue [facility] soft diet. Consistency per speech or MD.      2. Continue Boost Plus any flavor at all meals to optimize kcal/pro intake.     3. Please re-consult if TF rec's warranted.     Goals: Meet >75% EEN by RD f/u.  Nutrition Goal Status: new  Communication of RD Recs: other (comment) (POC)

## 2023-06-19 NOTE — HPI
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 [place] [facility] [place] for multiple falls at home and confusion. Work up negative for any acute fractures. Imaging revealed multilobar pneumonia and on Cefdinir. Discharged to [facility] 6/5/23 and readmitted to [facility] on 6/17/23 for AMS. CTH without acute process. Patient started on iv zosyn for concern of aspiration. Hospital course complicated by loose stools (C diff pending).      Functional History: Patient lives with wife in a single story home with 0 steps to enter.  Prior to admission, [place]. DME: RW. Has caregivers at home.

## 2023-06-19 NOTE — PLAN OF CARE
[facility] - [facility]  Discharge Final Note    Primary Care Provider: Tl Torres MD    Expected Discharge Date: 6/19/2023    Final Discharge Note (most recent)       Final Note - 06/19/23 1126          Final Note    Assessment Type Final Discharge Note     Anticipated Discharge Disposition Rehab Facility     Hospital Resources/Appts/Education Provided Post-Acute resouces added to AVS        Post-Acute Status    Post-Acute Authorization Placement     Post-Acute Placement Status Set-up Complete/Auth obtained     Discharge Delays None known at this time                     Important Message from Medicare

## 2023-06-19 NOTE — CONSULTS
[facility]  Wound Care    Patient Name:  Giselle Lemus   MRN:  [id number]  Date: 6/19/2023  Diagnosis: Acute metabolic encephalopathy    History:     Past Medical History:   Diagnosis Date    BPH (benign prostatic hyperplasia)     Parkinsons 09/2019    R hand tremor starting in 2017, diagnosed Sept 2019 by Dr. Angeles at     Ulcerative colitis     s/p colectomy    Unspecified chronic bronchitis        Social History     Socioeconomic History    Marital status:    Tobacco Use    Smoking status: Never    Smokeless tobacco: Never   Substance and Sexual Activity    Alcohol use: Never   Social History Narrative    ** Merged History Encounter **          Social Determinants of Health     Financial Resource Strain: Low Risk     Difficulty of Paying Living Expenses: Not very hard   Food Insecurity: No Food Insecurity    Worried About Running Out of Food in the Last Year: Never true    Ran Out of Food in the Last Year: Never true   Transportation Needs: No Transportation Needs    Lack of Transportation (Medical): No    Lack of Transportation (Non-Medical): No   Physical Activity: Unknown    Days of Exercise per Week: Patient refused    Minutes of Exercise per Session: Patient refused   Stress: No Stress Concern Present    Feeling of Stress : Only a little   Social Connections: Unknown    Frequency of Communication with Friends and Family: Patient refused    Frequency of Social Gatherings with Friends and Family: Patient refused    Attends Denominational Services: Patient refused    Active Member of Clubs or Organizations: Patient refused    Attends Club or Organization Meetings: Patient refused    Marital Status:    Housing Stability: High Risk    Unable to Pay for Housing in the Last Year: Yes    Number of Places Lived in the Last Year: 1    Unstable Housing in the Last Year: No       Precautions:     Allergies as of 06/17/2023 - Reviewed 06/17/2023   Allergen Reaction Noted    Heparin  06/01/2022    Heparin  analogues Other (See Comments) 05/15/2022       [facility] Assessment Details/Treatment     Wound consult received on patient's buttocks. Incontinent associated dermatitis noted, appears to be resolving fungal involvement. Recommend antifungal barrier cream twice a day and as needed. Updated Dr. Murillo.       06/19/23 1045   Coping/Psychosocial   Plan of Care Reviewed With patient;family   Psychosocial Support   Trust Relationship/Rapport care explained;questions answered        Rectal Tube 06/19/23 0000 fecal management system   Placement Date/Time: 06/19/23 0000   Present Prior to Hospital Arrival?: No  Inserted by: RN  Type: fecal management system  Additional Comments: to prevent anymore irritation to his levar area or scrotum   Insertion Site Appearance no redness, warmth, tenderness, skin breakdown, drainage        Altered Skin Integrity 06/19/23 1045 Buttocks Incontinence associated dermatitis   Date First Assessed/Time First Assessed: 06/19/23 1045   Location: Buttocks  Primary Wound Type: Incontinence associated dermatitis   Drainage Amount None   Drainage Characteristics/Odor No odor   Appearance   (hyperpigmented tissue)   Tissue loss description Not applicable   Periwound Area Intact  (resolving yeast?)   Wound Edges Undefined   Wound Length (cm)   (unclear margins)   Wound Width (cm)   (unclear margins)

## 2023-06-20 NOTE — DISCHARGE SUMMARY
Discharge Summary  Hospital Medicine    Attending Provider on Discharge: Poppy Murillo MD  [facility] Medicine Team: [facility] MED R  Date of Admission:  6/17/2023     Date of Discharge:  6/19/2023  Code status: Full Code    Active Hospital Problems    Diagnosis  POA    *Acute metabolic encephalopathy [G93.41]  Yes     Priority: 1 - High    Aspiration pneumonitis [J69.0]  Yes     Priority: 2     Multifocal pneumonia [J18.9]  Yes     Priority: 2     Hyperkalemia [E87.5]  Yes     Priority: 3     Parkinson's disease [G20]  Yes     Priority: 4     Excessive daytime sleepiness [G47.19]  Yes    Impaired mobility and activities of daily living [Z74.09, Z78.9]  Yes    Benign prostatic hyperplasia [N40.0]  Yes      Resolved Hospital Problems   No resolved problems to display.     ERICKSON Lemus is an 89-year-old male with a past medical history of parkinsonism, dementia, BPH, difficulty hearing, multiple falls, history of ulcerative colitis status post colectomy, recent hospital admission presenting with altered mental status.  Patient is currently at the [facility] re[facility] and is now presenting via EMS for altered mental status for 1 day.  His family members at bedside providing history who states he has been more confused, with concern for delirium and altered mental status.  No reports of falls, trauma noted.  No reported fevers or chills.  Per EMS vital signs are stable, blood sugar was not obtained.  Much of the history is obtained through medical records family member and EMS records as patient is unable to provide any history secondary to altered mental status and confusion.    In the ED patient afebrile and hemodynamically stable saturating well on room air. WBC 9.17 with left shift. CXR performed and with continued evidence of right middle lobe and lower lobe consolidations similar to recent. Patient seen by SLP during recent admission who advised pureed nectar thick diet. Patient with continued productive cough since  discharge per family. CTH without acute process. Patient cachectic and chronically ill appearing. GCS 11. Patient started on iv zosyn for concern of aspiration. MRI and EEG pending to further evaluate cause of AMS. Admitted to the care of medicine for further evaluation and management.     Hospital Course    * Acute metabolic encephalopathy  Likely due to acute aspiration pneumonia versus pneumonitis or other acute event. The following day patient was at baseline per brother-in-law. Discussed with neurology. Given he is at baseline, unlikely their evaluation would add anything at this time. Will defer MRI of brain and EEG at this time. Patient at risk of developing delirium in acute illness due to his parkinson's and dementia. Patient on delirium precautions. Encourage family to get patient's hearing aide repaired (patient had just broke it a second time recently)    Multifocal pneumonia  Aspiration pneumonitis  Patient on RA. CXR somewhat improved previous admission. Do not suspect untreated or even newly acute pneumonia. Given improvement overnight, likely patient experiencing acute pneumonitis. Concern for recurrent aspiration. SLP consult did not feel MBSS warranted. However patient be experiencing silent aspiration. Started on zosyn empirically. Then transitioned to augmentin for total of five days of antiboitic therapy. Patient was given furosemide 40 mg IV once as patient received IVF resuscitation with presence of pleural effusion.     Hyperkalemia   K+ 6.2 on presentation and shifted in ED. Patient with intermittent hyperkalemia in past lab draws, but this is highest it has been. EKG with K at 5.5 without diffuse ST changes. Unclear etiology. Creatinine wnl. Placed patient on lokelma 5 mg MWF. Referred patient outpatient nephrology evaluation for recurring hyperkalemia    Parkinson's disease  - continue home sinemet    Excessive daytime sleepiness  - continue home modafinil    Benign prostatic  hyperplasia  - continue home proscar    Diarrhea - c. Diff negative  Loperamide 2 g QID started    Procedures: none    Consultants: none    Discharge Medication List as of 6/19/2023  2:09 PM        START taking these medications    Details   amoxicillin-clavulanate 875-125mg (AUGMENTIN) 875-125 mg per tablet Take 1 tablet by mouth 2 (two) times daily. for 4 days, Starting Mon 6/19/2023, Until Fri 6/23/2023, No Print      sodium zirconium cyclosilicate (LOKELMA) 5 gram packet Take 1 packet (5 g total) by mouth every Mon, Wed, Fri. Mix entire contents of packet(s) into drinking glass containing 3 tablespoons of water; stir well and drink immediately. Add water and repeat until no powder remains to receive entire dose., Startin g Mon 6/19/2023, Normal           CONTINUE these medications which have NOT CHANGED    Details   artificial tears (ISOPTO TEARS) 0.5 % ophthalmic solution Place 2 drops into both eyes 4 (four) times daily as needed., Starting Mon 6/5/2023, No Print      balsam peru-castor oiL Oint Apply topically 2 (two) times a day. Apply to buttocks, Starting Wed 5/18/2022, No Print      carbidopa-levodopa  mg (SINEMET)  mg per tablet TAKE 1 TABLET BY MOUTH 4 (FOUR) TIMES A DAY FOR 30 DAYS., Normal      finasteride (PROSCAR) 5 mg tablet Take 1 tablet (5 mg total) by mouth once daily., Starting Wed 5/31/2023, Normal      furosemide (LASIX) 20 MG tablet Take 0.5 tablets (10 mg total) by mouth once daily. As needed for weight gain of 3 lb in 1 day, 5 lb in 1 week, or significant leg edema, Starting Mon 6/5/2023, Until Tue 6/4/2024, No Print      melatonin (MELATIN) 3 mg tablet Take 2 tablets (6 mg total) by mouth nightly., Starting Mon 6/5/2023, No Print      modafiniL (PROVIGIL) 100 MG Tab Take 1 tablet (100 mg total) by mouth 2 (two) times daily. (Morning and early afternoon), Starting Wed 5/24/2023, Until Fri 6/23/2023, Normal      polyethylene glycol (GLYCOLAX) 17 gram PwPk Take 17 g by mouth 2  (two) times daily as needed (constipation)., Starting Mon 6/5/2023, No Print      sodium chloride (OCEAN) 0.65 % nasal spray 1 spray by Nasal route as needed (irritation)., Starting Thu 8/11/2022, No Print             Discharge Diet:regular diet   Mechanical soft diet    Activity: activity as tolerated    Discharge Condition: Good    Disposition: Rehab Facility    Tests pending at the time of discharge: none      Time spent  on the discharge of the patient including review of hospital course with the patient. reviewing discharge medications and arranging follow-up care 35 min    Discharge examination Patient was seen and examined on the date of discharge and determined to be suitable for discharge.    Discharge plan     Future Appointments   Date Time Provider Department Center   6/28/2023 11:00 AM Rebecca Harris NP [facility]       Poppy Murillo MD

## 2023-06-29 NOTE — TELEPHONE ENCOUNTER
Spoke with Mrs. Lemus and explained that this appointment needs to be a hospital follow up so they would need to come in person but at 9:00 am.  States they will be here.

## 2023-06-29 NOTE — TELEPHONE ENCOUNTER
----- Message from Marie Oliver sent at 6/29/2023  9:11 AM CDT -----  Contact: Obi @ [phone]   Patient wife is unable to get into his My Chart today for his virtual appointment tomorrow. Is there anyway you change it to an in house appointment for tomorrow?

## 2023-06-30 PROBLEM — I10 HYPERTENSION: Status: RESOLVED | Noted: 2023-01-01 | Resolved: 2023-01-01

## 2023-06-30 NOTE — PROGRESS NOTES
"    /62 (BP Location: Right arm, Patient Position: Sitting, BP Method: Medium (Manual))   Pulse 60   Ht 5' 5" (1.651 m)   Wt 54 kg (119 lb 0.8 oz)   SpO2 97%   BMI 19.81 kg/m²       ===========    Chief Complaint: No chief complaint on file.          ERICKSON Lemus is a 89 y.o. male     here  w son for hosp dc f/u    Portion of [facility] dc summary as follows:    "ERICKSON Lemus is an 89-year-old male with a past medical history of parkinsonism, dementia, BPH, difficulty hearing, multiple falls, history of ulcerative colitis status post colectomy, recent hospital admission presenting with altered mental status.  Patient is currently at the [facility] and is now presenting via EMS for altered mental status for 1 day.  His family members at bedside providing history who states he has been more confused, with concern for delirium and altered mental status.  No reports of falls, trauma noted.  No reported fevers or chills.  Per EMS vital signs are stable, blood sugar was not obtained.  Much of the history is obtained through medical records family member and EMS records as patient is unable to provide any history secondary to altered mental status and confusion.     In the ED patient afebrile and hemodynamically stable saturating well on room air. WBC 9.17 with left shift. CXR performed and with continued evidence of right middle lobe and lower lobe consolidations similar to recent. Patient seen by SLP during recent admission who advised pureed nectar thick diet. Patient with continued productive cough since discharge per family. CTH without acute process. Patient cachectic and chronically ill appearing. GCS 11. Patient started on iv zosyn for concern of aspiration. MRI and EEG pending to further evaluate cause of AMS. Admitted to the care of medicine for further evaluation and management.      Hospital Course     * Acute metabolic encephalopathy  Likely due to acute aspiration pneumonia versus " "pneumonitis or other acute event. The following day patient was at baseline per brother-in-law. Discussed with neurology. Given he is at baseline, unlikely their evaluation would add anything at this time. Will defer MRI of brain and EEG at this time. Patient at risk of developing delirium in acute illness due to his parkinson's and dementia. Patient on delirium precautions. Encourage family to get patient's hearing aide repaired (patient had just broke it a second time recently)     Multifocal pneumonia  Aspiration pneumonitis  Patient on RA. CXR somewhat improved previous admission. Do not suspect untreated or even newly acute pneumonia. Given improvement overnight, likely patient experiencing acute pneumonitis. Concern for recurrent aspiration. SLP consult did not feel MBSS warranted. However patient be experiencing silent aspiration. Started on zosyn empirically. Then transitioned to augmentin for total of five days of antiboitic therapy. Patient was given furosemide 40 mg IV once as patient received IVF resuscitation with presence of pleural effusion.      Hyperkalemia   K+ 6.2 on presentation and shifted in ED. Patient with intermittent hyperkalemia in past lab draws, but this is highest it has been. EKG with K at 5.5 without diffuse ST changes. Unclear etiology. Creatinine wnl. Placed patient on lokelma 5 mg MWF. Referred patient outpatient nephrology evaluation for recurring hyperkalemia     Parkinson's disease  - continue home sinemet     Excessive daytime sleepiness  - continue home modafinil     Benign prostatic hyperplasia  - continue home proscar     Diarrhea - c. Diff negative  Loperamide 2 g QID started     Procedures: none     Consultants: none     Discharge Medication List as of 6/19/2023  2:09 PM        "          Patient queried and denies any further complaints    Patient has MEDICAL HISTORY OF  Patient Active Problem List   Diagnosis    Hyperuricemia    Benign prostatic hyperplasia    Peripheral " edema    RBBB    Pleural effusion    Sinus bradycardia    Impaired mobility and activities of daily living    Nontraumatic psoas hematoma    Hard of hearing    Pulmonary nodule    Incontinence associated dermatitis    History of epistaxis    Ulcerative (chronic) proctitis without complications    Normocytic anemia    Unspecified lack of coordination     Sequelae of protein-calorie malnutrition     Multiple falls    Excessive daytime sleepiness    Aortic atherosclerosis    Sepsis    Acute metabolic encephalopathy    Dilation of biliary tract    Multifocal pneumonia    Parkinson's disease    Eye irritation    Hyperkalemia    Aspiration pneumonitis       SURGICAL AND MEDICAL HISTORY: updated and reviewed.  Past Surgical History:   Procedure Laterality Date    TOTAL COLECTOMY       ALLERGIES updated and reviewed.  Review of patient's allergies indicates:   Allergen Reactions    Heparin     Heparin analogues Other (See Comments)     Not true allergy - but patient developed large spontaneous RP hematoma and concurrent severe epistaxis while on DVT prophylactic dose heparin - consider mechanical DVT ppx in future       CURRENT OUTPATIENT MEDICATIONS updated and reviewed    Current Outpatient Medications:     artificial tears (ISOPTO TEARS) 0.5 % ophthalmic solution, Place 2 drops into both eyes 4 (four) times daily as needed., Disp: , Rfl:     balsam peru-castor oiL Oint, Apply topically 2 (two) times a day. Apply to buttocks, Disp: , Rfl:     carbidopa-levodopa  mg (SINEMET)  mg per tablet, TAKE 1 TABLET BY MOUTH 4 (FOUR) TIMES A DAY FOR 30 DAYS., Disp: 120 tablet, Rfl: 11    finasteride (PROSCAR) 5 mg tablet, Take 1 tablet (5 mg total) by mouth once daily., Disp: 90 tablet, Rfl: 3    furosemide (LASIX) 20 MG tablet, Take 0.5 tablets (10 mg total) by mouth once daily. As needed for weight gain of 3 lb in 1 day, 5 lb in 1 week, or significant leg edema, Disp: 45 tablet, Rfl: 1    melatonin (MELATIN) 3 mg  "tablet, Take 2 tablets (6 mg total) by mouth nightly., Disp: , Rfl: 0    polyethylene glycol (GLYCOLAX) 17 gram PwPk, Take 17 g by mouth 2 (two) times daily as needed (constipation)., Disp: 60 each, Rfl: 0    sodium chloride (OCEAN) 0.65 % nasal spray, 1 spray by Nasal route as needed (irritation)., Disp: , Rfl: 12    sodium zirconium cyclosilicate (LOKELMA) 5 gram packet, Take 1 packet (5 g total) by mouth every Mon, Wed, Fri. Mix entire contents of packet(s) into drinking glass containing 3 tablespoons of water; stir well and drink immediately. Add water and repeat until no powder remains to receive entire dose., Disp: 12 packet, Rfl: 3    pneumoc 20-nazanin conj-dip cr,PF, (PREVNAR 20, PF,) 0.5 mL Syrg injection, Inject into the muscle once. for 1 dose, Disp: 0.5 mL, Rfl: 0    Review of Systems    /62 (BP Location: Right arm, Patient Position: Sitting, BP Method: Medium (Manual))   Pulse 60   Ht 5' 5" (1.651 m)   Wt 54 kg (119 lb 0.8 oz)   SpO2 97%   BMI 19.81 kg/m²   Physical Exam    ASSESSMENT/PLAN  Diagnoses and all orders for this visit:    Hospital discharge follow-up    Hyperkalemia  -     Basic Metabolic Panel; Future    Need for pneumococcal vaccination  -     (In Office Administered) Pneumococcal Conjugate Vaccine (20 Valent) (IM)    Acute metabolic encephalopathy    Parkinson's disease    Hearing loss, unspecified hearing loss type, unspecified laterality    Pleural effusion    Multifocal pneumonia    Aortic atherosclerosis    Sequelae of protein-calorie malnutrition     Hyperuricemia    Multiple falls    Impaired mobility and activities of daily living            All lab results over past 2 years available reviewed inc labs and any cardiology or radiology studies.  Any new prescription medications gone over in detail including reason for taking the medication, the general mechanism of action, most common possible side effects and possible costs, etcetera.    Chronic conditions updated. Other " than changes or additions as above, cont current medications and maintain follow-up with specialists if indicated.     Tl Torres MD  A dictation device was used to produce this document. Use of such devices sometimes results in grammatical errors or replacement of words that sound similarly.

## 2023-07-14 NOTE — TELEPHONE ENCOUNTER
----- Message from Tl Torres MD sent at 7/14/2023 12:54 PM CDT -----  Contact: [facility] [phone]  Patient is a candidate for Paxlovid.  I will go ahead and send in a prescription to our pharmacy if this is convenient because I know that we have this in stock.  Please update me ASAP if this is okay via message here and secure chat.  He can hold prescription see if he has a bit more of a cough or symptoms since he is really not having any symptoms.  Indeed, the patient does have a chronic cough.     Hydrate, rest, Mucinex Expectorant as directed for thinning out the mucus; or MucinexDM if with significant cough and mucus.  Zyrtec, Xyzal, Allegra or Claritin. (Would lean towards Allegra which may be a bit more effective than claritin and will not cause sedation as Xyzal or Zyrtec may.)  Nasal saline spray such as [place] brand as needed.  Flonase or Nasonex nasal spray after the nasal saline.  Warm salt water gargles and warm liquids may help soothe a sore throat better than cool liquids.  Tylenol as directed as needed for fever, body aches, headaches.   All are OTC  Most of all, stay well-hydrated.    ----- Message -----  From: Tl Torres MD  Sent: 7/14/2023  12:49 PM CDT  To: Anni Mccain LPN, Tl Torres MD, #    Patient is a candidate for Paxlovid.  I will go ahead and send in a prescription to our pharmacy if this is convenient because I know that we have this in stock.  Please update me ASAP if this is okay via message here and secure chat.  He can hold prescription see if he has a bit more of a cough or symptoms since he is really not having any symptoms.  Indeed, the patient does have a chronic cough.  ----- Message -----  From: Theresa Bear  Sent: 7/14/2023  12:34 PM CDT  To: Brian Alfred    Bridger said pt wife tested pos for covid two days ago and the pt got tested this morning with a home test and was pos. No fever BP is fine and heart rate 64 no chest pain or  shortness of breath. Pt has a cough but always has one. Please call Bridger back.

## 2023-07-14 NOTE — TELEPHONE ENCOUNTER
Advised [facility]ger with [facility] per Dr. Torres.   Pt's wife advised per Dr. Torres. Wife reports that she is currently hosp admit and needs the medication sent to [facility] by their house on [address].

## 2023-07-15 PROBLEM — D64.9 CHRONIC ANEMIA: Status: ACTIVE | Noted: 2023-01-01

## 2023-07-15 PROBLEM — U07.1 COVID-19: Status: ACTIVE | Noted: 2023-01-01

## 2023-07-15 PROBLEM — L98.429 SACRAL ULCER: Status: ACTIVE | Noted: 2023-01-01

## 2023-07-15 NOTE — DISCHARGE INSTRUCTIONS
Home Care Instructions:  - Medications: Continue taking your home medications as prescribed  - Continue Paxlovid    Follow-Up Plan:  - Follow-up with: Primary care doctor within 3  days  - Additional testing and/or evaluation will be directed by your primary doctor    Return to the Emergency Department for symptoms including but not limited to: worsening symptoms, severe back pain, shortness of breath or chest pain, vomiting with inability to hold down fluids, blood in vomit or poop, fevers greater than 100.4°F, passing out/fainting/unconsciousness, or other concerning symptoms.

## 2023-07-15 NOTE — ED PROVIDER NOTES
Encounter Date: 7/15/2023       History     Chief Complaint   Patient presents with    Fatigue     Pt tested positive for Covid 2 days ago. Pt reports generalized fatigue     89-year-old male with past medical history of  parkinsonism, dementia, BPH, difficulty hearing, multiple falls, history of ulcerative colitis status post colectomy, presenting to ED with chief complaint of fatigue and weakness.  Patient's wife was diagnosed with COVID 2 days ago and is currently hospitalized at [facility].  Patient has continuous home health care.  He tested positive at home for COVID-19 two days ago as well.  He was started on Paxlovid yesterday.  His home health nurse reports that patient typically ambulates with assistance and a walker.  Since yesterday, patient has been more fatigued and having difficulty ambulating.  He has been using a wheelchair.  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  No fevers or diarrhea.  Patient is very hard of hearing with dementia.  Remainder of history is limited.      Review of patient's allergies indicates:   Allergen Reactions    Heparin     Heparin analogues Other (See Comments)     Not true allergy - but patient developed large spontaneous RP hematoma and concurrent severe epistaxis while on DVT prophylactic dose heparin - consider mechanical DVT ppx in future     Past Medical History:   Diagnosis Date    BPH (benign prostatic hyperplasia)     Parkinsons 09/2019    R hand tremor starting in 2017, diagnosed Sept 2019 by Dr. Angeles at     Ulcerative colitis     s/p colectomy    Unspecified chronic bronchitis      Past Surgical History:   Procedure Laterality Date    TOTAL COLECTOMY       No family history on file.  Social History     Tobacco Use    Smoking status: Never    Smokeless tobacco: Never   Substance Use Topics    Alcohol use: Never     Review of Systems   Constitutional:  Positive for fatigue. Negative for chills and fever.   HENT:   Positive for congestion. Negative for sore throat.    Respiratory:  Negative for shortness of breath.    Cardiovascular:  Negative for chest pain.   Gastrointestinal:  Negative for abdominal pain, nausea and vomiting.   Genitourinary:  Negative for dysuria.   Musculoskeletal:  Positive for gait problem (Secondary to fatigue).   Skin:  Positive for wound (Sacral). Negative for rash.   Neurological:  Negative for seizures, syncope and facial asymmetry.     Physical Exam     Initial Vitals [07/15/23 1336]   BP Pulse Resp Temp SpO2   118/62 (!) 59 18 98.3 °F (36.8 °C) 98 %      MAP       --         Physical Exam    Nursing note and vitals reviewed.  Constitutional: He is not diaphoretic. No distress.   Chronically ill-appearing cachectic male.    HENT:   Head: Normocephalic and atraumatic.   Mouth/Throat: Oropharynx is clear and moist.   Eyes: Conjunctivae and EOM are normal. Pupils are equal, round, and reactive to light.   Neck: Neck supple.   Normal range of motion.  Cardiovascular:  Regular rhythm, normal heart sounds and intact distal pulses.           Bradycardia   Pulmonary/Chest: Breath sounds normal. He has no wheezes. He has no rales.   Abdominal: Abdomen is soft. He exhibits no distension. There is no abdominal tenderness.   Musculoskeletal:         General: No tenderness or edema. Normal range of motion.      Cervical back: Normal range of motion and neck supple.     Neurological: He is alert and oriented to person, place, and time. He has normal strength. No cranial nerve deficit or sensory deficit.   Diffuse bilateral weakness   Skin: Skin is warm and dry. Capillary refill takes less than 2 seconds.   3 x 4 cm stage II sacral ulcer with bleeding.  No surrounding erythema, induration or drainage.  There is surrounding stage I sacral ulcer.       ED Course   Procedures  Labs Reviewed   CBC W/ AUTO DIFFERENTIAL - Abnormal; Notable for the following components:       Result Value    RBC 3.02 (*)     Hemoglobin  8.4 (*)     Hematocrit 27.0 (*)     MCHC 31.1 (*)     RDW 22.2 (*)     Mono # 1.2 (*)     Lymph % 14.2 (*)     All other components within normal limits   COMPREHENSIVE METABOLIC PANEL - Abnormal; Notable for the following components:    Calcium 8.4 (*)     Albumin 2.4 (*)     ALT <5 (*)     All other components within normal limits   C-REACTIVE PROTEIN - Abnormal; Notable for the following components:    CRP 67.3 (*)     All other components within normal limits   FERRITIN - Abnormal; Notable for the following components:    Ferritin 343 (*)     All other components within normal limits   CK - Abnormal; Notable for the following components:     (*)     All other components within normal limits   SARS-COV-2 RNA AMPLIFICATION, QUAL - Abnormal; Notable for the following components:    SARS-CoV-2 RNA, Amplification, Qual Positive (*)     All other components within normal limits   CK - Abnormal; Notable for the following components:     (*)     All other components within normal limits   SEDIMENTATION RATE - Abnormal; Notable for the following components:    Sed Rate >120 (*)     All other components within normal limits   D DIMER, QUANTITATIVE - Abnormal; Notable for the following components:    D-Dimer 1.59 (*)     All other components within normal limits   B-TYPE NATRIURETIC PEPTIDE - Abnormal; Notable for the following components:     (*)     All other components within normal limits   D DIMER, QUANTITATIVE - Abnormal; Notable for the following components:    D-Dimer 1.59 (*)     All other components within normal limits   LACTATE DEHYDROGENASE   LACTIC ACID, PLASMA   TROPONIN I   MAGNESIUM   MAGNESIUM    Narrative:     add on mg per dr.donna van /order#[id number] @ 07/15/2023  16:43    PROTIME-INR   APTT   TROPONIN I   URINALYSIS, REFLEX TO URINE CULTURE     EKG Readings: (Independently Interpreted)   Initial Reading: No STEMI. Previous EKG: Compared with most recent EKG Heart Rate: 50.    Poor quality ECG, increased artifact     Imaging Results              X-Ray Chest AP Portable (Final result)  Result time 07/15/23 15:48:22      Final result by Joanna Baxter MD (07/15/23 15:48:22)                   Impression:      No detrimental change since June 17, 2023.      Electronically signed by: Joanna Baxter MD  Date:    07/15/2023  Time:    15:48               Narrative:    EXAMINATION:  XR CHEST AP PORTABLE    CLINICAL HISTORY:  COVID-19;    TECHNIQUE:  Single frontal view of the chest was performed.    COMPARISON:  June 17, 2023    FINDINGS:  Cardiac silhouette is not enlarged.  Abnormal opacification, consistent with a combination of pleural fluid, scarring, and focal consolidation, remains at the left lung base.  Overall, the lungs are similar in appearance to the previous examination with no new superimposed lobar consolidation or alveolar edema.  No pneumothorax is seen.  Visualized osseous structures are stable with degenerative changes of the spine and shoulders.  There are atherosclerotic calcifications of the aorta.                                    X-Rays:   Independently Interpreted Readings:   Chest X-Ray: Normal heart size.  No acute abnormalities.  No infiltrates. No pneumothorax or free air   Medications   sodium chloride 0.9% flush 10 mL (has no administration in time range)   remdesivir 100 mg in sodium chloride 0.9% 250 mL infusion (has no administration in time range)   glucose chewable tablet 16 g (has no administration in time range)   glucose chewable tablet 24 g (has no administration in time range)   glucagon (human recombinant) injection 1 mg (has no administration in time range)   albuterol inhaler 2 puff (2 puffs Inhalation Not Given 7/15/23 1930)   ascorbic acid (vitamin C) tablet 500 mg (500 mg Oral Not Given 7/15/23 2109)   multivitamin tablet (has no administration in time range)   dexAMETHasone tablet 6 mg (6 mg Oral Not Given 7/15/23 2108)   dextrose 10%  bolus 125 mL 125 mL (has no administration in time range)   dextrose 10% bolus 250 mL 250 mL (has no administration in time range)   pantoprazole EC tablet 40 mg (has no administration in time range)   heparin (porcine) injection 5,000 Units (5,000 Units Subcutaneous Not Given 7/15/23 2200)   artificial tears 0.5 % ophthalmic solution 2 drop (has no administration in time range)   balsam peru-castor oiL Oint ( Topical (Top) Given 7/15/23 2112)   carbidopa-levodopa  mg per tablet 1 tablet (1 tablet Oral Not Given 7/15/23 2109)   finasteride tablet 5 mg (has no administration in time range)   melatonin tablet 6 mg (6 mg Oral Not Given 7/15/23 2109)   polyethylene glycol packet 17 g (has no administration in time range)   lactated ringers bolus 500 mL (0 mLs Intravenous Stopped 7/15/23 1747)   remdesivir 200 mg in sodium chloride 0.9% 250 mL infusion (0 mg Intravenous Stopped 7/15/23 2144)     Medical Decision Making:   History:   Old Medical Records: I decided to obtain old medical records.  Old Records Summarized: records from previous admission(s).       <> Summary of Records: 6/19/23:  Discharge summary reviewed for patient admitted for pneumonitis, acute metabolic encephalopathy  Differential Diagnosis:   COVID-19 infection  Pneumonia  Viral syndrome  Rhabdomyolysis    Independently Interpreted Test(s):   I have ordered and independently interpreted X-rays - see prior notes.  I have ordered and independently interpreted EKG Reading(s) - see prior notes  Clinical Tests:   Lab Tests: Ordered and Reviewed  Radiological Study: Ordered and Reviewed  ED Management:  Patient is hemodynamically stable and satting well on room air.  Labs are notable for positive COVID-19, CRP 67.3, , ferritin 343.  Chest x-ray is similar to that of 06/17/2023.  Patient given 500 cc bolus of LR.  Repeat CPK after IV fluids is 295. He is unable to ambulate at baseline in ED and is high-risk due to significant co-morbidities.   Patient admitted to Hospital Medicine for further management.          Attending Attestation:   Physician Attestation Statement for Resident:  As the supervising MD   Physician Attestation Statement: I have personally seen and examined this patient.   I agree with the above history.  -:   As the supervising MD I agree with the above PE.     As the supervising MD I agree with the above treatment, course, plan, and disposition.                        I have reviewed and concur with the resident's history, physical, assessment, and plan.  I have personally interviewed and examined the patient at bedside.   I did supervise any and all procedures and was present for any critical portion, and was always immediately available for help and as a resource.     The above history physical, review of symptoms, HPI and physical exam reflect my independent interpretation and evaluation.    Complexity: High Risk    Final diagnoses:  [U07.1] COVID-19  [R53.1] Weakness (Primary)  [R53.83] Fatigue, unspecified type  [R79.89] Elevated d-dimer  [S31.000D] Wound of sacral region, subsequent encounter     Amilcar Maki DO, [facility]  Emergency Staff Physician   Dept of Emergency Medicine   [facility]  Spectralink: [id number]        Disclaimer: This note has been generated using voice-recognition software. There may be typographical errors that have been missed during proof-reading.                 Clinical Impression:   Final diagnoses:  [U07.1] COVID-19  [R53.1] Weakness (Primary)  [R53.83] Fatigue, unspecified type  [R79.89] Elevated d-dimer  [S31.000D] Wound of sacral region, subsequent encounter        ED Disposition Condition    Observation                 Lilian Calles MD  Resident  07/15/23 [id number]       Amilcar Maki DO  07/15/23 [id number]

## 2023-07-16 NOTE — PLAN OF CARE
[facility] Int[facility] ([facility]  Initial Discharge Assessment       Primary Care Provider: Tl Torres MD    Admission Diagnosis: Weakness [R53.1]  Elevated d-dimer [R79.89]  Wound of sacral region, subsequent encounter [S31.000D]  Fatigue, unspecified type [R53.83]  COVID-19 [U07.1]    Admission Date: 7/15/2023  Expected Discharge Date:     Transition of Care Barriers: No family/friends to help    Payor: MEDICARE / Plan: MEDICARE PART A & B / Product Type: Government /     Extended Emergency Contact Information  Primary Emergency Contact: Obi Lemus  Mobile Phone: [phone]  Relation: Spouse  Preferred language: English   needed? No  Secondary Emergency Contact: Lincoln Live  Mobile Phone: [phone]  Relation: Brother    Discharge Plan A: Home Health  Discharge Plan B: Home with family      [id number] - [place], [place] - [address]  [address]  [place] [address]  Phone: [phone] Fax: [phone]    SW spoke with patient's spouse Obi Lemus via telephone [phone] to complete discharge planning assessment.  Patient in covid isolation room.  Spouse verified all demographic information on facesheet is correct.  Spouse verified PCP is Dr Torres.  Spouse verified primary health insurance is Medicare and secondary is [facility].  Patient with NO home health but has listed DME.  Patient with NO POA or Living Will.  Patient not on dialysis or medication coumadin.  Patient with no 30 day admission.  Patient with no financial issues at this time.  Patient family will provide transportation upon discharge from facility.  Patient independent with ADLs, live with spouse, melissaer Lincoln oscar.      Patient would benefit from home health, [facility] at home and Outpatient Case Management referrals.      Initial Assessment (most recent)       Adult Discharge Assessment - 07/16/23 1450          Discharge Assessment    Assessment Type Discharge Planning Assessment      Confirmed/corrected address, phone number and insurance Yes     Confirmed Demographics Correct on Facesheet     Source of Information family     Communicated JOHAN with patient/caregiver Date not available/Unable to determine     People in Home spouse     Facility Arrived From: home     Do you expect to return to your current living situation? Yes     Do you have help at home or someone to help you manage your care at home? Yes     Who are your caregiver(s) and their phone number(s)? spouse     Prior to hospitilization cognitive status: Alert/Oriented     Current cognitive status: Alert/Oriented     Walking or Climbing Stairs ambulation difficulty, requires equipment;ambulation difficulty, assistance 1 person;stair climbing difficulty, requires equipment;stair climbing difficulty, assistance 1 person     Dressing/Bathing bathing difficulty, requires equipment;bathing difficulty, assistance 1 person;dressing difficulty, assistance 1 person;dressing difficulty, requires equipment     Equipment Currently Used at Home walker, rolling     Readmission within 30 days? Yes     Patient currently being followed by outpatient case management? No     Do you currently have service(s) that help you manage your care at home? No     Do you take prescription medications? Yes     Do you have prescription coverage? Yes     Do you have any problems affording any of your prescribed medications? No     Is the patient taking medications as prescribed? yes     Who is going to help you get home at discharge? spouse     How do you get to doctors appointments? family or friend will provide     Are you on dialysis? No     Do you take coumadin? No     Discharge Plan A Home Health     Discharge Plan B Home with family     DME Needed Upon Discharge  none     Discharge Plan discussed with: Patient     Transition of Care Barriers No family/friends to help     SDOH Lack of primary/family support

## 2023-07-16 NOTE — SUBJECTIVE & OBJECTIVE
Past Medical History:   Diagnosis Date    BPH (benign prostatic hyperplasia)     Parkinsons 09/2019    R hand tremor starting in 2017, diagnosed Sept 2019 by Dr. Angeles at     Ulcerative colitis     s/p colectomy    Unspecified chronic bronchitis        Past Surgical History:   Procedure Laterality Date    TOTAL COLECTOMY         Review of patient's allergies indicates:   Allergen Reactions    Heparin     Heparin analogues Other (See Comments)     Not true allergy - but patient developed large spontaneous RP hematoma and concurrent severe epistaxis while on DVT prophylactic dose heparin - consider mechanical DVT ppx in future       No current facility-administered medications on file prior to encounter.     Current Outpatient Medications on File Prior to Encounter   Medication Sig    artificial tears (ISOPTO TEARS) 0.5 % ophthalmic solution Place 2 drops into both eyes 4 (four) times daily as needed.    balsam peru-castor oiL Oint Apply topically 2 (two) times a day. Apply to buttocks    carbidopa-levodopa  mg (SINEMET)  mg per tablet TAKE 1 TABLET BY MOUTH 4 (FOUR) TIMES A DAY FOR 30 DAYS.    finasteride (PROSCAR) 5 mg tablet Take 1 tablet (5 mg total) by mouth once daily.    furosemide (LASIX) 20 MG tablet Take 0.5 tablets (10 mg total) by mouth once daily. As needed for weight gain of 3 lb in 1 day, 5 lb in 1 week, or significant leg edema    melatonin (MELATIN) 3 mg tablet Take 2 tablets (6 mg total) by mouth nightly.    nirmatrelvir-ritonavir (PAXLOVID) 300 mg (150 mg x 2)-100 mg copackaged tablets (EUA) Take 3 tablets by mouth 2 (two) times daily. Each dose contains 2 nirmatrelvir (pink tablets) and 1 ritonavir (white tablet). Take all 3 tablets together    polyethylene glycol (GLYCOLAX) 17 gram PwPk Take 17 g by mouth 2 (two) times daily as needed (constipation).    sodium chloride (OCEAN) 0.65 % nasal spray 1 spray by Nasal route as needed (irritation).    sodium zirconium cyclosilicate  (LOKELMA) 5 gram packet Take 1 packet (5 g total) by mouth every Mon, Wed, Fri. Mix entire contents of packet(s) into drinking glass containing 3 tablespoons of water; stir well and drink immediately. Add water and repeat until no powder remains to receive entire dose.     Family History    None       Tobacco Use    Smoking status: Never    Smokeless tobacco: Never   Substance and Sexual Activity    Alcohol use: Never    Drug use: Not on file    Sexual activity: Not on file     Review of Systems   Constitutional:  Positive for fatigue.   HENT: Negative.     Eyes: Negative.    Respiratory: Negative.     Cardiovascular: Negative.    Gastrointestinal:  Positive for diarrhea.   Endocrine: Negative.    Genitourinary: Negative.    Musculoskeletal:  Positive for arthralgias and myalgias.   Skin: Negative.    Allergic/Immunologic: Negative.    Neurological: Negative.    Hematological: Negative.    Psychiatric/Behavioral: Negative.     Objective:     Vital Signs (Most Recent):  Temp: 97.7 °F (36.5 °C) (07/15/23 1900)  Pulse: 61 (07/15/23 1933)  Resp: 14 (07/15/23 1933)  BP: (!) 176/72 (07/15/23 1933)  SpO2: 98 % (07/15/23 1933) Vital Signs (24h Range):  Temp:  [97.7 °F (36.5 °C)-98.3 °F (36.8 °C)] 97.7 °F (36.5 °C)  Pulse:  [51-61] 61  Resp:  [10-18] 14  SpO2:  [98 %-100 %] 98 %  BP: (118-176)/(62-73) 176/72     Weight: 63.5 kg (140 lb)  Body mass index is 21.93 kg/m².     Physical Exam  Constitutional:       Appearance: He is ill-appearing.   HENT:      Head: Atraumatic.      Right Ear: External ear normal.      Left Ear: External ear normal.      Nose: Nose normal.      Mouth/Throat:      Mouth: Mucous membranes are moist.      Pharynx: Oropharynx is clear. No oropharyngeal exudate or posterior oropharyngeal erythema.   Eyes:      Extraocular Movements: Extraocular movements intact.      Conjunctiva/sclera: Conjunctivae normal.      Pupils: Pupils are equal, round, and reactive to light.   Cardiovascular:      Rate and  Rhythm: Normal rate.      Pulses: Normal pulses.      Heart sounds: Normal heart sounds.   Pulmonary:      Effort: Pulmonary effort is normal.      Breath sounds: Normal breath sounds. No wheezing or rhonchi.   Abdominal:      General: There is no distension.      Palpations: Abdomen is soft.      Tenderness: There is no guarding or rebound.   Musculoskeletal:         General: No swelling or tenderness.      Cervical back: Normal range of motion and neck supple. No rigidity.      Right lower leg: No edema.      Left lower leg: No edema.   Skin:     General: Skin is warm and dry.      Capillary Refill: Capillary refill takes 2 to 3 seconds.      Coloration: Skin is pale.   Neurological:      General: No focal deficit present.      Mental Status: He is alert. Mental status is at baseline.   Psychiatric:         Behavior: Behavior normal.            CRANIAL NERVES     CN III, IV, VI   Pupils are equal, round, and reactive to light.     Significant Labs: All pertinent labs within the past 24 hours have been reviewed.  CBC:   Recent Labs   Lab 07/15/23  1525   WBC 8.40   HGB 8.4*   HCT 27.0*        CMP:   Recent Labs   Lab 07/15/23  1525      K 4.7      CO2 26   GLU 99   BUN 22   CREATININE 0.8   CALCIUM 8.4*   PROT 6.3   ALBUMIN 2.4*   BILITOT 0.2   ALKPHOS 107   AST 19   ALT <5*   ANIONGAP 9       Significant Imaging: I have reviewed all pertinent imaging results/findings within the past 24 hours.

## 2023-07-16 NOTE — PLAN OF CARE
PT eval complete, plan of care established    2023    Problem: Physical Therapy  Goal: Physical Therapy Goal  Description: Goals to be met by: 2023     Patient will increase functional independence with mobility by performin. Supine to sit with stand by assistance  2. Sit to supine with stand by assistance  3. Sit to stand transfer with stand by assistance  4. Gait  x 25 feet with stand by assistance using LRAD as needed  5. Lower extremity exercise program x10 reps per handout, with assistance as needed   Outcome: Ongoing, Progressing

## 2023-07-16 NOTE — PLAN OF CARE
Patient is extremely hard of hearing but when he does understand the question he answers appropriately.  For example, I was able to write COVID and speak loudly into the patient's right ear to tell him why he was in the hospital.  Once the patient realized the word that was written was COVID, he said I have had all of my vaccinations.  Patient told me I should have plenty of antibodies.  Patient told me my wife is in the hospital with COVID .  I was able to have the patient understand through writing and speaking very loudly that he was on remdesivir for prevention of severe disease.  I told him that typically we would complete 3 days of remdesivir for less severe cases of COVID in the last dose would be tomorrow.  Patient says so you think that I would be discharged tomorrow which is Monday?  Unfortunately, I could not get the patient to understand questions about possible discharge to facility (as requested by family or heparin and previous bleeding episodes.  I think this is due to the patient not being able to hear the questions that I am asking.  But I called the patient's wife and explained the risk of clotting versus the risk of bleeding.  In May of 2022, patient had a retroperitoneal bleed that needed to be treated with Interventional Radiology.  It was felt that this bleed was nontraumatic.  At the time, the physicians recommended holding future DVT prophylaxis.  After approximately 25 minutes of discussion with patient's wife and brother-in-law, they decided to proceed with heparin to prevent clot formation.  They understand the risk of bleeding.

## 2023-07-16 NOTE — NURSING
Received pt fr ED via stretcher. Pt w/ spontaneous non-labored breathing. Pt w/ infrequent productive cough. Pt DOx3. Pt on room air. Pt hooked to cardiac telemetry box. Heart rhythm regular, lung sounds diminished and abdominal sounds hyperactive. Pt w/ 20g PIV on left FA, intact. Pt w/ difficulty hearing. Pt noted w/ bilateral hearing aids. Pt w/ loose BM. Pt w/ generalized weakness. Pt oriented to room and call light. All needs attended.

## 2023-07-16 NOTE — PLAN OF CARE
Problem: Adult Inpatient Plan of Care  Goal: Plan of Care Review  Outcome: Ongoing, Progressing  Goal: Optimal Comfort and Wellbeing  Outcome: Ongoing, Progressing     Problem: Fluid Imbalance (Pneumonia)  Goal: Fluid Balance  Outcome: Ongoing, Progressing     Problem: Infection (Pneumonia)  Goal: Resolution of Infection Signs and Symptoms  Outcome: Ongoing, Progressing     Problem: Respiratory Compromise (Pneumonia)  Goal: Effective Oxygenation and Ventilation  Outcome: Ongoing, Progressing     Problem: Impaired Wound Healing  Goal: Optimal Wound Healing  Outcome: Ongoing, Progressing     Problem: Fall Injury Risk  Goal: Absence of Fall and Fall-Related Injury  Outcome: Ongoing, Progressing     Problem: Diarrhea  Goal: Fluid and Electrolyte Balance  Outcome: Ongoing, Progressing

## 2023-07-16 NOTE — H&P
[facility] - Emergency [facility]  [facility]  History & Physical    Patient Name: Giselle Lemus  MRN: [id number]  Patient Class: OP- Observation  Admission Date: 7/15/2023  Attending Physician: Justin Tam MD   Primary Care Provider: Tl Torres MD         Patient information was obtained from patient, relative(s) and ER records.     Subjective:     Principal Problem:COVID-19    Chief Complaint:   Chief Complaint   Patient presents with    Fatigue     Pt tested positive for Covid 2 days ago. Pt reports generalized fatigue        HPI: Patient is a pleasantly demented 89 year old male with pmh significant for parkinson's disease, frequent falls, hard of hearing, BPH presents to our ED secondary to weakness.  Apparently patient have been having diarrhea since Wednesday of this week associated with weakness.  His wife is admitted to our hospital 2 days ago for covid 19 and is recovering.  Per brother in-law at bedside, patient has continuous home health care. He tested positive at home for COVID-19 2 days ago as well.  He was started on Paxlovid yesterday.  His home health nurse reports that patient typically ambulates with assistance and a walker.  Since yesterday, patient has been more fatigued and having difficulty ambulating.  He has been using a wheelchair.  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 No reported fever or chills.  He continues to have diarrhea.      In the ED chest xray was without infiltrates.  Patient is on room air with good saturation.  In no acute distress.  Very difficult of hearing.  No complaints other than generalized weakness.  No recent increase in medications.        No new subjective & objective note has been filed under this hospital service since the last note was generated.    Assessment/Plan:     * COVID-19  Patient is on room air, however patient with multiple comorbidities and high risk for complications  Will start COVID 19  order set including steroid and ramdasivir   Have hx of RPH on DVT ppx heparin, however high risk for DVT/PE, will not start full dose lovenox, heparin q8h for dvt ppx will be started.     Weakness, generalized       Likely from covid 19       PT/OT      Chronic anemia  Stable, no sign of bleeding      Sacral ulcer  Present on admission  Wound care      Parkinson's disease  Resume home medication      Multiple falls  PT/OT  Fall precaution      Hard of hearing  At baseline      Benign prostatic hyperplasia  Resume home medications        VTE Risk Mitigation (From admission, onward)           Ordered     heparin (porcine) injection 5,000 Units  Every 8 hours         07/15/23 1947                       On 07/15/2023, patient should be placed in hospital observation services under my care.        Nat Ji DO  Department of Hospital Medicine  [facility] - Emergency Dept

## 2023-07-16 NOTE — PLAN OF CARE
CM reviewed chart for appropriate post-acute resources. Due to lack of family and caregiver support, ambulatory referrals to OP Case Management and Care at Home. CM staff is following through discharge.      Victoria Murphy RN  Weekend  - [facility] [address]  Spectralink: [phone]

## 2023-07-16 NOTE — PLAN OF CARE
[facility] - Intensive Care ([facility]  Initial Discharge Assessment       Primary Care Provider: Tl Torres MD    Admission Diagnosis: Weakness [R53.1]  Elevated d-dimer [R79.89]  Wound of sacral region, subsequent encounter [S31.000D]  Fatigue, unspecified type [R53.83]  COVID-19 [U07.1]    Admission Date: 7/15/2023  Expected Discharge Date:     Transition of Care Barriers: None    Payor: MEDICARE / Plan: MEDICARE PART A & B / Product Type: Government /     Extended Emergency Contact Information  Primary Emergency Contact: Obi Lemus  Mobile Phone: [phone]  Relation: Spouse  Preferred language: English   needed? No  Secondary Emergency Contact: Lincoln Live  Mobile Phone: [phone]  Relation: Brother    Discharge Plan A: Home Health  Discharge Plan B: Home with family      [id number] - [place], [place] - [address]  [address]  [place] [address]  Phone: [phone] Fax: [phone]    SW spoke with patient's spouse Obi Lemus via telephone [phone] to complete discharge planning assessment.  Patient in covid isolation room.  Spouse verified all demographic information on facesheet is correct.  Spouse verified PCP is Dr Torres.  Spouse verified primary health insurance is Medicare and secondary is [facility].  Patient with NO home health but has listed DME.  Patient with NO POA or Living Will.  Patient not on dialysis or medication coumadin.  Patient with no 30 day admission.  Patient with no financial issues at this time.  Patient family will provide transportation upon discharge from facility.  Patient independent with ADLs, live with spouse, melissaer Lincoln oscar.      Initial Assessment (most recent)       Adult Discharge Assessment - 07/16/23 1450          Discharge Assessment    Assessment Type Discharge Planning Assessment     Confirmed/corrected address, phone number and insurance Yes     Confirmed Demographics Correct on Facesheet     Source of Information  Verbal shift change report given to Gilford Blessing (oncoming nurse) by Marleny Wells (offgoing nurse). Report included the following information SBAR, Kardex, Intake/Output, MAR and Recent Results. family     Communicated JOHAN with patient/caregiver Date not available/Unable to determine     People in Home spouse     Facility Arrived From: home     Do you expect to return to your current living situation? Yes     Do you have help at home or someone to help you manage your care at home? Yes     Who are your caregiver(s) and their phone number(s)? spouse     Prior to hospitilization cognitive status: Alert/Oriented     Current cognitive status: Alert/Oriented     Walking or Climbing Stairs ambulation difficulty, requires equipment;ambulation difficulty, assistance 1 person;stair climbing difficulty, requires equipment;stair climbing difficulty, assistance 1 person     Dressing/Bathing bathing difficulty, requires equipment;bathing difficulty, assistance 1 person;dressing difficulty, assistance 1 person;dressing difficulty, requires equipment     Equipment Currently Used at Home walker, rolling     Readmission within 30 days? Yes     Patient currently being followed by outpatient case management? No     Do you currently have service(s) that help you manage your care at home? No     Do you take prescription medications? Yes     Do you have prescription coverage? Yes     Do you have any problems affording any of your prescribed medications? No     Is the patient taking medications as prescribed? yes     Who is going to help you get home at discharge? spouse     How do you get to doctors appointments? family or friend will provide     Are you on dialysis? No     Do you take coumadin? No     Discharge Plan A Home Health     Discharge Plan B Home with family     DME Needed Upon Discharge  none     Discharge Plan discussed with: Patient     Transition of Care Barriers None

## 2023-07-16 NOTE — ASSESSMENT & PLAN NOTE
Patient is on room air, however patient with multiple comorbidities and high risk for complications  Will start COVID 19 order set including steroid and ramdasivir   Have hx of RPH on DVT ppx heparin, however high risk for DVT/PE, will not start full dose lovenox, heparin q8h for dvt ppx will be started.

## 2023-07-16 NOTE — PT/OT/SLP EVAL
"Physical Therapy Evaluation and Treatment    Patient Name: Giselle Lemus   MRN: [id number]  Recent Surgery: * No surgery found *      Recommendations:     Discharge Recommendations: other (see comments)   Discharge Equipment Recommendations: bedside commode   Barriers to discharge: None    Assessment:     Giselle Lemus is a 89 y.o. male admitted with a medical diagnosis of COVID-19. He presents with the following impairments/functional limitations: weakness, impaired endurance, gait instability, impaired balance, impaired cognition. Patient tolerated session fairly. Demonstrates impaired command following, possibly due to impaired hearing or command comprehension.    Rehab Prognosis: Fair; patient would benefit from acute PT services to address these deficits and reach maximum level of function.    Plan:     During this hospitalization, patient to be seen 3 x/week to address the above listed problems via gait training, therapeutic activities, therapeutic exercises, neuromuscular re-education    Plan of Care Expires: 08/16/23    Subjective     Chief Complaint: None verbalized   Patient Comments/Goals: "You don't have a walker", "If I have the urge to urinate I can just go?"  Pain/Comfort:  Pain Rating 1: 0/10    Social History: Limited history per chart, pt not answering questions  Living Environment: Patient lives with their spouse in a single story home with number of outside stair(s): 0  Prior Level of Function: Prior to admission, patient with undetermined level of function  Equipment Used at Home: walker, rolling, wheelchair  DME owned (not currently used): none  Assistance Upon Discharge: sitter(s) 24/7    Objective:     Communicated with RN prior to session. Patient found HOB elevated with telemetry, Condom Catheter, peripheral IV, pulse ox (continuous) upon PT entry to room.    General Precautions: Standard, fall, airborne, contact, droplet   Orthopedic Precautions: N/A   Braces: N/A    Respiratory Status: Room " air    Exams:  Cognition: Unable to assess orientation, pt not understanding questions, follows commands 25% of the time  RLE ROM: WFL except lacking ~20 degrees knee extension  RLE Strength:  3/5 grossly, pt not following 2 step commands  LLE ROM: WFL except lacking ~20 degrees knee extension  LLE Strength: 3/5 grossly, pt not following 2 step commands    Functional Mobility:  Gait belt applied - No  Bed Mobility  Rolling Left: minimum assistance  Supine to Sit: minimum assistance  Sit to Supine: minimum assistance  Transfers  Sit to Stand: minimum assistance with hand-held assist  Gait  Patient ambulated 10 steps forward/back with hand-held assist and minimum assistance. Patient demonstrates unsteady gait, decreased step length, flexed posture, and decreased afshan. Cuing for safety and step size. All lines remained intact throughout ambulation trail.  Balance  Sitting: supervision  Standing: minimum assistance    Therapeutic Activities and Exercises:   Patient educated on role of acute care PT and PT POC, safety while in hospital including calling nurse for mobility, and call light usage  Patient is clear to stand pivot transfer with RN/PCT, assist x1    Pt encountered with soiled linens, extra time spent changing linens and brief to reduce risk of skin breakdown, cuing provided to encourage pt participation in bed mobility    AM-PAC 6 CLICK MOBILITY  Total Score:16    Patient left HOB elevated with all lines intact, call button in reach, RN notified, and bed alarm on.    GOALS:   Multidisciplinary Problems       Physical Therapy Goals          Problem: Physical Therapy    Goal Priority Disciplines Outcome Goal Variances Interventions   Physical Therapy Goal     PT, PT/OT Ongoing, Progressing     Description: Goals to be met by: 2023     Patient will increase functional independence with mobility by performin. Supine to sit with stand by assistance  2. Sit to supine with stand by assistance  3. Sit  to stand transfer with stand by assistance  4. Gait  x 25 feet with stand by assistance using LRAD as needed  5. Lower extremity exercise program x10 reps per handout, with assistance as needed                        History:     Past Medical History:   Diagnosis Date    BPH (benign prostatic hyperplasia)     Parkinsons 09/2019    R hand tremor starting in 2017, diagnosed Sept 2019 by Dr. Angeles at     Ulcerative colitis     s/p colectomy    Unspecified chronic bronchitis        Past Surgical History:   Procedure Laterality Date    TOTAL COLECTOMY         Time Tracking:     PT Received On: 07/16/23  PT Start Time: 1113  PT Stop Time: 1138  PT Total Time (min): 25 min     Billable Minutes: Evaluation 10 min and Therapeutic Activity 10 min    7/16/2023

## 2023-07-16 NOTE — ED NOTES
Assumed care of pt; received report from KATELYNN Geiger.     The patient is resting quietly in ED stretcher, and is awake and alert at this time. Respirations are even and unlabored, with no distress noted. The patient remains on continuous cardiac monitor, automated BP cuff cycling Q30 minutes, and continuous pulse The patient denies further needs and has no complaints at this time. SR raised x2, bed locked and in low position with brake engaged. Call bell within reach and the patient verbalized he or family would call for assistance if needed. Personal belongings are at bedside within reach. Patient has a visitor at bedside.

## 2023-07-16 NOTE — H&P
[facility] - Emergency Dept  [facility]  History & Physical    Patient Name: Giselle Lemus  MRN: [id number]  Patient Class: OP- Observation  Admission Date: 7/15/2023  Attending Physician: Justin Tam MD   Primary Care Provider: Tl Torres MD         Patient information was obtained from relative(s) and ER records.     Subjective:     Principal Problem:Covid 19, weakness  Chief Complaint: weakness, diarrhea  Chief Complaint   Patient presents with    Fatigue     Pt tested positive for Covid 2 days ago. Pt reports generalized fatigue        HPI: Patient is a pleasantly demented 89 year old male with pmh significant for parkinson's disease, frequent falls, hard of hearing, BPH presents to our ED secondary to weakness.  Apparently patient have been having diarrhea since Wednesday of this week associated with weakness.  His wife is admitted to our hospital 2 days ago for covid 19 and is recovering.  Per brother in-law at bedside, patient has continuous home health care. He tested positive at home for COVID-19 2 days ago as well.  He was started on Paxlovid yesterday.  His home health nurse reports that patient typically ambulates with assistance and a walker.  Since yesterday, patient has been more fatigued and having difficulty ambulating.  He has been using a wheelchair.  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 No reported fever or chills.  He continues to have diarrhea.      In the ED chest xray was without infiltrates.  Patient is on room air with good saturation.  In no acute distress.  Very difficult of hearing.  No complaints other than generalized weakness.  No recent increase in medications.        Past Medical History:   Diagnosis Date    BPH (benign prostatic hyperplasia)     Parkinsons 09/2019    R hand tremor starting in 2017, diagnosed Sept 2019 by Dr. Angeles at     Ulcerative colitis     s/p colectomy    Unspecified chronic  bronchitis        Past Surgical History:   Procedure Laterality Date    TOTAL COLECTOMY         Review of patient's allergies indicates:   Allergen Reactions    Heparin     Heparin analogues Other (See Comments)     Not true allergy - but patient developed large spontaneous RP hematoma and concurrent severe epistaxis while on DVT prophylactic dose heparin - consider mechanical DVT ppx in future       No current facility-administered medications on file prior to encounter.     Current Outpatient Medications on File Prior to Encounter   Medication Sig    artificial tears (ISOPTO TEARS) 0.5 % ophthalmic solution Place 2 drops into both eyes 4 (four) times daily as needed.    balsam peru-castor oiL Oint Apply topically 2 (two) times a day. Apply to buttocks    carbidopa-levodopa  mg (SINEMET)  mg per tablet TAKE 1 TABLET BY MOUTH 4 (FOUR) TIMES A DAY FOR 30 DAYS.    finasteride (PROSCAR) 5 mg tablet Take 1 tablet (5 mg total) by mouth once daily.    furosemide (LASIX) 20 MG tablet Take 0.5 tablets (10 mg total) by mouth once daily. As needed for weight gain of 3 lb in 1 day, 5 lb in 1 week, or significant leg edema    melatonin (MELATIN) 3 mg tablet Take 2 tablets (6 mg total) by mouth nightly.    nirmatrelvir-ritonavir (PAXLOVID) 300 mg (150 mg x 2)-100 mg copackaged tablets (EUA) Take 3 tablets by mouth 2 (two) times daily. Each dose contains 2 nirmatrelvir (pink tablets) and 1 ritonavir (white tablet). Take all 3 tablets together    polyethylene glycol (GLYCOLAX) 17 gram PwPk Take 17 g by mouth 2 (two) times daily as needed (constipation).    sodium chloride (OCEAN) 0.65 % nasal spray 1 spray by Nasal route as needed (irritation).    sodium zirconium cyclosilicate (LOKELMA) 5 gram packet Take 1 packet (5 g total) by mouth every Mon, Wed, Fri. Mix entire contents of packet(s) into drinking glass containing 3 tablespoons of water; stir well and drink immediately. Add water and repeat until no  powder remains to receive entire dose.     Family History    None       Tobacco Use    Smoking status: Never    Smokeless tobacco: Never   Substance and Sexual Activity    Alcohol use: Never    Drug use: Not on file    Sexual activity: Not on file     Review of Systems   Constitutional:  Positive for fatigue.   HENT: Negative.     Eyes: Negative.    Respiratory: Negative.     Cardiovascular: Negative.    Gastrointestinal:  Positive for diarrhea.   Endocrine: Negative.    Genitourinary: Negative.    Musculoskeletal:  Positive for arthralgias and myalgias.   Skin: Negative.    Allergic/Immunologic: Negative.    Neurological: Negative.    Hematological: Negative.    Psychiatric/Behavioral: Negative.     Objective:     Vital Signs (Most Recent):  Temp: 97.7 °F (36.5 °C) (07/15/23 1900)  Pulse: 61 (07/15/23 1933)  Resp: 14 (07/15/23 1933)  BP: (!) 176/72 (07/15/23 1933)  SpO2: 98 % (07/15/23 1933) Vital Signs (24h Range):  Temp:  [97.7 °F (36.5 °C)-98.3 °F (36.8 °C)] 97.7 °F (36.5 °C)  Pulse:  [51-61] 61  Resp:  [10-18] 14  SpO2:  [98 %-100 %] 98 %  BP: (118-176)/(62-73) 176/72     Weight: 63.5 kg (140 lb)  Body mass index is 21.93 kg/m².     Physical Exam  Constitutional:       Appearance: He is ill-appearing.   HENT:      Head: Atraumatic.      Right Ear: External ear normal.      Left Ear: External ear normal.      Nose: Nose normal.      Mouth/Throat:      Mouth: Mucous membranes are moist.      Pharynx: Oropharynx is clear. No oropharyngeal exudate or posterior oropharyngeal erythema.   Eyes:      Extraocular Movements: Extraocular movements intact.      Conjunctiva/sclera: Conjunctivae normal.      Pupils: Pupils are equal, round, and reactive to light.   Cardiovascular:      Rate and Rhythm: Normal rate.      Pulses: Normal pulses.      Heart sounds: Normal heart sounds.   Pulmonary:      Effort: Pulmonary effort is normal.      Breath sounds: Normal breath sounds. No wheezing or rhonchi.   Abdominal:       General: There is no distension.      Palpations: Abdomen is soft.      Tenderness: There is no guarding or rebound.   Musculoskeletal:         General: No swelling or tenderness.      Cervical back: Normal range of motion and neck supple. No rigidity.      Right lower leg: No edema.      Left lower leg: No edema.   Skin:     General: Skin is warm and dry.      Capillary Refill: Capillary refill takes 2 to 3 seconds.      Coloration: Skin is pale.   Neurological:      General: No focal deficit present.      Mental Status: He is alert. Mental status is at baseline.   Psychiatric:         Behavior: Behavior normal.            CRANIAL NERVES     CN III, IV, VI   Pupils are equal, round, and reactive to light.     Significant Labs: All pertinent labs within the past 24 hours have been reviewed.  CBC:   Recent Labs   Lab 07/15/23  1525   WBC 8.40   HGB 8.4*   HCT 27.0*        CMP:   Recent Labs   Lab 07/15/23  1525      K 4.7      CO2 26   GLU 99   BUN 22   CREATININE 0.8   CALCIUM 8.4*   PROT 6.3   ALBUMIN 2.4*   BILITOT 0.2   ALKPHOS 107   AST 19   ALT <5*   ANIONGAP 9       Significant Imaging: I have reviewed all pertinent imaging results/findings within the past 24 hours.    Assessment/Plan:     Chronic anemia  Stable, no sign of bleeding      Sacral ulcer  Present on admission  Wound care      COVID-19  Patient is on room air, however patient with multiple comorbidities and high risk for complications  Will start COVID 19 order set including steroid and ramdasivir   Have hx of RPH on DVT ppx heparin, however high risk for DVT/PE, will not start full dose lovenox, heparin q8h for dvt ppx will be started.       Parkinson's disease  Resume home medication      Multiple falls  PT/OT  Fall precaution      Hard of hearing  At baseline      Benign prostatic hyperplasia  Resume home medications        VTE Risk Mitigation (From admission, onward)         Ordered     heparin (porcine) injection 5,000 Units   Every 8 hours         07/15/23 1947                   On 07/15/2023, patient should be placed in hospital observation services under my care.        Nat Ji DO  Department of Hospital Medicine  [facility] - Emergency Dept

## 2023-07-16 NOTE — ED NOTES
Telemetry Verification   Patient placed on Telemetry Box  Verified with War Room  Box # [id number]   Monitor Tech    Rate 50   Rhythm Sinus Farhan

## 2023-07-16 NOTE — NURSING
"Pt's wife called to inform that the pt have had 2-3 doses of Paxlovid at home and wanted to make sure if the pt has to continue taking it while admitted. MD Yaima notified and was told that "paxlovid is an outpt med. he's on appropriate hosptial treatment".   "

## 2023-07-16 NOTE — NURSING
"Clarified with MS Team T/ -   Pt is allergic to Heparin but has Heparin on board his medication list.  confirmed to still give Heparin as scheduled. MD said "he had retroperitoneal bleed previous on heparin".  "

## 2023-07-16 NOTE — PLAN OF CARE
Problem: Adult Inpatient Plan of Care  Goal: Plan of Care Review  Outcome: Ongoing, Not Progressing  Goal: Optimal Comfort and Wellbeing  Outcome: Ongoing, Not Progressing     Problem: Impaired Wound Healing  Goal: Optimal Wound Healing  Outcome: Ongoing, Not Progressing     Problem: Fatigue  Goal: Improved Activity Tolerance  Outcome: Ongoing, Not Progressing     Alert and verbal. Sinus maycol on telemetry monitoring. Hr fluctuating from 44-53 during shift. Hr currenlty 61. Displays generalized weakness and displays delayed response. Comfort measures provided. Intake encouraged.  Ua collected for c/s.

## 2023-07-16 NOTE — HPI
Patient is a pleasantly demented 89 year old male with pmh significant for parkinson's disease, frequent falls, hard of hearing, BPH presents to our ED secondary to weakness.  Apparently patient have been having diarrhea since Wednesday of this week associated with weakness.  His wife is admitted to our hospital 2 days ago for covid 19 and is recovering.  Per brother in-law at bedside, patient has continuous home health care. He tested positive at home for COVID-19 2 days ago as well.  He was started on Paxlovid yesterday.  His home health nurse reports that patient typically ambulates with assistance and a walker.  Since yesterday, patient has been more fatigued and having difficulty ambulating.  He has been using a wheelchair.  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 No reported fever or chills.  He continues to have diarrhea.      In the ED chest xray was without infiltrates.  Patient is on room air with good saturation.  In no acute distress.  Very difficult of hearing.  No complaints other than generalized weakness.  No recent increase in medications.

## 2023-07-16 NOTE — NURSING
Nurses Note -- 4 Eyes      7/16/2023   1:20 AM      Skin assessed during: Admit      [] No Altered Skin Integrity Present    []Prevention Measures Documented      [x] Yes- Altered Skin Integrity Present or Discovered   [x] LDA Added if Not in Epic (Describe Wound)   [x] New Altered Skin Integrity was Present on Admit and Documented in LDA   [] Wound Image Taken    Wound Care Consulted? Yes    Attending Nurse:  Lou Denton RN     Second RN/Staff Member:  Qian Nguyen RN

## 2023-07-16 NOTE — ED NOTES
Pt's found to have feces in adult diaper. Pt cleaned, mepilex applied to wound, bedding changed, and cleaned diaper applied to pt.

## 2023-07-17 PROBLEM — Z78.9 FULL CODE STATUS: Status: ACTIVE | Noted: 2023-01-01

## 2023-07-17 PROBLEM — D72.829 LEUKOCYTOSIS: Status: ACTIVE | Noted: 2023-01-01

## 2023-07-17 PROBLEM — L89.309 PRESSURE INJURY OF SKIN OF BUTTOCK: Status: ACTIVE | Noted: 2023-01-01

## 2023-07-17 PROBLEM — F02.80 DEMENTIA DUE TO PARKINSON'S DISEASE: Status: ACTIVE | Noted: 2023-01-01

## 2023-07-17 PROBLEM — F05 DELIRIUM DUE TO MULTIPLE ETIOLOGIES: Status: ACTIVE | Noted: 2023-01-01

## 2023-07-17 PROBLEM — G20.A1 DEMENTIA DUE TO PARKINSON'S DISEASE: Status: ACTIVE | Noted: 2023-01-01

## 2023-07-17 PROBLEM — G93.41 ACUTE METABOLIC ENCEPHALOPATHY: Status: ACTIVE | Noted: 2022-05-14

## 2023-07-17 NOTE — PLAN OF CARE
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       Patient is homebound due to:  COVID-19    Allergies:  Review of patient's allergies indicates:   Allergen Reactions    Heparin     Heparin analogues Other (See Comments)     Not true allergy - but patient developed large spontaneous RP hematoma and concurrent severe epistaxis while on DVT prophylactic dose heparin - consider mechanical DVT ppx in future       Vitals:  Routine    Diet: regular diet    Activities:   Activity as tolerated    Goals of Care Treatment Preferences:  Code Status: Full Code      Labs:  CBC, CMP, Mag, phos in 1 week to be followed by PCP    Nursing Precautions:  Fall and Pressure ulcer prevention    Consults:   PT to evaluate and treat- 3 times a week, OT to evaluate and treat- 3 times a week, and Wound Care     Miscellaneous Care: Wound Care: sacral wound                 Medications: Discontinue all previous medication orders, if any. See new list below.     Medication List        CONTINUE taking these medications      artificial tears 0.5 % ophthalmic solution  Commonly known as: ISOPTO TEARS  Place 2 drops into both eyes 4 (four) times daily as needed.     balsam peru-castor  oiL Oint  Apply topically 2 (two) times a day. Apply to buttocks     carbidopa-levodopa  mg  mg per tablet  Commonly known as: SINEMET  TAKE 1 TABLET BY MOUTH 4 (FOUR) TIMES A DAY FOR 30 DAYS.     finasteride 5 mg tablet  Commonly known as: PROSCAR  Take 1 tablet (5 mg total) by mouth once daily.     furosemide 20 MG tablet  Commonly known as: LASIX  Take 0.5 tablets (10 mg total) by mouth once daily. As needed for weight gain of 3 lb in 1 day, 5 lb in 1 week, or significant leg edema     melatonin 3 mg tablet  Commonly known as: MELATIN  Take 2 tablets (6 mg total) by mouth nightly.     polyethylene glycol 17 gram Pwpk  Commonly known as: GLYCOLAX  Take 17 g by mouth 2 (two) times daily as needed (constipation).     sodium chloride 0.65 % nasal spray  Commonly known as: OCEAN  1 spray by Nasal route as needed (irritation).     sodium zirconium cyclosilicate 5 gram packet  Commonly known as: Lokelma  Take 1 packet (5 g total) by mouth every Mon, Wed, Fri. Mix entire contents of packet(s) into drinking glass containing 3 tablespoons of water; stir well and drink immediately. Add water and repeat until no powder remains to receive entire dose.            STOP taking these medications      PAXLOVID 300 mg (150 mg x 2)-100 mg copackaged tablets (EUA)  Generic drug: nirmatrelvir-ritonavir                Immunizations Administered as of 7/18/2023       Name Date Dose VIS Date Route Exp Date    COVID-19, MRNA, LN-S, PF (Pfizer) (Purple Cap) 1/31/2021  1:14 PM 0.3 mL 12/12/2020 Intramuscular 5/31/2021    Site: Left deltoid     Given By: Angy Choi RN     : [employer]     Lot: [id number]     COVID-19, MRNA, LN-S, PF (Pfizer) (Purple Cap) 1/10/2021  1:24 PM 0.3 mL 12/12/2020 Intramuscular 4/30/2021    Site: Left deltoid     Given By: Olya Mcfadden RN     : [employer]     Lot: [id number]             This patient has had both covid vaccinations    Some patients may experience side effects  after vaccination.  These may include fever, headache, muscle or joint aches.  Most symptoms resolve with 24-48 hours and do not require urgent medical evaluation unless they persist for more than 72 hours or symptoms are concerning for an unrelated medical condition.          _________________________________  Tyrone Sepulveda III, MD  07/18/2023

## 2023-07-17 NOTE — NURSING
"Pt kept going out of the bed and verbalized "There is something leaking. I need to get out. I can walk." Pt has been looking for his wife and verbalized "is my wife there?". When answered that his wife is not here, pt would laugh and verbalize "we were just sitting." Pt also kept on taking out his tubings for the cardiac telemetry box, condom catheter and continuous pulse ox. Pt almost took out his IV as well. Telesitter in place. MD informed via Secured Chat.  "

## 2023-07-17 NOTE — ASSESSMENT & PLAN NOTE
· At baseline.  Does not have working hearing aids.  If spoken to very loudly in the right ear, in combination with writing some words down, patient can understand and responds appropriately.  Does not appear to be encephalopathic based on my conversation with him.  On a previous admission for pneumonia, it was also reported that patient was encephalopathic.  I was an attending on that admission as well.  The patient answers what he thinks the question is.  If patient hears the correct question, he will respond appropriately.

## 2023-07-17 NOTE — NURSING
"Pt appeared more oriented. Pt is insisting on going home to his apartment. Pt verbalized "I am paying and I still have loose stools". Pt refuses to wear telemetry box and pulse oximeter. MD informed via Secured Chat.  "

## 2023-07-17 NOTE — ASSESSMENT & PLAN NOTE
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  Tried to discuss with the patient but he was unable to hear and comprehend the question.  Although, when patient does understand the question, he responds appropriately.  I contacted the patient's wife and brother-in-law and discussed risk of bleeding versus risk for clot for approximately 25 minutes.  Ultimately, wife decided to proceed with heparin understanding the risk of bleeding given prior significant hemorrhage.

## 2023-07-17 NOTE — NURSING
Completed the C Diff tree with Laxmi Diez and as per charge we are able to collect the C Diff sample the next time that pt passes a bowel. Will collect when ready.

## 2023-07-17 NOTE — PLAN OF CARE
07/17/23 1434   Post-Acute Status   Post-Acute Authorization Placement   Post-Acute Placement Status Patient List Provided   Discharge Plan   Discharge Plan A Rehab   Discharge Plan B Skilled Nursing Facility           SW spoke with patient's brother-in-Law Lincoln, to review discharge recommendation of SNF and is agreeable to plan    Provided list of facilities in-network with patient's payor plan. Notified that referral sent to below listed facilities from in-network list based on proximity to home/family support:   [facility]     Per Lincoln, patient and family not considering any additional facilities at this time; family will review list and provide additional preferences following review.      If an additional preferred facility not listed above is identified, additional referral to be sent. If above facilities unable to accept, will send additional referrals to in-network providers.      CRISTINA will continue to follow.          JONAH Selby, LMSW  [facility]  Case Management  Ext. [phone]

## 2023-07-17 NOTE — CONSULTS
Patient seen for wound care consultation.   Reviewed chart for this encounter.   See Flow Sheet for findings.    Luan: 11  Albumin: 2.2  Wounds POA    Pt sitting up in bed sleeping, bedside nurse at bedside assisted in turning pt. Diaper with urine removed from pt, pt cleansed with Coloplast wipes, cleansing appeared to be painful to pt as he was pulling away and moaning when being cleansed. Applied Triad to sacral, scrotum and inguinal region. Bedside nurse educaed hwo to use Coloplast wipes.   Placed constantino pad with hole in it around penis and wrapped burrito style to absorb urine.  Waffle ordered    RECOMMENDATIONS:  No diapers /no foam border dressing  Keep pt clean and dry-- Use Coloplast wipes to cleanse   Apply Triad   Assure waffle in place    Discussed POC with patient and primary nurse.   See EMR for orders & patient education.    Discussed nutrition and the role of protein in wound healing with the patient. Instructed patient to optimize protein for wound healing.    Nursing to continue care.  Nursing to maintain pressure injury prevention interventions.    Magda Fletcher, CHARLES notified.    07/17/23 1230   WOCN Assessment   WOCN Total Time (mins) 30   Visit Date 07/17/23   Visit Time 1230   Consult Type New   WOCN Speciality Wound   Wound moisture   Intervention assessed;changed;applied;chart review;consult other service;orders   Teaching on-going        Altered Skin Integrity 07/15/23 1905 posterior;lateral Scrotum #2 Moisture associated dermatitis Intact skin with non-blanchable redness of localized area   Date First Assessed/Time First Assessed: 07/15/23 1905   Altered Skin Integrity Present on Admission - Did Patient arrive to the hospital with altered skin?: yes  Orientation: posterior;lateral  Location: Scrotum  Wound Number: #2  Is this injury vinnie...   Wound Image    Dressing Appearance Open to air   Drainage Amount None   Drainage Characteristics/Odor No odor   Appearance Moist;Maroon;Red   Tissue  loss description Not applicable   Periwound Area Intact;Excoriated;Moist   Care Cleansed with:;Other (see comments)  (Coloplast wipes)   Dressing Applied;Other (comment)  (Triad)   Periwound Care Moisture barrier applied   Off Loading Other (see comments)  (waffle)   Dressing Change Due 07/17/23        Altered Skin Integrity 07/15/23 2300 medial Buttocks #1 Moisture associated dermatitis Partial thickness tissue loss. Shallow open ulcer with a red or pink wound bed, without slough. Intact or Open/Ruptured Serum-filled blister.   Date First Assessed/Time First Assessed: 07/15/23 2300   Altered Skin Integrity Present on Admission - Did Patient arrive to the hospital with altered skin?: yes  Orientation: medial  Location: Buttocks  Wound Number: #1  Is this injury device related...   Dressing Appearance Open to air   Appearance Maroon;Red;Moist   Periwound Area Moist   Care Cleansed with:;Other (see comments)  (Coloplast wipes)   Dressing Applied;Other (comment)  (Triad)   Off Loading Other (see comments)  (waffle ordered)   Dressing Change Due 07/17/23

## 2023-07-17 NOTE — NURSING
C Diff specimen was collected and sent off. Once it results (if negative), will notify MD to order Imodium for pt's frequent stool/diarrhea. As per brother in law, he had diarrhea while at home and worsened with COVID.     Wound care nurse left packages of white foam wipes to use to clean pt/excoriation area, no purple wipes and no diapers. Triad cream on pt's excoriated regions and sacral area.

## 2023-07-17 NOTE — SUBJECTIVE & OBJECTIVE
Interval History: Patient is extremely hard of hearing but when he does understand the question he answers appropriately.  For example, I was able to write COVID and speak loudly into the patient's right ear to tell him why he was in the hospital.  Once the patient realized the word that was written was COVID, he said I have had all of my vaccinations.  Patient told me I should have plenty of antibodies.  Patient told me my wife is in the hospital with COVID .  I was able to have the patient understand through writing and speaking very loudly that he was on remdesivir for prevention of severe disease.  I told him that typically we would complete 3 days of remdesivir for less severe cases of COVID in the last dose would be tomorrow.  Patient says so you think that I would be discharged tomorrow which is Monday?.  Patient denies any chest pain of shortness of breath and is breathing comfortably on room air.  I was unable to get the patient to understand questions regarding heparin and his risk of bleeding with history of nontraumatic bleed.  Discussed with wife and brother-in-law and decision made to proceed with DVT prophylaxis.  They understand the risk of bleeding.    Labs reviewed:  Hemoglobin 8.1, WBC 5.62, procalcitonin 0.07    CXR personally reviewed with persistent left lower lung abnormalities when compared to previous CXR from June.  Overall CXR looks slightly improved.  No new airspace disease noted.    Review of Systems:  As above, difficult to obtain due to patient hard of hearing  Objective:     Vital Signs (Most Recent):  Temp: 98.2 °F (36.8 °C) (07/16/23 1830)  Pulse: 65 (07/16/23 1830)  Resp: 17 (07/16/23 1215)  BP: (!) 167/71 (07/16/23 1830)  SpO2: 100 % (07/16/23 1830) Vital Signs (24h Range):  Temp:  [97.5 °F (36.4 °C)-98.2 °F (36.8 °C)] 98.2 °F (36.8 °C)  Pulse:  [40-65] 65  Resp:  [12-17] 17  SpO2:  [95 %-100 %] 100 %  BP: (125-176)/(58-72) 167/71     Weight: 46.4 kg (102 lb 4.7 oz)  Body  mass index is 16.02 kg/m².    Intake/Output Summary (Last 24 hours) at 7/16/2023 1919  Last data filed at 7/16/2023 1800  Gross per 24 hour   Intake 681.6 ml   Output 400 ml   Net 281.6 ml      Physical Exam  Constitutional:       General: He is not in acute distress.     Appearance: He is not toxic-appearing.      Comments: Cachectic   HENT:      Mouth/Throat:      Pharynx: No oropharyngeal exudate or posterior oropharyngeal erythema.   Cardiovascular:      Rate and Rhythm: Normal rate.      Pulses: Normal pulses.      Heart sounds: Normal heart sounds.   Pulmonary:      Effort: Pulmonary effort is normal.      Breath sounds: Normal breath sounds. No wheezing or rhonchi.   Abdominal:      General: There is no distension.      Palpations: Abdomen is soft.      Tenderness: There is no guarding or rebound.   Musculoskeletal:      Cervical back: No rigidity or tenderness.   Skin:     General: Skin is warm and dry.      Coloration: Skin is pale.   Neurological:      General: No focal deficit present.      Mental Status: He is alert. Mental status is at baseline.   Psychiatric:         Behavior: Behavior normal.

## 2023-07-17 NOTE — PLAN OF CARE
C Diff sample sent off at end of shift. If comes back negative, page team to add Imodium for pt. Pt has had to be changed multiple times today due to diarrhea/loose stools. Primary attending is aware.   Problem: Adult Inpatient Plan of Care  Goal: Plan of Care Review  Outcome: Ongoing, Progressing  Goal: Patient-Specific Goal (Individualized)  Outcome: Ongoing, Progressing  Goal: Absence of Hospital-Acquired Illness or Injury  Outcome: Ongoing, Progressing  Goal: Optimal Comfort and Wellbeing  Outcome: Ongoing, Progressing  Goal: Readiness for Transition of Care  Outcome: Ongoing, Progressing     Problem: Adjustment to Illness (Sepsis/Septic Shock)  Goal: Optimal Coping  Outcome: Ongoing, Progressing     Problem: Bleeding (Sepsis/Septic Shock)  Goal: Absence of Bleeding  Outcome: Ongoing, Progressing     Problem: Infection Progression (Sepsis/Septic Shock)  Goal: Absence of Infection Signs and Symptoms  Outcome: Ongoing, Progressing     Problem: Nutrition Impaired (Sepsis/Septic Shock)  Goal: Optimal Nutrition Intake  Outcome: Ongoing, Progressing     Problem: Fluid Imbalance (Pneumonia)  Goal: Fluid Balance  Outcome: Ongoing, Progressing     Problem: Impaired Wound Healing  Goal: Optimal Wound Healing  Outcome: Ongoing, Progressing     Problem: Fall Injury Risk  Goal: Absence of Fall and Fall-Related Injury  Outcome: Ongoing, Progressing     Problem: Skin Injury Risk Increased  Goal: Skin Health and Integrity  Outcome: Ongoing, Progressing     Problem: Diarrhea  Goal: Fluid and Electrolyte Balance  Outcome: Ongoing, Progressing     Problem: Fatigue  Goal: Improved Activity Tolerance  Outcome: Ongoing, Progressing     Problem: Anxiety  Goal: Anxiety Reduction or Resolution  Outcome: Ongoing, Progressing     Problem: Infection  Goal: Absence of Infection Signs and Symptoms  Outcome: Ongoing, Progressing

## 2023-07-17 NOTE — NURSING
Pt is still very anxious to go home and says that he does not want to stay here. Pt keeps trying to get out of the bed. MD notified via Secured Chat.

## 2023-07-17 NOTE — CONSULTS
7/17/2023 8:56 AM  Giselle Lemus  8/9/1933  [id number]    Capacity Evaluation    History of Present Illness     Giselle Lemus is a 89 y.o. 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  Psychiatry was originally consulted to address the patient's capacity to decide to discharge home (vs SNF or rehab) and to leave AMA.     Reported confusion but patient is extremely hard of hearing. Family wants him to go to SNF or rehab. Pt does not. Please assess capacity to make decision to decide to discharge home. As well as capacity to leave AMA.    Per Primary Team:   Basically deaf and very poor vision. For pt to hear, must speak very loudly in right ear. Hx parkinsons with reported mild cognitive decline. Often sent to hospital for ams but pt just can not hear the questions. Has COVID but very mild. Family wants SNF vs rehab and pt wants home where he has caretakers he can call. Wife is disabled and in hospital with covid. Brother in law is other emergency contact and can not care for the patient in current weakened state.     As far as AMA, pt does have covid and now increased WBC, unclear significance. But wants to leave the hospital    Patient is a pleasantly demented 89 year old male with pmh significant for parkinson's disease, frequent falls, hard of hearing, BPH presents to our ED secondary to weakness.  Apparently patient have been having diarrhea since Wednesday of this week associated with weakness.  His wife is admitted to our hospital 2 days ago for covid 19 and is recovering.  Per brother in-law at bedside, patient has continuous home health care. He tested positive at home for COVID-19 2 days ago as well.  He was started on Paxlovid yesterday.  His home health nurse reports that patient typically ambulates with assistance and a walker.  Since yesterday, patient has been more fatigued and having difficulty ambulating.  He has been using a  "wheelchair.  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 No reported fever or chills.  He continues to have diarrhea.     In the ED chest xray was without infiltrates.  Patient is on room air with good saturation.  In no acute distress.  Very difficult of hearing.  No complaints other than generalized weakness.  No recent increase in medications.      Got PRN IV Depacon 250mg this AM at times 0242 and 0557. Has been anxious per nursing.   AST 19, ALT <5. Plt wnl. Qtc 7/16/23: 461 ms.    On exam, pt noted to be sleeping in bed, in NAD. Brother-in-law at bedside. Was arousable to voice, but notably hard of hearing (does own hearing aid, but it needs to be repaired); was able to hear when spoken to loudly in R ear, but appeared to be confused by the questions, repeating them instead of answering them. Attempted to write down questions on a piece of paper; showed to pt, but pt stared at paper and was unable to answer. Appeared to recognize his brother-in-law, but unable to answer any of his questions either. Unable to assess for orientation or SAVEAHAART (however noted inattention per interview). Per brother-in-law, pt was more alert and talkative, answering questions appropriately yesterday evening. Stated that pt has episodes of confusion at home as well. Family would like for pt to go to SNF or rehab; stated that pt is too weak to go home at this time, and has been discharged to rehab or SNF after his previous few admissions. Family feels his stays at rehab have been beneficial. When brother-in-law mentioned rehab to pt, pt asked "which one," but did not appear to be against the idea of going. Pt did not voice wanting to leave AMA at this time.       Mental Status Exam     CAM-ICU:  Acute change and/or fluctuating course of mental status: Yes  Inattention (SAVEAHAART): Yes; noted inattention per interview  "Squeeze my hand, only when you hear, the letter 'A'."  Altered " "Level of Consciousness: Yes  Disorganized Thinking (Errors >1/6):  ABBIE  "Will a stone float on water?"  "Are there fish in the sea?"  "Does one pound weigh more than two?"  "Can you use a hammer to pound a nail?"  Command(s):  "Hold up 2 fingers."  "Now do the same thing with the other hand."    Score: 1+2 AND, either 3 or 4 present = Positive CAM-ICU    Appearance: age appropriate, lying in bed, in NAD, thin, wearing hospital garb   Level of Consciousness: alert, awake   Grooming:  appropriate to situation   Psychomotor Behavior: psychomotor retardation, calm, cooperative w/ attempts to interview, but unable to answer any questions   Speech: slowed, increased latency of response, soft, non-spontaneous   Language: english, fluid   Orientation:   ABBIE   Attention Span/Concentration: Easily distracted   Memory: Unable to assess   Mood: ABBIE   Affect: blunted   Thought Process: ABBIE   Associations: ABBIE   Thought Content: Unable to assess; no objective signs of hallucinations    Fund of Knowledge: ABBIE   Insight: Poor 2/2 mental status change   Judgment:  Poor 2/2 mental status change     Capacity Evaluation     Capacity question:  Does the patient possess the ability to make an informed decision, regarding the proposed treatment/option for his care?    Capacity for a given decision requires:  Understanding - the ability to state the meaning of the relevant information (eg, diagnosis, risks and benefits of a treatment or procedure, indications, and options of care).  The patient must be able to understand the proposed treatment, and/or options for his care.  Appreciation - the ability to explain how information (eg, diagnosis, benefit, and risk) applies to oneself.  The patient must be able to appreciate his own medical situation, and abstract, as to how the treatments/proposed options for care, apply to his medical situation.  Reasoning - the ability to compare information and infer consequences of choice.  The patient " must be able to understand the consequences of his medical decision, in a reasonable manner, supported by the facts, and his own values, in a consistent fashion.  Expressing a choice - the ability to state a decision.  The patient must demonstrate the ability to communicate a choice, clearly and consistently.    Assessment of capacity:  The patient understands the clinical condition relation to this evaluation: No.  The patient understands the indication and nature of/reasoning behind the proposed treatment(s) and/or options for his care: No.  The patient understands and appreciates the risks and benefits of the proposed treatment(s) and/or options for his care: No.  The patient understands and appreciates the risks and benefits of refusing the proposed treatment(s) and/or options for his care: No.  The patient is in in agreement with the assessment and consents to treatment. -> at this time, pt does not have capacity to leave AMA or decide to be discharged home (vs SNF or rehab); however, is also not currently voicing wanting to leave AMA or being against being discharged to SNF or rehab.  The patient has evidence of impaired insight and/or judgment: Yes.    Recommendations   Delirium (on underlying dementia) due to multiple etiologies  Dementia due to Parkinson's Disease    Based upon my evaluation of Giselle Lemus regarding his medical decision-making capacity for his refusing safe discharge, I found with reasonable certainty that Giselle Lemus does not have capacity to make medical decisions on his behalf.  At this time, pt does not have capacity to leave AMA or decide to be discharged home (vs SNF or rehab); however, is also not currently voicing wanting to leave AMA or being against being discharged to SNF or rehab. (Please defer to family / designated PoA for discharge-related decisions).     Case discussed with: Dr. Tristan    Thank you for consulting psychiatry. We will sign off at this time. Please do not  hesitate to contact [facility] psychiatry for any further questions or concerns.       Danielle Wheeler MD  [facility]iat[facility], PGY-4

## 2023-07-17 NOTE — PROGRESS NOTES
[facility] ([address] Medicine  Progress Note    Patient Name: Giselle Lemus  MRN: [id number]  Patient Class: IP- Inpatient   Admission Date: 7/15/2023  Length of Stay: 0 days  Attending Physician: Tyrone Sepulveda III,*  Primary Care Provider: Tl Torres MD        Subjective:     Principal Problem:COVID-19        HPI:  Patient is a pleasantly demented 89 year old male with pmh significant for parkinson's disease, frequent falls, hard of hearing, BPH presents to our ED secondary to weakness.  Apparently patient have been having diarrhea since Wednesday of this week associated with weakness.  His wife is admitted to our hospital 2 days ago for covid 19 and is recovering.  Per brother in-law at bedside, patient has continuous home health care. He tested positive at home for COVID-19 2 days ago as well.  He was started on Paxlovid yesterday.  His home health nurse reports that patient typically ambulates with assistance and a walker.  Since yesterday, patient has been more fatigued and having difficulty ambulating.  He has been using a wheelchair.  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 No reported fever or chills.  He continues to have diarrhea.      In the ED chest xray was without infiltrates.  Patient is on room air with good saturation.  In no acute distress.  Very difficult of hearing.  No complaints other than generalized weakness.  No recent increase in medications.        Overview/Hospital Course:  No notes on file    Interval History: Patient is extremely hard of hearing but when he does understand the question he answers appropriately.  For example, I was able to write COVID and speak loudly into the patient's right ear to tell him why he was in the hospital.  Once the patient realized the word that was written was COVID, he said I have had all of my vaccinations.  Patient told me I should have plenty of antibodies.   Patient told me my wife is in the hospital with COVID .  I was able to have the patient understand through writing and speaking very loudly that he was on remdesivir for prevention of severe disease.  I told him that typically we would complete 3 days of remdesivir for less severe cases of COVID in the last dose would be tomorrow.  Patient says so you think that I would be discharged tomorrow which is Monday?.  Patient denies any chest pain of shortness of breath and is breathing comfortably on room air.  I was unable to get the patient to understand questions regarding heparin and his risk of bleeding with history of nontraumatic bleed.  Discussed with wife and brother-in-law and decision made to proceed with DVT prophylaxis.  They understand the risk of bleeding.    Labs reviewed:  Hemoglobin 8.1, WBC 5.62, procalcitonin 0.07    CXR personally reviewed with persistent left lower lung abnormalities when compared to previous CXR from June.  Overall CXR looks slightly improved.  No new airspace disease noted.    Review of Systems:  As above, difficult to obtain due to patient hard of hearing  Objective:     Vital Signs (Most Recent):  Temp: 98.2 °F (36.8 °C) (07/16/23 1830)  Pulse: 65 (07/16/23 1830)  Resp: 17 (07/16/23 1215)  BP: (!) 167/71 (07/16/23 1830)  SpO2: 100 % (07/16/23 1830) Vital Signs (24h Range):  Temp:  [97.5 °F (36.4 °C)-98.2 °F (36.8 °C)] 98.2 °F (36.8 °C)  Pulse:  [40-65] 65  Resp:  [12-17] 17  SpO2:  [95 %-100 %] 100 %  BP: (125-176)/(58-72) 167/71     Weight: 46.4 kg (102 lb 4.7 oz)  Body mass index is 16.02 kg/m².    Intake/Output Summary (Last 24 hours) at 7/16/2023 1919  Last data filed at 7/16/2023 1800  Gross per 24 hour   Intake 681.6 ml   Output 400 ml   Net 281.6 ml      Physical Exam  Constitutional:       General: He is not in acute distress.     Appearance: He is not toxic-appearing.      Comments: Cachectic   HENT:      Mouth/Throat:      Pharynx: No oropharyngeal exudate or  posterior oropharyngeal erythema.   Cardiovascular:      Rate and Rhythm: Normal rate.      Pulses: Normal pulses.      Heart sounds: Normal heart sounds.   Pulmonary:      Effort: Pulmonary effort is normal.      Breath sounds: Normal breath sounds. No wheezing or rhonchi.   Abdominal:      General: There is no distension.      Palpations: Abdomen is soft.      Tenderness: There is no guarding or rebound.   Musculoskeletal:      Cervical back: No rigidity or tenderness.   Skin:     General: Skin is warm and dry.      Coloration: Skin is pale.   Neurological:      General: No focal deficit present.      Mental Status: He is alert. Mental status is at baseline.   Psychiatric:         Behavior: Behavior normal.         Assessment/Plan:      * COVID-19  · Patient is on room air, per guidelines will not start steroids  · Planned for remdesivir x3 days  · Have hx of RPH on DVT ppx heparin, however high risk for DVT/PE, will not start full dose lovenox.  Tried to discuss with the patient but he was unable to hear and comprehend the question.  Although, when patient does understand the question, he responds appropriately.  I contacted the patient's wife and brother-in-law and discussed risk of bleeding versus risk for clot for approximately 25 minutes.  Ultimately, wife decided to proceed with heparin understanding the risk of bleeding given prior significant hemorrhage.      Chronic anemia  · Stable, no sign of bleeding      Sacral ulcer  · Present on admission  · Wound care      Parkinson's disease  Resume home medication      Multiple falls  · PT/OT  · Fall precaution      Hard of hearing  · At baseline.  Does not have working hearing aids.  If spoken to very loudly in the right ear, in combination with writing some words down, patient can understand and responds appropriately.  Does not appear to be encephalopathic based on my conversation with him.  On a previous admission for pneumonia, it was also reported that  patient was encephalopathic.  I was an attending on that admission as well.  The patient answers what he thinks the question is.  If patient hears the correct question, he will respond appropriately.      Benign prostatic hyperplasia  · Resume home medications      VTE Risk Mitigation (From admission, onward)         Ordered     heparin (porcine) injection 5,000 Units  Every 8 hours         07/16/23 1819     Place sequential compression device  Until discontinued         07/16/23 0651                Discharge Planning   JOHAN: 7/18/2023     Code Status: Full Code   Is the patient medically ready for discharge?: No    Reason for patient still in hospital (select all that apply): Patient trending condition, Treatment, PT / OT recommendations and Pending disposition  Discharge Plan A: Home Health                  Tyrone Sepulveda III, MD  Department of Hospital Medicine   [facility] - Intensive Care ([place]-16)

## 2023-07-17 NOTE — PLAN OF CARE
Problem: Adult Inpatient Plan of Care  Goal: Plan of Care Review  Outcome: Ongoing, Progressing  Goal: Optimal Comfort and Wellbeing  Outcome: Ongoing, Progressing     Problem: Infection (Pneumonia)  Goal: Resolution of Infection Signs and Symptoms  Outcome: Ongoing, Progressing     Problem: Respiratory Compromise (Pneumonia)  Goal: Effective Oxygenation and Ventilation  Outcome: Ongoing, Progressing     Problem: Impaired Wound Healing  Goal: Optimal Wound Healing  Outcome: Ongoing, Progressing     Problem: Fall Injury Risk  Goal: Absence of Fall and Fall-Related Injury  Outcome: Ongoing, Progressing     Problem: Diarrhea  Goal: Fluid and Electrolyte Balance  Outcome: Ongoing, Progressing     Problem: Anxiety  Goal: Anxiety Reduction or Resolution  Outcome: Ongoing, Progressing

## 2023-07-18 NOTE — PLAN OF CARE
Problem: Adult Inpatient Plan of Care  Goal: Plan of Care Review  Outcome: Ongoing, Progressing  Goal: Optimal Comfort and Wellbeing  Outcome: Ongoing, Progressing     Problem: Infection (Pneumonia)  Goal: Resolution of Infection Signs and Symptoms  Outcome: Ongoing, Progressing     Problem: Respiratory Compromise (Pneumonia)  Goal: Effective Oxygenation and Ventilation  Outcome: Ongoing, Progressing     Problem: Impaired Wound Healing  Goal: Optimal Wound Healing  Outcome: Ongoing, Progressing     Problem: Fall Injury Risk  Goal: Absence of Fall and Fall-Related Injury  Outcome: Ongoing, Progressing     Problem: Diarrhea  Goal: Fluid and Electrolyte Balance  Outcome: Ongoing, Progressing

## 2023-07-18 NOTE — PROGRESS NOTES
[facility] - Int[facility] Care ([address] Medicine  Progress Note    Patient Name: Giselle Lemus  MRN: [id number]  Patient Class: IP- Inpatient   Admission Date: 7/15/2023  Length of Stay: 1 days  Attending Physician: Tyrone Sepulveda III,*  Primary Care Provider: Tl Torres MD        Subjective:     Principal Problem:COVID-19        HPI:  Patient is a pleasantly demented 89 year old male with pmh significant for parkinson's disease, frequent falls, hard of hearing, BPH presents to our ED secondary to weakness.  Apparently patient have been having diarrhea since Wednesday of this week associated with weakness.  His wife is admitted to our hospital 2 days ago for covid 19 and is recovering.  Per brother in-law at bedside, patient has continuous home health care. He tested positive at home for COVID-19 2 days ago as well.  He was started on Paxlovid yesterday.  His home health nurse reports that patient typically ambulates with assistance and a walker.  Since yesterday, patient has been more fatigued and having difficulty ambulating.  He has been using a wheelchair.  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 No reported fever or chills.  He continues to have diarrhea.      In the ED chest xray was without infiltrates.  Patient is on room air with good saturation.  In no acute distress.  Very difficult of hearing.  No complaints other than generalized weakness.  No recent increase in medications.        Overview/Hospital Course:  No notes on file    Interval History:  Patient more somnolent today.  He did receive a dose of Ativan this morning because he was agitated trying to leave the hospital.  Unable to get the patient to answer questions as well as he was yesterday.  At this point, I do not think that the patient has capacity to make medical decisions as he does not demonstrate understanding of his current medical condition and the the risks that are  associated with going home with minimal assistance.  Psychiatry consulted to help determine capacity    Nursing staff reported at least 5 watery stools today.  Patient has history of chronic diarrhea but given multiple rounds of antibiotics with the last 6 weeks, C diff ordered.  If negative will provide symptomatic treatment.    Labs reviewed:  Labs reviewed:  WBC increased to 18.1 from 5.6.  Repeat 14.2.  She did not receive any steroids.  Hemoglobin 8.7, creatinine 0.8, CRP 71.8 from 67.3.  Procalcitonin 0.12.  UA with occasional bacteria and nitrite positive, culture pending.    Discussed with psychiatry:  Agree that patient does not have capacity to make medical decision to leave the hospital or to decline admission to skilled nursing facility.    Review of Systems:  As above, difficult to obtain due to patient hard of hearing and altered mentation.  Objective:     Vital Signs (Most Recent):  Temp: 98 °F (36.7 °C) (07/17/23 1531)  Pulse: (!) 54 (07/17/23 2003)  Resp: 18 (07/17/23 2003)  BP: (!) 148/67 (07/17/23 2003)  SpO2: 96 % (07/17/23 2003) Vital Signs (24h Range):  Temp:  [97.6 °F (36.4 °C)-98.4 °F (36.9 °C)] 98 °F (36.7 °C)  Pulse:  [46-72] 54  Resp:  [16-19] 18  SpO2:  [92 %-100 %] 96 %  BP: (115-165)/(56-70) 148/67     Weight: 46.4 kg (102 lb 4.7 oz)  Body mass index is 16.02 kg/m².    Intake/Output Summary (Last 24 hours) at 7/17/2023 2104  Last data filed at 7/17/2023 1832  Gross per 24 hour   Intake 780 ml   Output --   Net 780 ml        Physical Exam  Constitutional:       General: He is not in acute distress.     Appearance: He is not toxic-appearing.      Comments: Cachectic   HENT:      Mouth/Throat:      Pharynx: No oropharyngeal exudate or posterior oropharyngeal erythema.   Cardiovascular:      Rate and Rhythm: Normal rate.      Pulses: Normal pulses.      Heart sounds: Normal heart sounds.   Pulmonary:      Effort: Pulmonary effort is normal.      Breath sounds: Normal breath sounds. No  wheezing or rhonchi.   Abdominal:      General: There is no distension.      Palpations: Abdomen is soft.      Tenderness: There is no guarding or rebound.   Musculoskeletal:      Cervical back: No rigidity or tenderness.   Skin:     General: Skin is warm and dry.      Coloration: Skin is pale.   Neurological:      Mental Status: He is alert. He is disoriented.      Comments: Somnolent today, unable to get patient to engage in meaningful conversation despite speaking very loudly into right ear.   Psychiatric:      Comments: Sleeping, difficult to wake         Assessment/Plan:      * COVID-19  Patient is on room air, per guidelines will not start steroids  Planned for remdesivir x3 days, last day 7/17.  Per guidelines, 3 days remdesivir is sufficient for administration and individuals with mild COVID symptoms at risk for severe disease.  Additionally, patient has known chronic bradycardia with known right BBB.  Remdesivir with known side-effect of bradycardia.  Have hx of RPH on DVT ppx heparin, however high risk for DVT/PE, will not start full dose lovenox.  Tried to discuss with the patient but he was unable to hear and comprehend the question.  Although, when patient does understand the question, he responds appropriately.  I contacted the patient's wife and brother-in-law and discussed risk of bleeding versus risk for clot for approximately 25 minutes.  Ultimately, wife decided to proceed with heparin understanding the risk of bleeding given prior significant hemorrhage.      Acute metabolic encephalopathy  Hard of hearing  Parkinson's disease  Patient with waxing and waning mentation.  Some days, he can understand the questions and answers appropriately.  Some days patient more confused and does not seem to answer any questions appropriately.  Encephalopathy likely related to COVID in the setting of Parkinson's disease with known cognitive decline.  Given leukocytosis, further infectious workup initiated.  See  below.  Resume home Parkinson's medications  Psychiatry consulted, appreciate assistance.  Patient does not have capacity to make decision to leave hospital against medical advice or to decline admission to SNF or inpatient rehab.    Leukocytosis  New on morning of 7/17.  Afebrile, no new symptoms.  Procalcitonin negative.  CT chest pending.  UA with nitrites and occasional bacteria, culture pending.  Multiple episodes of watery diarrhea per nursing, C diff ordered.  Patient on multiple antibiotics over the last 6 weeks.  If negative, will treat symptomatically.      Full code status  Discussed at length with patient's wife who was unable to make a decision.  She deferred that decision to her brother-in-law, Lincoln.  Lincoln says that over the years he is had this discussion with the patient who has expressed desire to be full code and to let the family make any decisions if it does not look like there will be any meaningful recovery.      Chronic anemia  Stable, no sign of bleeding      Pressure injury of skin of buttock  Pressure injury of scrotum  Present on admission  Wound care      Multiple falls  PT/OT  Fall precaution  Discussed at length with wife and brother-in-law, plan for discharge to skilled nursing facility.  Wife is in hospital and brother lies not able to care for patient 24/7.  They do have home caretakers but they are also not there 24/7.        Benign prostatic hyperplasia  Resume home medications        VTE Risk Mitigation (From admission, onward)           Ordered     heparin (porcine) injection 5,000 Units  Every 8 hours         07/16/23 1819     Place sequential compression device  Until discontinued         07/16/23 0651                    Discharge Planning   JOHAN: 7/18/2023     Code Status: Full Code   Is the patient medically ready for discharge?: No    Reason for patient still in hospital (select all that apply): Patient new problem, Patient trending condition, Laboratory test, PT / OT  recommendations and Pending disposition  Discharge Plan A: Rehab                  Tyrone Sepulveda III, MD  Department of Hospital Medicine   [facility] - Intensive Care ([place])

## 2023-07-18 NOTE — ASSESSMENT & PLAN NOTE
· PT/OT  · Fall precaution  · Discussed at length with wife and brother-in-law, plan for discharge to skilled nursing facility.  Wife is in hospital and brother lies not able to care for patient 24/7.  They do have home caretakers but they are also not there 24/7.

## 2023-07-18 NOTE — ASSESSMENT & PLAN NOTE
· New on morning of 7/17.  Afebrile, no new symptoms.  Procalcitonin negative.  CT chest pending.  UA with nitrites and occasional bacteria, culture pending.

## 2023-07-18 NOTE — CONSULTS
Inpatient consult to Physical Medicine Rehab  Consult performed by: Sabine Lopez NP  Consult ordered by: Tyrone Sepulveda III, MD  Reason for consult: Assess rehab eeds    Reviewed patient history and current admission.  Rehab team following.  Full consult to follow.    RODERICK Baker, FNP-C  Physical Medicine & Rehabilitation   07/18/2023

## 2023-07-18 NOTE — SUBJECTIVE & OBJECTIVE
Interval History:  Patient more somnolent today.  He did receive a dose of Ativan this morning because he was agitated trying to leave the hospital.  Unable to get the patient to answer questions as well as he was yesterday.  At this point, I do not think that the patient has capacity to make medical decisions as he does not demonstrate understanding of his current medical condition and the the risks that are associated with going home with minimal assistance.  Psychiatry consulted to help determine capacity    Nursing staff reported at least 5 watery stools today.  Patient has history of chronic diarrhea but given multiple rounds of antibiotics with the last 6 weeks, C diff ordered.  If negative will provide symptomatic treatment.    Labs reviewed:  Labs reviewed:  WBC increased to 18.1 from 5.6.  Repeat 14.2.  She did not receive any steroids.  Hemoglobin 8.7, creatinine 0.8, CRP 71.8 from 67.3.  Procalcitonin 0.12.  UA with occasional bacteria and nitrite positive, culture pending.    Discussed with psychiatry:  Agree that patient does not have capacity to make medical decision to leave the hospital or to decline admission to skilled nursing facility.    Review of Systems:  As above, difficult to obtain due to patient hard of hearing and altered mentation.  Objective:     Vital Signs (Most Recent):  Temp: 98 °F (36.7 °C) (07/17/23 1531)  Pulse: (!) 54 (07/17/23 2003)  Resp: 18 (07/17/23 2003)  BP: (!) 148/67 (07/17/23 2003)  SpO2: 96 % (07/17/23 2003) Vital Signs (24h Range):  Temp:  [97.6 °F (36.4 °C)-98.4 °F (36.9 °C)] 98 °F (36.7 °C)  Pulse:  [46-72] 54  Resp:  [16-19] 18  SpO2:  [92 %-100 %] 96 %  BP: (115-165)/(56-70) 148/67     Weight: 46.4 kg (102 lb 4.7 oz)  Body mass index is 16.02 kg/m².    Intake/Output Summary (Last 24 hours) at 7/17/2023 2108  Last data filed at 7/17/2023 1832  Gross per 24 hour   Intake 780 ml   Output --   Net 780 ml        Physical Exam  Constitutional:       General: He is not in  acute distress.     Appearance: He is not toxic-appearing.      Comments: Cachectic   HENT:      Mouth/Throat:      Pharynx: No oropharyngeal exudate or posterior oropharyngeal erythema.   Cardiovascular:      Rate and Rhythm: Normal rate.      Pulses: Normal pulses.      Heart sounds: Normal heart sounds.   Pulmonary:      Effort: Pulmonary effort is normal.      Breath sounds: Normal breath sounds. No wheezing or rhonchi.   Abdominal:      General: There is no distension.      Palpations: Abdomen is soft.      Tenderness: There is no guarding or rebound.   Musculoskeletal:      Cervical back: No rigidity or tenderness.   Skin:     General: Skin is warm and dry.      Coloration: Skin is pale.   Neurological:      Mental Status: He is alert. He is disoriented.      Comments: Somnolent today, unable to get patient to engage in meaningful conversation despite speaking very loudly into right ear.   Psychiatric:      Comments: Sleeping, difficult to wake

## 2023-07-18 NOTE — PLAN OF CARE
Discharge Recommendation: SNF.    Discharge recommendation reassessed today. PT goals appropriate.    Please continue Progressive Mobility Protocol as appropriate.    Appropriate transfer level with nursing staff: Safe to transfer to bedside chair/bedside commode: stand pivot with 1 person with HHA or RW.     Jannie Oakes, PT, DPT  2023  Pager: [phone]    Problem: Physical Therapy  Goal: Physical Therapy Goal  Description: Goals to be met by: 2023     Patient will increase functional independence with mobility by performin. Supine to sit with stand by assistance  2. Sit to supine with stand by assistance  3. Sit to stand transfer with stand by assistance  4. Gait  x 25 feet with stand by assistance using LRAD as needed  5. Lower extremity exercise program x10 reps per handout, with assistance as needed   Outcome: Ongoing, Progressing

## 2023-07-18 NOTE — PT/OT/SLP PROGRESS
Physical Therapy Co-Treatment    Patient Name:  Giselle Lemus   MRN:  [id number]  Admitting Diagnosis:  COVID-19   Recent Surgery: * No surgery found *    Admit Date: 7/15/2023  Length of Stay: 2 days    Recommendations:     Discharge Recommendations:  nursing facility, skilled   Discharge Equipment Recommendations: to be determined by next level of care   Barriers to discharge: Decreased caregiver support    Appropriate transfer level with nursing staff: Safe to transfer to bedside chair/bedside commode: stand pivot with 1 person with RW.    Plan:     During this hospitalization, patient to be seen 3 x/week to address the identified rehab impairments via gait training, therapeutic activities, therapeutic exercises, neuromuscular re-education and progress towards the established goals.  Plan of Care Expires:  08/16/23  Plan of Care Reviewed with: patient    Assessment:     Giselle Lemus is a 89 y.o. male admitted with a medical diagnosis of COVID-19. Pt tolerated treatment session fairly today. Pt more lethargic on this date but easy to arouse with cold towel and change in position. Pt with decreased command following on this date but unsure if this is due to hearing deficits vs confusion. He was able to stand and take steps on this date with RW with minimal assist needed for safe mgmt of walker and to prevent LOB. Pt unable to correct deviations via vc's 2/2 [place] but receptive to physical assist/tactile cues from therapist. He is below his baseline function and has demo'ed increased deconditioning since initial evaluation on 7/16. Due to this a higher level of post-acute care is recommended for safe discharge from hospital as well as to prevent further unplanned readmissions. Patient continues to demonstrate a need for therapy on a daily basis as patient continues to demonstrate decreased independence with functional mobility.     Problem List: weakness, impaired endurance, impaired self care skills, impaired functional  "mobility, gait instability, impaired balance, decreased upper extremity function, decreased lower extremity function, decreased safety awareness, visual deficits, decreased coordination, impaired coordination, impaired fine motor, impaired skin, impaired cardiopulmonary response to activity.  Rehab Prognosis: Fair; patient would benefit from acute skilled PT services to address these deficits and reach maximum level of function.      Goals:   Multidisciplinary Problems       Physical Therapy Goals          Problem: Physical Therapy    Goal Priority Disciplines Outcome Goal Variances Interventions   Physical Therapy Goal     PT, PT/OT Ongoing, Progressing     Description: Goals to be met by: 2023     Patient will increase functional independence with mobility by performin. Supine to sit with stand by assistance  2. Sit to supine with stand by assistance  3. Sit to stand transfer with stand by assistance  4. Gait  x 25 feet with stand by assistance using LRAD as needed  5. Lower extremity exercise program x10 reps per handout, with assistance as needed                        Subjective     RN notified prior to session. No family/friends present upon PT entrance into room. Patient agreeable to PT treatment session.    Chief Complaint: "I can't hear"  Patient/Family Comments/goals: none stated this date  Pain/Comfort:  Pain Rating 1: other (see comments) (pt did not rate; did not appear to be in pain)  Pain Rating Post-Intervention 1: 0/10      Objective:     Additional staff present: OT; OT for cotx due to pt's multiple deficits req'ing two skilled therapists to appropriately progress pt's musculoskeletal strength and endurance due to tenuous medical status from COVID-19 diagnosis. Pt does not have the activity tolerance or endurance to tolerate multiple therapy sessions due to complications from co-morbidities and COVID-19 virus infection.    Patient found HOB elevated with: telemetry, pulse ox " "(continuous), peripheral IV, bed alarm (avasys)   Cognition:   Alert and Cooperative  Patient is oriented to  unable to assess 2/2 pt [place]  Command following: unable to assess 2/2 pt [place]  Fluency: clear/fluent at times however mostly non-verbal  General Precautions: Standard, Cardiac airborne, contact, fall, droplet   Orthopedic Precautions:N/A   Braces: N/A   Body mass index is 16.02 kg/m².  Oxygen Device: Room Air  Vitals: BP (!) 140/63   Pulse 64   Temp 98 °F (36.7 °C)   Resp 16   Ht 5' 7.01" (1.702 m)   Wt 46.4 kg (102 lb 4.7 oz)   SpO2 (!) 94%   BMI 16.02 kg/m²     Outcome Measures:  AM-PAC 6 CLICK MOBILITY  Turning over in bed (including adjusting bedclothes, sheets and blankets)?: 2  Sitting down on and standing up from a chair with arms (e.g., wheelchair, bedside commode, etc.): 2  Moving from lying on back to sitting on the side of the bed?: 2  Moving to and from a bed to a chair (including a wheelchair)?: 3  Need to walk in hospital room?: 3  Climbing 3-5 steps with a railing?: 1  Basic Mobility Total Score: 13     Functional Mobility:    Bed Mobility:   Scooting to HOB via supine bridge: total assistance and 2 persons  Supine to Sit: total assistance and 2 persons; from Rt side of bed  Scooting anteriorly to EOB to have both feet planted on floor: total assistance  Sit to Supine: total assistance and 2 persons; to Rt side of bed    Sitting Balance at Edge of Bed:  Static Sitting Balance: Good- : able to accept minimal resistance  Dynamic Sitting Balance: Fair : minimal weight shifting ipsilaterally or anteriorly. Difficulty crossing midline  Assistance Level Required: Contact Guard Assistance  Time: 8 minutes  Postural deviations noted: slouched posture, rounded shoulders, and forward head    Transfers:   Sit <> Stand Transfer: minimum assistance and of 2 persons with rolling walker   Stand <> Sit Transfer: minimum assistance and of 2 persons with rolling walker   [id number] from EOB    Standing " Balance:  Static Standing Balance: Fair- : requires minimal assistance or UE support in order to stand without LOB  Dynamic Standing Balance: Poor+ : able to stand with minimal assistance and reach ipsilaterally. Unable to weight shift.  Assistance Level Required: Minimal Assistance  Patient used: rolling walker     Gait:  Patient ambulated: 20 ft   Patient required: minimal assist  Patient used:  rolling walker   Gait Pattern observed: reciprocal gait  Gait Deviation(s): unsteady gait, decreased step length, narrow base of support, decreased weight shift, decreased foot clearance, flexed posture, decreased afshan, and shuffle gait  Impairments due to: impaired balance, decreased strength, and decreased endurance  all lines remained intact throughout ambulation trial  Comments: Patient required cues for position in walker, step through gait pattern, upright posture, and width of base of support to increase independence and safety. Patient required cues ~ 75% of the time. Pt unable to hear vc's but responded better to tactile/physical cues from therapist during ambulation trial. Pt returned to bed vs chair 2/2 safety concern due to [place]    Education:  Time provided for education, counseling and discussion of health disposition in regards to patient's current status  All questions answered within PT scope of practice and to patient's satisfaction  PT role in POC to address current functional deficits  Pt educated on proper body mechanics, safety techniques, and energy conservation with PT facilitation and cueing throughout session  Call nursing/pct to transfer to chair/use bathroom. Pt stated understanding.  Whiteboard updated with therapist name and pt's current mobility status documented above  Safe to perform step transfer to/from chair/bedside commode assist x 1 and RW w/ nursing/PCT present  Importance of OOB tolerance prn hrs/day to improve lung ventilation and expansion as well as strengthen postural  musculature    Pt found saturated in urine and BM at start of session. PT assisted pt with standing balance while OT performed pericare. Despite performing this x2 pt still actively having BM at end of session. Attempted to call KATELYNN Frank/José x2 with no success. KATELYNN Frank messaged via secure chat and notified of pt still having BM at end of PT session.    Patient left HOB elevated with all lines intact, call button in reach, bed alarm on, RN notified, and avasys camera present.    Time Tracking:     PT Received On: 07/18/23  PT Start Time: 1033     PT Stop Time: 1056  PT Total Time (min): 23 min     Billable Minutes:   Therapeutic Activity 23 minutes    Treatment Type: Treatment  PT/PTA: PT       7/18/2023

## 2023-07-18 NOTE — ASSESSMENT & PLAN NOTE
Hard of hearing  Parkinson's disease  · Patient with waxing and waning mentation.  Some days, he can understand the questions and answers appropriately.  Some days patient more confused and does not seem to answer any questions appropriately.  · Encephalopathy likely related to COVID in the setting of Parkinson's disease with known cognitive decline.  Given leukocytosis, further infectious workup initiated.  See below.  · Resume home Parkinson's medications  · Psychiatry consulted, appreciate assistance.  Patient does not have capacity to make decision to leave hospital against medical advice or to decline admission to SNF or inpatient rehab.

## 2023-07-18 NOTE — PT/OT/SLP EVAL
Occupational Therapy   Co-Evaluation and Co-Treatment with PT  Co-treatment performed due to patient's multiple deficits requiring two skilled therapists to appropriately and safely assess patient's strength and endurance while facilitating functional tasks in addition to accommodating for patient's activity tolerance.         Name: Giselle Lemus  MRN: [id number]  Admitting Diagnosis: COVID-19  Recent Surgery: * No surgery found *      Recommendations:     Discharge Recommendations: nursing facility, skilled  Discharge Equipment Recommendations:  other (see comments) (TBD)  Barriers to discharge:  Decreased caregiver support, Inaccessible home environment    Assessment:     Giselle Lemus is a 89 y.o. male with a medical diagnosis of COVID-19.  Patient presents with decreased functional abilities in relation to PLOF and will benefit from skilled therapy services to return to PLOF. Patient with good prognosis for accelerated therapy outcome to reach set goals and discharge to next level of care.  Patient to receive therapy to combat weakness and worsening of functional abilities to care for self. Pt will work on adaptations to ADLs, strength and endurance training, and overall mobility training to directly aid safety at hospital and when going through daily life. Performance deficits affecting function: weakness, impaired endurance, impaired self care skills, impaired functional mobility, gait instability, impaired balance, impaired cognition, visual deficits, decreased coordination, decreased upper extremity function, decreased lower extremity function, decreased safety awareness, pain, abnormal tone, decreased ROM.      Rehab Prognosis: Good; patient would benefit from acute skilled OT services to address these deficits and reach maximum level of function.       Plan:     Patient to be seen 3 x/week to address the above listed problems via self-care/home management, therapeutic activities, therapeutic exercises,  "neuromuscular re-education  Plan of Care Expires:    Plan of Care Reviewed with: patient    Subjective     Chief Complaint: "You're crazy"  Patient/Family Comments/goals: Unable to clearly relate goals; patient mostly non-verbal during this encounter and extremely [place]    Occupational Profile:  Living Environment: Patient was previously living in [facility] with 0 BRENDA.   Previous level of function: Patient required 24/7 assistance from caregivers. Completed functional mobility with assistance and RW. Required assistance with all ADLs.  Equipment Used at Home: walker, rolling, wheelchair  Assistance upon Discharge: 24/7 care    Pain/Comfort:  Pain Rating 1: other (see comments) (unable to determine)    Patients cultural, spiritual, Faith conflicts given the current situation:      Objective:     Communicated with: KATELYNN Frank/José prior to session.  Patient found supine with telemetry, peripheral IV, pulse ox (continuous) upon OT entry to room.    General Precautions: Standard,    Orthopedic Precautions:    Braces:    Respiratory Status: Room air    Occupational Performance:    Bed Mobility:    Patient completed Rolling/Turning to Left with  total assistance and 2 persons  Patient completed Rolling/Turning to Right with total assistance and 2 persons  Patient completed Scooting/Bridging with total assistance and 2 persons  Patient completed Supine to Sit with total assistance and 2 persons  Patient completed Sit to Supine with total assistance and 2 persons    Functional Mobility/Transfers:  Patient completed Sit <> Stand Transfer with minimum assistance  with  rolling walker   Functional Mobility: Patient completed BM with levels above. Once seated EOB patient ranged from max to CGA for dynamic and static sitting balance. Patient completed 2x S2S trials with min A first using HHA and the next using RW. Patient then completed around ~20 ft functional mobility in room and transferred back to bed.     Activities of Daily " Living:  Toileting: total assistance total assistance needed to complete toileting hygiene while standing at EOB.     Cognitive/Visual Perceptual:  Cognitive/Psychosocial Skills:     -       Oriented to: unable to determine on this date   -       Safety awareness/insight to disability: unable to determine at this date   Visual/Perceptual:      -Impaired  visual field      Physical Exam:  Upper Extremity Range of Motion:     -       Right Upper Extremity: WFL  -       Left Upper Extremity: WFL  Upper Extremity Strength:    -       Right Upper Extremity: WFL  -       Left Upper Extremity: WFL   Strength:    -       Right Upper Extremity: WFL  -       Left Upper Extremity: WFL    AMPAC 6 Click ADL:  AMPAC Total Score: 9    Treatment & Education:  Treatment and evaluation completed as stated above. Education attempted but unable due to patient's lack of hearing, lack of engagement, and verbalization at this time.       Patient left supine with all lines intact, call button in reach, bed alarm on, and nursing notified    GOALS:   Multidisciplinary Problems       Occupational Therapy Goals          Problem: Occupational Therapy    Goal Priority Disciplines Outcome Interventions   Occupational Therapy Goal     OT, PT/OT     Description: Goals to be met by: 8/1/2023     Patient will increase functional independence with ADLs by performing:    UE Dressing with Contact Guard Assistance.  LE Dressing with Minimal Assistance.  Toileting from toilet with Minimal Assistance for hygiene and clothing management.   Supine to sit with Stand-by Assistance.  Step transfer with Stand-by Assistance  Toilet transfer to toilet with Stand-by Assistance.                         History:     Past Medical History:   Diagnosis Date    BPH (benign prostatic hyperplasia)     Parkinsons 09/2019    R hand tremor starting in 2017, diagnosed Sept 2019 by Dr. Angeles at     Ulcerative colitis     s/p colectomy    Unspecified chronic bronchitis           Past Surgical History:   Procedure Laterality Date    TOTAL COLECTOMY         Time Tracking:     OT Date of Treatment: 07/18/23  OT Start Time: 1033  OT Stop Time: 1055  OT Total Time (min): 22 min    Billable Minutes:Evaluation 12  Self Care/Home Management 10    7/18/2023

## 2023-07-18 NOTE — ASSESSMENT & PLAN NOTE
· Patient is on room air, per guidelines will not start steroids  · Planned for remdesivir x3 days, last day 7/17.  Per guidelines, 3 days remdesivir is sufficient for administration and individuals with mild COVID symptoms at risk for severe disease.  Additionally, patient has known chronic bradycardia with known right BBB.  Remdesivir with known side-effect of bradycardia.  · Have hx of RPH on DVT ppx heparin, however high risk for DVT/PE, will not start full dose lovenox.  Tried to discuss with the patient but he was unable to hear and comprehend the question.  Although, when patient does understand the question, he responds appropriately.  I contacted the patient's wife and brother-in-law and discussed risk of bleeding versus risk for clot for approximately 25 minutes.  Ultimately, wife decided to proceed with heparin understanding the risk of bleeding given prior significant hemorrhage.

## 2023-07-18 NOTE — PLAN OF CARE
Goals to be met by: 8/1/2023     Patient will increase functional independence with ADLs by performing:    UE Dressing with Contact Guard Assistance.  LE Dressing with Minimal Assistance.  Toileting from toilet with Minimal Assistance for hygiene and clothing management.   Supine to sit with Stand-by Assistance.  Step transfer with Stand-by Assistance  Toilet transfer to toilet with Stand-by Assistance.

## 2023-07-18 NOTE — ASSESSMENT & PLAN NOTE
· Discussed at length with patient's wife who was unable to make a decision.  She deferred that decision to her brother-in-law, Lincoln.  Lincoln says that over the years he is had this discussion with the patient who has expressed desire to be full code and to let the family make any decisions if it does not look like there will be any meaningful recovery.

## 2023-07-19 PROBLEM — Z51.5 PALLIATIVE CARE ENCOUNTER: Status: ACTIVE | Noted: 2023-01-01

## 2023-07-19 NOTE — SUBJECTIVE & OBJECTIVE
Past Medical History:   Diagnosis Date    BPH (benign prostatic hyperplasia)     Parkinsons 09/2019    R hand tremor starting in 2017, diagnosed Sept 2019 by Dr. Angeles at     Ulcerative colitis     s/p colectomy    Unspecified chronic bronchitis        Past Surgical History:   Procedure Laterality Date    TOTAL COLECTOMY         Review of patient's allergies indicates:   Allergen Reactions    Heparin     Heparin analogues Other (See Comments)     Not true allergy - but patient developed large spontaneous RP hematoma and concurrent severe epistaxis while on DVT prophylactic dose heparin - consider mechanical DVT ppx in future       Medications:  Continuous Infusions:  Scheduled Meds:   ascorbic acid (vitamin C)  500 mg Oral BID    [START ON 7/20/2023] azithromycin  250 mg Oral Daily    azithromycin  500 mg Oral Once    balsam peru-castor oiL   Topical (Top) BID    carbidopa-levodopa  mg  1 tablet Oral QID    cefTRIAXone (ROCEPHIN) IVPB  1 g Intravenous Q24H    finasteride  5 mg Oral Daily    heparin (porcine)  5,000 Units Subcutaneous Q8H    loperamide  2 mg Oral TID    melatonin  6 mg Oral Nightly    multivitamin  1 tablet Oral Daily    mupirocin   Nasal BID    pantoprazole  40 mg Oral Daily     PRN Meds:albuterol **AND** MDI Q6H PRN, artificial tears, dextrose 10%, dextrose 10%, glucagon (human recombinant), glucose, glucose, hydrOXYzine HCL, polyethylene glycol, sodium chloride 0.9%    Family History    None       Tobacco Use    Smoking status: Never    Smokeless tobacco: Never   Substance and Sexual Activity    Alcohol use: Never    Drug use: Not on file    Sexual activity: Not on file       Review of Systems   Unable to perform ROS: Mental status change   Objective:     Vital Signs (Most Recent):  Temp: 98.5 °F (36.9 °C) (07/19/23 1112)  Pulse: 68 (07/19/23 1138)  Resp: 17 (07/19/23 1112)  BP: (!) 152/69 (07/19/23 1112)  SpO2: (!) 94 % (07/19/23 1112) Vital Signs (24h Range):  Temp:  [97.5 °F (36.4  °C)-98.5 °F (36.9 °C)] 98.5 °F (36.9 °C)  Pulse:  [50-76] 68  Resp:  [16-18] 17  SpO2:  [87 %-97 %] 94 %  BP: (127-205)/(60-82) 152/69     Weight: 47.3 kg (104 lb 4.4 oz)  Body mass index is 16.33 kg/m².       Physical Exam  Vitals and nursing note reviewed.   Constitutional:       General: He is not in acute distress.     Appearance: He is ill-appearing. He is not toxic-appearing.      Comments: Malnourished, frail, does not appear uncomfortable or in active pain   HENT:      Right Ear: External ear normal.      Left Ear: External ear normal.      Ears:      Comments: Per chart hard of hearing and hearing aide malfunctioning     Mouth/Throat:      Pharynx: No oropharyngeal exudate or posterior oropharyngeal erythema.   Cardiovascular:      Rate and Rhythm: Normal rate.      Pulses: Normal pulses.      Heart sounds: Normal heart sounds.   Pulmonary:      Effort: Pulmonary effort is normal.      Breath sounds: No wheezing or rhonchi.   Abdominal:      General: There is no distension.      Palpations: Abdomen is soft.      Tenderness: There is no guarding or rebound.   Musculoskeletal:      Cervical back: No rigidity or tenderness.      Right lower leg: No edema.      Left lower leg: No edema.   Skin:     General: Skin is warm and dry.      Coloration: Skin is pale.      Findings: Lesion (sacral ulcer) present.   Neurological:      Mental Status: He is alert. He is disoriented.      Comments: Awake but not following commands          Review of Symptoms      Symptom Assessment (ESAS 0-10 Scale)  Pain:  0  Dyspnea:  0  Anxiety:  0  Nausea:  0  Depression:  0  Anorexia:  0  Fatigue:  0  Insomnia:  0  Restlessness:  0  Agitation:  0  Unable to complete assessment due to Dementia         Pain Assessment in Advanced Demential Scale (PAINAD)   Breathing - Independent of vocalization:  0  Negative vocalization:  0  Facial expression:  0  Body language:  0  Consolability:  0  Total:  0    Living Arrangements:  Lives with  spouse    Psychosocial/Cultural:   See Palliative Psychosocial Note: Yes  **Primary  to Follow**  Palliative Care  Consult: No    Spiritual:  F - Roz[place] and Belief:  Islam      Advance Care Planning   Advance Directives:   Living Will: No    LaPOST: No    Do Not Resuscitate Status: No    Medical Power of : No      Decision Making:  Patient unable to communicate due to disease severity/cognitive impairment and Family answered questions  Goals of Care: The family endorses that what is most important right now is to focus on spending as much time with wife?    Accordingly, we have decided that the best plan to meet the patient's goals includes continuing with treatment       CBC:   Recent Labs   Lab 07/19/23 0539   WBC 14.05*   HGB 8.3*   HCT 27.0*   MCV 87        BMP:  Recent Labs   Lab 07/19/23 0539   GLU 84      K 4.0      CO2 27   BUN 22   CREATININE 0.9   CALCIUM 8.2*   MG 1.8     LFT:  Lab Results   Component Value Date    AST 18 07/18/2023    ALKPHOS 105 07/18/2023    BILITOT 0.2 07/18/2023     Albumin:   Albumin   Date Value Ref Range Status   07/18/2023 2.1 (L) 3.5 - 5.2 g/dL Final     Protein:   Total Protein   Date Value Ref Range Status   07/18/2023 5.8 (L) 6.0 - 8.4 g/dL Final     Lactic acid:   Lab Results   Component Value Date    LACTATE 1.1 07/15/2023    LACTATE 1.1 06/17/2023       S

## 2023-07-19 NOTE — ASSESSMENT & PLAN NOTE
Medicolegal  Decision-making:   -Pt does not have decision-making capacity  -Pt has not appointed a CPOA.  -Marital status:   -Children: None    Advance care planning/Advance directives:  -The patient has not previously engaged in advance care planning  -Pt has no scanned advance directives in [facility]    Psychosocial  Support system: Wife next nicola cabello is bedbound, they have sitter service as needed. -Spiritual: yes;  Patient is Mandaeism per wife, denomination, will clarify denomination The palliative care will offers  services when appropiate  -Family: Wife's siblings are main support system. Ms Lemus reports her sister helps at home also and Mr Live (brother in law) has been overseeing all hospitalizations matters for both.    Health status prior to this hospitalization    -Functional status: Inconsistent reports,  Patientt was not able to manage ADLs, wheelchair bound mostly, able to take a few steps at home and feed himself with assitance  but unable on the days preceding and during this admission.  -Cognitive status: Known dementia/parkinsons, wife describes waxing and waning mentation.  -QOL:Poor     Prognosis:  -Patient with recurrent hospitalizations and valentina  overall decline, would anticipate shortening in between hospitalizations.     GOC Discussion with:  Ms. Lemus: [place] [facility] initiated GOC discussed husbands current hospital course and our concerns regarding his anuradha risk of readmission in the short term. She expressed she is  hoping for recovery.  Touched code status and aggressive medical management, she is no ready to make decisions at this moment     brother in law Mr Lincoln Live. His spouse is his next of keen for medical decisions, she has capacity. Currently Mrs. Lemus is also [place] [facility] [place] symptoms, covid positive. given her current state of health, Mr. Live expresses he is waiting to hear from the  office to start with the advance care  planning documentation for both  Mrs and Mrs Lemus.    Active symptoms  -Pain: will occassionally complain of back pain 2/2 to sacral ulcer      Summary/Recommendation:  -Needs MPOA  -Most important goals at this time: curative/life-prolongation (regardless of treatment burdens). still being determined and more discussions are needed  -Code status FULL  -Most appropriate disposition is hospice, although this has not been determined yet or decided by Mr. Lemus.  -We will continue to follow and and with goals of conversation.

## 2023-07-19 NOTE — ASSESSMENT & PLAN NOTE
· Patient is on room air, per guidelines does not need steroids  · S/p remdesivir x3 days, last day 7/17.  Per guidelines, 3 days remdesivir is sufficient for administration for individuals with mild COVID symptoms at risk for severe disease.  Additionally, patient has known chronic bradycardia with known right BBB.  Remdesivir with known side-effect of bradycardia.  · Have hx of RPH on DVT ppx heparin, however high risk for DVT/PE, will not start full dose lovenox.  Tried to discuss with the patient but he was unable to hear and comprehend the question.  Although, when patient does understand the question, he responds appropriately.  I contacted the patient's wife and brother-in-law and discussed risk of bleeding versus risk for clot for approximately 25 minutes.  Ultimately, wife decided to proceed with heparin understanding the risk of bleeding given prior significant hemorrhage.

## 2023-07-19 NOTE — PROGRESS NOTES
[facility] - Intensive Care ([address] Medicine  Progress Note    Patient Name: Giselle Lemus  MRN: [id number]  Patient Class: IP- Inpatient   Admission Date: 7/15/2023  Length of Stay: 2 days  Attending Physician: Tyrone Sepulveda III,*  Primary Care Provider: Tl Torres MD        Subjective:     Principal Problem:COVID-19        HPI:  Patient is a pleasantly demented 89 year old male with pmh significant for parkinson's disease, frequent falls, hard of hearing, BPH presents to our ED secondary to weakness.  Apparently patient have been having diarrhea since Wednesday of this week associated with weakness.  His wife is admitted to our hospital 2 days ago for covid 19 and is recovering.  Per brother in-law at bedside, patient has continuous home health care. He tested positive at home for COVID-19 2 days ago as well.  He was started on Paxlovid yesterday.  His home health nurse reports that patient typically ambulates with assistance and a walker.  Since yesterday, patient has been more fatigued and having difficulty ambulating.  He has been using a wheelchair.  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 No reported fever or chills.  He continues to have diarrhea.      In the ED chest xray was without infiltrates.  Patient is on room air with good saturation.  In no acute distress.  Very difficult of hearing.  No complaints other than generalized weakness.  No recent increase in medications.        Overview/Hospital Course:  No notes on file    Interval History:  Patient is sitting up in bed, more alert today.  He seems to be answering questions more appropriately today.  Seems to understand that we are trying to get him to a facility and he agrees.  Denies any difficulty breathing.    Labs reviewed:  WBC 11.79 which has improved without any antibiotics.  Hemoglobin 8.4, creatinine 0.7.    CT chest personally reviewed with left lower lobe airspace  disease but seems to be consistent with previous images.  Right upper lobe airspace disease improved.    Review of Systems:  As above, difficult to obtain due to patient hard of hearing and altered mentation.  Objective:     Vital Signs (Most Recent):  Temp: 97.5 °F (36.4 °C) (07/18/23 2033)  Pulse: 63 (07/18/23 2033)  Resp: 18 (07/18/23 2033)  BP: (!) 150/67 (07/18/23 2033)  SpO2: 97 % (07/18/23 2033) Vital Signs (24h Range):  Temp:  [97.5 °F (36.4 °C)-98 °F (36.7 °C)] 97.5 °F (36.4 °C)  Pulse:  [48-76] 63  Resp:  [16-18] 18  SpO2:  [91 %-97 %] 97 %  BP: (132-150)/(60-78) 150/67     Weight: 46.4 kg (102 lb 4.7 oz)  Body mass index is 16.02 kg/m².    Intake/Output Summary (Last 24 hours) at 7/18/2023 2218  Last data filed at 7/18/2023 1300  Gross per 24 hour   Intake 508 ml   Output --   Net 508 ml        Physical Exam  Constitutional:       General: He is not in acute distress.     Appearance: He is not toxic-appearing.      Comments: Cachectic   HENT:      Mouth/Throat:      Pharynx: No oropharyngeal exudate or posterior oropharyngeal erythema.   Cardiovascular:      Rate and Rhythm: Normal rate.      Pulses: Normal pulses.      Heart sounds: Normal heart sounds.   Pulmonary:      Effort: Pulmonary effort is normal.      Breath sounds: Normal breath sounds. No wheezing or rhonchi.   Abdominal:      General: There is no distension.      Palpations: Abdomen is soft.      Tenderness: There is no guarding or rebound.   Musculoskeletal:      Cervical back: No rigidity or tenderness.   Skin:     General: Skin is warm and dry.      Coloration: Skin is pale.   Neurological:      Mental Status: He is alert. He is disoriented.      Comments: More alert today, seems to be answering questions more appropriate.  Mentation waxes and wanes   Psychiatric:      Comments: Awake, extremely hard of hearing         Assessment/Plan:      * COVID-19  · Patient is on room air, per guidelines does not need steroids  · S/p remdesivir x3  days, last day 7/17.  Per guidelines, 3 days remdesivir is sufficient for administration for individuals with mild COVID symptoms at risk for severe disease.  Additionally, patient has known chronic bradycardia with known right BBB.  Remdesivir with known side-effect of bradycardia.  · Have hx of RPH on DVT ppx heparin, however high risk for DVT/PE, will not start full dose lovenox.  Tried to discuss with the patient but he was unable to hear and comprehend the question.  Although, when patient does understand the question, he responds appropriately.  I contacted the patient's wife and brother-in-law and discussed risk of bleeding versus risk for clot for approximately 25 minutes.  Ultimately, wife decided to proceed with heparin understanding the risk of bleeding given prior significant hemorrhage.      Acute metabolic encephalopathy  Hard of hearing  Parkinson's disease  · Patient with waxing and waning mentation.  Some days, he can understand the questions and answers appropriately.  Some days patient more confused and does not seem to answer any questions appropriately.  · Encephalopathy likely related to COVID in the setting of Parkinson's disease with known cognitive decline.  Given leukocytosis, further infectious workup initiated.  See below.  · Resume home Parkinson's medications  · Psychiatry consulted, appreciate assistance.  Patient does not have capacity to make decision to leave hospital against medical advice or to decline admission to SNF or inpatient rehab.    Leukocytosis  · New on morning of 7/17.  Resolved with no treatment, however, UA with nitrites and occasional bacteria, culture with Gram-negative rods.  Unable to accurately assess patient for symptoms so will treat.  Rocephin started 7/18.  Follow up culture results for speciation and susceptibility  · CT chest right lung improved from previous, left lower lobe seems similar to previous.  No cough no shortness of breath no fevers, do not  suspect a new pneumonia.  Will discuss with pulmonology.    Full code status  · Discussed at length with patient's wife who was unable to make a decision.  She deferred that decision to her brother-in-law, Lincoln.  Lincoln says that over the years he is had this discussion with the patient who has expressed desire to be full code and to let the family make any decisions if it does not look like there will be any meaningful recovery.      Chronic anemia  · Stable, no sign of bleeding      Pressure injury of skin of buttock  Pressure injury of scrotum  · Present on admission  · Wound care      Multiple falls  · PT/OT  · Fall precaution  · Discussed at length with wife and brother-in-law, plan for discharge to skilled nursing facility.  Wife is in hospital and brother lies not able to care for patient 24/7.  They do have home caretakers but they are also not there 24/7.        Benign prostatic hyperplasia  · Resume home medications        VTE Risk Mitigation (From admission, onward)         Ordered     heparin (porcine) injection 5,000 Units  Every 8 hours         07/16/23 1819     Place sequential compression device  Until discontinued         07/16/23 0651                Discharge Planning   JOHAN: 7/19/2023     Code Status: Full Code   Is the patient medically ready for discharge?: No    Reason for patient still in hospital (select all that apply): Patient new problem, Patient trending condition, Treatment and Pending disposition  Discharge Plan A: Rehab                  Tyrone Sepulveda III, MD  Department of Hospital Medicine   [facility] - Intensive Care ([place]-[facility])

## 2023-07-19 NOTE — ASSESSMENT & PLAN NOTE
Mr. Lemus is an 88yo M for which pulmonary was consulted for management of loculated L pleural effusion and mucus plugging. Pt has a h/o of dementia, Parkinson's BPH, multiple falls, hearing loss, UC s/p colectomy, chronic sacral wounds. He was admitting on 7/15 for fatigue and weakness and was found to be COVID+. Hospital course included steroids and 3-day course of remdesivir. Recent chest CT with improvement of some consolidations, while other opacities persist but have not worsened. Patient is afebrile, HDS without oxygen requirements. Currently being treated for bacteruria on UA with ceftriaxone, which should cover typical lung pathogens as well. No role for bronchoscopy at this time. Recommend speech evaluation with concern for aspiration events, as brother-in-law reports history of coughing while eating and drinking. Pt unable to drink through straw at bedside today during rounds. Also recommend palliative care consult for goals of care discussion.    Recs  - no role for bronchoscopy at this time  - continue ceftriaxone 5 day course  - speech evaluation for swallow study   - palliative care consult

## 2023-07-19 NOTE — PT/OT/SLP PROGRESS
"Occupational Therapy   Treatment    Name: Giselle Lemus  MRN: [id number]  Admitting Diagnosis:  COVID-19       Recommendations:     Discharge Recommendations: nursing facility, skilled  Discharge Equipment Recommendations:  other (see comments) (TBD)  Barriers to discharge:  Decreased caregiver support, Other (Comment) (decreased function)    Assessment:     Giselle Lemus is a 89 y.o. male with a medical diagnosis of COVID-19.  Pt tolerated OT session well with good participation and motivation. Pt is progressing well overall but does remain extremely limited, however, is edging closer to PLOF in which he did require assistance. Pt would continue to benefit from skilled OT services to maximize functional (I) & facilitate safe discharge. Performance deficits affecting function are weakness, impaired endurance, impaired sensation, impaired self care skills, impaired functional mobility, gait instability, impaired balance, visual deficits, impaired cognition, decreased coordination, decreased upper extremity function, decreased lower extremity function, decreased safety awareness, abnormal tone.     Rehab Prognosis:  Good; patient would benefit from acute skilled OT services to address these deficits and reach maximum level of function.       Plan:     Patient to be seen 3 x/week to address the above listed problems via self-care/home management, therapeutic activities, therapeutic exercises, neuromuscular re-education  Plan of Care Expires:    Plan of Care Reviewed with: patient    Subjective     Chief Complaint: "you here again?"  Patient/Family Comments/goals: Unable to clearly relate goals at this time  Pain/Comfort:  Pain Rating 1: 0/10    Objective:     Communicated with: RN prior to session.  Patient found supine with telemetry, peripheral IV upon OT entry to room.    General Precautions: Standard, deaf, contact, airborne, droplet, fall    Orthopedic Precautions:   Braces:    Respiratory Status: Room air   "   Occupational Performance:     Bed Mobility:    Patient completed Rolling/Turning to Left with  moderate assistance  Patient completed Rolling/Turning to Right with moderate assistance  Patient completed Scooting/Bridging with moderate assistance  Patient completed Supine to Sit with moderate assistance     Functional Mobility/Transfers:  Patient completed Sit <> Stand Transfer with minimum assistance and physical cueing  with  rolling walker   Functional Mobility: Patient completed sit to stand exercises x3 to complete toileting dependently after bowel movement. Sit to stand exercises were originally the plan to gain strength and endurance as well as continue transfer training. Patient completed 3 side steps toward HOP with max cueing before returning to supine.     Activities of Daily Living:  Grooming: contact guard assistance EOB grooming exercises completed with CGA for cueing; pt showcases excellent EOB sitting balance on this day  Toileting: dependence while standing EOB after BM on pad; pads removed and clean pads arranged for return to bed      [facility] 6 Click ADL: 17    Treatment & Education:  Treatment as described above. Patient participated and was more alert and responsive than yesterday's session. Patient education difficult due to [place] and still some questionable AMS symptoms but education was at least attempted for all below.   -Education on energy conservation and task modification to maximize safety and (I) during ADLs and mobility  -Education on importance of OOB activity to improve overall activity tolerance and promote recovery  -Pt educated to call for assistance and to transfer with hospital staff only  -Provided education regarding role of OT, POC, & discharge recommendations with pt (somewhat) verbalizing understanding.  Pt had no further questions & when asked whether there were any concerns pt reported none.      Patient left supine with all lines intact, call button in reach, bed alarm on,  nursing notified, and telesitter/camera present    GOALS:   Multidisciplinary Problems       Occupational Therapy Goals          Problem: Occupational Therapy    Goal Priority Disciplines Outcome Interventions   Occupational Therapy Goal     OT, PT/OT     Description: Goals to be met by: 8/1/2023     Patient will increase functional independence with ADLs by performing:    UE Dressing with Contact Guard Assistance.  LE Dressing with Minimal Assistance.  Toileting from toilet with Minimal Assistance for hygiene and clothing management.   Supine to sit with Stand-by Assistance.  Step transfer with Stand-by Assistance  Toilet transfer to toilet with Stand-by Assistance.                         Time Tracking:     OT Date of Treatment: 07/19/23  OT Start Time: 1433  OT Stop Time: 1459  OT Total Time (min): 26 min    Billable Minutes:Self Care/Home Management 16  Therapeutic Activity 10    OT/HAYLEY: OT          7/19/2023

## 2023-07-19 NOTE — SUBJECTIVE & OBJECTIVE
Interval History:  Patient is sitting up in bed, more alert today.  He seems to be answering questions more appropriately today.  Seems to understand that we are trying to get him to a facility and he agrees.  Denies any difficulty breathing.    Labs reviewed:  WBC 11.79 which has improved without any antibiotics.  Hemoglobin 8.4, creatinine 0.7.    CT chest personally reviewed with left lower lobe airspace disease but seems to be consistent with previous images.  Right upper lobe airspace disease improved.    Review of Systems:  As above, difficult to obtain due to patient hard of hearing and altered mentation.  Objective:     Vital Signs (Most Recent):  Temp: 97.5 °F (36.4 °C) (07/18/23 2033)  Pulse: 63 (07/18/23 2033)  Resp: 18 (07/18/23 2033)  BP: (!) 150/67 (07/18/23 2033)  SpO2: 97 % (07/18/23 2033) Vital Signs (24h Range):  Temp:  [97.5 °F (36.4 °C)-98 °F (36.7 °C)] 97.5 °F (36.4 °C)  Pulse:  [48-76] 63  Resp:  [16-18] 18  SpO2:  [91 %-97 %] 97 %  BP: (132-150)/(60-78) 150/67     Weight: 46.4 kg (102 lb 4.7 oz)  Body mass index is 16.02 kg/m².    Intake/Output Summary (Last 24 hours) at 7/18/2023 2218  Last data filed at 7/18/2023 1300  Gross per 24 hour   Intake 508 ml   Output --   Net 508 ml        Physical Exam  Constitutional:       General: He is not in acute distress.     Appearance: He is not toxic-appearing.      Comments: Cachectic   HENT:      Mouth/Throat:      Pharynx: No oropharyngeal exudate or posterior oropharyngeal erythema.   Cardiovascular:      Rate and Rhythm: Normal rate.      Pulses: Normal pulses.      Heart sounds: Normal heart sounds.   Pulmonary:      Effort: Pulmonary effort is normal.      Breath sounds: Normal breath sounds. No wheezing or rhonchi.   Abdominal:      General: There is no distension.      Palpations: Abdomen is soft.      Tenderness: There is no guarding or rebound.   Musculoskeletal:      Cervical back: No rigidity or tenderness.   Skin:     General: Skin is warm  and dry.      Coloration: Skin is pale.   Neurological:      Mental Status: He is alert. He is disoriented.      Comments: More alert today, seems to be answering questions more appropriate.  Mentation waxes and wanes   Psychiatric:      Comments: Awake, extremely hard of hearing

## 2023-07-19 NOTE — SUBJECTIVE & OBJECTIVE
Past Medical History:   Diagnosis Date    BPH (benign prostatic hyperplasia)     Parkinsons 09/2019    R hand tremor starting in 2017, diagnosed Sept 2019 by Dr. Angeles at     Ulcerative colitis     s/p colectomy    Unspecified chronic bronchitis        Past Surgical History:   Procedure Laterality Date    TOTAL COLECTOMY         Review of patient's allergies indicates:   Allergen Reactions    Heparin     Heparin analogues Other (See Comments)     Not true allergy - but patient developed large spontaneous RP hematoma and concurrent severe epistaxis while on DVT prophylactic dose heparin - consider mechanical DVT ppx in future       Family History    None       Tobacco Use    Smoking status: Never    Smokeless tobacco: Never   Substance and Sexual Activity    Alcohol use: Never    Drug use: Not on file    Sexual activity: Not on file         Review of Systems   Reason unable to perform ROS: dementia at baseline; other ROS from brother-in-law.   Constitutional:  Negative for appetite change.   HENT:  Negative for trouble swallowing (some coughing while eating and drinking).    Respiratory:  Negative for cough.    Objective:     Vital Signs (Most Recent):  Temp: 97.7 °F (36.5 °C) (07/19/23 0901)  Pulse: 60 (07/19/23 0901)  Resp: 17 (07/19/23 0901)  BP: 127/60 (07/19/23 0901)  SpO2: (!) 94 % (07/19/23 0901) Vital Signs (24h Range):  Temp:  [97.5 °F (36.4 °C)-98 °F (36.7 °C)] 97.7 °F (36.5 °C)  Pulse:  [50-76] 60  Resp:  [16-18] 17  SpO2:  [87 %-97 %] 94 %  BP: (127-205)/(60-82) 127/60     Weight: 47.3 kg (104 lb 4.4 oz)  Body mass index is 16.33 kg/m².      Intake/Output Summary (Last 24 hours) at 7/19/2023 0983  Last data filed at 7/19/2023 0634  Gross per 24 hour   Intake 598 ml   Output --   Net 598 ml        Physical Exam  Constitutional:       Appearance: He is ill-appearing.   Eyes:      General:         Right eye: Discharge present.         Left eye: Discharge present.     Comments: Bilateral watery discharge    Pulmonary:      Effort: No respiratory distress.      Breath sounds: Normal breath sounds. No wheezing.      Comments: Small breaths  Musculoskeletal:      Right lower leg: No edema.      Left lower leg: No edema.        Vents:       Lines/Drains/Airways       Peripheral Intravenous Line  Duration                  Peripheral IV - Single Lumen 07/15/23 1538 20 G Left;Posterior Forearm 3 days                    Significant Labs:    CBC/Anemia Profile:  Recent Labs   Lab 07/18/23  0605 07/19/23  0539   WBC 11.79 14.05*   HGB 8.4* 8.3*   HCT 27.3* 27.0*    219   MCV 87 87   RDW 21.5* 22.9*        Chemistries:  Recent Labs   Lab 07/18/23  0605 07/19/23  0539    138   K 4.2 4.0    105   CO2 24 27   BUN 24* 22   CREATININE 0.7 0.9   CALCIUM 8.4* 8.2*   ALBUMIN 2.1*  --    PROT 5.8*  --    BILITOT 0.2  --    ALKPHOS 105  --    ALT <5*  --    AST 18  --    MG 1.8 1.8   PHOS  --  2.8         Significant Imaging:   I have reviewed all pertinent imaging results/findings within the past 24 hours.    CT: I have reviewed all pertinent results/findings within the past 24 hours and findings are:     1. Improving right upper lobe and left lower lobe consolidations.   2. Left basal peripheral consolidation grossly unchanged.   3. Substantial mucoid impaction in the left lower lobe segmental bronchi.   4. Left lower lobe volume loss again noted.   5. Right small pleural effusion.  Small left-sided loculated effusion

## 2023-07-19 NOTE — ASSESSMENT & PLAN NOTE
· New on morning of 7/17.  Resolved with no treatment, however, UA with nitrites and occasional bacteria, culture with Gram-negative rods.  Unable to accurately assess patient for symptoms so will treat.  Rocephin started 7/18.  Follow up culture results for speciation and susceptibility  · CT chest right lung improved from previous, left lower lobe seems similar to previous.  No cough no shortness of breath no fevers, do not suspect a new pneumonia.  Will discuss with pulmonology.

## 2023-07-19 NOTE — PLAN OF CARE
Problem: Adult Inpatient Plan of Care  Goal: Plan of Care Review  Outcome: Ongoing, Not Progressing  Goal: Optimal Comfort and Wellbeing  Outcome: Ongoing, Not Progressing     Problem: Impaired Wound Healing  Goal: Optimal Wound Healing  Outcome: Ongoing, Not Progressing     Problem: Fatigue  Goal: Improved Activity Tolerance  Outcome: Ongoing, Not Progressing

## 2023-07-19 NOTE — PLAN OF CARE
Patient showing increased alertness and verbalizing thoughts today. Able to complete EOB sitting with SBA while completing grooming tasks. Patient still extremely [place] so requires physical cueing to complete standing tasks (completes standing tasks with CGA). Continue to POC as patient is continuing to show progress.     Goals to be met by: 8/1/2023     Patient will increase functional independence with ADLs by performing:    UE Dressing with Contact Guard Assistance.  LE Dressing with Minimal Assistance.  Toileting from toilet with Minimal Assistance for hygiene and clothing management.   Supine to sit with Stand-by Assistance.  Step transfer with Stand-by Assistance  Toilet transfer to toilet with Stand-by Assistance.

## 2023-07-19 NOTE — HPI
Mr. Lemus is hard of hearing  88 yo mal with medical history of ulcer colitis status post colectomy in 2013, Parkinson's disease, dementia, visual loss, BPH and chronic sacral pressure ulcer.  Most recent medical history is notable for 2 admissions in June 2023 with presumed aspiration pneumonia and worsening mental status from baseline.  He was a the dmitted on July 15 with generalized weakness and diarrhea, found to be COVID-19 positive. Per primary team and bother-in-law he has has had some clinical improvement during since admission. Patient expressed desire to leave hospital and mentioned not going back to LTAC/SNF, given patients history of dementia, psychiatry was consulted to assess capacity. Patient  was deemed  to not have capacity.     Palliative care has been consulted to initiate goals of conversation in the setting of [place] overall decline leading to recurrent hospitalizations.     Today Mr. Lemus is accompanied in the room by his brother in law Mr Lincoln Live. His spouse is his next of keen for medical decisions, she has capacity. [place] [facility] [place].

## 2023-07-19 NOTE — CONSULTS
[facility] - [facility] ([place] [address])  Pulmonology  Consult Note    Patient Name: Giselle Lemus  MRN: [id number]  Admission Date: 7/15/2023  Hospital Length of Stay: 3 days  Code Status: Full Code  Attending Physician: Tyrone Sepulveda III,*  Primary Care Provider: Tl Torres MD   Principal Problem: COVID-19    Inpatient consult to Pulmonology  Consult performed by: Reba Guillory MD  Consult ordered by: Tyrone Sepulveda III, MD        Subjective:     HPI:  Mr. Lemus is an 88yo M for which pulmonary was consulted for management of loculated L pleural effusion and mucus plugging. Pt has a h/o of dementia, Parkinson's BPH, multiple falls, hearing loss, UC s/p colectomy, chronic sacral wounds. He presented to the ED on 7/15 for progressive fatigue and weakness over 2 days. Pt and his wife tested positive for COVID 2 days prior, his wife was admitted to the hospital. Paxlovid started 7/14. In the ED, afebrile, VSS and satting 98% on RA. WCC wnl, Hb 8.4. CMP wnl. CXR w/o infiltrates. IVF given. Since hospitalization, patient received steroids and remdesivir (3 day course, ended 7/17). 7/17 with new elevated WCC and concern for L sided pneumonia on CXR. CT chest 7/18 showing mucoid impaction in LLL segmental bronchi, a small R pleural effusion and a small L loculated pleural effusion. Bacteriuria on UA, ceftriaxone started. Today, pt is afebrile and HDS. WCC elevated to 14. Brother in law at bedside states patient does not have lung issues at home or a chronic cough, but reports the patient does occasionally cough while eating or drinking.       Past Medical History:   Diagnosis Date    BPH (benign prostatic hyperplasia)     Parkinsons 09/2019    R hand tremor starting in 2017, diagnosed Sept 2019 by Dr. Angeles at     Ulcerative colitis     s/p colectomy    Unspecified chronic bronchitis        Past Surgical History:   Procedure Laterality Date    TOTAL COLECTOMY         Review of patient's allergies  indicates:   Allergen Reactions    Heparin     Heparin analogues Other (See Comments)     Not true allergy - but patient developed large spontaneous RP hematoma and concurrent severe epistaxis while on DVT prophylactic dose heparin - consider mechanical DVT ppx in future       Family History    None       Tobacco Use    Smoking status: Never    Smokeless tobacco: Never   Substance and Sexual Activity    Alcohol use: Never    Drug use: Not on file    Sexual activity: Not on file         Review of Systems   Reason unable to perform ROS: dementia at baseline; other ROS from brother-in-law.   Constitutional:  Negative for appetite change.   HENT:  Negative for trouble swallowing (some coughing while eating and drinking).    Respiratory:  Negative for cough.    Objective:     Vital Signs (Most Recent):  Temp: 97.7 °F (36.5 °C) (07/19/23 0901)  Pulse: 60 (07/19/23 0901)  Resp: 17 (07/19/23 0901)  BP: 127/60 (07/19/23 0901)  SpO2: (!) 94 % (07/19/23 0901) Vital Signs (24h Range):  Temp:  [97.5 °F (36.4 °C)-98 °F (36.7 °C)] 97.7 °F (36.5 °C)  Pulse:  [50-76] 60  Resp:  [16-18] 17  SpO2:  [87 %-97 %] 94 %  BP: (127-205)/(60-82) 127/60     Weight: 47.3 kg (104 lb 4.4 oz)  Body mass index is 16.33 kg/m².      Intake/Output Summary (Last 24 hours) at 7/19/2023 0935  Last data filed at 7/19/2023 0634  Gross per 24 hour   Intake 598 ml   Output --   Net 598 ml        Physical Exam  Constitutional:       Appearance: He is ill-appearing.   Eyes:      General:         Right eye: Discharge present.         Left eye: Discharge present.     Comments: Bilateral watery discharge   Pulmonary:      Effort: No respiratory distress.      Breath sounds: Normal breath sounds. No wheezing.      Comments: Small breaths  Musculoskeletal:      Right lower leg: No edema.      Left lower leg: No edema.        Vents:       Lines/Drains/Airways       Peripheral Intravenous Line  Duration                  Peripheral IV - Single Lumen 07/15/23  1538 20 G Left;Posterior Forearm 3 days                    Significant Labs:    CBC/Anemia Profile:  Recent Labs   Lab 07/18/23  0605 07/19/23  0539   WBC 11.79 14.05*   HGB 8.4* 8.3*   HCT 27.3* 27.0*    219   MCV 87 87   RDW 21.5* 22.9*        Chemistries:  Recent Labs   Lab 07/18/23  0605 07/19/23  0539    138   K 4.2 4.0    105   CO2 24 27   BUN 24* 22   CREATININE 0.7 0.9   CALCIUM 8.4* 8.2*   ALBUMIN 2.1*  --    PROT 5.8*  --    BILITOT 0.2  --    ALKPHOS 105  --    ALT <5*  --    AST 18  --    MG 1.8 1.8   PHOS  --  2.8         Significant Imaging:   I have reviewed all pertinent imaging results/findings within the past 24 hours.    CT: I have reviewed all pertinent results/findings within the past 24 hours and findings are:     1. Improving right upper lobe and left lower lobe consolidations.   2. Left basal peripheral consolidation grossly unchanged.   3. Substantial mucoid impaction in the left lower lobe segmental bronchi.   4. Left lower lobe volume loss again noted.   5. Right small pleural effusion.  Small left-sided loculated effusion       Assessment/Plan:     Pulmonary  Multifocal pneumonia  Mr. Lemus is an 89yo M for which pulmonary was consulted for management of loculated L pleural effusion and mucus plugging. Pt has a h/o of dementia, Parkinson's BPH, multiple falls, hearing loss, UC s/p colectomy, chronic sacral wounds. He was admitting on 7/15 for fatigue and weakness and was found to be COVID+. Hospital course included steroids and 3-day course of remdesivir. Recent chest CT with improvement of some consolidations, while other opacities persist but have not worsened. Patient is afebrile, HDS without oxygen requirements. Currently being treated for bacteruria on UA with ceftriaxone, which should cover typical lung pathogens as well. No role for bronchoscopy at this time. Recommend speech evaluation with concern for aspiration events, as brother-in-law reports history of  coughing while eating and drinking. Pt unable to drink through straw at bedside today during rounds. Also recommend palliative care consult for goals of care discussion.    Recs  - no role for bronchoscopy at this time  - continue ceftriaxone 5 day course  - speech evaluation for swallow study   - palliative care consult     ID  * COVID-19  See notes under multifocal pneumonia          Thank you for your consult. I will sign off. Please contact us if you have any additional questions.     Reba Guillory MD  Pulmonology  [facility] - Intensive Care ([place])

## 2023-07-19 NOTE — CONSULTS
[facility] ([place] [address])  Palliative Medicine  Consult Note    Patient Name: Giselle Lemus  MRN: [id number]  Admission Date: 7/15/2023  Hospital Length of Stay: 3 days  Code Status: Full Code   Attending Provider: Tyrone Sepulveda III,*  Consulting Provider: Bogdan Franco MD  Primary Care Physician: Tl Torres MD  Principal Problem:COVID-19    Patient information was obtained from spouse/SO, relative(s), past medical records, ER records and primary team.      Inpatient consult to Palliative Care  Consult performed by: Vera Segura MD  Consult ordered by: Tyrone Sepulveda III, MD      Assessment/Plan:     Palliative Care  Palliative care encounter    Medicolegal  Decision-making:   -Pt does not have decision-making capacity  -Pt has not appointed a CPOA.  -Marital status:   -Children: None    Advance care planning/Advance directives:  -The patient has not previously engaged in advance care planning  -Pt has no scanned advance directives in [facility]    Psychosocial  Support system: Wife next of destiney is bedbound, they have sitter service as needed. -Spiritual: yes;  Patient is [place] per wife, denomination, will clarify denomination The palliative care will offers  services when appropiate  -Family: Wife's siblings are main support system. Ms Lemus reports her sister helps at home also and Mr Live (brother in law) has been overseeing all hospitalizations matters for both.    Health status prior to this hospitalization    -Functional status: Inconsistent reports,  Patientt was not able to manage ADLs, wheelchair bound mostly, able to take a few steps at home and feed himself with assitance  but unable on the days preceding and during this admission.  -Cognitive status: Known dementia/parkinsons, wife describes waxing and waning mentation.  -QOL:Poor     Prognosis:  -Patient with recurrent hospitalizations and valentina  overall decline, would anticipate shortening in  between hospitalizations.     GOC Discussion with:  Ms. Lemus: Currently hospitalized at [facility], initiated GOC discussed husbands current hospital course and our concerns regarding his anuradha risk of readmission in the short term. She expressed she is  hoping for recovery.  Touched code status and aggressive medical management, she is no ready to make decisions at this moment     brother in law Mr Lincoln Live. His spouse is his next of keen for medical decisions, she has capacity. Currently Mrs. Lemus is also hospitalized at [facility] with respiratory symptoms, covid positive. given her current state of health, Mr. Live expresses he is waiting to hear from the  office to start with the advance care planning documentation for both   and Mrs Lemus.    Active symptoms  -Pain: will occassionally complain of back pain 2/2 to sacral ulcer      Summary/Recommendation:  -Needs MPOA  -Most important goals at this time: curative/life-prolongation (regardless of treatment burdens). still being determined and more discussions are needed  -Code status FULL  -Most appropriate disposition is hospice, although this has not been determined yet or decided by Mr. Lemus.  -We will continue to follow and and with goals of conversation.                           Thank you for your consult. I will follow-up with patient. Please contact us if you have any additional questions.    Subjective:     HPI:        Mr. Lemus is hard of hearing  88 yo mal with medical history of ulcer colitis status post colectomy in 2013, Parkinson's disease, dementia, visual loss, BPH and chronic sacral pressure ulcer.  Most recent medical history is notable for 2 admissions in June 2023 with presumed aspiration pneumonia and worsening mental status from baseline.  He was a the dmitted on July 15 with generalized weakness and diarrhea, found to be COVID-19 positive. Per primary team and bother-in-law he has has had some clinical improvement during since admission. Patient  expressed desire to leave hospital and mentioned not going back to LTAC/SNF, given patients history of dementia, psychiatry was consulted to assess capacity. Patient  was deemed  to not have capacity.     Palliative care has been consulted to initiate goals of conversation in the setting of valentina overall decline leading to recurrent hospitalizations.     Today Mr. Lemus is accompanied in the room by his brother in law Mr Lincoln Live. His spouse is his next of keen for medical decisions, she has capacity. Currently Mrs. Lemus is also hospitalized at [facility] with respiratory symptoms, covid positive.           Hospital Course:  No notes on file        Past Medical History:   Diagnosis Date    BPH (benign prostatic hyperplasia)     Parkinsons 09/2019    R hand tremor starting in 2017, diagnosed Sept 2019 by Dr. Angeles at     Ulcerative colitis     s/p colectomy    Unspecified chronic bronchitis        Past Surgical History:   Procedure Laterality Date    TOTAL COLECTOMY         Review of patient's allergies indicates:   Allergen Reactions    Heparin     Heparin analogues Other (See Comments)     Not true allergy - but patient developed large spontaneous RP hematoma and concurrent severe epistaxis while on DVT prophylactic dose heparin - consider mechanical DVT ppx in future       Medications:  Continuous Infusions:  Scheduled Meds:   ascorbic acid (vitamin C)  500 mg Oral BID    [START ON 7/20/2023] azithromycin  250 mg Oral Daily    azithromycin  500 mg Oral Once    balsam peru-castor oiL   Topical (Top) BID    carbidopa-levodopa  mg  1 tablet Oral QID    cefTRIAXone (ROCEPHIN) IVPB  1 g Intravenous Q24H    finasteride  5 mg Oral Daily    heparin (porcine)  5,000 Units Subcutaneous Q8H    loperamide  2 mg Oral TID    melatonin  6 mg Oral Nightly    multivitamin  1 tablet Oral Daily    mupirocin   Nasal BID    pantoprazole  40 mg Oral Daily     PRN Meds:albuterol **AND** MDI Q6H PRN, artificial tears, dextrose 10%,  dextrose 10%, glucagon (human recombinant), glucose, glucose, hydrOXYzine HCL, polyethylene glycol, sodium chloride 0.9%    Family History    None       Tobacco Use    Smoking status: Never    Smokeless tobacco: Never   Substance and Sexual Activity    Alcohol use: Never    Drug use: Not on file    Sexual activity: Not on file       Review of Systems   Unable to perform ROS: Mental status change   Objective:     Vital Signs (Most Recent):  Temp: 98.5 °F (36.9 °C) (07/19/23 1112)  Pulse: 68 (07/19/23 1138)  Resp: 17 (07/19/23 1112)  BP: (!) 152/69 (07/19/23 1112)  SpO2: (!) 94 % (07/19/23 1112) Vital Signs (24h Range):  Temp:  [97.5 °F (36.4 °C)-98.5 °F (36.9 °C)] 98.5 °F (36.9 °C)  Pulse:  [50-76] 68  Resp:  [16-18] 17  SpO2:  [87 %-97 %] 94 %  BP: (127-205)/(60-82) 152/69     Weight: 47.3 kg (104 lb 4.4 oz)  Body mass index is 16.33 kg/m².       Physical Exam  Vitals and nursing note reviewed.   Constitutional:       General: He is not in acute distress.     Appearance: He is ill-appearing. He is not toxic-appearing.      Comments: Malnourished, frail, does not appear uncomfortable or in active pain   HENT:      Right Ear: External ear normal.      Left Ear: External ear normal.      Ears:      Comments: Per chart hard of hearing and hearing aide malfunctioning     Mouth/Throat:      Pharynx: No oropharyngeal exudate or posterior oropharyngeal erythema.   Cardiovascular:      Rate and Rhythm: Normal rate.      Pulses: Normal pulses.      Heart sounds: Normal heart sounds.   Pulmonary:      Effort: Pulmonary effort is normal.      Breath sounds: No wheezing or rhonchi.   Abdominal:      General: There is no distension.      Palpations: Abdomen is soft.      Tenderness: There is no guarding or rebound.   Musculoskeletal:      Cervical back: No rigidity or tenderness.      Right lower leg: No edema.      Left lower leg: No edema.   Skin:     General: Skin is warm and dry.      Coloration: Skin is pale.      Findings:  Lesion (sacral ulcer) present.   Neurological:      Mental Status: He is alert. He is disoriented.      Comments: Awake but not following commands          Review of Symptoms      Symptom Assessment (ESAS 0-10 Scale)  Pain:  0  Dyspnea:  0  Anxiety:  0  Nausea:  0  Depression:  0  Anorexia:  0  Fatigue:  0  Insomnia:  0  Restlessness:  0  Agitation:  0  Unable to complete assessment due to Dementia         Pain Assessment in Advanced Demential Scale (PAINAD)   Breathing - Independent of vocalization:  0  Negative vocalization:  0  Facial expression:  0  Body language:  0  Consolability:  0  Total:  0    Living Arrangements:  Lives with spouse    Psychosocial/Cultural:   See Palliative Psychosocial Note: Yes  **Primary  to Follow**  Palliative Care  Consult: No    Spiritual:  F - [place] and Belief:  Advent      Advance Care Planning   Advance Directives:   Living Will: No    LaPOST: No    Do Not Resuscitate Status: No    Medical Power of : No      Decision Making:  Patient unable to communicate due to disease severity/cognitive impairment and Family answered questions  Goals of Care: The family endorses that what is most important right now is to focus on spending as much time with wife?    Accordingly, we have decided that the best plan to meet the patient's goals includes continuing with treatment       CBC:   Recent Labs   Lab 07/19/23  0539   WBC 14.05*   HGB 8.3*   HCT 27.0*   MCV 87        BMP:  Recent Labs   Lab 07/19/23  0539   GLU 84      K 4.0      CO2 27   BUN 22   CREATININE 0.9   CALCIUM 8.2*   MG 1.8     LFT:  Lab Results   Component Value Date    AST 18 07/18/2023    ALKPHOS 105 07/18/2023    BILITOT 0.2 07/18/2023     Albumin:   Albumin   Date Value Ref Range Status   07/18/2023 2.1 (L) 3.5 - 5.2 g/dL Final     Protein:   Total Protein   Date Value Ref Range Status   07/18/2023 5.8 (L) 6.0 - 8.4 g/dL Final     Lactic acid:   Lab Results    Component Value Date    LACTATE 1.1 07/15/2023    LACTATE 1.1 06/17/2023       S        I spent a total of 45 minutes on the day of the visit. This includes face to face time in discussion of goals of care, symptom assessment, coordination of care and emotional support.  This also includes non-face to face time preparing to see the patient (eg, review of tests/imaging), obtaining and/or reviewing separately obtained history, documenting clinical information in the electronic or other health record, independently interpreting results and communicating results to the patient/family/caregiver, or care coordinator.    JONG Franco MD  PGY-2 Internal Medicine  Palliative Medicine  [facility] - Intensive Care ([place] [address])

## 2023-07-19 NOTE — HPI
Mr. Lemus is an 88yo M for which pulmonary was consulted for management of loculated L pleural effusion and mucus plugging. Pt has a h/o of dementia, Parkinson's BPH, multiple falls, hearing loss, UC s/p colectomy, chronic sacral wounds. He presented to the ED on 7/15 for progressive fatigue and weakness over 2 days. Pt and his wife tested positive for COVID 2 days prior, his wife was admitted to the hospital. Paxlovid started 7/14. In the ED, afebrile, VSS and satting 98% on RA. WCC wnl, Hb 8.4. CMP wnl. CXR w/o infiltrates. IVF given. Since hospitalization, patient received steroids and remdesivir (3 day course, ended 7/17). 7/17 with new elevated WCC and concern for L sided pneumonia on CXR. CT chest 7/18 showing mucoid impaction in LLL segmental bronchi, a small R pleural effusion and a small L loculated pleural effusion. Bacteriuria on UA, ceftriaxone started. Today, pt is afebrile and HDS. WCC elevated to 14. Brother in law at bedside states patient does not have lung issues at home or a chronic cough, but reports the patient does occasionally cough while eating or drinking.

## 2023-07-20 PROBLEM — J15.9 BACTERIAL PNEUMONIA: Status: ACTIVE | Noted: 2023-01-01

## 2023-07-20 PROBLEM — N39.0 UTI (URINARY TRACT INFECTION): Status: ACTIVE | Noted: 2023-01-01

## 2023-07-20 PROBLEM — J18.9 PNEUMONIA: Status: ACTIVE | Noted: 2023-01-01

## 2023-07-20 NOTE — ASSESSMENT & PLAN NOTE
Hard of hearing  Parkinson's disease  · Patient with waxing and waning mentation.  Some days, he can understand the questions and answers appropriately.  Some days patient more confused and does not seem to answer any questions appropriately.  · Encephalopathy likely related to COVID in the setting of Parkinson's disease with known cognitive decline, UTI, and bacterial pneumonia.  · Resume home Parkinson's medications  · Psychiatry consulted, appreciate assistance.  Patient does not have capacity to make decision to leave hospital against medical advice or to decline admission to SNF or inpatient rehab.

## 2023-07-20 NOTE — PLAN OF CARE
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       Patient is homebound due to:  COVID-19    Allergies:  Review of patient's allergies indicates:   Allergen Reactions    Heparin     Heparin analogues Other (See Comments)     Not true allergy - but patient developed large spontaneous RP hematoma and concurrent severe epistaxis while on DVT prophylactic dose heparin - consider mechanical DVT ppx in future       Vitals:  Routine    Diet: regular diet    Activities:   Activity as tolerated    Goals of Care Treatment Preferences:  Code Status: Full Code      Nursing Precautions:  Aspiration , Fall, and Pressure ulcer prevention    Consults:   PT to evaluate and treat- 5 times a week and OT to evaluate and treat- 5 times a week         Medications: Discontinue all previous medication orders, if any. See new list below.     Medication List        START taking these medications      cefpodoxime 100 MG tablet  Commonly known as: VANTIN  Take 1 tablet (100 mg total) by mouth every 12 (twelve) hours. for 6 days            CONTINUE taking these  medications      artificial tears 0.5 % ophthalmic solution  Commonly known as: ISOPTO TEARS  Place 2 drops into both eyes 4 (four) times daily as needed.     balsam peru-castor oiL Oint  Apply topically 2 (two) times a day. Apply to buttocks     carbidopa-levodopa  mg  mg per tablet  Commonly known as: SINEMET  TAKE 1 TABLET BY MOUTH 4 (FOUR) TIMES A DAY FOR 30 DAYS.     finasteride 5 mg tablet  Commonly known as: PROSCAR  Take 1 tablet (5 mg total) by mouth once daily.     furosemide 20 MG tablet  Commonly known as: LASIX  Take 0.5 tablets (10 mg total) by mouth once daily. As needed for weight gain of 3 lb in 1 day, 5 lb in 1 week, or significant leg edema     melatonin 3 mg tablet  Commonly known as: MELATIN  Take 2 tablets (6 mg total) by mouth nightly.     polyethylene glycol 17 gram Pwpk  Commonly known as: GLYCOLAX  Take 17 g by mouth 2 (two) times daily as needed (constipation).     sodium chloride 0.65 % nasal spray  Commonly known as: OCEAN  1 spray by Nasal route as needed (irritation).     sodium zirconium cyclosilicate 5 gram packet  Commonly known as: Lokelma  Take 1 packet (5 g total) by mouth every Mon, Wed, Fri. Mix entire contents of packet(s) into drinking glass containing 3 tablespoons of water; stir well and drink immediately. Add water and repeat until no powder remains to receive entire dose.            STOP taking these medications      PAXLOVID 300 mg (150 mg x 2)-100 mg copackaged tablets (EUA)  Generic drug: nirmatrelvir-ritonavir                Immunizations Administered as of 7/20/2023       Name Date Dose VIS Date Route Exp Date    COVID-19, MRNA, LN-S, PF (Pfizer) (Purple Cap) 1/31/2021  1:14 PM 0.3 mL 12/12/2020 Intramuscular 5/31/2021    Site: Left deltoid     Given By: Angy Choi RN     : [employer]     Lot: [id number]     COVID-19, MRNA, LN-S, PF (Pfizer) (Purple Cap) 1/10/2021  1:24 PM 0.3 mL 12/12/2020 Intramuscular 4/30/2021    Site: Left deltoid      Given By: Olya Mcfadden, RN     : [employer]     Lot: [id number]               Some patients may experience side effects after vaccination.  These may include fever, headache, muscle or joint aches.  Most symptoms resolve with 24-48 hours and do not require urgent medical evaluation unless they persist for more than 72 hours or symptoms are concerning for an unrelated medical condition.          _________________________________  Escobar Simon MD  07/20/2023

## 2023-07-20 NOTE — PROGRESS NOTES
[facility] - [facility] ([place] [id number])  Palliative Medicine  Progress Note    Patient Name: Giselle Lemus  MRN: [id number]  Admission Date: 7/15/2023  Hospital Length of Stay: 4 days  Code Status: Full Code   Attending Provider: Escobar Simon MD  Consulting Provider: Bogdan Franco MD  Primary Care Physician: Tl Torres MD  Principal Problem:COVID-19    Patient information was obtained from spouse/SO, relative(s), past medical records and primary team.      Assessment/Plan:     Palliative Care  Palliative care encounter    Medicolegal  Decision-making:   -Pt does not have decision-making capacity  -Pt has not appointed a CPOA. Legal process has started.  -Marital status:  for >60 years  -Children: None, strong support from wifes side of the family. Mr Lemus does not have children and rest of his family     Advance care planning/Advance directives:  -The patient has not previously engaged in advance care planning  -Pt has no scanned advance directives in [facility]    Psychosocial  Support system: Wife next of destiney is bedbound, they have sitter service as needed.   -Spiritual: yes;  Patient is Judaism per wife, denomination unclear, The palliative care will offers  services when appropiate  -Family: Wife's siblings are main support system. Ms Lemus reports her sister helps at home also and Mr Live (brother in law) has been overseeing all hospitalizations matters for both.    Health status prior to this hospitalization    -Functional status: Inconsistent reports,  Patientt was not able to manage ADLs, wheelchair bound mostly, able to take a few steps at home and feed himself with assitance  but unable on the days preceding and during this admission. Per PT/OT notes patient was able to ambulate 20 ft with assistance  -Cognitive status: Known dementia/parkinsons, wife describes waxing and waning mentation.  -QOL:Poor     Prognosis:  -Patient with recurrent hospitalizations and valentina  overall  decline, would anticipate shortening in between hospitalizations.     C Discussion with:  Ms. Lemus: Currently hospitalized at [facility], initiated GOC discussed husbands current hospital course and our concerns regarding his anuradha risk of readmission in the short term. She expressed she is  hoping for him, to have a significant recovery (describes this as him being able to feed himself and have moments of sporadic mental clarity0   Touched code status and aggressive medical management, she is no ready to make decisions at this moment, she wishes to discuss further with family.     Mr Lincoln Live (Brother-in-law). His spouse is his next of keen for medical decisions, she has capacity. Currently Mrs. Lemus is also hospitalized at [facility] with respiratory symptoms, covid positive. given her current state of health, Mr. Live expresses he is waiting to hear from the  office to start with the advance care planning documentation for both   and Mrs Lemus. Mr. Live is reasonable and has a very good understanding of the whole clinical picture.     Active symptoms  -Pain: will occassionally complain of back pain 2/2 to sacral ulcer      Summary/Recommendation:  -Needs MPOA  -Most important goals at this time: curative/life-prolongation (regardless of treatment burdens). still being determined and more discussions are needed  -Code status FULL  -Most appropriate disposition is hospice, although as off today Ms. Lemus wants him to go to SNF  -We will continue to follow and and with goals of conversation.  -Mr. Live expressed the possibility of having primary team talk to a doctor family member withh the hopes that this might help them arrive to more definite decision .                           I will follow-up with patient. Please contact us if you have any additional questions.    Subjective:     Chief Complaint:   Chief Complaint   Patient presents with    Fatigue     Pt tested positive for Covid 2 days ago. Pt reports  generalized fatigue       HPI:        Mr. Lemus is hard of hearing  88 yo mal with medical history of ulcer colitis status post colectomy in 2013, Parkinson's disease, dementia, visual loss, BPH and chronic sacral pressure ulcer.  Most recent medical history is notable for 2 admissions in June 2023 with presumed aspiration pneumonia and worsening mental status from baseline.  He was a the dmitted on July 15 with generalized weakness and diarrhea, found to be COVID-19 positive. Per primary team and bother-in-law he has has had some clinical improvement during since admission. Patient expressed desire to leave hospital and mentioned not going back to LTAC/SNF, given patients history of dementia, psychiatry was consulted to assess capacity. Patient  was deemed  to not have capacity.     Palliative care has been consulted to initiate goals of conversation in the setting of valentina overall decline leading to recurrent hospitalizations.     Today Mr. Lemus is accompanied in the room by his brother in law Mr Lincoln Live. His spouse is his next of keen for medical decisions, she has capacity. Currently Mrs. Lemus is also hospitalized at [facility] with respiratory symptoms, covid positive.           Hospital Course:  No notes on file    Interval History: Patient without  significant changes overnight. Met with wife this am, her desires is that he goes to SNF/Rehab, states that she needs more time to discuss with her siblings Mr. Sewell code status and disposition. Reached out to Mr. Live who states legal documentation gathering is ongoing. He expressed current plan is to try to arrange having Mr Lemus go the same facility as his wife, potentially [facility]. Mr. Live also mentioned interest in having primary team discuss case with a doctor family member to explain overall clinical picture in hopes that this will help move forward with disposition and planning.    Medications:  Continuous Infusions:  Scheduled Meds:   ascorbic acid  (vitamin C)  500 mg Oral BID    azithromycin  500 mg Oral Daily    balsam peru-castor oiL   Topical (Top) BID    carbidopa-levodopa  mg  1 tablet Oral QID    cefTRIAXone (ROCEPHIN) IVPB  1 g Intravenous Q24H    finasteride  5 mg Oral Daily    heparin (porcine)  5,000 Units Subcutaneous Q8H    melatonin  6 mg Oral Nightly    multivitamin  1 tablet Oral Daily    mupirocin   Nasal BID    pantoprazole  40 mg Oral Daily     PRN Meds:albuterol **AND** MDI Q6H PRN, artificial tears, dextrose 10%, dextrose 10%, glucagon (human recombinant), glucose, glucose, hydrOXYzine HCL, polyethylene glycol, sodium chloride 0.9%    Objective:     Vital Signs (Most Recent):  Temp: 97.8 °F (36.6 °C) (07/20/23 1049)  Pulse: 65 (07/20/23 1154)  Resp: 17 (07/20/23 1049)  BP: 121/77 (07/20/23 1049)  SpO2: (!) 92 % (07/20/23 1049) Vital Signs (24h Range):  Temp:  [96.2 °F (35.7 °C)-98.9 °F (37.2 °C)] 97.8 °F (36.6 °C)  Pulse:  [60-72] 65  Resp:  [16-18] 17  SpO2:  [87 %-96 %] 92 %  BP: (113-136)/(58-77) 121/77     Weight: 47.3 kg (104 lb 4.4 oz)  Body mass index is 16.33 kg/m².       Physical Exam  Vitals and nursing note reviewed.   Constitutional:       General: He is not in acute distress.     Appearance: He is ill-appearing. He is not toxic-appearing.      Comments: Malnourished, frail, does not appear uncomfortable or in active pain, he is asleep and does not respond to voice commands, extremely difficult to arouse.   HENT:      Right Ear: External ear normal.      Left Ear: External ear normal.      Ears:      Comments: hard of hearing and hearing devices in place but not working.      Mouth/Throat:      Pharynx: No oropharyngeal exudate or posterior oropharyngeal erythema.   Cardiovascular:      Rate and Rhythm: Normal rate.      Pulses: Normal pulses.      Heart sounds: Normal heart sounds.   Pulmonary:      Effort: Pulmonary effort is normal.      Breath sounds: No wheezing or rhonchi.   Abdominal:      General: There  is no distension.      Palpations: Abdomen is soft.      Tenderness: There is no guarding or rebound.   Musculoskeletal:      Cervical back: No rigidity or tenderness.      Right lower leg: No edema.      Left lower leg: No edema.   Skin:     General: Skin is warm and dry.      Coloration: Skin is pale.      Findings: Lesion (sacral ulcer) present.   Neurological:      Mental Status: He is alert.          Review of Symptoms      Symptom Assessment (ESAS 0-10 Scale)  Pain:  0  Dyspnea:  0  Anxiety:  0  Nausea:  0  Depression:  0  Anorexia:  0  Fatigue:  0  Insomnia:  0  Restlessness:  0  Agitation:  0         Living Arrangements:  Lives with spouse    Psychosocial/Cultural:   See Palliative Psychosocial Note: Yes  Lives with wife who is also going through health issues, they a have a good support family system.  **Primary  to Follow**  Palliative Care  Consult: No     Time-Based Charting:  No      Advance Care Planning   Advance Directives:   Living Will: No        Oral Declaration: No    LaPOST: No    Do Not Resuscitate Status: No    Medical Power of : No        Oral Declaration: No      Decision Making:  Family answered questions  Goals of Care: What is most important right now for Mr. Lemus is to focus on trying to spend as much time together and to pursue SNF/Rehab.       Significant Labs:   Recent Lab Results         07/20/23  0454   07/19/23  1845        Procalcitonin 0.24  Comment: A concentration < 0.25 ng/mL represents a low risk of bacterial   infection.  Procalcitonin may not be accurate among patients with localized   infection, recent trauma or major surgery, immunosuppressed state,   invasive fungal infection, renal dysfunction. Decisions regarding   initiation or continuation of antibiotic therapy should not be based   solely on procalcitonin levels.           Anion Gap 6         Baso # 0.02         Basophil % 0.2         BUN 24         Calcium 8.1         Chloride 107          CO2 26         Creatinine 0.9         D-Dimer   0.52  Comment: The quantitative D-dimer assay should be used as an aid in   the diagnosis of deep vein thrombosis and pulmonary embolism  in patients with the appropriate presentation and clinical  history. The upper limit of the reference interval and the clinical   cut off   point are identical. Causes of a positive (>0.50 mg/L FEU) D-Dimer   test  include, but are not limited to: DVT, PE, DIC, thrombolytic   therapy, anticoagulant therapy, recent surgery, trauma, or   pregnancy, disseminated malignancy, aortic aneurysm, cirrhosis,  and severe infection. False negative results may occur in   patients with distal DVT.         Differential Method Automated         eGFR >60.0         Eos # 0.1         Eosinophil % 0.5         Glucose 96         Gran # (ANC) 10.2         Gran % 80.7         Hematocrit 26.0         Hemoglobin 8.1         Immature Grans (Abs) 0.06  Comment: Mild elevation in immature granulocytes is non specific and   can be seen in a variety of conditions including stress response,   acute inflammation, trauma and pregnancy. Correlation with other   laboratory and clinical findings is essential.           Immature Granulocytes 0.5         Lymph # 1.1         Lymph % 8.4         Magnesium 1.7         MCH 27.2         MCHC 31.2         MCV 87         Mono # 1.2         Mono % 9.7         MPV 10.4         nRBC 0         Platelets 224         Potassium 4.3         RBC 2.98         RDW 22.5         Sodium 139         WBC 12.57               CBC:   Recent Labs   Lab 07/20/23  0454   WBC 12.57   HGB 8.1*   HCT 26.0*   MCV 87        BMP:  Recent Labs   Lab 07/20/23  0454   GLU 96      K 4.3      CO2 26   BUN 24*   CREATININE 0.9   CALCIUM 8.1*   MG 1.7     LFT:  Lab Results   Component Value Date    AST 18 07/18/2023    ALKPHOS 105 07/18/2023    BILITOT 0.2 07/18/2023     Albumin:   Albumin   Date Value Ref Range Status   07/18/2023 2.1  (L) 3.5 - 5.2 g/dL Final     Protein:   Total Protein   Date Value Ref Range Status   07/18/2023 5.8 (L) 6.0 - 8.4 g/dL Final     Lactic acid:   Lab Results   Component Value Date    LACTATE 1.1 07/15/2023    LACTATE 1.1 06/17/2023       Significant Imaging: I have reviewed all pertinent imaging results/findings within the past 24 hours.      Bogdan Franco MD   PGY-2 Internal Medicine   Palliative Medicine  [facility] - Intensive Care ([place] [id number])

## 2023-07-20 NOTE — ASSESSMENT & PLAN NOTE
· Discussed at length with patient's wife who was unable to make a decision.  She deferred that decision to her brother-in-law, Lincoln.  Lincoln says that over the years he has had this discussion with the patient who has expressed desire to be full code and to let the family make any decisions if it does not look like there will be any meaningful recovery.

## 2023-07-20 NOTE — ASSESSMENT & PLAN NOTE
· PT/OT  · Fall precaution  · Discussed at length with wife and brother-in-law, plan for discharge to skilled nursing facility versus inpatient rehab.  Wife is in hospital and brother is not able to care for patient 24/7.  They do have home caretakers but they are also not there 24/7.

## 2023-07-20 NOTE — SUBJECTIVE & OBJECTIVE
Interval History:  Patient seen and examined with brother-in-law at bedside.  Patient sitting in bed with eyes open.  Not responding as well today.  Does not appear to be in any distress.      Labs reviewed:  WBC increased to 14.05 from 11.79, sodium 138, creatinine 0.9    Discussed with pulmonology, recommend community-acquired pneumonia treatment and SLP as well as palliative Medicine consult.    Review of Systems:  As above, difficult to obtain due to patient hard of hearing and altered mentation.  Objective:     Vital Signs (Most Recent):  Temp: 97.9 °F (36.6 °C) (07/20/23 0002)  Pulse: 68 (07/20/23 0002)  Resp: 16 (07/20/23 0002)  BP: (!) 124/58 (07/20/23 0002)  SpO2: 96 % (07/20/23 0002) Vital Signs (24h Range):  Temp:  [96.2 °F (35.7 °C)-98.5 °F (36.9 °C)] 97.9 °F (36.6 °C)  Pulse:  [50-72] 68  Resp:  [16-18] 16  SpO2:  [87 %-96 %] 96 %  BP: (113-165)/(58-69) 124/58     Weight: 47.3 kg (104 lb 4.4 oz)  Body mass index is 16.33 kg/m².    Intake/Output Summary (Last 24 hours) at 7/20/2023 0116  Last data filed at 7/19/2023 1825  Gross per 24 hour   Intake 960 ml   Output --   Net 960 ml        Physical Exam  Constitutional:       General: He is not in acute distress.     Appearance: He is not toxic-appearing.      Comments: Cachectic   HENT:      Mouth/Throat:      Pharynx: No oropharyngeal exudate or posterior oropharyngeal erythema.   Cardiovascular:      Rate and Rhythm: Normal rate.      Pulses: Normal pulses.      Heart sounds: Normal heart sounds.   Pulmonary:      Effort: Pulmonary effort is normal.      Breath sounds: Normal breath sounds. No wheezing or rhonchi.   Abdominal:      General: There is no distension.      Palpations: Abdomen is soft.      Tenderness: There is no guarding or rebound.   Musculoskeletal:      Cervical back: No rigidity or tenderness.   Skin:     General: Skin is warm and dry.      Coloration: Skin is pale.   Neurological:      Mental Status: He is alert. He is disoriented.       Comments: Mentation waxes and wanes   Psychiatric:      Comments: Awake, extremely hard of hearing

## 2023-07-20 NOTE — SUBJECTIVE & OBJECTIVE
Interval History: Patient is very hard of hearing. Urine culture growing Klebsiella oxytoca. Consulted speech. Recommend he continue with a regular diet and thin liquids at this time. Recommend meds with puree and small, single sips to reduce risk for aspiration. Palliative care following. SNF when medically ready.     Review of Systems  Objective:     Vital Signs (Most Recent):  Temp: 97.8 °F (36.6 °C) (07/20/23 1049)  Pulse: 65 (07/20/23 1154)  Resp: 17 (07/20/23 1049)  BP: 121/77 (07/20/23 1049)  SpO2: (!) 92 % (07/20/23 1049) Vital Signs (24h Range):  Temp:  [96.2 °F (35.7 °C)-98.9 °F (37.2 °C)] 97.8 °F (36.6 °C)  Pulse:  [60-72] 65  Resp:  [16-18] 17  SpO2:  [87 %-96 %] 92 %  BP: (113-136)/(58-77) 121/77     Weight: 47.3 kg (104 lb 4.4 oz)  Body mass index is 16.33 kg/m².    Intake/Output Summary (Last 24 hours) at 7/20/2023 1427  Last data filed at 7/20/2023 1129  Gross per 24 hour   Intake 960 ml   Output --   Net 960 ml         Physical Exam  Constitutional:       General: He is not in acute distress.     Appearance: He is not toxic-appearing.      Comments: Cachectic  extremely hard of hearing   HENT:      Mouth/Throat:      Pharynx: No oropharyngeal exudate or posterior oropharyngeal erythema.   Cardiovascular:      Rate and Rhythm: Normal rate.      Pulses: Normal pulses.      Heart sounds: Normal heart sounds.   Pulmonary:      Effort: Pulmonary effort is normal.      Breath sounds: Normal breath sounds. No wheezing or rhonchi.   Abdominal:      General: There is no distension.      Palpations: Abdomen is soft.      Tenderness: There is no guarding or rebound.   Musculoskeletal:      Cervical back: No rigidity or tenderness.   Skin:     General: Skin is warm and dry.      Coloration: Skin is pale.   Neurological:      Mental Status: He is alert. He is disoriented.      Comments: Mentation waxes and wanes

## 2023-07-20 NOTE — PLAN OF CARE
07/20/23 1410   Post-Acute Status   Post-Acute Authorization Placement   Post-Acute Placement Status Pending post-acute provider review/more information requested  ([facility])   Discharge Plan   Discharge Plan A Skilled Nursing Facility   Discharge Plan B Inpatient Hospice       SW phoned Bisi ([facility] admit coordinator) regarding patient admitting with SNF. Per Bisi, [facility] able to accept patient with SNF pending review of SNF orders and additional requested documentation. SW notified patient's family that [facility] able to admit patient w/ SNF and family agreeable to D/c plan. SW will continue to follow.              JONAH Selby, LMSW  [facility]  Case Management  Ext. [phone]

## 2023-07-20 NOTE — PROGRESS NOTES
[facility] - Int[facility] Care ([address] Medicine  Progress Note    Patient Name: Giselle Lemus  MRN: [id number]  Patient Class: IP- Inpatient   Admission Date: 7/15/2023  Length of Stay: 4 days  Attending Physician: Tyrone Sepulveda III,*  Primary Care Provider: Tl Torres MD        Subjective:     Principal Problem:COVID-19        HPI:  Patient is a pleasantly demented 89 year old male with pmh significant for parkinson's disease, frequent falls, hard of hearing, BPH presents to our ED secondary to weakness.  Apparently patient have been having diarrhea since Wednesday of this week associated with weakness.  His wife is admitted to our hospital 2 days ago for covid 19 and is recovering.  Per brother in-law at bedside, patient has continuous home health care. He tested positive at home for COVID-19 2 days ago as well.  He was started on Paxlovid yesterday.  His home health nurse reports that patient typically ambulates with assistance and a walker.  Since yesterday, patient has been more fatigued and having difficulty ambulating.  He has been using a wheelchair.  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 No reported fever or chills.  He continues to have diarrhea.      In the ED chest xray was without infiltrates.  Patient is on room air with good saturation.  In no acute distress.  Very difficult of hearing.  No complaints other than generalized weakness.  No recent increase in medications.        Overview/Hospital Course:  89-year-old male with Parkinson's and cognitive decline with waxing and waning mentation, extremely hard of hearing, decreased vision, frequent falls, sinus bradycardia with RBBB admitted to hospitalist service for further evaluation and treatment of generalized weakness.  Diagnosed with COVID, left lower lobe bacterial pneumonia, and urinary tract infection.  This is the patient's 4th admission since 05/18/2023.  COVID symptoms  mild, patient on room air so per guidelines was not started on steroids.  He is s/p 3 doses of remdesivir which is sufficient for individuals with mild COVID systems at risk for severe disease. Additionally patient has chronic bradycardia and remdesivir seemed to be exacerbating this.  Attempted meaningful discussion with the patient regarding code status and DVT prophylaxis given history of bleeding.  Patient unable to demonstrate understanding of his current medical condition.  Patient also attempted to get out of bed and leave the hospital several times.  Psychiatry consulted and agreed that the patient did not have capacity to leave the hospital AMA or decline admission to SNF if healthcare surrogate thought it was the appropriate decision for him.  Patient at high risk for clots given decreased mobility and COVID.  He does have a history of severe retroperitoneal hemorrhage on DVT prophylaxis.  Discussed this with wife and brother-in-law who said that the patient had received heparin products prior to that event with no problems.  Family made decision to continue with DVT prophylaxis.  Urinalysis collected on admission, culture positive for Klebsiella oxytoca sensitive to Rocephin.  Patient with intermittent leukocytosis throughout admission with episodes of O2 desaturation down to 87%.  CT chest obtained with small left lower lobe loculated effusion, persistent left lower lobe patchy consolidation areas.  Pulmonology consulted and recommended treatment for CPAP.  Azithromycin added to Rocephin.  Patient's wife was contacted to discuss code status and she became tearful and deferred decision to her brother who is the patient's brother-in-law, Lincoln.  Lincoln has discussed this with the patient who has expressed desire for full code.  Additionally, discussed palliative medicine and hospice with Lincoln and palliative Medicine been consulted.      Interval History:  Patient seen and examined with brother-in-law at  bedside.  Patient sitting in bed with eyes open.  Not responding as well today.  Does not appear to be in any distress.      Labs reviewed:  WBC increased to 14.05 from 11.79, sodium 138, creatinine 0.9    Discussed with pulmonology, recommend community-acquired pneumonia treatment and SLP as well as palliative Medicine consult.    Review of Systems:  As above, difficult to obtain due to patient hard of hearing and altered mentation.  Objective:     Vital Signs (Most Recent):  Temp: 97.9 °F (36.6 °C) (07/20/23 0002)  Pulse: 68 (07/20/23 0002)  Resp: 16 (07/20/23 0002)  BP: (!) 124/58 (07/20/23 0002)  SpO2: 96 % (07/20/23 0002) Vital Signs (24h Range):  Temp:  [96.2 °F (35.7 °C)-98.5 °F (36.9 °C)] 97.9 °F (36.6 °C)  Pulse:  [50-72] 68  Resp:  [16-18] 16  SpO2:  [87 %-96 %] 96 %  BP: (113-165)/(58-69) 124/58     Weight: 47.3 kg (104 lb 4.4 oz)  Body mass index is 16.33 kg/m².    Intake/Output Summary (Last 24 hours) at 7/20/2023 0116  Last data filed at 7/19/2023 1825  Gross per 24 hour   Intake 960 ml   Output --   Net 960 ml        Physical Exam  Constitutional:       General: He is not in acute distress.     Appearance: He is not toxic-appearing.      Comments: Cachectic   HENT:      Mouth/Throat:      Pharynx: No oropharyngeal exudate or posterior oropharyngeal erythema.   Cardiovascular:      Rate and Rhythm: Normal rate.      Pulses: Normal pulses.      Heart sounds: Normal heart sounds.   Pulmonary:      Effort: Pulmonary effort is normal.      Breath sounds: Normal breath sounds. No wheezing or rhonchi.   Abdominal:      General: There is no distension.      Palpations: Abdomen is soft.      Tenderness: There is no guarding or rebound.   Musculoskeletal:      Cervical back: No rigidity or tenderness.   Skin:     General: Skin is warm and dry.      Coloration: Skin is pale.   Neurological:      Mental Status: He is alert. He is disoriented.      Comments: Mentation waxes and wanes   Psychiatric:      Comments:  Awake, extremely hard of hearing         Assessment/Plan:      * COVID-19  · Patient is on room air, per guidelines does not need steroids  · S/p remdesivir x3 days, last day 7/17.  Per guidelines, 3 days remdesivir is sufficient for administration for individuals with mild COVID symptoms at risk for severe disease.  Additionally, patient has known chronic bradycardia with known right BBB.  Remdesivir with known side-effect of bradycardia.  · Have hx of RPH on DVT ppx heparin, however high risk for DVT/PE, will not start full dose lovenox.  Tried to discuss with the patient but he was unable to hear and comprehend the question.  Although, when patient does understand the question, he responds appropriately.  I contacted the patient's wife and brother-in-law and discussed risk of bleeding versus risk for clot for approximately 25 minutes.  Ultimately, wife decided to proceed with heparin understanding the risk of bleeding given prior significant hemorrhage.      Acute metabolic encephalopathy  Hard of hearing  Parkinson's disease  · Patient with waxing and waning mentation.  Some days, he can understand the questions and answers appropriately.  Some days patient more confused and does not seem to answer any questions appropriately.  · Encephalopathy likely related to COVID in the setting of Parkinson's disease with known cognitive decline, UTI, and bacterial pneumonia.  · Resume home Parkinson's medications  · Psychiatry consulted, appreciate assistance.  Patient does not have capacity to make decision to leave hospital against medical advice or to decline admission to SNF or inpatient rehab.    Leukocytosis  UTI  Bacterial pneumonia  · Intermittent leukocytosis, urine culture Klebsiella oxytoca, CT chest consistent with left lower lobe bacterial pneumonia  · Started on Rocephin and azithromycin  · Pulmonary consulted and recommend treatment for CAP, SLP and palliative consults    Full code status  · Discussed at  length with patient's wife who was unable to make a decision.  She deferred that decision to her brother-in-law, Lincoln.  Lincoln says that over the years he has had this discussion with the patient who has expressed desire to be full code and to let the family make any decisions if it does not look like there will be any meaningful recovery.      Chronic anemia  · Stable, no sign of bleeding      Pressure injury of skin of buttock  Pressure injury of scrotum  · Present on admission  · Wound care      Multiple falls  · PT/OT  · Fall precaution  · Discussed at length with wife and brother-in-law, plan for discharge to skilled nursing facility versus inpatient rehab.  Wife is in hospital and brother is not able to care for patient 24/7.  They do have home caretakers but they are also not there 24/7.        Benign prostatic hyperplasia  · Resume home medications        VTE Risk Mitigation (From admission, onward)         Ordered     heparin (porcine) injection 5,000 Units  Every 8 hours         07/16/23 1819     Place sequential compression device  Until discontinued         07/16/23 0651                Discharge Planning   JOHAN: 7/21/2023     Code Status: Full Code   Is the patient medically ready for discharge?: No    Reason for patient still in hospital (select all that apply): Patient trending condition, Laboratory test, Treatment, Consult recommendations, PT / OT recommendations and Pending disposition  Discharge Plan A: Rehab                  Tyrone Sepulveda III, MD  Department of Hospital Medicine   [facility] - Intensive Care ([place])

## 2023-07-20 NOTE — PT/OT/SLP EVAL
"Speech Language Pathology Evaluation  Bedside Swallow/ Discharge Summary     Patient Name:  Giselle Lemus   MRN:  [id number]  Admitting Diagnosis: COVID-19    Recommendations:                 General Recommendations:  Follow-up not indicated  Diet recommendations:  Regular Diet - IDDSI Level 7, Thin liquids - IDDSI Level 0   Aspiration Precautions: 1 bite/sip at a time, Assistance with meals, Feed only when awake/alert, HOB to 90 degrees, Meds whole buried in puree, Meds whole 1 at a time, Small bites/sips, and Strict aspiration precautions   General Precautions: Standard, fall, droplet, contact, airborne, deaf  Communication strategies:   hard of hearing     Assessment:     Giselle Lemus is a 89 y.o. male with an SLP diagnosis of  largely functional swallow .  No acute ST needs.    History:     Past Medical History:   Diagnosis Date    BPH (benign prostatic hyperplasia)     Parkinsons 09/2019    R hand tremor starting in 2017, diagnosed Sept 2019 by Dr. Angeles at     Ulcerative colitis     s/p colectomy    Unspecified chronic bronchitis      Past Surgical History:   Procedure Laterality Date    TOTAL COLECTOMY       Principal Problem:COVID-19     HPI "Patient is a pleasantly demented 89 year old male with pmh significant for parkinson's disease, frequent falls, hard of hearing, BPH presents to our ED secondary to weakness.  Apparently patient have been having diarrhea since Wednesday of this week associated with weakness.  His wife is admitted to our hospital 2 days ago for covid 19 and is recovering.  Per brother in-law at bedside, patient has continuous home health care. He tested positive at home for COVID-19 2 days ago as well.  He was started on Paxlovid yesterday.  His home health nurse reports that patient typically ambulates with assistance and a walker.  Since yesterday, patient has been more fatigued and having difficulty ambulating.  He has been using a wheelchair.  Patient is reporting perianal pain where " "he has chronic stage I and stage II sacral wounds. No reported fever or chills.  He continues to have diarrhea."    Prior Intubation HX:  n/a    Modified Barium Swallow: none    Chest X-Rays: 7/7:"Possible left-sided pneumonia with small pleural effusion."    Prior diet: regular/thin    Subjective     Spoke with RN prior to session. Pt awake/alert sitting upright in bed, agreeable to ST. RN reports pt tolerated regular meal tray and thin liquids without any overt coughing/choking.    Pain/Comfort:  Pain Rating 1:  (Not rated)  Location 1: coccyx  Pain Addressed 1: Reposition, Distraction    Respiratory Status: Room air    Objective:     Oral Musculature Evaluation  Oral Musculature: general weakness, WFL  Dentition: present and adequate  Secretion Management: adequate  Mucosal Quality: adequate  Mandibular Strength and Mobility: WFL  Oral Labial Strength and Mobility: WFL  Lingual Strength and Mobility: WFL  Volitional Cough: present, congested, productive  Volitional Swallow: present  Voice Prior to PO Intake: clear    Bedside Swallow Eval:   Consistencies Assessed:  Thin liquids 3 oz from open cup and straw- single and consecutive   Nectar thick liquids 2 oz via cup and straw- single and consecutive   Solids potato from meal tray x1      Oral Phase:   WFL    Pharyngeal Phase:   Occasional repeated/double swallows  no overt clinical signs/symptoms of aspiration  no overt clinical signs/symptoms of pharyngeal dysphagia    Compensatory Strategies  None    Treatment: Provided 1:1 assist. Pt tolerated all consistencies without an overt coughing or choking. He demonstrated increased congestion with consecutive sips of thin liquids, though able to demonstrate productive cough when cued. He tolerated small, single sips from cup and straw without any congestion or overt s/sx of airway compromise. Recommend he continue with a regular diet and thin liquids at this time, meds with puree and strict aspiration precautions. " Education provided re: role of SLP, diet recs, swallow precs, s/s aspiration and POC.  Pt's sin in law verbalized understanding and was in agreement.     Plan:     Plan of Care reviewed with:  patient, family   SLP Follow-Up:  No       Discharge recommendations:  nursing facility, skilled   Barriers to Discharge:  None    Time Tracking:     SLP Treatment Date:   07/20/23  Speech Start Time:  1315  Speech Stop Time:  1327     Speech Total Time (min):  12 min    Billable Minutes: Eval Swallow and Oral Function 12 07/20/2023

## 2023-07-20 NOTE — PLAN OF CARE
[facility] - Intensive Care ([facility])  Discharge Reassessment    Primary Care Provider: Tl Torres MD    Expected Discharge Date: 7/21/2023    Reassessment (most recent)       Discharge Reassessment - 07/20/23 1358          Discharge Reassessment    Assessment Type Discharge Planning Reassessment (P)      Did the patient's condition or plan change since previous assessment? Yes (P)      Discharge Plan discussed with: Sibling;Spouse/sig other (P)      Name(s) and Number(s) Allan Crocker (Spouse)   [phone]. Lincoln Live (Brother-in-Law) [phone] (P)      Communicated JOHAN with patient/caregiver Yes (P)      Discharge Plan A Inpatient Hospice (P)      Discharge Plan B Skilled Nursing Facility (P)      DME Needed Upon Discharge  none (P)      Transition of Care Barriers None (P)      Why the patient remains in the hospital Requires continued medical care (P)         Post-Acute Status    Post-Acute Authorization Placement (P)      Post-Acute Placement Status Discharge Plan Changed (P)                    SW spoke with patient's spouse Lupe Lemus via phone regarding patient's placement status. SW notified lupe that patient has been accepted to [facility] and [facility] SNF and patient's spouse is agreeable to [facility] placement.        SW spoke with patient's brother-in-law, Lincoln at bedside to review D/c plan. Lincoln informed SW of palliative care consult and expressed that he and family are undecided on patient admitting to [facility] with hospice care or SNF. CRISTINA educated patient's brother in law on SNF vs. Hospice using teachback method. Lincoln expressed that he is in agreement with patient's spouse to admit patient to [facility] w/ SNF; will explore hospice care based on patient's progress at SNF. CRISTINA informed patient's spouse and brother in law of next steps regarding SNF placement at [facility] and is agreeable to D/c plan. CRISTINA will continue to follow.              JONAH Selby,  LMSW  [facility]  Case Management  Ext. [phone]

## 2023-07-20 NOTE — HOSPITAL COURSE
89-year-old male with Parkinson's and cognitive decline with waxing and waning mentation, extremely hard of hearing, decreased vision, frequent falls, sinus bradycardia with RBBB admitted to hospitalist service for further evaluation and treatment of generalized weakness.  Diagnosed with COVID, left lower lobe bacterial pneumonia, and urinary tract infection.  This is the patient's 4th admission since 05/18/2023.  COVID symptoms mild, patient on room air so per guidelines was not started on steroids.  He is s/p 3 doses of remdesivir which is sufficient for individuals with mild COVID systems at risk for severe disease. Additionally patient has chronic bradycardia and remdesivir seemed to be exacerbating this.  Attempted meaningful discussion with the patient regarding code status and DVT prophylaxis given history of bleeding.  Patient unable to demonstrate understanding of his current medical condition.  Patient also attempted to get out of bed and leave the hospital several times.  Psychiatry consulted and agreed that the patient did not have capacity to leave the hospital AMA or decline admission to SNF if healthcare surrogate thought it was the appropriate decision for him.  Patient at high risk for clots given decreased mobility and COVID.  He does have a history of severe retroperitoneal hemorrhage on DVT prophylaxis.  Discussed this with wife and brother-in-law who said that the patient had received heparin products prior to that event with no problems.  Family made decision to continue with DVT prophylaxis.  Urinalysis collected on admission, culture positive for Klebsiella oxytoca sensitive to Rocephin.  Patient with intermittent leukocytosis throughout admission with episodes of O2 desaturation down to 87%.  CT chest obtained with small left lower lobe loculated effusion, persistent left lower lobe patchy consolidation areas.  Pulmonology consulted and recommended treatment for CPAP.  Azithromycin added to  Lakhwinder.  Patient's wife was contacted to discuss code status and she became tearful and deferred decision to her brother who is the patient's brother-in-law, Lincoln.  Lincoln has discussed this with the patient who has expressed desire for full code.  Additionally, discussed palliative medicine and hospice with Lincoln and palliative Medicine been consulted. Patient remains full code. Pulmonary consulted and recommend treatment for CAP, SLP consult, CPT. FU CT chest in 6 weeks for resolution of PNA. Speech cleared for regular diet. Patient and family agreeable to go to SNF. Patient is currently medically and HDS. He is being d/c to SNF. FU with PCP, pulm.  Plan of care discussed with patient, verbalized understanding. All questions were answered.

## 2023-07-20 NOTE — ASSESSMENT & PLAN NOTE
UTI  Bacterial pneumonia  · Intermittent leukocytosis, urine culture Klebsiella oxytoca, CT chest consistent with left lower lobe bacterial pneumonia  · Started on Rocephin and azithromycin  · Pulmonary consulted and recommend treatment for CAP, SLP and palliative consults

## 2023-07-20 NOTE — PROGRESS NOTES
[facility] - Intensive Care ([address] Medicine  Progress Note    Patient Name: Giselle Lemus  MRN: [id number]  Patient Class: IP- Inpatient   Admission Date: 7/15/2023  Length of Stay: 4 days  Attending Physician: Escobar Simon MD  Primary Care Provider: Tl Torres MD        Subjective:     Principal Problem:COVID-19        HPI:  Patient is a pleasantly demented 89 year old male with pmh significant for parkinson's disease, frequent falls, hard of hearing, BPH presents to our ED secondary to weakness.  Apparently patient have been having diarrhea since Wednesday of this week associated with weakness.  His wife is admitted to our hospital 2 days ago for covid 19 and is recovering.  Per brother in-law at bedside, patient has continuous home health care. He tested positive at home for COVID-19 2 days ago as well.  He was started on Paxlovid yesterday.  His home health nurse reports that patient typically ambulates with assistance and a walker.  Since yesterday, patient has been more fatigued and having difficulty ambulating.  He has been using a wheelchair.  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 No reported fever or chills.  He continues to have diarrhea.      In the ED chest xray was without infiltrates.  Patient is on room air with good saturation.  In no acute distress.  Very difficult of hearing.  No complaints other than generalized weakness.  No recent increase in medications.        Overview/Hospital Course:  89-year-old male with Parkinson's and cognitive decline with waxing and waning mentation, extremely hard of hearing, decreased vision, frequent falls, sinus bradycardia with RBBB admitted to hospitalist service for further evaluation and treatment of generalized weakness.  Diagnosed with COVID, left lower lobe bacterial pneumonia, and urinary tract infection.  This is the patient's 4th admission since 05/18/2023.  COVID symptoms mild,  patient on room air so per guidelines was not started on steroids.  He is s/p 3 doses of remdesivir which is sufficient for individuals with mild COVID systems at risk for severe disease. Additionally patient has chronic bradycardia and remdesivir seemed to be exacerbating this.  Attempted meaningful discussion with the patient regarding code status and DVT prophylaxis given history of bleeding.  Patient unable to demonstrate understanding of his current medical condition.  Patient also attempted to get out of bed and leave the hospital several times.  Psychiatry consulted and agreed that the patient did not have capacity to leave the hospital AMA or decline admission to SNF if healthcare surrogate thought it was the appropriate decision for him.  Patient at high risk for clots given decreased mobility and COVID.  He does have a history of severe retroperitoneal hemorrhage on DVT prophylaxis.  Discussed this with wife and brother-in-law who said that the patient had received heparin products prior to that event with no problems.  Family made decision to continue with DVT prophylaxis.  Urinalysis collected on admission, culture positive for Klebsiella oxytoca sensitive to Rocephin.  Patient with intermittent leukocytosis throughout admission with episodes of O2 desaturation down to 87%.  CT chest obtained with small left lower lobe loculated effusion, persistent left lower lobe patchy consolidation areas.  Pulmonology consulted and recommended treatment for CPAP.  Azithromycin added to Rocephin.  Patient's wife was contacted to discuss code status and she became tearful and deferred decision to her brother who is the patient's brother-in-law, Lincoln.  Lincoln has discussed this with the patient who has expressed desire for full code.  Additionally, discussed palliative medicine and hospice with Lincoln and palliative Medicine been consulted.      Interval History: Patient is very hard of hearing. Urine culture growing  Klebsiella oxytoca. Consulted speech. Recommend he continue with a regular diet and thin liquids at this time. Recommend meds with puree and small, single sips to reduce risk for aspiration. Palliative care following. SNF when medically ready.     Review of Systems  Objective:     Vital Signs (Most Recent):  Temp: 97.8 °F (36.6 °C) (07/20/23 1049)  Pulse: 65 (07/20/23 1154)  Resp: 17 (07/20/23 1049)  BP: 121/77 (07/20/23 1049)  SpO2: (!) 92 % (07/20/23 1049) Vital Signs (24h Range):  Temp:  [96.2 °F (35.7 °C)-98.9 °F (37.2 °C)] 97.8 °F (36.6 °C)  Pulse:  [60-72] 65  Resp:  [16-18] 17  SpO2:  [87 %-96 %] 92 %  BP: (113-136)/(58-77) 121/77     Weight: 47.3 kg (104 lb 4.4 oz)  Body mass index is 16.33 kg/m².    Intake/Output Summary (Last 24 hours) at 7/20/2023 1427  Last data filed at 7/20/2023 1129  Gross per 24 hour   Intake 960 ml   Output --   Net 960 ml         Physical Exam  Constitutional:       General: He is not in acute distress.     Appearance: He is not toxic-appearing.      Comments: Cachectic  extremely hard of hearing   HENT:      Mouth/Throat:      Pharynx: No oropharyngeal exudate or posterior oropharyngeal erythema.   Cardiovascular:      Rate and Rhythm: Normal rate.      Pulses: Normal pulses.      Heart sounds: Normal heart sounds.   Pulmonary:      Effort: Pulmonary effort is normal.      Breath sounds: Normal breath sounds. No wheezing or rhonchi.   Abdominal:      General: There is no distension.      Palpations: Abdomen is soft.      Tenderness: There is no guarding or rebound.   Musculoskeletal:      Cervical back: No rigidity or tenderness.   Skin:     General: Skin is warm and dry.      Coloration: Skin is pale.   Neurological:      Mental Status: He is alert. He is disoriented.      Comments: Mentation waxes and wanes             Assessment/Plan:      * COVID-19  · Patient is on room air, per guidelines does not need steroids  · S/p remdesivir x3 days, last day 7/17.  Per guidelines, 3  days remdesivir is sufficient for administration for individuals with mild COVID symptoms at risk for severe disease.  Additionally, patient has known chronic bradycardia with known right BBB.  Remdesivir with known side-effect of bradycardia.  · Have hx of RPH on DVT ppx heparin, however high risk for DVT/PE, will not start full dose lovenox.  Tried to discuss with the patient but he was unable to hear and comprehend the question.  Although, when patient does understand the question, he responds appropriately.  I contacted the patient's wife and brother-in-law and discussed risk of bleeding versus risk for clot for approximately 25 minutes.  Ultimately, wife decided to proceed with heparin understanding the risk of bleeding given prior significant hemorrhage.      Leukocytosis  UTI  Bacterial pneumonia  · Intermittent leukocytosis, urine culture Klebsiella oxytoca, CT chest consistent with left lower lobe bacterial pneumonia  · Started on Rocephin and azithromycin  · Pulmonary consulted and recommend treatment for CAP, SLP and palliative consults  · CPT  · FU CT chest in 6 weeks for resolution of PNA     Full code status  · Discussed at length with patient's wife who was unable to make a decision.  She deferred that decision to her brother-in-law, Lincoln.  Lincoln says that over the years he has had this discussion with the patient who has expressed desire to be full code and to let the family make any decisions if it does not look like there will be any meaningful recovery.      Chronic anemia  · Stable, no sign of bleeding      Pressure injury of skin of buttock  Pressure injury of scrotum  · Present on admission  · Wound care      Multiple falls  · PT/OT  · Fall precaution  · Discussed at length with wife and brother-in-law, plan for discharge to skilled nursing facility versus inpatient rehab.  Wife is in hospital and brother is not able to care for patient 24/7.  They do have home caretakers but they are also  not there 24/7.        Acute metabolic encephalopathy  Hard of hearing  Parkinson's disease  · Patient with waxing and waning mentation.  Some days, he can understand the questions and answers appropriately.  Some days patient more confused and does not seem to answer any questions appropriately.  · Encephalopathy likely related to COVID in the setting of Parkinson's disease with known cognitive decline, UTI, and bacterial pneumonia.  · Resume home Parkinson's medications  · Psychiatry consulted, appreciate assistance.  Patient does not have capacity to make decision to leave hospital against medical advice or to decline admission to SNF or inpatient rehab.    Benign prostatic hyperplasia  · Resume home medications        VTE Risk Mitigation (From admission, onward)         Ordered     heparin (porcine) injection 5,000 Units  Every 8 hours         07/16/23 1819     Place sequential compression device  Until discontinued         07/16/23 0651                Discharge Planning   JOHAN: 7/21/2023     Code Status: Full Code   Is the patient medically ready for discharge?: No    Reason for patient still in hospital (select all that apply): Patient trending condition  Discharge Plan A: Skilled Nursing Facility                  Escobar Simon MD  Department of Hospital Medicine   [facility] - Intensive Care ([address])

## 2023-07-20 NOTE — ASSESSMENT & PLAN NOTE
Medicolegal  Decision-making:   -Pt does not have decision-making capacity  -Pt has not appointed a CPOA. Legal process has started.  -Marital status:  for >60 years  -Children: None, strong support from wifes side of the family. Mr Lemus does not have children and rest of his family     Advance care planning/Advance directives:  -The patient has not previously engaged in advance care planning  -Pt has no scanned advance directives in [facility]    Psychosocial  Support system: Wife next of destiney is bedbound, they have sitter service as needed.   -Spiritual: yes;  Patient is Alevism per wife, denomination unclear, The palliative care will offers  services when appropiate  -Family: Wife's siblings are main support system. Ms Lemus reports her sister helps at home also and Mr Live (brother in law) has been overseeing all hospitalizations matters for both.    Health status prior to this hospitalization    -Functional status: Inconsistent reports,  Patientt was not able to manage ADLs, wheelchair bound mostly, able to take a few steps at home and feed himself with assitance  but unable on the days preceding and during this admission. Per PT/OT notes patient was able to ambulate 20 ft with assistance  -Cognitive status: Known dementia/parkinsons, wife describes waxing and waning mentation.  -QOL:Poor     Prognosis:  -Patient with recurrent hospitalizations and valentina  overall decline, would anticipate shortening in between hospitalizations.     GOC Discussion with:  Ms. Lemus: Currently hospitalized at [facility], initiated GOC discussed husbands current hospital course and our concerns regarding his anuradha risk of readmission in the short term. She expressed she is  hoping for him, to have a significant recovery (describes this as him being able to feed himself and have moments of sporadic mental clarity0   Touched code status and aggressive medical management, she is no ready to make decisions at this moment, she wishes  to discuss further with family.     Mr Lincoln Live (Brother-in-law). His spouse is his next of keen for medical decisions, she has capacity. Currently Mrs. Lemus is also hospitalized [facility] with respiratory symptoms, covid positive. given her current state of health, Mr. Live expresses he is waiting to hear from the  office to start with the advance care planning documentation for both   and Mrs Lemus. Mr. Live is reasonable and has a very good understanding of the whole clinical picture.     Active symptoms  -Pain: will occassionally complain of back pain 2/2 to sacral ulcer      Summary/Recommendation:  -Needs MPOA  -Most important goals at this time: curative/life-prolongation (regardless of treatment burdens). still being determined and more discussions are needed  -Code status FULL  -Most appropriate disposition is hospice, although as off today Ms. Lemus wants him to go to SNF  -We will continue to follow and and with goals of conversation.  -Mr. Live expressed the possibility of having primary team talk to a doctor family member withh the hopes that this might help them arrive to more definite decision .

## 2023-07-20 NOTE — PLAN OF CARE
Problem: Adult Inpatient Plan of Care  Goal: Plan of Care Review  Outcome: Ongoing, Progressing  Goal: Patient-Specific Goal (Individualized)  Outcome: Ongoing, Progressing  Goal: Absence of Hospital-Acquired Illness or Injury  Outcome: Ongoing, Progressing  Goal: Optimal Comfort and Wellbeing  Outcome: Ongoing, Progressing  Goal: Readiness for Transition of Care  Outcome: Ongoing, Progressing     Problem: Fluid Imbalance (Pneumonia)  Goal: Fluid Balance  Outcome: Ongoing, Progressing     Problem: Infection (Pneumonia)  Goal: Resolution of Infection Signs and Symptoms  Outcome: Ongoing, Progressing     Problem: Respiratory Compromise (Pneumonia)  Goal: Effective Oxygenation and Ventilation  Outcome: Ongoing, Progressing     Problem: Impaired Wound Healing  Goal: Optimal Wound Healing  Outcome: Ongoing, Progressing     Problem: Fall Injury Risk  Goal: Absence of Fall and Fall-Related Injury  Outcome: Ongoing, Progressing     Problem: Skin Injury Risk Increased  Goal: Skin Health and Integrity  Outcome: Ongoing, Progressing     Problem: Infection  Goal: Absence of Infection Signs and Symptoms  Outcome: Ongoing, Progressing     Problem: Coping Ineffective  Goal: Effective Coping  Outcome: Ongoing, Progressing     Pt has been asleep for most of the night. Pt given medications as prescribed. Pt cleaned and turned. Wound care performed. Pt placed on purewick for urinary output. 1 small bowel movement. Isolation precautions maintained. No concerns at this time. Bed locked and in lowest position, call light within reach. Plan of care ongoing.

## 2023-07-20 NOTE — SUBJECTIVE & OBJECTIVE
Interval History: Patient without  significant changes overnight. Met with wife this am, her desires is that he goes to SNF/Rehab, states that she needs more time to discuss with her siblings Mr. Sewell code status and disposition. Reached out to Mr. Live who states legal documentation gathering is ongoing. He expressed current plan is to try to arrange having Mr Lemus go the same facility as his wife, potentially [facility]. Mr. Live also mentioned interest in having primary team discuss case with a doctor family member to explain overall clinical picture in hopes that this will help move forward with disposition and planning.    Medications:  Continuous Infusions:  Scheduled Meds:   ascorbic acid (vitamin C)  500 mg Oral BID    azithromycin  500 mg Oral Daily    balsam peru-castor oiL   Topical (Top) BID    carbidopa-levodopa  mg  1 tablet Oral QID    cefTRIAXone (ROCEPHIN) IVPB  1 g Intravenous Q24H    finasteride  5 mg Oral Daily    heparin (porcine)  5,000 Units Subcutaneous Q8H    melatonin  6 mg Oral Nightly    multivitamin  1 tablet Oral Daily    mupirocin   Nasal BID    pantoprazole  40 mg Oral Daily     PRN Meds:albuterol **AND** MDI Q6H PRN, artificial tears, dextrose 10%, dextrose 10%, glucagon (human recombinant), glucose, glucose, hydrOXYzine HCL, polyethylene glycol, sodium chloride 0.9%    Objective:     Vital Signs (Most Recent):  Temp: 97.8 °F (36.6 °C) (07/20/23 1049)  Pulse: 65 (07/20/23 1154)  Resp: 17 (07/20/23 1049)  BP: 121/77 (07/20/23 1049)  SpO2: (!) 92 % (07/20/23 1049) Vital Signs (24h Range):  Temp:  [96.2 °F (35.7 °C)-98.9 °F (37.2 °C)] 97.8 °F (36.6 °C)  Pulse:  [60-72] 65  Resp:  [16-18] 17  SpO2:  [87 %-96 %] 92 %  BP: (113-136)/(58-77) 121/77     Weight: 47.3 kg (104 lb 4.4 oz)  Body mass index is 16.33 kg/m².       Physical Exam  Vitals and nursing note reviewed.   Constitutional:       General: He is not in acute distress.     Appearance: He is ill-appearing. He is not  toxic-appearing.      Comments: Malnourished, frail, does not appear uncomfortable or in active pain, he is asleep and does not respond to voice commands, extremely difficult to arouse.   HENT:      Right Ear: External ear normal.      Left Ear: External ear normal.      Ears:      Comments: hard of hearing and hearing devices in place but not working.      Mouth/Throat:      Pharynx: No oropharyngeal exudate or posterior oropharyngeal erythema.   Cardiovascular:      Rate and Rhythm: Normal rate.      Pulses: Normal pulses.      Heart sounds: Normal heart sounds.   Pulmonary:      Effort: Pulmonary effort is normal.      Breath sounds: No wheezing or rhonchi.   Abdominal:      General: There is no distension.      Palpations: Abdomen is soft.      Tenderness: There is no guarding or rebound.   Musculoskeletal:      Cervical back: No rigidity or tenderness.      Right lower leg: No edema.      Left lower leg: No edema.   Skin:     General: Skin is warm and dry.      Coloration: Skin is pale.      Findings: Lesion (sacral ulcer) present.   Neurological:      Mental Status: He is alert.          Review of Symptoms      Symptom Assessment (ESAS 0-10 Scale)  Pain:  0  Dyspnea:  0  Anxiety:  0  Nausea:  0  Depression:  0  Anorexia:  0  Fatigue:  0  Insomnia:  0  Restlessness:  0  Agitation:  0         Living Arrangements:  Lives with spouse    Psychosocial/Cultural:   See Palliative Psychosocial Note: Yes  Lives with wife who is also going through health issues, they a have a good support family system.  **Primary  to Follow**  Palliative Care  Consult: No     Time-Based Charting:  No      Advance Care Planning   Advance Directives:   Living Will: No        Oral Declaration: No    [place]POST: No    Do Not Resuscitate Status: No    Medical Power of : No        Oral Declaration: No      Decision Making:  Family answered questions  Goals of Care: What is most important right now for Mr. Lemus is  to focus on trying to spend as much time together and to pursue SNF/Rehab.       Significant Labs:   Recent Lab Results         07/20/23  0454   07/19/23  1845        Procalcitonin 0.24  Comment: A concentration < 0.25 ng/mL represents a low risk of bacterial   infection.  Procalcitonin may not be accurate among patients with localized   infection, recent trauma or major surgery, immunosuppressed state,   invasive fungal infection, renal dysfunction. Decisions regarding   initiation or continuation of antibiotic therapy should not be based   solely on procalcitonin levels.           Anion Gap 6         Baso # 0.02         Basophil % 0.2         BUN 24         Calcium 8.1         Chloride 107         CO2 26         Creatinine 0.9         D-Dimer   0.52  Comment: The quantitative D-dimer assay should be used as an aid in   the diagnosis of deep vein thrombosis and pulmonary embolism  in patients with the appropriate presentation and clinical  history. The upper limit of the reference interval and the clinical   cut off   point are identical. Causes of a positive (>0.50 mg/L FEU) D-Dimer   test  include, but are not limited to: DVT, PE, DIC, thrombolytic   therapy, anticoagulant therapy, recent surgery, trauma, or   pregnancy, disseminated malignancy, aortic aneurysm, cirrhosis,  and severe infection. False negative results may occur in   patients with distal DVT.         Differential Method Automated         eGFR >60.0         Eos # 0.1         Eosinophil % 0.5         Glucose 96         Gran # (ANC) 10.2         Gran % 80.7         Hematocrit 26.0         Hemoglobin 8.1         Immature Grans (Abs) 0.06  Comment: Mild elevation in immature granulocytes is non specific and   can be seen in a variety of conditions including stress response,   acute inflammation, trauma and pregnancy. Correlation with other   laboratory and clinical findings is essential.           Immature Granulocytes 0.5         Lymph # 1.1          Lymph % 8.4         Magnesium 1.7         MCH 27.2         MCHC 31.2         MCV 87         Mono # 1.2         Mono % 9.7         MPV 10.4         nRBC 0         Platelets 224         Potassium 4.3         RBC 2.98         RDW 22.5         Sodium 139         WBC 12.57               CBC:   Recent Labs   Lab 07/20/23 0454   WBC 12.57   HGB 8.1*   HCT 26.0*   MCV 87        BMP:  Recent Labs   Lab 07/20/23 0454   GLU 96      K 4.3      CO2 26   BUN 24*   CREATININE 0.9   CALCIUM 8.1*   MG 1.7     LFT:  Lab Results   Component Value Date    AST 18 07/18/2023    ALKPHOS 105 07/18/2023    BILITOT 0.2 07/18/2023     Albumin:   Albumin   Date Value Ref Range Status   07/18/2023 2.1 (L) 3.5 - 5.2 g/dL Final     Protein:   Total Protein   Date Value Ref Range Status   07/18/2023 5.8 (L) 6.0 - 8.4 g/dL Final     Lactic acid:   Lab Results   Component Value Date    LACTATE 1.1 07/15/2023    LACTATE 1.1 06/17/2023       Significant Imaging: I have reviewed all pertinent imaging results/findings within the past 24 hours.

## 2023-07-21 NOTE — NURSING
Patient given discharge information and education. IV and heart monitor removed. Patient and brother educated regarding signs and symptoms to watch for, medications, follow up appointments, activity, and diet. No questions at this time. Patient left unit with [facility] via stretcher in stable condition.

## 2023-07-21 NOTE — DISCHARGE SUMMARY
[facility] - Intensive Care ([place] [address])  [facility] Medicine  Discharge Summary      Patient Name: Giselle Lemus  MRN: [id number]  EMILE: [id number]  Patient Class: IP- Inpatient  Admission Date: 7/15/2023  Hospital Length of Stay: 5 days  Discharge Date and Time:  07/21/2023 3:34 PM  Attending Physician: Escobar Simon MD   Discharging Provider: Escobar Simon MD  Primary Care Provider: Tl Torres MD  [facility] Medicine Team: [facility] MED  Escobar Simon MD  Primary Care Team: [facility]    HPI:   Patient is a pleasantly demented 89 year old male with pmh significant for parkinson's disease, frequent falls, hard of hearing, BPH presents to our ED secondary to weakness.  Apparently patient have been having diarrhea since Wednesday of this week associated with weakness.  His wife is admitted to our hospital 2 days ago for covid 19 and is recovering.  Per brother in-law at bedside, patient has continuous home health care. He tested positive at home for COVID-19 2 days ago as well.  He was started on Paxlovid yesterday.  His home health nurse reports that patient typically ambulates with assistance and a walker.  Since yesterday, patient has been more fatigued and having difficulty ambulating.  He has been using a wheelchair.  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 No reported fever or chills.  He continues to have diarrhea.      In the ED chest xray was without infiltrates.  Patient is on room air with good saturation.  In no acute distress.  Very difficult of hearing.  No complaints other than generalized weakness.  No recent increase in medications.        * No surgery found *      Hospital Course:    89-year-old male with Parkinson's and cognitive decline with waxing and waning mentation, extremely hard of hearing, decreased vision, frequent falls, sinus bradycardia with RBBB admitted to hospitalist service for further evaluation and treatment of  generalized weakness.  Diagnosed with COVID, left lower lobe bacterial pneumonia, and urinary tract infection.  This is the patient's 4th admission since 05/18/2023.  COVID symptoms mild, patient on room air so per guidelines was not started on steroids.  He is s/p 3 doses of remdesivir which is sufficient for individuals with mild COVID systems at risk for severe disease. Additionally patient has chronic bradycardia and remdesivir seemed to be exacerbating this.  Attempted meaningful discussion with the patient regarding code status and DVT prophylaxis given history of bleeding.  Patient unable to demonstrate understanding of his current medical condition.  Patient also attempted to get out of bed and leave the hospital several times.  Psychiatry consulted and agreed that the patient did not have capacity to leave the hospital AMA or decline admission to SNF if healthcare surrogate thought it was the appropriate decision for him.  Patient at high risk for clots given decreased mobility and COVID.  He does have a history of severe retroperitoneal hemorrhage on DVT prophylaxis.  Discussed this with wife and brother-in-law who said that the patient had received heparin products prior to that event with no problems.  Family made decision to continue with DVT prophylaxis.  Urinalysis collected on admission, culture positive for Klebsiella oxytoca sensitive to Rocephin.  Patient with intermittent leukocytosis throughout admission with episodes of O2 desaturation down to 87%.  CT chest obtained with small left lower lobe loculated effusion, persistent left lower lobe patchy consolidation areas.  Pulmonology consulted and recommended treatment for CPAP.  Azithromycin added to Rocephin.  Patient's wife was contacted to discuss code status and she became tearful and deferred decision to her brother who is the patient's brother-in-law, Lincoln.  Lincoln has discussed this with the patient who has expressed desire for full code.   Additionally, discussed palliative medicine and hospice with Lincoln and palliative Medicine been consulted. Patient remains full code. Pulmonary consulted and recommend treatment for CAP, SLP consult, CPT. FU CT chest in 6 weeks for resolution of PNA. Speech cleared for regular diet. Patient and family agreeable to go to SNF. Patient is currently medically and HDS. He is being d/c to SNF. FU with PCP, pulm.  Plan of care discussed with patient, verbalized understanding. All questions were answered.         Goals of Care Treatment Preferences:  Code Status: Full Code              Consults:   Consults (From admission, onward)        Status Ordering Provider     Inpatient consult to Palliative Care  Once        Provider:  (Not yet assigned)    Completed DOUGIE CARDONA III     Inpatient consult to Pulmonology  Once        Provider:  (Not yet assigned)    Completed DOUGIE CARDONA III.     Inpatient consult to Physical Medicine Rehab  Once        Provider:  (Not yet assigned)    Completed DOUGIE CARDONA III     Inpatient consult to Psychiatry  Once        Provider:  (Not yet assigned)    Completed DOUGIE CARDONA III.          No new Assessment & Plan notes have been filed under this hospital service since the last note was generated.  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, F02.80] 07/17/2023 Yes    Delirium due to multiple etiologies [F05] 07/17/2023 Yes    Full code status [Z78.9] 07/17/2023 Yes    Leukocytosis [D72.829] 07/17/2023 No    Pressure injury of skin of buttock [L89.309] 07/15/2023 Yes    Chronic anemia [D64.9] 07/15/2023 Yes    Multifocal pneumonia [J18.9] 06/03/2023 Yes     Multiple falls [R29.6] 08/16/2022 Not Applicable    Acute metabolic encephalopathy [G93.41] 05/14/2022 Yes    Benign prostatic hyperplasia [N40.0] 09/22/2019 Yes      Problems Resolved During this Admission:       Discharged Condition: stable    Disposition: Skilled Nursing Facility    Follow Up:   Follow-up Information     Tl Torres MD Follow up in 3 day(s).    Specialty: Family Medicine  Contact information:  [address] [facility] [address]  [phone]                       Patient Instructions:      CT Chest With Contrast   Standing Status: Future Standing Exp. Date: 07/20/24     Order Specific Question Answer Comments   Is the patient allergic to iodine contrast? Unable to Assess    Is the patient taking metformin or a metformin combination medication?  If so, the patient should hold the medication for 2 days following contrast. Unable to assess    Does this patient have impaired renal function? Unable to Assess    May the Radiologist modify the order per protocol to meet the clinical needs of the patient? Yes      Ambulatory referral/consult to [facility]   Standing Status: Future   Referral Priority: Routine Referral Type: Consultation   Referral Reason: Specialty Services Required   Number of Visits Requested: 1     Ambulatory referral/consult to Outpatient Case Management   Referral Priority: Routine Referral Type: Consultation   Referral Reason: Specialty Services Required   Number of Visits Requested: 1     Ambulatory referral/consult to Pulmonology   Standing Status: Future   Referral Priority: Routine Referral Type: Consultation   Referral Reason: Specialty Services Required   Requested Specialty: Pulmonary Disease   Number of Visits Requested: 1       Significant Diagnostic Studies: N/A    Pending Diagnostic Studies:     Procedure Component Value Units Date/Time    Procalcitonin [[id number]]     Order Status: Sent Lab Status: No result      Specimen: Blood          Medications:  Reconciled Home Medications:      Medication List      START taking these medications    cefpodoxime 100 MG tablet  Commonly known as: VANTIN  Take 1 tablet (100 mg total) by mouth every 12 (twelve) hours. for 6 days        CONTINUE taking these medications    artificial tears 0.5 % ophthalmic solution  Commonly known as: ISOPTO TEARS  Place 2 drops into both eyes 4 (four) times daily as needed.     balsam peru-castor oiL Oint  Apply topically 2 (two) times a day. Apply to buttocks     carbidopa-levodopa  mg  mg per tablet  Commonly known as: SINEMET  TAKE 1 TABLET BY MOUTH 4 (FOUR) TIMES A DAY FOR 30 DAYS.     finasteride 5 mg tablet  Commonly known as: PROSCAR  Take 1 tablet (5 mg total) by mouth once daily.     furosemide 20 MG tablet  Commonly known as: LASIX  Take 0.5 tablets (10 mg total) by mouth once daily. As needed for weight gain of 3 lb in 1 day, 5 lb in 1 week, or significant leg edema     melatonin 3 mg tablet  Commonly known as: MELATIN  Take 2 tablets (6 mg total) by mouth nightly.     polyethylene glycol 17 gram Pwpk  Commonly known as: GLYCOLAX  Take 17 g by mouth 2 (two) times daily as needed (constipation).     sodium chloride 0.65 % nasal spray  Commonly known as: OCEAN  1 spray by Nasal route as needed (irritation).     sodium zirconium cyclosilicate 5 gram packet  Commonly known as: Lokelma  Take 1 packet (5 g total) by mouth every Mon, Wed, Fri. Mix entire contents of packet(s) into drinking glass containing 3 tablespoons of water; stir well and drink immediately. Add water and repeat until no powder remains to receive entire dose.        STOP taking these medications    PAXLOVID 300 mg (150 mg x 2)-100 mg copackaged tablets (EUA)  Generic drug: nirmatrelvir-ritonavir            Indwelling Lines/Drains at time of discharge:   Lines/Drains/Airways     None                 Time spent on the discharge of patient: 35 minutes         Seemal  MD Alfred  Department of Hospital Medicine  [facility] - Intensive Care ([facility]

## 2023-07-21 NOTE — PT/OT/SLP PROGRESS
"Physical Therapy Treatment    Patient Name:  Giselle Lemus   MRN:  [id number]    Recommendations:     Discharge Recommendations: nursing facility, skilled  Discharge Equipment Recommendations: to be determined by next level of care  Barriers to discharge: Decreased caregiver support    Assessment:     Giselle Lemus is a 89 y.o. male admitted with a medical diagnosis of COVID-19.  He presents with the following impairments/functional limitations: weakness, impaired endurance, impaired functional mobility, gait instability, impaired balance, visual deficits, impaired cognition, decreased coordination, decreased safety awareness.  Pt participated in therapy session well. Pt continues to need increase assistance with bed mobility, but able to slightly increase total gait distance today. Pt progress is limited by visual and hearing deficit, but pt is receptive to physical assistance/tactile cues from therapist. Pt continues to demonstrate a need for therapy to improve functional endurance, strength, and mobility.    Rehab Prognosis: Fair; patient would benefit from acute skilled PT services to address these deficits and reach maximum level of function.    Recent Surgery: * No surgery found *      Plan:     During this hospitalization, patient to be seen 3 x/week to address the identified rehab impairments via gait training, therapeutic activities, therapeutic exercises, neuromuscular re-education and progress toward the following goals:    Plan of Care Expires:  08/16/23    Subjective     Chief Complaint: none verbalized  Patient/Family Comments/goals: "I think that's my pillow"  (Pt was mostly non-verbal during session)  Pain/Comfort:  Pain Rating 1:  (did not rate)  Pain Rating Post-Intervention 1:  (did not rate)      Objective:     Communicated with RN prior to session.  Patient found HOB elevated with telemetry upon PT entry to room.     General Precautions: Standard, airborne, vision impaired, hearing impaired, contact, " droplet, fall  Orthopedic Precautions: N/A  Braces: N/A  Respiratory Status: Room air     Functional Mobility:  Additional staff present: PCT present towards end of session  Bed Mobility:   Rolling/Turning to Left/Right: maximal assistance  Scooting to EOB: total assistance  Supine to Sit: maximal assistance; HOB elevated  Assisted with trunk and LE management   Sit to Supine: maximal assistance  Assisted with trunk and LE management   Transfers:   Sit <> Stand Transfer: minimum assistance with rolling walker   Gait:  Pt ambulated ~2x 15 ft with minimum assistance and rolling walker.  1 standing rest break  Increase assistance with RW management for turning and object avoidance d/t vision impaired  Gait Deviation(s): occasional unsteady gait with decrease afshan, decrease heel strike, decrease step length, and flexed posture   Pt unable to hear vc's but responded better to tactile/physical cues from therapist during ambulation trial.   Balance:   Static/Dynamic sitting EOB balance: CGA    AM-PAC 6 CLICK MOBILITY  Turning over in bed (including adjusting bedclothes, sheets and blankets)?: 2  Sitting down on and standing up from a chair with arms (e.g., wheelchair, bedside commode, etc.): 3  Moving from lying on back to sitting on the side of the bed?: 2  Moving to and from a bed to a chair (including a wheelchair)?: 3  Need to walk in hospital room?: 3  Climbing 3-5 steps with a railing?: 1  Basic Mobility Total Score: 14     Treatment & Education:  Pt mostly non-verbal during session, but able to follow tactile cues for activity.   Increase time assisting with pericare cleaning and changing brief d/t BM towards end of session  Patient educated on role of therapy, goals of session, and benefits of out of bed mobility.   Instructed on use of call button and importance of calling nursing staff for assistance with mobility   Questions/concerns addressed within PTA scope of practice    Patient left HOB elevated with all  lines intact, call button in reach, nurse notified, and PCT present..    GOALS:   Multidisciplinary Problems       Physical Therapy Goals          Problem: Physical Therapy    Goal Priority Disciplines Outcome Goal Variances Interventions   Physical Therapy Goal     PT, PT/OT Ongoing, Progressing     Description: Goals to be met by: 2023     Patient will increase functional independence with mobility by performin. Supine to sit with stand by assistance  2. Sit to supine with stand by assistance  3. Sit to stand transfer with stand by assistance  4. Gait  x 25 feet with stand by assistance using LRAD as needed  5. Lower extremity exercise program x10 reps per handout, with assistance as needed                        Time Tracking:     PT Received On: 23  PT Start Time: 1133     PT Stop Time: 1156  PT Total Time (min): 23 min     Billable Minutes: Gait Training 12 and Therapeutic Activity 11    Treatment Type: Treatment  PT/PTA: PTA     Number of PTA visits since last PT visit: 2023

## 2023-07-21 NOTE — PLAN OF CARE
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       Patient is homebound due to:  COVID-19    Allergies:  Review of patient's allergies indicates:   Allergen Reactions    Heparin     Heparin analogues Other (See Comments)     Not true allergy - but patient developed large spontaneous RP hematoma and concurrent severe epistaxis while on DVT prophylactic dose heparin - consider mechanical DVT ppx in future       Vitals:  Routine    Diet: regular diet    Activities:   Activity as tolerated    Goals of Care Treatment Preferences:  Code Status: Full Code                Nursing Precautions:  Aspiration , Fall, and Pressure ulcer prevention    Consults:   PT to evaluate and treat- 5 times a week and OT to evaluate and treat- 5 times a week       Medications: Discontinue all previous medication orders, if any. See new list below.     Medication List        START taking these medications      cefpodoxime 100 MG tablet  Commonly known as: VANTIN  Take 1 tablet (100 mg total) by mouth every 12 (twelve) hours. for 6 days             CONTINUE taking these medications      artificial tears 0.5 % ophthalmic solution  Commonly known as: ISOPTO TEARS  Place 2 drops into both eyes 4 (four) times daily as needed.     balsam peru-castor oiL Oint  Apply topically 2 (two) times a day. Apply to buttocks     carbidopa-levodopa  mg  mg per tablet  Commonly known as: SINEMET  TAKE 1 TABLET BY MOUTH 4 (FOUR) TIMES A DAY FOR 30 DAYS.     finasteride 5 mg tablet  Commonly known as: PROSCAR  Take 1 tablet (5 mg total) by mouth once daily.     furosemide 20 MG tablet  Commonly known as: LASIX  Take 0.5 tablets (10 mg total) by mouth once daily. As needed for weight gain of 3 lb in 1 day, 5 lb in 1 week, or significant leg edema     melatonin 3 mg tablet  Commonly known as: MELATIN  Take 2 tablets (6 mg total) by mouth nightly.     polyethylene glycol 17 gram Pwpk  Commonly known as: GLYCOLAX  Take 17 g by mouth 2 (two) times daily as needed (constipation).     sodium chloride 0.65 % nasal spray  Commonly known as: OCEAN  1 spray by Nasal route as needed (irritation).     sodium zirconium cyclosilicate 5 gram packet  Commonly known as: Lokelma  Take 1 packet (5 g total) by mouth every Mon, Wed, Fri. Mix entire contents of packet(s) into drinking glass containing 3 tablespoons of water; stir well and drink immediately. Add water and repeat until no powder remains to receive entire dose.            STOP taking these medications      PAXLOVID 300 mg (150 mg x 2)-100 mg copackaged tablets (EUA)  Generic drug: nirmatrelvir-ritonavir                Immunizations Administered as of 7/21/2023       Name Date Dose VIS Date Route Exp Date    COVID-19, MRNA, LN-S, PF (Pfizer) (Purple Cap) 1/31/2021  1:14 PM 0.3 mL 12/12/2020 Intramuscular 5/31/2021    Site: Left deltoid     Given By: Angy Choi RN     : [employer]     Lot: [id number]     COVID-19, MRNA, LN-S, PF (Pfizer) (Purple Cap) 1/10/2021  1:24 PM 0.3 mL 12/12/2020 Intramuscular 4/30/2021     Site: Left deltoid     Given By: Olya Mcfadden, RN     : [employer]     Lot: [id number]                 Some patients may experience side effects after vaccination.  These may include fever, headache, muscle or joint aches.  Most symptoms resolve with 24-48 hours and do not require urgent medical evaluation unless they persist for more than 72 hours or symptoms are concerning for an unrelated medical condition.          _________________________________  Escobar Simon MD  07/21/2023

## 2023-07-21 NOTE — NURSING
Pt had 2 BMs. Cleaned and turned. Wound ointment applied to scrotum and buttocks. Pt tolerated medications well crushed in apple sauce.

## 2023-07-21 NOTE — PLAN OF CARE
07/21/23 1608   Post-Acute Status   Post-Acute Authorization Placement   Post-Acute Placement Status Set-up Complete/Auth obtained  ([facility])   Discharge Plan   Discharge Plan A Skilled Nursing Facility   Discharge Plan B Home with family       SW phoned [facility] for update on approval of orders and additional requested documentation. Per Bisi, patient is accepted to admit to [facility] Today. SNF Set-up complete. Transport orders to follow.              JONAH Selby, LMSW  [facility]  Case Management  Ext. [phone]

## 2023-07-21 NOTE — PLAN OF CARE
CRISTINA attempted completion of the LOCET via phone for patient admission to NH. CRISTINA was advised by [facility] rep that agents are not available to assist at that time. [facility] rep advised SW that SW will receive return phone call when an agent becomes available. SW left contact info for f/u. CRISTINA will continue to follow.      12:05pm  SW completed the LOCET via phone. CRISTINA faxed PAS to obtain the 142 for NH admission.     CRISTINA will continue to follow.        JONAH Selby, LMSW  [facility]  Case Management  Ext. [phone]

## 2023-07-21 NOTE — PLAN OF CARE
Problem: Adult Inpatient Plan of Care  Goal: Plan of Care Review  Outcome: Ongoing, Progressing  Goal: Patient-Specific Goal (Individualized)  Outcome: Ongoing, Progressing  Goal: Absence of Hospital-Acquired Illness or Injury  Outcome: Ongoing, Progressing  Goal: Optimal Comfort and Wellbeing  Outcome: Ongoing, Progressing  Goal: Readiness for Transition of Care  Outcome: Ongoing, Progressing     Problem: Adjustment to Illness (Sepsis/Septic Shock)  Goal: Optimal Coping  Outcome: Ongoing, Progressing     Problem: Bleeding (Sepsis/Septic Shock)  Goal: Absence of Bleeding  Outcome: Ongoing, Progressing     Problem: Glycemic Control Impaired (Sepsis/Septic Shock)  Goal: Blood Glucose Level Within Desired Range  Outcome: Ongoing, Progressing     Problem: Infection Progression (Sepsis/Septic Shock)  Goal: Absence of Infection Signs and Symptoms  Outcome: Ongoing, Progressing     Problem: Nutrition Impaired (Sepsis/Septic Shock)  Goal: Optimal Nutrition Intake  Outcome: Ongoing, Progressing     Problem: Fluid Imbalance (Pneumonia)  Goal: Fluid Balance  Outcome: Ongoing, Progressing     Problem: Infection (Pneumonia)  Goal: Resolution of Infection Signs and Symptoms  Outcome: Ongoing, Progressing     Problem: Respiratory Compromise (Pneumonia)  Goal: Effective Oxygenation and Ventilation  Outcome: Ongoing, Progressing     Problem: Impaired Wound Healing  Goal: Optimal Wound Healing  Outcome: Ongoing, Progressing     Problem: Fall Injury Risk  Goal: Absence of Fall and Fall-Related Injury  Outcome: Ongoing, Progressing     Problem: Skin Injury Risk Increased  Goal: Skin Health and Integrity  Outcome: Ongoing, Progressing     Problem: Diarrhea  Goal: Fluid and Electrolyte Balance  Outcome: Ongoing, Progressing     Problem: Fatigue  Goal: Improved Activity Tolerance  Outcome: Ongoing, Progressing     Problem: Anxiety  Goal: Anxiety Reduction or Resolution  Outcome: Ongoing, Progressing     Problem: Infection  Goal: Absence  of Infection Signs and Symptoms  Outcome: Ongoing, Progressing     Problem: Coping Ineffective  Goal: Effective Coping  Outcome: Ongoing, Progressing       Pt AAO x 2. Pt has been sleeping most of the night. Pt cleaned and turned, skin ointment applied to sacrum and buttocks. New purewick placed. No concerns at this time. Bed locked and in lowest position, call light w/in reach. Plan of care ongoing.

## 2023-07-21 NOTE — PLAN OF CARE
SW placed PFC orders for patient transport to [facility]. SW notified patient's family via phone, family currently at bedside and is agreeable to D/c plan. Report to be called to [phone]  #[phone].     CRISTINA will continue to follow.              JONAH Selby, LMSW  [facility]  Case Management  Ext. [phone]

## 2023-07-24 NOTE — PLAN OF CARE
[facility] ([facility])  Discharge Final Note    Primary Care Provider: Tl Torres MD    Expected Discharge Date: 7/21/2023    Final Discharge Note (most recent)       Final Note - 07/24/23 0848          Final Note    Assessment Type Final Discharge Note (P)      Anticipated Discharge Disposition Skilled Nursing Facility (P)      What phone number can be called within the next 1-3 days to see how you are doing after discharge? [phone] (P)         Post-Acute Status    Post-Acute Authorization Placement (P)      Post-Acute Placement Status Set-up Complete/Auth obtained (P)    [facility]                    Important Message from Medicare  Important Message from Medicare regarding Discharge Appeal Rights: Explained to patient/caregiver, Signed/date by patient/caregiver     Date IMM was signed: 07/20/23  Time IMM was signed: 1332    Contact Info       Tl Torres MD   Specialty: Family Medicine   Relationship: PCP - General    [address]  [facility] [address]   Phone: [phone]       Next Steps: Follow up in 3 day(s)                    Patient D/c to [facility] SNF.              JONAH Selby, LMSW  [facility]  Case Management  Ext. [phone]

## 2023-07-24 NOTE — PLAN OF CARE
CHW unable to schedule the Pulmonology [facility] follow up. A message was sent to the clinic requesting an appointment on the patients behalf.   Significant improvement on low dose Wellbutrin XL. She will continue for another 2 weeks at the 150 mg dose, then try increasing to 300 mg if progress stalls. Patient will call for any significant medication side effects or worsening symptoms of depression.

## 2023-07-26 PROBLEM — Z78.9 IMPAIRED MOBILITY AND ACTIVITIES OF DAILY LIVING: Chronic | Status: ACTIVE | Noted: 2022-05-12

## 2023-07-26 PROBLEM — G20.A1 DEMENTIA DUE TO PARKINSON'S DISEASE: Chronic | Status: ACTIVE | Noted: 2023-01-01

## 2023-07-26 PROBLEM — N40.0 BENIGN PROSTATIC HYPERPLASIA: Chronic | Status: ACTIVE | Noted: 2019-09-22

## 2023-07-26 PROBLEM — F02.80 DEMENTIA DUE TO PARKINSON'S DISEASE: Chronic | Status: ACTIVE | Noted: 2023-01-01

## 2023-07-26 PROBLEM — D64.9 CHRONIC ANEMIA: Chronic | Status: ACTIVE | Noted: 2023-01-01

## 2023-07-26 PROBLEM — Z74.09 IMPAIRED MOBILITY AND ACTIVITIES OF DAILY LIVING: Chronic | Status: ACTIVE | Noted: 2022-05-12

## 2023-07-26 PROBLEM — J96.01 ACUTE HYPOXEMIC RESPIRATORY FAILURE: Status: ACTIVE | Noted: 2023-01-01

## 2023-07-26 NOTE — PROGRESS NOTES
Pharmacist Dose Adjustment Note    Giselle Lemus is a 89 y.o. male being treated with the enoxaparin 40 mg every 24 hrs for VTE ppx     Patient Data:    Vital Signs (Most Recent):  Temp: 99 °F (37.2 °C) (07/25/23 2014)  Pulse: 73 (07/25/23 2232)  Resp: 11 (07/25/23 2232)  BP: (!) 155/114 (07/25/23 2301)  SpO2: 100 % (07/25/23 2232) Vital Signs (72h Range):  Temp:  [98.5 °F (36.9 °C)-99.1 °F (37.3 °C)]   Pulse:  [69-83]   Resp:  [11-19]   BP: (108-165)/()   SpO2:  [93 %-100 %]      Recent Labs   Lab 07/19/23  0539 07/20/23  0454 07/25/23 2037   CREATININE 0.9 0.9 0.9     Serum creatinine: 0.9 mg/dL 07/25/23 2037  Estimated creatinine clearance: 37.2 mL/min    Pt is s/p COVID , however, given hx of bleed on prophylaxis dose will adjust for patient's weight.   Enoxaparin 30 mg every 24 hours, based on underweight pt < 50 kg per pharmacy anticoagulation protocol.     Pharmacist's Name: Sreedhar Reece  Pharmacist's Extension: [phone]

## 2023-07-26 NOTE — ASSESSMENT & PLAN NOTE
Acute hypoxemic respiratory failure  COVID-19  Left lower lobe pneumonia  UTI (urinary tract infection)    H/o Parkinson dementia & recent COVID p/w acute-onset confusion, lethargy, & poor PO intake.  No known head trauma or falls.  No focal neuro deficits on Ex, however w/ tachypnea & new hypoxemia requiring supp O2.  No fever, but noted leukocytosis w/o lactic acidosis on labs.  CXR w/ LLL pleural effusion vs consolidation, suspicious for PNA, likely superimposed bacterial PNA on recent COVID.  UA infectious, suspicious for UTI.  Would classify as sepsis (leukocytosis + tachypnea + suspected LLL PNA vs UTI).  Suspect septic encephalopathy 2/2 PNA vs UTI.  DDx Parkinson dementia progression vs multifactorial delirium vs polypharmacy vs other infection vs malignancy.  COVID positive (suspected since pt was only recently treated for COVID); flu/RSV negative.    -  BCx, UCx, RCx  - CTH to r/o intracranial pathology  - Abx, tailor per Cx data: vanc (MRSA coverage since pt is NH resident), levofloxacin (GNR & atypical coverage)  - delirium precautions  - Mental status improving  - Speech rec regular diet

## 2023-07-26 NOTE — PROGRESS NOTES
Pharmacist Renal Dose Adjustment Note    Giselle Lemus is a 89 y.o. male being treated with the medication levofloxacin.    Patient Data:    Vital Signs (Most Recent):  Temp: 99 °F (37.2 °C) (07/25/23 2014)  Pulse: 73 (07/26/23 0132)  Resp: 18 (07/26/23 0132)  BP: (!) 147/70 (07/26/23 0132)  SpO2: 96 % (07/26/23 0132) Vital Signs (72h Range):  Temp:  [98.5 °F (36.9 °C)-99.1 °F (37.3 °C)]   Pulse:  [62-83]   Resp:  [11-19]   BP: (108-165)/()   SpO2:  [93 %-100 %]      Recent Labs   Lab 07/19/23  0539 07/20/23  0454 07/25/23 2037   CREATININE 0.9 0.9 0.9     Serum creatinine: 0.9 mg/dL 07/25/23 2037  Estimated creatinine clearance: 37.2 mL/min    Levofloxacin 750 mg Q24H will be changed to levofloxacin 750 mg Q48H.    Pharmacist's Name: Soledad Ramos  Pharmacist's Extension: [phone]

## 2023-07-26 NOTE — PROGRESS NOTES
Pharmacokinetic Initial Assessment: IV Vancomycin    Assessment/Plan:    Mr. Lemus received vancomycin with loading dose of 2000 mg (~42 mg/kg) once in the ED.   - His renal function appears stable and at baseline.  - Will not re-dose now given recent loading dose, but will re-dose with subsequent doses when random concentrations are less than 20 mcg/mL.  - Given Mr. Lemus received such a large loading dose, will pulse dose at least once to determine his level in ~24 hours, and then will reassess pulse dosing vs schedule depending on how he clears the loading dose.  - Desired empiric serum trough concentration is 10 to 20 mcg/mL.  - Draw vancomycin random level on 7/26 at 2200.    Pharmacy will continue to follow and monitor vancomycin.      Please contact pharmacy at extension [phone] with any questions regarding this assessment.     Thank you for the consult,   Soledad Ramos       Patient brief summary:  Giselle Lemus is a 89 y.o. male initiated on antimicrobial therapy with IV Vancomycin for treatment of suspected lower respiratory infection    Actual Body Weight:   47.3 kg    Renal Function:   Estimated Creatinine Clearance: 37.2 mL/min (based on SCr of 0.9 mg/dL).    Dialysis Method (if applicable):  N/A

## 2023-07-26 NOTE — CARE UPDATE
Patient seen in ED. Very hard of hearing. Not making a meaningful conversation at the moment. Brother in law present at bedside. Reports patient stopped eating for two days and is more confused. CXR with concern for PNA. WBC 13. Receiving empiric abx. Consulted palliative who plan to have another GOC discussion with family tomorrow if patient wants to pursue PEG placement vs hospice. SLP consulted for swallow eval.

## 2023-07-26 NOTE — ED NOTES
Pt family at bedside requesting to feed pt, pt repositioned and restraints untied for meal. Will reassess need to continue.

## 2023-07-26 NOTE — ED TRIAGE NOTES
Giselle Lemus, a 89 y.o. male presents to the ED w/ complaint of weakness related to refusal to eat or drink. Recent covid dx.    Triage note:  Chief Complaint   Patient presents with    Failure To Thrive     Patient has not eaten or drank anything in 2 days.  Hx of dementia. From [facility]     Review of patient's allergies indicates:   Allergen Reactions    Heparin     Heparin analogues Other (See Comments)     Not true allergy - but patient developed large spontaneous RP hematoma and concurrent severe epistaxis while on DVT prophylactic dose heparin - consider mechanical DVT ppx in future     Past Medical History:   Diagnosis Date    BPH (benign prostatic hyperplasia)     Parkinsons 09/2019    R hand tremor starting in 2017, diagnosed Sept 2019 by Dr. Angeles at     Ulcerative colitis     s/p colectomy    Unspecified chronic bronchitis

## 2023-07-26 NOTE — PT/OT/SLP PROGRESS
Occupational Therapy      Patient Name:  Giselle Lemus   MRN:  [id number]    Patient not seen today secondary to Pt with bedrest orders until 2119. Will follow-up on 7/27.    7/26/2023

## 2023-07-26 NOTE — HPI
Giselle Lemus is an 89-year-old man with a history of severe auditory impairment, ulcerative colitis s/p colectomy (2013), Parkinson's disease, dementia, chronic sacral pressure ulcer, severe protein-calorie malnutrition, recent admissions for aspiration pneumonia (June 2023) and recently admitted on July 15, 2023 for symptomatic COVID-19. He was discharged to SNF for severe deconditioning and returned to [facility] on July 25, 2023 for worsening encephalopathy and poor PO intake, found to have likely bacterial pneumonia due to either superimposed bacterial infection of prior viral pneumonia versus aspiration pneumonia. Palliative and Supportive Care was consulted to discuss end of life and hospice.

## 2023-07-26 NOTE — H&P
"[facility] - Emergency Dept  [facility] Medicine  History & Physical    Patient Name: Giselle Lemus  MRN: [id number]  Patient Class: IP- Inpatient   Admission Date: 7/25/2023  Attending Physician: Lucero Hernandez MD   Primary Care Provider: Tl Torres MD        Patient information was obtained from patient, relative(s), nursing home, past medical records, and ER records.     Subjective:     Principal Problem:Acute metabolic encephalopathy    Chief Complaint:  Chief Complaint   Patient presents with    Failure To Thrive     Patient has not eaten or drank anything in 2 days.  Hx of dementia. From [facility]       HPI: 89 y.o. 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  Pt is acutely altered & so is poor historian; pt presents from [facility], so Hx was obtained mostly from NH records & brother at bedside.  At baseline, pt is able to communicate & ambulate w/ a walker.  He was at his normal baseline mentation on 07/23, however since then, he has not been eating or drinking much & has been sleeping more than usual.  No known h/o trauma, falls, recent med changes.  Noted subacute cough since COVID diagnosis.  Pt has some diarrhea at baseline since colectomy.  Denies fevers/chills, CP/palpitations, SOB, abdominal pain, nausea/vomiting, constipation, hematuria, melena/hematochezia, weakness/numbness/tingling, HA/presyncope/syncope.     ED course: AF, HR 82, /56, 95% on 2 L NC.  Labs unremarkable except for WBC 17.39, Hb 9.0 (at baseline), K 5.5, Glc 68, Alb 2.3, , .  LA, TSH unremarkable.  UA w/ 3+ leukocytes, 18 WBC.  CXR w/ "Findings suggesting interval increased small left pleural effusion with probable left basilar atelectasis/consolidation. Otherwise no change".      Past Medical History:   Diagnosis Date    BPH (benign prostatic hyperplasia)     Parkinsons 09/2019    R hand tremor starting " in 2017, diagnosed Sept 2019 by Dr. Angeles at     Ulcerative colitis     s/p colectomy    Unspecified chronic bronchitis        Past Surgical History:   Procedure Laterality Date    TOTAL COLECTOMY         Review of patient's allergies indicates:   Allergen Reactions    Heparin     Heparin analogues Other (See Comments)     Not true allergy - but patient developed large spontaneous RP hematoma and concurrent severe epistaxis while on DVT prophylactic dose heparin - consider mechanical DVT ppx in future       No current facility-administered medications on file prior to encounter.     Current Outpatient Medications on File Prior to Encounter   Medication Sig    artificial tears (ISOPTO TEARS) 0.5 % ophthalmic solution Place 2 drops into both eyes 4 (four) times daily as needed.    balsam peru-castor oiL Oint Apply topically 2 (two) times a day. Apply to buttocks    carbidopa-levodopa  mg (SINEMET)  mg per tablet TAKE 1 TABLET BY MOUTH 4 (FOUR) TIMES A DAY FOR 30 DAYS.    cefpodoxime (VANTIN) 100 MG tablet Take 1 tablet (100 mg total) by mouth every 12 (twelve) hours. for 6 days    finasteride (PROSCAR) 5 mg tablet Take 1 tablet (5 mg total) by mouth once daily.    melatonin (MELATIN) 3 mg tablet Take 2 tablets (6 mg total) by mouth nightly.    sodium zirconium cyclosilicate (LOKELMA) 5 gram packet Take 1 packet (5 g total) by mouth every Mon, Wed, Fri. Mix entire contents of packet(s) into drinking glass containing 3 tablespoons of water; stir well and drink immediately. Add water and repeat until no powder remains to receive entire dose.    furosemide (LASIX) 20 MG tablet Take 0.5 tablets (10 mg total) by mouth once daily. As needed for weight gain of 3 lb in 1 day, 5 lb in 1 week, or significant leg edema    polyethylene glycol (GLYCOLAX) 17 gram PwPk Take 17 g by mouth 2 (two) times daily as needed (constipation).    sodium chloride (OCEAN) 0.65 % nasal spray 1 spray by Nasal route as needed  (irritation).     Family History    None       Tobacco Use    Smoking status: Never    Smokeless tobacco: Never   Substance and Sexual Activity    Alcohol use: Never    Drug use: Not on file    Sexual activity: Not on file     Review of Systems   Unable to perform ROS: Mental status change   Objective:     Vital Signs (Most Recent):  Temp: 98.9 °F (37.2 °C) (07/26/23 0256)  Pulse: 62 (07/26/23 0256)  Resp: 17 (07/26/23 0256)  BP: (!) 144/66 (07/26/23 0256)  SpO2: 95 % (07/26/23 0256) Vital Signs (24h Range):  Temp:  [98.5 °F (36.9 °C)-99.1 °F (37.3 °C)] 98.9 °F (37.2 °C)  Pulse:  [58-83] 62  Resp:  [11-19] 17  SpO2:  [93 %-100 %] 95 %  BP: (108-165)/() 144/66     Weight: 47.3 kg (104 lb 4.4 oz)  Body mass index is 16.33 kg/m².     Physical Exam  Vitals and nursing note reviewed.   Constitutional:       General: He is not in acute distress.     Appearance: He is cachectic. He is ill-appearing. He is not toxic-appearing or diaphoretic.      Interventions: Nasal cannula in place.   HENT:      Head: Normocephalic and atraumatic.      Right Ear: External ear normal.      Left Ear: External ear normal.      Mouth/Throat:      Mouth: Mucous membranes are dry.      Pharynx: No oropharyngeal exudate.   Eyes:      Extraocular Movements: Extraocular movements intact.      Pupils: Pupils are equal, round, and reactive to light.   Cardiovascular:      Rate and Rhythm: Normal rate and regular rhythm.      Pulses: Normal pulses.      Heart sounds: Normal heart sounds. No murmur heard.  Pulmonary:      Effort: Pulmonary effort is normal. No respiratory distress.      Breath sounds: Rales present. No wheezing.   Abdominal:      General: Abdomen is flat. Bowel sounds are normal. There is no distension.      Palpations: Abdomen is soft. There is no mass.      Tenderness: There is no abdominal tenderness.   Musculoskeletal:         General: No swelling or tenderness. Normal range of motion.      Cervical back: Normal range of  motion and neck supple.      Right lower leg: No edema.      Left lower leg: No edema.   Skin:     General: Skin is warm and dry.      Capillary Refill: Capillary refill takes less than 2 seconds.   Neurological:      General: No focal deficit present.      Mental Status: He is lethargic and disoriented.            CRANIAL NERVES     CN III, IV, VI   Pupils are equal, round, and reactive to light.     Significant Labs: All pertinent labs within the past 24 hours have been reviewed.    Significant Imaging: I have reviewed and interpreted all pertinent imaging results/findings within the past 24 hours.  Assessment/Plan:      * Acute metabolic encephalopathy  Acute hypoxemic respiratory failure  COVID-19  Left lower lobe pneumonia  UTI (urinary tract infection)    H/o Parkinson dementia & recent COVID p/w acute-onset confusion, lethargy, & poor PO intake.  No known head trauma or falls.  No focal neuro deficits on Ex, however w/ tachypnea & new hypoxemia requiring supp O2.  No fever, but noted leukocytosis w/o lactic acidosis on labs.  CXR w/ LLL pleural effusion vs consolidation, suspicious for PNA, likely superimposed bacterial PNA on recent COVID.  UA infectious, suspicious for UTI.  Would classify as sepsis (leukocytosis + tachypnea + suspected LLL PNA vs UTI).  Suspect septic encephalopathy 2/2 PNA vs UTI.  DDx Parkinson dementia progression vs multifactorial delirium vs polypharmacy vs other infection vs malignancy.  COVID positive (suspected since pt was only recently treated for COVID); flu/RSV negative.    - f/u BCx, UCx, RCx  - f/u CTH to r/o intracranial pathology  - Abx, tailor per Cx data: vanc (MRSA coverage since pt is NH resident), levofloxacin (GNR & atypical coverage)  - delirium precautions    Hyperkalemia  On admission, K 5.5.  No ECG changes.    - monitor  - shift + furosemide/Lokelma p.r.n.    Dementia due to Parkinson's disease  - home carbidopa-levodopa    Chronic anemia  Stable, chronic,  normocytic.    - monitor    Impaired mobility and activities of daily living  - PT/OT consulted    Benign prostatic hyperplasia  - home finasteride      VTE Risk Mitigation (From admission, onward)           Ordered     enoxaparin injection 30 mg  Every 24 hours         07/25/23 2308     IP VTE HIGH RISK PATIENT  Once         07/25/23 2307     Place sequential compression device  Until discontinued         07/25/23 2307                      John Cameron MD  Department of Hospital Medicine   [facility] - Emergency Dept

## 2023-07-26 NOTE — ASSESSMENT & PLAN NOTE
Giselle Lemus is an 89-year-old man with a history of severe auditory impairment, ulcerative colitis s/p colectomy (2013), Parkinson's disease, dementia, chronic sacral pressure ulcer, severe protein-calorie malnutrition, recent admissions for aspiration pneumonia (June 2023) and recently admitted on July 15, 2023 for symptomatic COVID-19. He was discharged to SNF for severe deconditioning and returned to [facility] on July 25, 2023 for worsening encephalopathy and poor PO intake, found to have likely bacterial pneumonia due to either superimposed bacterial infection of prior viral pneumonia versus aspiration pneumonia. Palliative and Supportive Care was consulted to discuss end of life and hospice.    Advance Care Planning   Goals of Care:  - Code status: Full code  - Next of kin: wife Obi Lemus  - Patient does not have decision making capacity  - Prognosis: poor  - Family's understanding of prognosis: limited  - Goals: life prolongation  - Plans: will meet with Mr. Lemus and his family tomorrow (7/27) at 10:15 AM. Will plan to include his wife Obi, who is currently at [facility] for therapy. Reassured family that if Mrs. Lemus is in her therapy session at that time, we will be flexible to change the time so that Mrs. Lemus can participate as she is the legal next of kin.    Goals of Care Conversation:  - 7/26/23: Met Mr. Lemus and his brother-in-law Lincoln Live at bedside. I have met Mr. Live last week when MrIsi And Mrs. Lemus were hospitalized at [facility] prior to their transfer to [facility]. Mr. Live shared that for the last two days, Mr. Lemus had been sleeping significantly more and not eating/drinking. The doctor/nurse at the nursing home told Mr. Live that they were going to send Mr. Lemus to the hospital, and that likely they will have to discuss whether to pursue PEG or hospice support. Isi Live told Mrs. Lemus that he is worried that Mr. Lemus is going to the hospital, and may not be able to make it  back to SNF because of how sick he is. Mr. Live acknowledges that Mr. Lemus is very weak, and they are recognizing that in his age, he is getting weaker with each hospitalization and unlikely to recover back to baseline. Mr. Live admits that he would be accepting of pursuing PEG placement if it means that his brother-in-law does get better. Mr. Live's brothers have disagreeing ideas about PEG - one believes that the PEG tube is necessary and the other worries that it won't be helpful and will only cause more harm than benefit. We agreed to discuss with the entire family tomorrow at 10:15 AM and plan to engage his wife Obi in the conversation about trajectory, prognosis and next steps.

## 2023-07-26 NOTE — PT/OT/SLP PROGRESS
Physical Therapy      Patient Name:  Giselle Lemus   MRN:  [id number]    Patient not seen today secondary to Nurse/ CHARLES hold, Bedrest. Pt on bed rest orders throughout the day.  Noted to be combative with RN staff and had restraints on in AM as well.  Will follow-up when appropriate.

## 2023-07-26 NOTE — HPI
"89 y.o. 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  Pt is acutely altered & so is poor historian; pt presents from [facility] [place], so Hx was obtained mostly from NH records & brother at bedside.  At baseline, pt is able to communicate & ambulate w/ a walker.  He was at his normal baseline mentation on 07/23, however since then, he has not been eating or drinking much & has been sleeping more than usual.  No known h/o trauma, falls, recent med changes.  Noted subacute cough since COVID diagnosis.  Pt has some diarrhea at baseline since colectomy.  Denies fevers/chills, CP/palpitations, SOB, abdominal pain, nausea/vomiting, constipation, hematuria, melena/hematochezia, weakness/numbness/tingling, HA/presyncope/syncope.     ED course: AF, HR 82, /56, 95% on 2 L NC.  Labs unremarkable except for WBC 17.39, Hb 9.0 (at baseline), K 5.5, Glc 68, Alb 2.3, , .  LA, TSH unremarkable.  UA w/ 3+ leukocytes, 18 WBC.  CXR w/ "Findings suggesting interval increased small left pleural effusion with probable left basilar atelectasis/consolidation. Otherwise no change".  "

## 2023-07-26 NOTE — ED PROVIDER NOTES
Encounter Date: 7/25/2023       History     Chief Complaint   Patient presents with    Failure To Thrive     Patient has not eaten or drank anything in 2 days.  Hx of dementia. From [facility]     89 year old male past medical history of parkinson's disease, frequent falls, hard of hearing, BPH, ulcer colitis status post colectomy, recent COVID diagnosis status post Paxlovid today with increasing fatigue, decreased p.o. and confusion.  Per son patient was at his baseline mental status on Sunday where he was eating as usual.  At baseline he communicates and is able to ambulate with a walker.  Has not eaten or drinking much since Monday he has been sleeping more than normal.  Denies any history of trauma or falls.  Denies any recent medication changes.  Denies any nausea vomiting.  Does report some diarrhea but this is at patient's baseline since his colectomy.  Denies any fevers or chills.  Patient does have a cough but this has been occurring since his COVID diagnosis.  History is limited from patient as he is altered at this time most of the history is from patient's brother and the nursing home.    The history is provided by the nursing home and a relative. No  was used.   Review of patient's allergies indicates:   Allergen Reactions    Heparin     Heparin analogues Other (See Comments)     Not true allergy - but patient developed large spontaneous RP hematoma and concurrent severe epistaxis while on DVT prophylactic dose heparin - consider mechanical DVT ppx in future     Past Medical History:   Diagnosis Date    BPH (benign prostatic hyperplasia)     Parkinsons 09/2019    R hand tremor starting in 2017, diagnosed Sept 2019 by Dr. Angeles at     Ulcerative colitis     s/p colectomy    Unspecified chronic bronchitis      Past Surgical History:   Procedure Laterality Date    TOTAL COLECTOMY       No family history on file.  Social History     Tobacco Use    Smoking status: Never    Smokeless  tobacco: Never   Substance Use Topics    Alcohol use: Never     Review of Systems   Unable to perform ROS: Acuity of condition     Physical Exam     Initial Vitals [07/25/23 1710]   BP Pulse Resp Temp SpO2   (!) 108/56 82 16 98.5 °F (36.9 °C) (!) 94 %      MAP       --         Physical Exam    Nursing note and vitals reviewed.  Constitutional: No distress.   Cachectic and chronically ill-appearing   HENT:   Head: Normocephalic and atraumatic.   Nose: Nose normal.   Eyes: EOM are normal. Pupils are equal, round, and reactive to light.   Neck: Neck supple. No tracheal deviation present. No JVD present.   Cardiovascular:  Normal rate, regular rhythm, normal heart sounds and intact distal pulses.     Exam reveals no gallop and no friction rub.       No murmur heard.  Pulmonary/Chest: No respiratory distress. He has no wheezes. He has no rhonchi. He has rales.   Abdominal: Abdomen is soft. Bowel sounds are normal. He exhibits no distension. There is no abdominal tenderness. There is no rebound and no guarding.   Musculoskeletal:         General: Normal range of motion.      Cervical back: Neck supple.     Neurological: He is alert.   Difficult neuro exam due to patient's altered mental status but grossly nonfocal.  Patient moves extremities and opens eyes.  No facial asymmetry.  Noncommunicative.    Skin: Skin is warm and dry. Capillary refill takes less than 2 seconds. No rash noted.   Psychiatric: He has a normal mood and affect.       ED Course   Critical Care    Date/Time: 8/16/2023 3:59 PM    Performed by: Magdiel Eptsein MD  Authorized by: Escobar Simon MD  Direct patient critical care time: 45 minutes  Additional history critical care time: 10 minutes  Ordering / reviewing critical care time: 15 minutes  Documentation critical care time: 10 minutes  Consulting other physicians critical care time: 20 minutes  Consult with family critical care time: 15 minutes  Other critical care time: 10 minutes  Total critical  care time (exclusive of procedural time) : 125 minutes  Critical care time was exclusive of separately billable procedures and treating other patients and teaching time.  Critical care was necessary to treat or prevent imminent or life-threatening deterioration of the following conditions: sepsis and CNS failure or compromise.  Critical care was time spent personally by me on the following activities: discussions with consultants, blood draw for specimens, development of treatment plan with patient or surrogate, evaluation of patient's response to treatment, examination of patient, obtaining history from patient or surrogate, ordering and performing treatments and interventions, ordering and review of laboratory studies, ordering and review of radiographic studies, pulse oximetry, re-evaluation of patient's condition and review of old charts.        Labs Reviewed   CBC W/ AUTO DIFFERENTIAL - Abnormal; Notable for the following components:       Result Value    WBC 17.39 (*)     RBC 3.29 (*)     Hemoglobin 9.0 (*)     Hematocrit 29.2 (*)     MCHC 30.8 (*)     RDW 22.5 (*)     Immature Granulocytes 1.0 (*)     Gran # (ANC) 15.3 (*)     Immature Grans (Abs) 0.17 (*)     Lymph # 0.8 (*)     Mono # 1.1 (*)     Gran % 87.8 (*)     Lymph % 4.7 (*)     All other components within normal limits   COMPREHENSIVE METABOLIC PANEL - Abnormal; Notable for the following components:    Potassium 5.5 (*)     Glucose 68 (*)     Albumin 2.3 (*)     All other components within normal limits   B-TYPE NATRIURETIC PEPTIDE - Abnormal; Notable for the following components:     (*)     All other components within normal limits   CK - Abnormal; Notable for the following components:     (*)     All other components within normal limits   CULTURE, BLOOD   CULTURE, BLOOD   LACTIC ACID, PLASMA   TSH   LIPASE   URINALYSIS, REFLEX TO URINE CULTURE   POCT GLUCOSE MONITORING CONTINUOUS   POCT GLUCOSE MONITORING CONTINUOUS           Imaging Results              X-Ray Chest AP Portable (Final result)  Result time 07/25/23 20:59:56      Final result by Justin Gonzalez MD (07/25/23 20:59:56)                   Impression:      Findings suggesting interval increased small left pleural effusion with probable left basilar atelectasis/consolidation.  Otherwise no change.      Electronically signed by: Justin Gonzalez MD  Date:    07/25/2023  Time:    20:59               Narrative:    EXAMINATION:  XR CHEST AP PORTABLE    CLINICAL HISTORY:  Chest Pain;    TECHNIQUE:  Single frontal view of the chest was performed.    COMPARISON:  Chest radiograph and CT thorax 07/17/2023    FINDINGS:  Monitoring leads overlie the chest.  Patient is slightly rotated.    Interval increased opacification of the left mid to lower lung zone further obscuring the diaphragm, costophrenic angle and heart suggesting increased pleural effusion with probable left basilar atelectasis/consolidation.  Cardiomediastinal silhouette remains somewhat shielded towards the left likely secondary to left lower lobe volume loss, similar to prior.  No consolidative process or sizable pleural effusion on the right.  No pneumothorax.  Hilar contours are unchanged.  Osseous structures are stable without acute process seen.                                       Medications   sodium chloride 0.9% bolus 2,001 mL 2,001 mL (has no administration in time range)   piperacillin-tazobactam (ZOSYN) 4.5 g in dextrose 5 % in water (D5W) 5 % 100 mL IVPB (MB+) (has no administration in time range)   vancomycin 2 g in dextrose 5 % 500 mL IVPB (has no administration in time range)   calcium gluconate 1 g in NS IVPB (premixed) (has no administration in time range)   insulin regular injection 5 Units 0.05 mL (has no administration in time range)   sodium bicarbonate solution 50 mEq (has no administration in time range)   dextrose 10% bolus 500 mL 500 mL (has no administration in time range)     And   dextrose 10%  "bolus 250 mL 250 mL (has no administration in time range)     Medical Decision Making:   Clinical Tests:   Sepsis Perfusion Assessment: "I attest a sepsis perfusion exam was performed within 6 hours of sepsis, severe sepsis, or septic shock presentation, following fluid resuscitation."             ED Course as of 07/25/23 2215 Tue Jul 25, 2023 2124 89 year old male past medical history of parkinson's disease, frequent falls, hard of hearing, BPH, ulcer colitis status post colectomy, recent COVID diagnosis status post Paxlovid today with increasing fatigue, decreased p.o. and confusion. AF.  Borderline hypoxemic at rest based on nasal cannula.  Cachectic chronically ill-appearing.  Will initiate broad altered mental status workup [BD]   2125 Labs show leukocytosis borderline oxygen level and chest x-ray concerning for pneumonia will initiate septic workup broad-spectrum antibiotics. [BD]   2126 X-Ray Chest AP Portable [BD]   2126 Impression:     Findings suggesting interval increased small left pleural effusion with probable left basilar atelectasis/consolidation.  Otherwise no change.        Electronically signed by: Justin Gonzalez MD  Date:                                            07/25/2023  Time:                                           20:59 [BD]   2131 WBC(!): 17.39 [BD]   2133 Hemoglobin(!): 9.0 [BD]   2133 Hemoglobin at baseline [BD]   2200 Potassium(!): 5.5  Labs show hypokalemia with no visible hemolysis.  Give hyperkalemia medications.  Will give D10 prior to insulin though because his glucose is low.  Additionally will hold off on nebulization given his COVID status.  [BD]   2214 Patient signed out to oncoming team pending CT head and UA with plan for admission for undifferentiated encephalopathy suspect likely secondary to infection. [BD]      ED Course User Index  [BD] Magdiel Epstein MD                 Clinical Impression:   Final diagnoses:  [R06.02] SOB (shortness of breath)  [G93.40] " Encephalopathy (Primary)  [A41.9] Sepsis, due to unspecified organism, unspecified whether acute organ dysfunction present  [J18.9] Pneumonia of left lower lobe due to infectious organism               Magdiel Epstein MD  07/25/23 9162       Magdiel Epstein MD  08/16/23 7622

## 2023-07-26 NOTE — PHARMACY MED REC
"Admission Medication History     The home medication history was taken by Diamond Paula.    You may go to "Admission" then "Reconcile Home Medications" tabs to review and/or act upon these items.     The home medication list has been updated by the Pharmacy department.   Please read ALL comments highlighted in yellow.   Please address this information as you see fit.    Feel free to contact us if you have any questions or require assistance.          Medications listed below were obtained from: Nursing home  Current Outpatient Medications on File Prior to Encounter   Medication Sig    artificial tears (ISOPTO TEARS) 0.5 % ophthalmic solution Place 2 drops into both eyes 4 (four) times daily as needed.    balsam peru-castor oiL Oint Apply topically 2 (two) times a day. Apply to buttocks    carbidopa-levodopa  mg (SINEMET)  mg per tablet TAKE 1 TABLET BY MOUTH 4 (FOUR) TIMES A DAY FOR 30 DAYS.    cefpodoxime (VANTIN) 100 MG tablet Take 100 mg by mouth every 12 (twelve) hours. Stop date: 07/27/23    finasteride (PROSCAR) 5 mg tablet Take 1 tablet (5 mg total) by mouth once daily.    furosemide (LASIX) 20 MG tablet Take 0.5 tablets (10 mg total) by mouth once daily. As needed for weight gain of 3 lb in 1 day, 5 lb in 1 week, or significant leg edema    melatonin (MELATIN) 3 mg tablet Take 2 tablets (6 mg total) by mouth nightly.    polyethylene glycol (GLYCOLAX) 17 gram PwPk Take 17 g by mouth 2 (two) times daily as needed (constipation).    sodium chloride (OCEAN) 0.65 % nasal spray 1 spray by Nasal route as needed (irritation).    sodium zirconium cyclosilicate (LOKELMA) 5 gram packet Take 1 packet (5 g total) by mouth every Mon, Wed, Fri. Mix entire contents of packet(s) into drinking glass containing 3 tablespoons of water; stir well and drink immediately. Add water and repeat until no powder remains to receive entire dose.               Potential issues to be addressed PRIOR TO DISCHARGE  The listed " medications were obtained from another facility ([facility]). The patient may not have been able to fill these prescriptions prior to this admission and may require new scripts upon discharge.     Diamond Paula  EXT [id number]                  .

## 2023-07-26 NOTE — ED NOTES
Mepalex dressing placed to sacrum, redness noted, no breakdown. Turned and pillow placed to right side to reduce pressure to bottom. Pt tolerated well.

## 2023-07-26 NOTE — SUBJECTIVE & OBJECTIVE
Interval Hx:  Patient more awake and alert today. Brother in law present at bedside. Reports that they would like the patient to go back to SNF when medically ready. Code status updated to DNR. No plans for PEG placement. Appreciate palliative care assistance. Patient noted to have hematuria last night, suspect traumatic due to monreal's insertion. Hb stable. Speech rec regular diet.     Objective:     Vital Signs (Most Recent):  Temp: 98.9 °F (37.2 °C) (07/26/23 0256)  Pulse: 62 (07/26/23 0256)  Resp: 17 (07/26/23 0256)  BP: (!) 144/66 (07/26/23 0256)  SpO2: 95 % (07/26/23 0256) Vital Signs (24h Range):  Temp:  [98.5 °F (36.9 °C)-99.1 °F (37.3 °C)] 98.9 °F (37.2 °C)  Pulse:  [58-83] 62  Resp:  [11-19] 17  SpO2:  [93 %-100 %] 95 %  BP: (108-165)/() 144/66     Weight: 47.3 kg (104 lb 4.4 oz)  Body mass index is 16.33 kg/m².     Physical Exam  Vitals and nursing note reviewed.   Constitutional:       General: He is not in acute distress.     Appearance: He is cachectic. He is ill-appearing. He is not toxic-appearing or diaphoretic.      Interventions: Nasal cannula in place.   HENT:      Head: Normocephalic and atraumatic.      Right Ear: External ear normal.      Left Ear: External ear normal.      Mouth/Throat:      Mouth: Mucous membranes are dry.      Pharynx: No oropharyngeal exudate.   Eyes:      Extraocular Movements: Extraocular movements intact.      Pupils: Pupils are equal, round, and reactive to light.   Cardiovascular:      Rate and Rhythm: Normal rate and regular rhythm.      Pulses: Normal pulses.      Heart sounds: Normal heart sounds. No murmur heard.  Pulmonary:      Effort: Pulmonary effort is normal. No respiratory distress.      Breath sounds: Rales present. No wheezing.   Abdominal:      General: Abdomen is flat. Bowel sounds are normal. There is no distension.      Palpations: Abdomen is soft. There is no mass.      Tenderness: There is no abdominal tenderness.   Musculoskeletal:          General: No swelling or tenderness. Normal range of motion.      Cervical back: Normal range of motion and neck supple.      Right lower leg: No edema.      Left lower leg: No edema.   Skin:     General: Skin is warm and dry.      Capillary Refill: Capillary refill takes less than 2 seconds.   Neurological:      General: No focal deficit present.      Mental Status: He is lethargic.            CRANIAL NERVES     CN III, IV, VI   Pupils are equal, round, and reactive to light.

## 2023-07-26 NOTE — ED NOTES
"  APPEARANCE: awake and alert in NAD.  SKIN: warm, dry and intact. Bruising bilat arms PTA   MUSCULOSKELETAL: Patient moving all extremities spontaneously, no obvious swelling or deformities noted.   RESPIRATORY: Respirations even unlabored.   CARDIAC: 2+ distal pulses; no peripheral edema  ABDOMEN: S/ND/NT  : voids spontaneously, incontinent   Neurologic: AA not oriented, does not answer questions appropriately, does not follow verbal command, when asked name patient stated "bastard"   "

## 2023-07-26 NOTE — SUBJECTIVE & OBJECTIVE
Interval History: Met with Mr. Lemus and his brother-in-law Lincoln Live at bedside.    Past Medical History:   Diagnosis Date    BPH (benign prostatic hyperplasia)     Parkinsons 09/2019    R hand tremor starting in 2017, diagnosed Sept 2019 by Dr. Angeles at     Ulcerative colitis     s/p colectomy    Unspecified chronic bronchitis        Past Surgical History:   Procedure Laterality Date    TOTAL COLECTOMY         Review of patient's allergies indicates:   Allergen Reactions    Heparin     Heparin analogues Other (See Comments)     Not true allergy - but patient developed large spontaneous RP hematoma and concurrent severe epistaxis while on DVT prophylactic dose heparin - consider mechanical DVT ppx in future       Medications:  Continuous Infusions:  Scheduled Meds:   carbidopa-levodopa  mg  1 tablet Oral QID    dextrose 10%  50 g Intravenous Once    enoxparin  30 mg Subcutaneous Q24H (prophylaxis, 1700)    finasteride  5 mg Oral Daily    levoFLOXacin  750 mg Intravenous Q48H     PRN Meds:dextrose 10% **AND** dextrose 10%, glucagon (human recombinant), glucose, glucose, melatonin, naloxone, sodium chloride 0.9%, Pharmacy to dose Vancomycin consult **AND** vancomycin - pharmacy to dose    Family History    None       Tobacco Use    Smoking status: Never    Smokeless tobacco: Never   Substance and Sexual Activity    Alcohol use: Never    Drug use: Not on file    Sexual activity: Not on file       Review of Systems   Unable to perform ROS: Other   Objective:     Vital Signs (Most Recent):  Temp: 97.2 °F (36.2 °C) (07/26/23 1235)  Pulse: (!) 46 (07/26/23 1301)  Resp: 15 (07/26/23 1301)  BP: 136/65 (07/26/23 1301)  SpO2: 96 % (07/26/23 1301) Vital Signs (24h Range):  Temp:  [97.2 °F (36.2 °C)-99.1 °F (37.3 °C)] 97.2 °F (36.2 °C)  Pulse:  [46-83] 46  Resp:  [11-19] 15  SpO2:  [93 %-100 %] 96 %  BP: (108-165)/() 136/65     Weight: 47.3 kg (104 lb 4.4 oz)  Body mass index is 16.33 kg/m².       Physical  Exam  Constitutional:       General: He is not in acute distress.     Appearance: He is ill-appearing.   HENT:      Head: Normocephalic and atraumatic.      Right Ear: External ear normal.      Left Ear: External ear normal.      Nose: Nose normal.      Mouth/Throat:      Mouth: Mucous membranes are dry.   Eyes:      Extraocular Movements: Extraocular movements intact.      Conjunctiva/sclera: Conjunctivae normal.   Cardiovascular:      Rate and Rhythm: Normal rate.   Pulmonary:      Effort: Pulmonary effort is normal.   Abdominal:      General: Bowel sounds are normal.      Palpations: Abdomen is soft.   Musculoskeletal:         General: No swelling.   Lymphadenopathy:      Cervical: No cervical adenopathy.   Skin:     General: Skin is dry.      Coloration: Skin is pale.      Findings: Bruising present.   Neurological:      Comments: Extremely hearing impaired, bilaterally          Review of Symptoms      Symptom Assessment (ESAS 0-10 Scale)  Pain:  0  Dyspnea:  0  Anxiety:  0  Nausea:  0  Depression:  0  Anorexia:  0  Fatigue:  0  Insomnia:  0  Restlessness:  0  Agitation:  0  Unable to complete assessment due to Other         Pain Assessment in Advanced Demential Scale (PAINAD)   Breathing - Independent of vocalization:  0  Negative vocalization:  0  Facial expression:  0  Body language:  0  Consolability:  0  Total:  0    Living Arrangements:  Lives with spouse    Psychosocial/Cultural:   See Palliative Psychosocial Note: No  Former , . No children. Large family through wife who has a very close and supportive family  **Primary  to Follow**  Palliative Care  Consult: No    Spiritual:  F - [place] and Belief:  Restorationist - Presbyterian  I - Importance:  Important  C - Community:  Engaged, [facility]ian[facility]      Advance Care Planning   Advance Directives:   Living Will: No    LaPOST: No    Do Not Resuscitate Status: No    Medical Power of : No       Decision Making:  Family answered questions and Patient unable to communicate due to disease severity/cognitive impairment  Goals of Care: What is most important right now is to focus on quality of life. Accordingly, we have decided that the best plan to meet the patient's goals includes continuing with treatment.       Significant Labs: CBC:   Recent Labs   Lab 07/25/23 2037 07/26/23  0349   WBC 17.39* 13.74*   HGB 9.0* 8.1*   HCT 29.2* 26.0*    192     CMP:   Recent Labs   Lab 07/25/23 2037 07/26/23  0349    139   K 5.5* 4.7    107   CO2 23 23   GLU 68* 86   BUN 23 22   CREATININE 0.9 0.8   CALCIUM 9.1 8.4*   PROT 6.9 5.7*   ALBUMIN 2.3* 1.9*   BILITOT 0.3 0.2   ALKPHOS 99 77   AST 31 29   ALT 18 18   ANIONGAP 8 9     CBC:   Recent Labs   Lab 07/26/23 0349   WBC 13.74*   HGB 8.1*   HCT 26.0*   MCV 88        BMP:  Recent Labs   Lab 07/26/23  0349   GLU 86      K 4.7      CO2 23   BUN 22   CREATININE 0.8   CALCIUM 8.4*   MG 1.8     LFT:  Lab Results   Component Value Date    AST 29 07/26/2023    ALKPHOS 77 07/26/2023    BILITOT 0.2 07/26/2023     Albumin:   Albumin   Date Value Ref Range Status   07/26/2023 1.9 (L) 3.5 - 5.2 g/dL Final     Protein:   Total Protein   Date Value Ref Range Status   07/26/2023 5.7 (L) 6.0 - 8.4 g/dL Final     Lactic acid:   Lab Results   Component Value Date    LACTATE 1.3 07/25/2023    LACTATE 1.1 07/15/2023       Significant Imaging: I have reviewed all pertinent imaging results/findings within the past 24 hours.

## 2023-07-26 NOTE — CONSULTS
[facility] - Emergency Dept  Palliative Medicine  Consult Note    Patient Name: Giselle Lemus  MRN: [id number]  Admission Date: 7/25/2023  Hospital Length of Stay: 1 days  Code Status: Full Code   Attending Provider: Escobar Simno MD  Consulting Provider: Poornima Beasley MD  Primary Care Physician: Tl Torres MD  Principal Problem:Acute metabolic encephalopathy    Patient information was obtained from patient, relative(s) and primary team.      Inpatient consult to Palliative Care  Consult performed by: Poornima Beasley MD  Consult ordered by: Augie Burkett MD  Reason for consult: goals of care        Assessment/Plan:     Palliative Care  Palliative care encounter  Giselle Lemus is an 89-year-old man with a history of severe auditory impairment, ulcerative colitis s/p colectomy (2013), Parkinson's disease, dementia, chronic sacral pressure ulcer, severe protein-calorie malnutrition, recent admissions for aspiration pneumonia (June 2023) and recently admitted on July 15, 2023 for symptomatic COVID-19. He was discharged to SNF for severe deconditioning and returned to [facility] on July 25, 2023 for worsening encephalopathy and poor PO intake, found to have likely bacterial pneumonia due to either superimposed bacterial infection of prior viral pneumonia versus aspiration pneumonia. Palliative and Supportive Care was consulted to discuss end of life and hospice.    Advance Care Planning   Goals of Care:  - Code status: Full code  - Next of kin: wife Obi Lemus  - Patient does not have decision making capacity  - Prognosis: poor  - Family's understanding of prognosis: limited  - Goals: life prolongation  - Plans: will meet with Mr. Lemus and his family tomorrow (7/27) at 10:15 AM. Will plan to include his wife Obi, who is currently at [facility] for therapy. Reassured family that if Mrs. Lemus is in her therapy session at that time, we will be flexible to change the time so that Mrs. Lemus can participate as she  is the legal next of kin.    Goals of Care Conversation:  - 7/26/23: Met Mr. Lemus and his brother-in-law Lincoln Live at bedside. I have met Mr. Live last week when  And Mrs. Lemus were hospitalized at [facility] prior to their transfer to [facility]. Mr. Live shared that for the last two days, Mr. Lemus had been sleeping significantly more and not eating/drinking. The doctor/nurse at the nursing home told Mr. Live that they were going to send Mr. Lemus to the hospital, and that likely they will have to discuss whether to pursue PEG or hospice support. Mr. Live told Mrs. Lemus that he is worried that Mr. Lemus is going to the hospital, and may not be able to make it back to SNF because of how sick he is. Mr. Live acknowledges that Mr. Lemus is very weak, and they are recognizing that in his age, he is getting weaker with each hospitalization and unlikely to recover back to baseline. Mr. Live admits that he would be accepting of pursuing PEG placement if it means that his brother-in-law does get better. Mr. Live's brothers have disagreeing ideas about PEG - one believes that the PEG tube is necessary and the other worries that it won't be helpful and will only cause more harm than benefit. We agreed to discuss with the entire family tomorrow at 10:15 AM and plan to engage his wife Obi in the conversation about trajectory, prognosis and next steps.            Thank you for your consult. I will follow-up with patient. Please contact us if you have any additional questions.    Subjective:     HPI:   Giselle Lemus is an 89-year-old man with a history of severe auditory impairment, ulcerative colitis s/p colectomy (2013), Parkinson's disease, dementia, chronic sacral pressure ulcer, severe protein-calorie malnutrition, recent admissions for aspiration pneumonia (June 2023) and recently admitted on July 15, 2023 for symptomatic COVID-19. He was discharged to SNF for severe deconditioning and returned to [facility] on  July 25, 2023 for worsening encephalopathy and poor PO intake, found to have likely bacterial pneumonia due to either superimposed bacterial infection of prior viral pneumonia versus aspiration pneumonia. Palliative and Supportive Care was consulted to discuss end of life and hospice.      Hospital Course:  No notes on file    Interval History: Met with Mr. Lemus and his brother-in-law Lincoln Live at bedside.    Past Medical History:   Diagnosis Date    BPH (benign prostatic hyperplasia)     Parkinsons 09/2019    R hand tremor starting in 2017, diagnosed Sept 2019 by Dr. Angeles at     Ulcerative colitis     s/p colectomy    Unspecified chronic bronchitis        Past Surgical History:   Procedure Laterality Date    TOTAL COLECTOMY         Review of patient's allergies indicates:   Allergen Reactions    Heparin     Heparin analogues Other (See Comments)     Not true allergy - but patient developed large spontaneous RP hematoma and concurrent severe epistaxis while on DVT prophylactic dose heparin - consider mechanical DVT ppx in future       Medications:  Continuous Infusions:  Scheduled Meds:   carbidopa-levodopa  mg  1 tablet Oral QID    dextrose 10%  50 g Intravenous Once    enoxparin  30 mg Subcutaneous Q24H (prophylaxis, 1700)    finasteride  5 mg Oral Daily    levoFLOXacin  750 mg Intravenous Q48H     PRN Meds:dextrose 10% **AND** dextrose 10%, glucagon (human recombinant), glucose, glucose, melatonin, naloxone, sodium chloride 0.9%, Pharmacy to dose Vancomycin consult **AND** vancomycin - pharmacy to dose    Family History    None       Tobacco Use    Smoking status: Never    Smokeless tobacco: Never   Substance and Sexual Activity    Alcohol use: Never    Drug use: Not on file    Sexual activity: Not on file       Review of Systems   Unable to perform ROS: Other   Objective:     Vital Signs (Most Recent):  Temp: 97.2 °F (36.2 °C) (07/26/23 1235)  Pulse: (!) 46 (07/26/23 1301)  Resp: 15  (07/26/23 1301)  BP: 136/65 (07/26/23 1301)  SpO2: 96 % (07/26/23 1301) Vital Signs (24h Range):  Temp:  [97.2 °F (36.2 °C)-99.1 °F (37.3 °C)] 97.2 °F (36.2 °C)  Pulse:  [46-83] 46  Resp:  [11-19] 15  SpO2:  [93 %-100 %] 96 %  BP: (108-165)/() 136/65     Weight: 47.3 kg (104 lb 4.4 oz)  Body mass index is 16.33 kg/m².       Physical Exam  Constitutional:       General: He is not in acute distress.     Appearance: He is ill-appearing.   HENT:      Head: Normocephalic and atraumatic.      Right Ear: External ear normal.      Left Ear: External ear normal.      Nose: Nose normal.      Mouth/Throat:      Mouth: Mucous membranes are dry.   Eyes:      Extraocular Movements: Extraocular movements intact.      Conjunctiva/sclera: Conjunctivae normal.   Cardiovascular:      Rate and Rhythm: Normal rate.   Pulmonary:      Effort: Pulmonary effort is normal.   Abdominal:      General: Bowel sounds are normal.      Palpations: Abdomen is soft.   Musculoskeletal:         General: No swelling.   Lymphadenopathy:      Cervical: No cervical adenopathy.   Skin:     General: Skin is dry.      Coloration: Skin is pale.      Findings: Bruising present.   Neurological:      Comments: Extremely hearing impaired, bilaterally          Review of Symptoms      Symptom Assessment (ESAS 0-10 Scale)  Pain:  0  Dyspnea:  0  Anxiety:  0  Nausea:  0  Depression:  0  Anorexia:  0  Fatigue:  0  Insomnia:  0  Restlessness:  0  Agitation:  0  Unable to complete assessment due to Other         Pain Assessment in Advanced Demential Scale (PAINAD)   Breathing - Independent of vocalization:  0  Negative vocalization:  0  Facial expression:  0  Body language:  0  Consolability:  0  Total:  0    Living Arrangements:  Lives with spouse    Psychosocial/Cultural:   See Palliative Psychosocial Note: No  Former , . No children. Large family through wife who has a very close and supportive family  **Primary  to  Follow**  Palliative Care  Consult: No    Spiritual:  F - Rozina and Belief:  Quaker - Presbyterian  I - Importance:  Important  C - Community:  Engaged, [facility]      Advance Care Planning   Advance Directives:   Living Will: No    LaPOST: No    Do Not Resuscitate Status: No    Medical Power of : No      Decision Making:  Family answered questions and Patient unable to communicate due to disease severity/cognitive impairment  Goals of Care: What is most important right now is to focus on quality of life. Accordingly, we have decided that the best plan to meet the patient's goals includes continuing with treatment.       Significant Labs: CBC:   Recent Labs   Lab 07/25/23 2037 07/26/23  0349   WBC 17.39* 13.74*   HGB 9.0* 8.1*   HCT 29.2* 26.0*    192     CMP:   Recent Labs   Lab 07/25/23 2037 07/26/23 0349    139   K 5.5* 4.7    107   CO2 23 23   GLU 68* 86   BUN 23 22   CREATININE 0.9 0.8   CALCIUM 9.1 8.4*   PROT 6.9 5.7*   ALBUMIN 2.3* 1.9*   BILITOT 0.3 0.2   ALKPHOS 99 77   AST 31 29   ALT 18 18   ANIONGAP 8 9     CBC:   Recent Labs   Lab 07/26/23 0349   WBC 13.74*   HGB 8.1*   HCT 26.0*   MCV 88        BMP:  Recent Labs   Lab 07/26/23 0349   GLU 86      K 4.7      CO2 23   BUN 22   CREATININE 0.8   CALCIUM 8.4*   MG 1.8     LFT:  Lab Results   Component Value Date    AST 29 07/26/2023    ALKPHOS 77 07/26/2023    BILITOT 0.2 07/26/2023     Albumin:   Albumin   Date Value Ref Range Status   07/26/2023 1.9 (L) 3.5 - 5.2 g/dL Final     Protein:   Total Protein   Date Value Ref Range Status   07/26/2023 5.7 (L) 6.0 - 8.4 g/dL Final     Lactic acid:   Lab Results   Component Value Date    LACTATE 1.3 07/25/2023    LACTATE 1.1 07/15/2023       Significant Imaging: I have reviewed all pertinent imaging results/findings within the past 24 hours.        I spent a total of 75 minutes on the day of the visit. This includes face to face  time in discussion of goals of care, symptom assessment, coordination of care and emotional support.  This also includes non-face to face time preparing to see the patient (eg, review of tests/imaging), obtaining and/or reviewing separately obtained history, documenting clinical information in the electronic or other health record, independently interpreting results and communicating results to the patient/family/caregiver, or care coordinator.    Poornima Beasley MD  Palliative Medicine  [facility] - Emergency Dept

## 2023-07-27 PROBLEM — L30.8 DERMATITIS ASSOCIATED WITH MOISTURE: Status: ACTIVE | Noted: 2023-01-01

## 2023-07-27 NOTE — PLAN OF CARE
Problem: Occupational Therapy  Goal: Occupational Therapy Goal  Description: Goals to be met by: 8/27/23     Patient will increase functional independence with ADLs by performing:    UE Dressing with Maximum Assistance.  LE Dressing with Maximum Assistance.  Grooming while seated with Maximum Assistance.  Toileting from bedside commode with Maximum Assistance for hygiene and clothing management.   Supine to sit with Moderate Assistance.  Step transfer with Moderate Assistance.    Outcome: Ongoing, Progressing     OT eval completed. The above goals are established to improve function and mobility.

## 2023-07-27 NOTE — CONSULTS
[facility] - Med Surg  Adult Nutrition  Consult Note    SUMMARY     Recommendations    1. Continue Regular, recommend adding High-Calorie, High-Protein Diet. Continue to encourage PO intake and adequate fluid intake. Offer pleasure foods caloric/nutrient-dense options-- small, frequent meals/snacks.   2. Recommend SLP evaluation to determine if texture modification is appropriate.   3. Recommend continue Boost Plus with all meals.   4. Recommend MVI once daily.   5. Recommend continue routine measurements to assess for any acute weight changes.   6. RD to monitor and follow-up.    Goals: Meet % EEN/EPN by follow-up date.  Nutrition Goal Status: new  Communication of RD Recs: other (comment) (POC)    Assessment and Plan    Nutrition Problem:  Severe Protein-Calorie Malnutrition  Malnutrition in the context of Chronic Illness/Injury    Related to (etiology):  Decreased ability to consume sufficient energy    Signs and Symptoms (as evidenced by):  Poor PO intake, cachetic  Weight Loss: 13% x 5 weeks       Interventions(treatment strategy):  Collaboration of nutrition care with other providers  Commercial beverages  Regular, High-Calorie, High-Protein Diet  Vitamin Supplementation    Nutrition Diagnosis Status:  New        Malnutrition Assessment  Malnutrition Context: chronic illness  Malnutrition Level: severe  Skin (Micronutrient): dry, bruised, thinned, turgor reduced, wounds unhealed  Nails (Micronutrient): brittle  Hair/Scalp (Micronutrient): dry, dull, scaly/flaky  Eyes (Micronutrient): other (see comments) (corneal arcus)  Extraoral (Micronutrient): none  Gums (Micronutrient): none  Lips/Mucous Membranes (Micronutrient): none  Teeth (Micronutrient): carries, broken dentition, mottled, yellow-brown pigment  Neck/Chest (Micronutrient): muscle wasting, bony prominence, subcutaneous fat loss, neck veins distended  Musculoskeletal/Lower Extremities: muscle wasting, muscle control poor, subcutaneous fat loss    Micronutrient Evaluation Summary: suspected deficiency   Weight Loss (Malnutrition): other (see comments) (13% x 5 weeks)  Energy Intake (Malnutrition): less than 75% for greater than or equal to 3 months  Subcutaneous Fat (Malnutrition): severe depletion  Muscle Mass (Malnutrition): severe depletion  Hand  Strength, Left (Malnutrition): Diminished  Hand  Strength, Right (Malnutrition): Diminished   Orbital Region (Subcutaneous Fat Loss): severe depletion  Upper Arm Region (Subcutaneous Fat Loss): severe depletion  Thoracic and Lumbar Region: severe depletion   [place] Region (Muscle Loss): severe depletion  Clavicle Bone Region (Muscle Loss): severe depletion  Clavicle and Acromion Bone Region (Muscle Loss): severe depletion  Scapular Bone Region (Muscle Loss): severe depletion  Dorsal Hand (Muscle Loss): moderate depletion  Patellar Region (Muscle Loss): severe depletion  Anterior Thigh Region (Muscle Loss): severe depletion  Posterior Calf Region (Muscle Loss): severe depletion                 Reason for Assessment    Reason For Assessment: consult  Diagnosis: other (see comments) (Acute metabolic encephalopathy)  Relevant Medical History: BPH, dementia, recent COVID, UC s/p colectomy, Parkinson's  Interdisciplinary Rounds: did not attend  General Information Comments: RD consulted for poor PO intake, cachetic, FTT and diet recommendations. Pt has not eaten/drank anything in 2 days. -125lbs; Wt loss noted 16lbs since last admit. Rd observed patient being fed by family member. NFPE completed 7/27 severe fat and muscle loss in the orbital, clavicle, and temple regions. Pt meets criteria for malnutrition.  Nutrition Discharge Planning: Pending Clinical course    Nutrition Risk Screen    Nutrition Risk Screen: reduced oral intake over the last month    Nutrition/Diet History    Patient Reported Diet/Restrictions/Preferences: general  Spiritual, Cultural Beliefs, Quaker Practices, Values that Affect  "Care: no  Food Allergies: NKFA  Factors Affecting Nutritional Intake: impaired cognitive status/motor control, decreased appetite    Anthropometrics    Temp: 97.4 °F (36.3 °C)  Height: 5' 7" (170.2 cm)  Height (inches): 67 in  Weight: 47.2 kg (104 lb)  Weight (lb): 104 lb  Ideal Body Weight (IBW), Male: 148 lb  % Ideal Body Weight, Male (lb): 70.27 %  BMI (Calculated): 16.3  BMI Grade: 16 - 16.9 protein-energy malnutrition grade II  Weight Loss: unintentional  Usual Body Weight (UBW), k.54 kg  Weight Change Amount: 16 lb  % Usual Body Weight: 86.68  % Weight Change From Usual Weight: -13.51 %       Lab/Procedures/Meds    Pertinent Labs Reviewed: reviewed  Pertinent Labs Comments: H/H: 8.8/28.4, MCHC 31.0, ALT <5  Pertinent Medications Reviewed: reviewed  Pertinent Medications Comments: levodopa, levofloxacin, vancomycin      Estimated/Assessed Needs    Weight Used For Calorie Calculations: 47.2 kg (104 lb)  Energy Calorie Requirements (kcal): 9722-1322 kcal (30-40 kcal/kg)     Protein Requirements: 47-76 g/d (1.0-1.6 g/kg)  Weight Used For Protein Calculations: 47.2 kg (104 lb)        RDA Method (mL): 1415         Nutrition Prescription Ordered    Current Diet Order: Regular  Oral Nutrition Supplement: Boost Plus (All meals)    Evaluation of Received Nutrient/Fluid Intake    I/O: -250 mL  Comments: LBM: 7/20  % Intake of Estimated Energy Needs: 75 - 100 %  % Meal Intake: 25 - 50 %    Nutrition Risk    Level of Risk/Frequency of Follow-up: moderate (1x/ week)       Monitor and Evaluation    Food and Nutrient Intake: energy intake, food and beverage intake  Food and Nutrient Adminstration: diet order  Knowledge/Beliefs/Attitudes: food and nutrition knowledge/skill, beliefs and attitudes  Physical Activity and Function: nutrition-related ADLs and IADLs, factors affecting access to physical activity  Anthropometric Measurements: weight, weight change, body mass index  Biochemical Data, Medical Tests and Procedures: " gastrointestinal profile, electrolyte and renal panel, glucose/endocrine profile, inflammatory profile, lipid profile  Nutrition-Focused Physical Findings: skin, head and eyes, extremities, muscles and bones, overall appearance       Nutrition Follow-Up    RD Follow-up?: Yes    Yuri Doty Registration Eligible, Provisional LDN

## 2023-07-27 NOTE — ASSESSMENT & PLAN NOTE
- on sacrum, present on arrival  - wound care consulted, recommendations in place  - waffle mattress, pressure reduction, turn 2qh

## 2023-07-27 NOTE — SUBJECTIVE & OBJECTIVE
Interval History: Met with Mr. Lemus and his brother-in-law Lincoln Live at bedside. Family meeting held with remainder of family on the phone including wife Obi Lemus.    Past Medical History:   Diagnosis Date    BPH (benign prostatic hyperplasia)     Parkinsons 09/2019    R hand tremor starting in 2017, diagnosed Sept 2019 by Dr. Angeles at     Ulcerative colitis     s/p colectomy    Unspecified chronic bronchitis        Past Surgical History:   Procedure Laterality Date    TOTAL COLECTOMY         Review of patient's allergies indicates:   Allergen Reactions    Heparin     Heparin analogues Other (See Comments)     Not true allergy - but patient developed large spontaneous RP hematoma and concurrent severe epistaxis while on DVT prophylactic dose heparin - consider mechanical DVT ppx in future       Medications:  Continuous Infusions:  Scheduled Meds:   carbidopa-levodopa  mg  1 tablet Oral QID    dextrose 10%  50 g Intravenous Once    finasteride  5 mg Oral Daily    levoFLOXacin  750 mg Intravenous Q48H    vancomycin (VANCOCIN) IV (PEDS and ADULTS)  750 mg Intravenous Q24H     PRN Meds:dextrose 10% **AND** dextrose 10%, glucagon (human recombinant), glucose, glucose, hydrALAZINE, melatonin, naloxone, sodium chloride 0.9%, Pharmacy to dose Vancomycin consult **AND** vancomycin - pharmacy to dose    Family History    None       Tobacco Use    Smoking status: Never    Smokeless tobacco: Never   Substance and Sexual Activity    Alcohol use: Never    Drug use: Not on file    Sexual activity: Not on file       Review of Systems   Unable to perform ROS: Other   Objective:     Vital Signs (Most Recent):  Temp: 97.4 °F (36.3 °C) (07/27/23 0523)  Pulse: 62 (07/27/23 1044)  Resp: 18 (07/27/23 1025)  BP: 138/67 (07/27/23 1025)  SpO2: 96 % (07/27/23 1025) Vital Signs (24h Range):  Temp:  [97.4 °F (36.3 °C)-97.7 °F (36.5 °C)] 97.4 °F (36.3 °C)  Pulse:  [] 62  Resp:  [17-20] 18  SpO2:  [95 %-98 %] 96 %  BP:  (128-194)/(59-86) 138/67     Weight: 47.2 kg (104 lb)  Body mass index is 16.29 kg/m².       Physical Exam  Constitutional:       General: He is not in acute distress.     Appearance: He is ill-appearing.   HENT:      Head: Normocephalic and atraumatic.      Right Ear: External ear normal.      Left Ear: External ear normal.      Nose: Nose normal.      Mouth/Throat:      Mouth: Mucous membranes are dry.   Eyes:      Extraocular Movements: Extraocular movements intact.      Conjunctiva/sclera: Conjunctivae normal.   Cardiovascular:      Rate and Rhythm: Normal rate.   Pulmonary:      Effort: Pulmonary effort is normal.   Abdominal:      General: Bowel sounds are normal.      Palpations: Abdomen is soft.   Musculoskeletal:         General: No swelling.   Lymphadenopathy:      Cervical: No cervical adenopathy.   Skin:     General: Skin is dry.      Coloration: Skin is pale.      Findings: Bruising present.   Neurological:      Comments: Extremely hearing impaired, bilaterally          Review of Symptoms      Symptom Assessment (ESAS 0-10 Scale)  Pain:  0  Dyspnea:  0  Anxiety:  0  Nausea:  0  Depression:  0  Anorexia:  0  Fatigue:  0  Insomnia:  0  Restlessness:  0  Agitation:  0 due to Other         Pain Assessment in Advanced Demential Scale (PAINAD)   Breathing - Independent of vocalization:  0  Negative vocalization:  0  Facial expression:  0  Body language:  0  Consolability:  0  Total:  0    Living Arrangements:  Lives with spouse    Psychosocial/Cultural:   See Palliative Psychosocial Note: No  Former , . No children. Large family through wife who has a very close and supportive family  **Primary  to Follow**  Palliative Care  Consult: No    Spiritual:  F - Rozina and Belief:  Quaker - Presbyterian  I - Importance:  Important  C - Community:  [facility]      Advance Care Planning   Advance Directives:   Living Will: No    LaPOST:  No    Do Not Resuscitate Status: No    Medical Power of : No      Decision Making:  Family answered questions and Patient unable to communicate due to disease severity/cognitive impairment  Goals of Care: The family endorses that what is most important right now is to focus on comfort and QOL     Accordingly, we have decided that the best plan to meet the patient's goals includes enrolling in hospice care         Significant Labs: CBC:   Recent Labs   Lab 07/25/23 2037 07/26/23 0349 07/27/23  0543   WBC 17.39* 13.74* 14.55*   HGB 9.0* 8.1* 8.8*   HCT 29.2* 26.0* 28.4*    192 223       CMP:   Recent Labs   Lab 07/25/23 2037 07/26/23 0349 07/27/23  0543    139 138   K 5.5* 4.7 4.6    107 103   CO2 23 23 25   GLU 68* 86 93   BUN 23 22 23   CREATININE 0.9 0.8 0.8   CALCIUM 9.1 8.4* 8.8   PROT 6.9 5.7* 6.2   ALBUMIN 2.3* 1.9* 2.0*   BILITOT 0.3 0.2 0.3   ALKPHOS 99 77 82   AST 31 29 30   ALT 18 18 <5*   ANIONGAP 8 9 10       CBC:   Recent Labs   Lab 07/27/23  0543   WBC 14.55*   HGB 8.8*   HCT 28.4*   MCV 87          BMP:  Recent Labs   Lab 07/27/23  0543   GLU 93      K 4.6      CO2 25   BUN 23   CREATININE 0.8   CALCIUM 8.8   MG 2.2       LFT:  Lab Results   Component Value Date    AST 30 07/27/2023    ALKPHOS 82 07/27/2023    BILITOT 0.3 07/27/2023     Albumin:   Albumin   Date Value Ref Range Status   07/27/2023 2.0 (L) 3.5 - 5.2 g/dL Final     Protein:   Total Protein   Date Value Ref Range Status   07/27/2023 6.2 6.0 - 8.4 g/dL Final     Lactic acid:   Lab Results   Component Value Date    LACTATE 1.3 07/25/2023    LACTATE 1.1 07/15/2023       Significant Imaging: I have reviewed all pertinent imaging results/findings within the past 24 hours.

## 2023-07-27 NOTE — PLAN OF CARE
Problem: Infection  Goal: Absence of Infection Signs and Symptoms  Outcome: Ongoing, Progressing     Problem: Adult Inpatient Plan of Care  Goal: Plan of Care Review  Outcome: Ongoing, Progressing  Goal: Patient-Specific Goal (Individualized)  Outcome: Ongoing, Progressing  Goal: Absence of Hospital-Acquired Illness or Injury  Outcome: Ongoing, Progressing  Goal: Optimal Comfort and Wellbeing  Outcome: Ongoing, Progressing  Goal: Readiness for Transition of Care  Outcome: Ongoing, Progressing     Problem: Fall Injury Risk  Goal: Absence of Fall and Fall-Related Injury  Outcome: Ongoing, Progressing     Problem: Coping Ineffective  Goal: Effective Coping  Outcome: Ongoing, Progressing     Problem: Adjustment to Illness (Sepsis/Septic Shock)  Goal: Optimal Coping  Outcome: Ongoing, Progressing     Problem: Bleeding (Sepsis/Septic Shock)  Goal: Absence of Bleeding  Outcome: Ongoing, Progressing     Problem: Glycemic Control Impaired (Sepsis/Septic Shock)  Goal: Blood Glucose Level Within Desired Range  Outcome: Ongoing, Progressing     Problem: Infection Progression (Sepsis/Septic Shock)  Goal: Absence of Infection Signs and Symptoms  Outcome: Ongoing, Progressing     Problem: Nutrition Impaired (Sepsis/Septic Shock)  Goal: Optimal Nutrition Intake  Outcome: Ongoing, Progressing     Problem: Fluid Imbalance (Pneumonia)  Goal: Fluid Balance  Outcome: Ongoing, Progressing     Problem: Infection (Pneumonia)  Goal: Resolution of Infection Signs and Symptoms  Outcome: Ongoing, Progressing     Problem: Respiratory Compromise (Pneumonia)  Goal: Effective Oxygenation and Ventilation  Outcome: Ongoing, Progressing     Problem: Impaired Wound Healing  Goal: Optimal Wound Healing  Outcome: Ongoing, Progressing     Problem: Skin Injury Risk Increased  Goal: Skin Health and Integrity  Outcome: Ongoing, Progressing     Problem: Confusion Acute  Goal: Optimal Cognitive Function  Outcome: Ongoing, Progressing

## 2023-07-27 NOTE — PLAN OF CARE
PT eval completed- see note for details, goals and POC established.     Problem: Physical Therapy  Goal: Physical Therapy Goal  Description: Goals to be met by: 8/10/23     Patient will increase functional independence with mobility by performin. Supine to sit with MInimal Assistance  2. Sit to supine with MInimal Assistance  3. Rolling to Left and Right with Minimal Assistance.  4. Sit to stand transfer with Minimal Assistance using Rolling walker  5. Bed to chair/wheelchair transfer with Minimal Assistance using Rolling Walker  6. Sitting at edge of bed x5 minutes with Stand-by Assistance to perform ADLs  7. Lower extremity exercise program x12 reps per handout, with assistance as needed    Outcome: Ongoing, Progressing   2023

## 2023-07-27 NOTE — CONSULTS
[facility] Surg  Wound Care    Patient Name:  Giselle Lemus   MRN:  [id number]  Date: 7/27/2023  Diagnosis: Acute metabolic encephalopathy    History:     Past Medical History:   Diagnosis Date    BPH (benign prostatic hyperplasia)     Parkinsons 09/2019    R hand tremor starting in 2017, diagnosed Sept 2019 by Dr. Angeles at     Ulcerative colitis     s/p colectomy    Unspecified chronic bronchitis        Social History     Socioeconomic History    Marital status:    Tobacco Use    Smoking status: Never    Smokeless tobacco: Never   Substance and Sexual Activity    Alcohol use: Never   Social History Narrative    ** Merged History Encounter **          Social Determinants of Health     Financial Resource Strain: Low Risk     Difficulty of Paying Living Expenses: Not very hard   Food Insecurity: No Food Insecurity    Worried About Running Out of Food in the Last Year: Never true    Ran Out of Food in the Last Year: Never true   Transportation Needs: No Transportation Needs    Lack of Transportation (Medical): No    Lack of Transportation (Non-Medical): No   Physical Activity: Unknown    Days of Exercise per Week: Patient refused    Minutes of Exercise per Session: Patient refused   Stress: No Stress Concern Present    Feeling of Stress : Only a little   Social Connections: Unknown    Frequency of Communication with Friends and Family: Patient refused    Frequency of Social Gatherings with Friends and Family: Patient refused    Attends Sabianist Services: Patient refused    Active Member of Clubs or Organizations: Patient refused    Attends Club or Organization Meetings: Patient refused    Marital Status:    Housing Stability: High Risk    Unable to Pay for Housing in the Last Year: Yes    Number of Places Lived in the Last Year: 1    Unstable Housing in the Last Year: No       Precautions:     Allergies as of 07/25/2023 - Reviewed 07/25/2023   Allergen Reaction Noted    Heparin  06/01/2022    Heparin  analogues Other (See Comments) 05/15/2022       [facility] Assessment Details/Treatment   Patient seen for wound care consultation.   Reviewed chart for this encounter.   See Flow Sheet for findings.    Pt lying in bed. Primary RN assisted Wound Care in turning pt. Removed old foam dressing revealing intact skin with blanchable pinkness. Mepilex applied to protect against friction and shearing. Wound Care to order a waffle mattress to assist in pressure prevention.     RECOMMENDATIONS  Recommendations made to primary team for above plan via secured chat. Wound Care to follow up. Orders placed.     Discussed POC with patient and primary RN.   See EMR for orders & patient education.  Discussed POC with primary team.    Nursing to continue care.  Nursing to maintain pressure injury prevention interventions.  Contact wound care for any further questions.         07/27/23 0950   WOCN Assessment   WOCN Total Time (mins) 30   Visit Date 07/27/23   Visit Time 0950   Consult Type New   Intervention assessed;applied;chart review;coordination of care;orders   Teaching on-going        Altered Skin Integrity 07/15/23 2300 medial Buttocks #1 Moisture associated dermatitis Partial thickness tissue loss. Shallow open ulcer with a red or pink wound bed, without slough. Intact or Open/Ruptured Serum-filled blister.   Date First Assessed/Time First Assessed: 07/15/23 2300   Altered Skin Integrity Present on Admission - Did Patient arrive to the hospital with altered skin?: yes  Orientation: medial  Location: Buttocks  Wound Number: #1  Is this injury device related...   Wound Image    Dressing Appearance Dry;Intact;Clean   Drainage Amount None   Appearance Intact;Dry   Dressing Applied;Foam   Dressing Change Due 07/30/23 07/27/2023

## 2023-07-27 NOTE — PT/OT/SLP EVAL
Physical Therapy Co-Evaluation    Patient Name:  Giselle Lemus   MRN:  [id number]    Recommendations:     Discharge Recommendations: nursing facility, basic   Discharge Equipment Recommendations: to be determined by next level of care   Barriers to discharge:  increased assistance needed    Assessment:     Giselle Lemus is a 89 y.o. male admitted with a medical diagnosis of Acute metabolic encephalopathy.  He presents with the following impairments/functional limitations: impaired cognition, impaired functional mobility, impaired self care skills, impaired endurance, weakness, impaired balance, decreased upper extremity function, decreased coordination, decreased safety awareness, decreased lower extremity function, impaired joint extensibility, impaired muscle length, decreased ROM   Pt AAOx0, unable to express needs, follows minimal to no commands upon evaluation due to current condition. Sitter/caregiver, who was present in the room, states she has been sitting for the pt for a while now and that he is able to use a rollator at baseline.Total assistance needed for bed mobility and for balance sitting EOB due to posterior lean. Pending medical status and potential for progress with therapy, recommending basic NH at discharge once medically stable.    Rehab Prognosis: Fair; patient would benefit from acute skilled PT services to address these deficits and reach maximum level of function.    Recent Surgery: * No surgery found *      Plan:     During this hospitalization, patient to be seen 2 x/week to address the identified rehab impairments via therapeutic activities, gait training, therapeutic exercises, neuromuscular re-education and progress toward the following goals:    Plan of Care Expires:  08/26/23    Subjective     Chief Complaint: Pt did not state  Patient/Family Comments/goals: Sitter/caregiver who has been with him states he is not usually like this and that he is able to amb with rollator at  baseline  Pain/Comfort:  Pain Rating 1: 0/10  Pain Rating Post-Intervention 1: 0/10    Patients cultural, spiritual, Worship conflicts given the current situation: no    Patient History:     Living Environment: Pt came from a SNF were his wife is also placed  Prior Level of Function: sitter/caregiver in room stated pt is able to amb with rollator at baseline. Sitter assists with ADLs  DME owned: rollator  Caregiver Assistance: staff from nursing facility, sitter/caregiver     Objective:     Communicated with RN prior to session.  Patient found HOB elevated with monreal catheter, peripheral IV  upon PT entry to room.    General Precautions: Standard, airborne, droplet, contact, fall, aspiration  Orthopedic Precautions:N/A   Braces: N/A  Respiratory Status: Nasal cannula, flow 2 L/min    Exams:  Cognitive Exam:  Patient is oriented to AAOx0  RLE ROM: Deficits: decreased PROM knee extension and Ankle DF  LLE ROM: Deficits: decreased PROM knee extensions and Ankle DF  Unable to test strength due to poor command following    Functional Mobility:    Bed Mobility:   Supine > Sit: total assistance and of 2 persons  Sit > Supine: total assistance and of 2 persons  Scooting: total assistance and of 2 persons    Balance:   Sitting balance: POOR: N/A  Needed total A for balance sitting EOB due to posterior lean      AM-PAC 6 CLICK MOBILITY  Total Score:8     OT present for coeval due to pt's multiple medical comorbidities and functional/cognition deficits requiring two skilled therapists to appropriately progress pt's musculoskeletal strength, neuromuscular control, and endurance while taking into consideration medical acuity and pt safety.    Treatment & Education:  Pt needed significant assistance to perform bed mobility due to increased confusion with poor command following.  Pt and caregiver educated on safety with mobility and using call button for assistance from nursing staff.  Pt and caregiver educated on tips to  reduce fall risk.  All questions answered within the scope of PT.  White board updated accordingly.      Patient left HOB elevated with all lines intact, call button in reach, and sitter/caregiver present.    GOALS:   Multidisciplinary Problems       Physical Therapy Goals          Problem: Physical Therapy    Goal Priority Disciplines Outcome Goal Variances Interventions   Physical Therapy Goal     PT, PT/OT Ongoing, Progressing     Description: Goals to be met by: 8/10/23     Patient will increase functional independence with mobility by performin. Supine to sit with MInimal Assistance  2. Sit to supine with MInimal Assistance  3. Rolling to Left and Right with Minimal Assistance.  4. Sit to stand transfer with Minimal Assistance using Rolling walker  5. Bed to chair/wheelchair transfer with Minimal Assistance using Rolling Walker  6. Sitting at edge of bed x5 minutes with Stand-by Assistance to perform ADLs  7. Lower extremity exercise program x12 reps per handout, with assistance as needed                         History:     Past Medical History:   Diagnosis Date    BPH (benign prostatic hyperplasia)     Parkinsons 2019    R hand tremor starting in , diagnosed 2019 by Dr. Angeles at     Ulcerative colitis     s/p colectomy    Unspecified chronic bronchitis        Past Surgical History:   Procedure Laterality Date    TOTAL COLECTOMY         Time Tracking:     PT Received On: 23  PT Start Time: 1350     PT Stop Time: 1410  PT Total Time (min): 20 min     Billable Minutes: Evaluation 10 and Therapeutic Activity 10      2023

## 2023-07-27 NOTE — NURSING
Notified Daiana Souza withe speech that the patient is able to swallow pills and eggs but he allows it to collect at the back of his throat and has to be coached to swallow it down with some water. He struggles with using a straw, sometimes blowing instead of sucking. Daiana saw the patient and said his swallowing is good but just to make sure he is upright and alert when being fed soft foods.

## 2023-07-27 NOTE — PROGRESS NOTES
[facility] - Med Surg  Palliative Medicine  Progress Note    Patient Name: Giselle Lemus  MRN: [id number]  Admission Date: 7/25/2023  Hospital Length of Stay: 2 days  Code Status: DNR   Attending Provider: Escobar Simon MD  Consulting Provider: Poornima Beasley MD  Primary Care Physician: Tl Torres MD  Principal Problem:Acute metabolic encephalopathy    Patient information was obtained from patient, spouse/SO, relative(s) and primary team.      Assessment/Plan:     Palliative Care  Palliative care encounter  Giselle Lemus is an 89-year-old man with a history of severe auditory impairment, ulcerative colitis s/p colectomy (2013), Parkinson's disease, dementia, chronic sacral pressure ulcer, severe protein-calorie malnutrition, recent admissions for aspiration pneumonia (June 2023) and recently admitted on July 15, 2023 for symptomatic COVID-19. He was discharged to SNF for severe deconditioning and returned to [facility] on July 25, 2023 for worsening encephalopathy and poor PO intake, found to have likely bacterial pneumonia due to either superimposed bacterial infection of prior viral pneumonia versus aspiration pneumonia. Palliative and Supportive Care was consulted to discuss end of life and hospice.    Advance Care Planning   Goals of Care:  - Code status: updated to DNAR/DNI  - Next of kin: wife Obi Lemus  - Patient does not have decision making capacity  - Prognosis: poor  - Family's understanding of prognosis: fair  - Goals: life prolongation  - Recommendations: please engage case management to explore return to [facility] in the same room as his wife Obi Lemus who is there for SNF. Eventually the hope is that they will ultimately be at home with hospice support and 24/7 caregivers, but family would have to arrange this and need time to do so. Family has very good understanding of his decline. I have recommended against PEG insertion as it will not decrease his risk of aspiration and only prolong his  suffering and dying process. Will continue to follow along to provide support and clarify any concerns/questions that family may have after our emotional family meeting.    Goals of Care Conversation:  - 7/27/23: Met Dr. Lemus and his brother-in-law Lincoln Live at bedside. Mr. Live got the remainder of family (wife Obi and her other brothers) on the phone and we spoke via speakerphone. They were able to express their understanding that  Allan has been hospitalized four times in the last four weeks, and that he's getting weaker. They acknowledge that he is getting older (almost 89 years old in August) and that in the setting of an incurable disease (Parkinson's) he will continue to decline over time. Agreed with family that he is getting weaker with each hospitalization despite everyone's earnest efforts to restore him back to baseline. I shared that I believe he is in the last chapter of his life. Recommended that we focus on his comfort as everything we've done to try to buy meaningful time and delay the inevitable is not working to buy him more quality time, but at best prolonging his suffering. Family does not want Dr. Lemus to be suffering and they would want him to be treated with dignity, respect and comfort for the remainder of his time. They believe that Dr. Lemus's strong shyam and dedication to God brings peace to Dr. Lemus, knowing that when he dies he will go to [facility]. I asked Obi if she had any thoughts, concerns, questions, and she admits that she doesn't know. Family expressed support for Obi and reassured her that they can continue talking after our meeting and make any changes to honor their preferences. We did discuss code status and I recommended that in efforts to protect him from interventions that would cause more pain/suffering, to not do CPR/intubation when God calls him home to [facility]. Family agreed and expressed understanding. They asked if it was possible to get him to [facility] again so  he can be with his wife. This will also buy family time to arrange and hire caregivers 24/7 to care for Dr. Lemus at home if possible and then take him out of the nursing home. I agreed with family that this is a very thoughtful plan and will be in communication with his primary team.  - 7/26/23: Met Mr. Lemus and his brother-in-law Lincoln Live at bedside. I have met Mr. Live last week when  And Mrs. Lemus were hospitalized at [facility] prior to their transfer to [facility]. Mr. Live shared that for the last two days, Mr. Lemus had been sleeping significantly more and not eating/drinking. The doctor/nurse at the nursing home told Mr. Live that they were going to send Mr. Lemus to the hospital, and that likely they will have to discuss whether to pursue PEG or hospice support. Mr. Live told Mrs. Lemus that he is worried that Mr. Lemus is going to the hospital, and may not be able to make it back to SNF because of how sick he is. Mr. Live acknowledges that Mr. Lemus is very weak, and they are recognizing that in his age, he is getting weaker with each hospitalization and unlikely to recover back to baseline. Mr. Live admits that he would be accepting of pursuing PEG placement if it means that his brother-in-law does get better. Mr. Live's brothers have disagreeing ideas about PEG - one believes that the PEG tube is necessary and the other worries that it won't be helpful and will only cause more harm than benefit. We agreed to discuss with the entire family tomorrow at 10:15 AM and plan to engage his wife Obi in the conversation about trajectory, prognosis and next steps.            I will follow-up with patient. Please contact us if you have any additional questions.    Subjective:     Chief Complaint:   Chief Complaint   Patient presents with    Failure To Thrive     Patient has not eaten or drank anything in 2 days.  Hx of dementia. From [facility]       HPI:   Giselle Lemus is an 89-year-old man with a history of  severe auditory impairment, ulcerative colitis s/p colectomy (2013), Parkinson's disease, dementia, chronic sacral pressure ulcer, severe protein-calorie malnutrition, recent admissions for aspiration pneumonia (June 2023) and recently admitted on July 15, 2023 for symptomatic COVID-19. He was discharged to SNF for severe deconditioning and returned to [facility] on July 25, 2023 for worsening encephalopathy and poor PO intake, found to have likely bacterial pneumonia due to either superimposed bacterial infection of prior viral pneumonia versus aspiration pneumonia. Palliative and Supportive Care was consulted to discuss end of life and hospice.      Hospital Course:  No notes on file    Interval History: Met with Mr. Lemus and his brother-in-law Lincoln Live at bedside. Family meeting held with remainder of family on the phone including wife Obi Lemus.    Past Medical History:   Diagnosis Date    BPH (benign prostatic hyperplasia)     Parkinsons 09/2019    R hand tremor starting in 2017, diagnosed Sept 2019 by Dr. Angeles at     Ulcerative colitis     s/p colectomy    Unspecified chronic bronchitis        Past Surgical History:   Procedure Laterality Date    TOTAL COLECTOMY         Review of patient's allergies indicates:   Allergen Reactions    Heparin     Heparin analogues Other (See Comments)     Not true allergy - but patient developed large spontaneous RP hematoma and concurrent severe epistaxis while on DVT prophylactic dose heparin - consider mechanical DVT ppx in future       Medications:  Continuous Infusions:  Scheduled Meds:   carbidopa-levodopa  mg  1 tablet Oral QID    dextrose 10%  50 g Intravenous Once    finasteride  5 mg Oral Daily    levoFLOXacin  750 mg Intravenous Q48H    vancomycin (VANCOCIN) IV (PEDS and ADULTS)  750 mg Intravenous Q24H     PRN Meds:dextrose 10% **AND** dextrose 10%, glucagon (human recombinant), glucose, glucose, hydrALAZINE, melatonin, naloxone,  sodium chloride 0.9%, Pharmacy to dose Vancomycin consult **AND** vancomycin - pharmacy to dose    Family History    None       Tobacco Use    Smoking status: Never    Smokeless tobacco: Never   Substance and Sexual Activity    Alcohol use: Never    Drug use: Not on file    Sexual activity: Not on file       Review of Systems   Unable to perform ROS: Other   Objective:     Vital Signs (Most Recent):  Temp: 97.4 °F (36.3 °C) (07/27/23 0523)  Pulse: 62 (07/27/23 1044)  Resp: 18 (07/27/23 1025)  BP: 138/67 (07/27/23 1025)  SpO2: 96 % (07/27/23 1025) Vital Signs (24h Range):  Temp:  [97.4 °F (36.3 °C)-97.7 °F (36.5 °C)] 97.4 °F (36.3 °C)  Pulse:  [] 62  Resp:  [17-20] 18  SpO2:  [95 %-98 %] 96 %  BP: (128-194)/(59-86) 138/67     Weight: 47.2 kg (104 lb)  Body mass index is 16.29 kg/m².       Physical Exam  Constitutional:       General: He is not in acute distress.     Appearance: He is ill-appearing.   HENT:      Head: Normocephalic and atraumatic.      Right Ear: External ear normal.      Left Ear: External ear normal.      Nose: Nose normal.      Mouth/Throat:      Mouth: Mucous membranes are dry.   Eyes:      Extraocular Movements: Extraocular movements intact.      Conjunctiva/sclera: Conjunctivae normal.   Cardiovascular:      Rate and Rhythm: Normal rate.   Pulmonary:      Effort: Pulmonary effort is normal.   Abdominal:      General: Bowel sounds are normal.      Palpations: Abdomen is soft.   Musculoskeletal:         General: No swelling.   Lymphadenopathy:      Cervical: No cervical adenopathy.   Skin:     General: Skin is dry.      Coloration: Skin is pale.      Findings: Bruising present.   Neurological:      Comments: Extremely hearing impaired, bilaterally          Review of Symptoms      Symptom Assessment (ESAS 0-10 Scale)  Pain:  0  Dyspnea:  0  Anxiety:  0  Nausea:  0  Depression:  0  Anorexia:  0  Fatigue:  0  Insomnia:  0  Restlessness:  0  Agitation:  0 due to Other         Pain  Assessment in Advanced Demential Scale (PAINAD)   Breathing - Independent of vocalization:  0  Negative vocalization:  0  Facial expression:  0  Body language:  0  Consolability:  0  Total:  0    Living Arrangements:  Lives with spouse    Psychosocial/Cultural:   See Palliative Psychosocial Note: No  Former , . No children. Large family through wife who has a very close and supportive family  **Primary  to Follow**  Palliative Care  Consult: No    Spiritual:  F - [place] and Belief:  Spiritism - Presbyterian  I - Importance:  Important  C - Community:  [employer]      Advance Care Planning   Advance Directives:   Living Will: No    LaPOST: No    Do Not Resuscitate Status: No    Medical Power of : No      Decision Making:  Family answered questions and Patient unable to communicate due to disease severity/cognitive impairment  Goals of Care: The family endorses that what is most important right now is to focus on comfort and QOL     Accordingly, we have decided that the best plan to meet the patient's goals includes enrolling in hospice care         Significant Labs: CBC:   Recent Labs   Lab 07/25/23 2037 07/26/23 0349 07/27/23  0543   WBC 17.39* 13.74* 14.55*   HGB 9.0* 8.1* 8.8*   HCT 29.2* 26.0* 28.4*    192 223       CMP:   Recent Labs   Lab 07/25/23 2037 07/26/23 0349 07/27/23  0543    139 138   K 5.5* 4.7 4.6    107 103   CO2 23 23 25   GLU 68* 86 93   BUN 23 22 23   CREATININE 0.9 0.8 0.8   CALCIUM 9.1 8.4* 8.8   PROT 6.9 5.7* 6.2   ALBUMIN 2.3* 1.9* 2.0*   BILITOT 0.3 0.2 0.3   ALKPHOS 99 77 82   AST 31 29 30   ALT 18 18 <5*   ANIONGAP 8 9 10       CBC:   Recent Labs   Lab 07/27/23  0543   WBC 14.55*   HGB 8.8*   HCT 28.4*   MCV 87          BMP:  Recent Labs   Lab 07/27/23  0543   GLU 93      K 4.6      CO2 25   BUN 23   CREATININE 0.8   CALCIUM 8.8   MG 2.2       LFT:  Lab Results    Component Value Date    AST 30 07/27/2023    ALKPHOS 82 07/27/2023    BILITOT 0.3 07/27/2023     Albumin:   Albumin   Date Value Ref Range Status   07/27/2023 2.0 (L) 3.5 - 5.2 g/dL Final     Protein:   Total Protein   Date Value Ref Range Status   07/27/2023 6.2 6.0 - 8.4 g/dL Final     Lactic acid:   Lab Results   Component Value Date    LACTATE 1.3 07/25/2023    LACTATE 1.1 07/15/2023       Significant Imaging: I have reviewed all pertinent imaging results/findings within the past 24 hours.    I spent a total of 50 minutes on the day of the visit. This includes face to face time in discussion of goals of care, symptom assessment, coordination of care and emotional support. This also includes non-face to face time preparing to see the patient (eg, review of tests/imaging), obtaining and/or reviewing separately obtained history, documenting clinical information in the electronic or other health record, independently interpreting results and communicating results to the patient/family/caregiver, or care coordinator. A total of 16 minutes was spent on advance care planning, goals of care discussion, emotional support, formulating and communicating prognosis and goals of care, exploring burden/benefit of various approaches of treatment. This discussion occurred on a fully voluntary basis with the verbal consent of the patient and/or family.        Poornima Beasley MD  Palliative Medicine  [facility]

## 2023-07-27 NOTE — PLAN OF CARE
Problem: Infection  Goal: Absence of Infection Signs and Symptoms  Outcome: Ongoing, Progressing     Problem: Adult Inpatient Plan of Care  Goal: Plan of Care Review  Outcome: Ongoing, Progressing  Goal: Patient-Specific Goal (Individualized)  Outcome: Ongoing, Progressing  Goal: Absence of Hospital-Acquired Illness or Injury  Outcome: Ongoing, Progressing  Goal: Optimal Comfort and Wellbeing  Outcome: Ongoing, Progressing  Goal: Readiness for Transition of Care  Outcome: Ongoing, Progressing     Problem: Fall Injury Risk  Goal: Absence of Fall and Fall-Related Injury  Outcome: Ongoing, Progressing     Problem: Coping Ineffective  Goal: Effective Coping  Outcome: Ongoing, Progressing     Problem: Adjustment to Illness (Sepsis/Septic Shock)  Goal: Optimal Coping  Outcome: Ongoing, Progressing     Problem: Bleeding (Sepsis/Septic Shock)  Goal: Absence of Bleeding  Outcome: Ongoing, Progressing     Problem: Glycemic Control Impaired (Sepsis/Septic Shock)  Goal: Blood Glucose Level Within Desired Range  Outcome: Ongoing, Progressing     Problem: Infection Progression (Sepsis/Septic Shock)  Goal: Absence of Infection Signs and Symptoms  Outcome: Ongoing, Progressing     Problem: Nutrition Impaired (Sepsis/Septic Shock)  Goal: Optimal Nutrition Intake  Outcome: Ongoing, Progressing     Problem: Fluid Imbalance (Pneumonia)  Goal: Fluid Balance  Outcome: Ongoing, Progressing     Problem: Infection (Pneumonia)  Goal: Resolution of Infection Signs and Symptoms  Outcome: Ongoing, Progressing     Problem: Respiratory Compromise (Pneumonia)  Goal: Effective Oxygenation and Ventilation  Outcome: Ongoing, Progressing     Problem: Impaired Wound Healing  Goal: Optimal Wound Healing  Outcome: Ongoing, Progressing     Problem: Skin Injury Risk Increased  Goal: Skin Health and Integrity  Outcome: Ongoing, Progressing

## 2023-07-27 NOTE — NURSING
"Notified Dr. Simon at 0915 via secure chat that the patient is fidgeting and shakey which is causing a lot of artifact on the tele monitor. His rhythm keeps alarming Vtach and has been as high as the 180s this morning but it still looks like artifact. This was an issue overnight as well and they ordered an EKG aaround 0200 but it does not appear to have been done yet. Dr. Simon said no need for a stat EKG at this time because "it looks like artifact based on how HR is varying from 50s to 110s in a short amount of time."  "

## 2023-07-27 NOTE — PT/OT/SLP EVAL
"Speech Language Pathology Evaluation  Bedside Swallow/ Discharge summary     Patient Name:  Giselle Lemus   MRN:  [id number]  Admitting Diagnosis: Acute metabolic encephalopathy    Recommendations:                 General Recommendations:  Follow-up not indicated  Diet recommendations:  Regular Diet - IDDSI Level 7, Thin liquids - IDDSI Level 0   Aspiration Precautions: HOB to 90 degrees, Small bites/sips, and Standard aspiration precautions   General Precautions: Standard, aspiration, fall, droplet, airborne, contact  Communication strategies:  none    Assessment:     Giselle Lemus is a 89 y.o. male with an SLP diagnosis of  functional swallow, though benefits from softer foods at this time  .  No further acute ST needs.     History:     Past Medical History:   Diagnosis Date    BPH (benign prostatic hyperplasia)     Parkinsons 09/2019    R hand tremor starting in 2017, diagnosed Sept 2019 by Dr. Angeles at     Ulcerative colitis     s/p colectomy    Unspecified chronic bronchitis      Past Surgical History:   Procedure Laterality Date    TOTAL COLECTOMY       HPI: "Giselle Lemus is an 89-year-old man with a history of severe auditory impairment, ulcerative colitis s/p colectomy (2013), Parkinson's disease, dementia, chronic sacral pressure ulcer, severe protein-calorie malnutrition, recent admissions for aspiration pneumonia (June 2023) and recently admitted on July 15, 2023 for symptomatic COVID-19. He was discharged to SNF for severe deconditioning and returned to [facility] on July 25, 2023 for worsening encephalopathy and poor PO intake, found to have likely bacterial pneumonia due to either superimposed bacterial infection of prior viral pneumonia versus aspiration pneumonia. Palliative and Supportive Care was consulted to discuss end of life and hospice."    Chest X-Rays: 7/25:"Findings suggesting interval increased small left pleural effusion with probable left basilar atelectasis/consolidation.  " "Otherwise no change."    Prior diet: regular/thin  .  Subjective     Spoke with RN prior to session. Palliative in room discussion goals of care with family over the phone. Pt awake, calm and agreeable to PO trials. Bilateral wrist restraints in place.     Pain/Comfort:  Pain Rating 1:  (none indicated)    Respiratory Status: Room air    Objective:     Oral Musculature Evaluation  Oral Musculature: general weakness (functional)  Structural Abnormalities: open mouth posture  Dentition: present and adequate  Secretion Management: adequate  Mucosal Quality: adequate    Bedside Swallow Eval:   Consistencies Assessed:  Thin liquids 3 oz from straw  Puree tsp x6  Solids cracker x1      Oral Phase:   Prolonged mastication yet functional    Pharyngeal Phase:   no overt clinical signs/symptoms of aspiration  no overt clinical signs/symptoms of pharyngeal dysphagia    Compensatory Strategies  Small bites/sips, feed assist    Treatment: HOB raised. 1:1 Assist provided,. He consumed all intake without any difficulty. Recommend regular diet and thin liquids though discussed pt would benefit from small bite/sips and softer foods at this time. All in agreement.      Plan:     Plan of Care reviewed with:  patient, family (MD)   SLP Follow-Up:  No       Barriers to Discharge:  None    Time Tracking:     SLP Treatment Date:   07/27/23  Speech Start Time:  1044  Speech Stop Time:  1102     Speech Total Time (min):  18 min    Billable Minutes: Eval Swallow and Oral Function 18    07/27/2023         "

## 2023-07-27 NOTE — PROGRESS NOTES
Pharmacokinetic Assessment Follow Up: IV Vancomycin    Vancomycin serum concentration assessment(s):    The random level was drawn correctly and can be used to guide therapy at this time. The measurement is within the desired definitive target range of 10 to 20 mcg/mL.    Vancomycin Regimen Plan:    Change regimen to Vancomycin 750 mg IV every 24 hours with next serum trough concentration measured at 0230 prior to 3rd dose on 7/29    Drug levels (last 3 results):  Recent Labs   Lab Result Units 07/27/23  0038   Vancomycin, Random ug/mL 12.5       Pharmacy will continue to follow and monitor vancomycin.    Please contact pharmacy at extension [phone] for questions regarding this assessment.    Thank you for the consult,   Aly Glaser       Patient brief summary:  Giselle Lemus is a 89 y.o. male initiated on antimicrobial therapy with IV Vancomycin for treatment of  pneumonia        Drug Allergies:   Review of patient's allergies indicates:   Allergen Reactions    Heparin     Heparin analogues Other (See Comments)     Not true allergy - but patient developed large spontaneous RP hematoma and concurrent severe epistaxis while on DVT prophylactic dose heparin - consider mechanical DVT ppx in future       Actual Body Weight:   47.3    Renal Function:   Estimated Creatinine Clearance: 41.9 mL/min (based on SCr of 0.8 mg/dL).,     Dialysis Method (if applicable):  N/A    CBC (last 72 hours):  Recent Labs   Lab Result Units 07/25/23 2037 07/26/23  0349   WBC K/uL 17.39* 13.74*   Hemoglobin g/dL 9.0* 8.1*   Hematocrit % 29.2* 26.0*   Platelets K/uL 252 192   Gran % % 87.8* 87.0*   Lymph % % 4.7* 0.0*   Mono % % 6.3 5.0   Eosinophil % % 0.0 0.0   Basophil % % 0.2 1.0   Differential Method  Automated Automated       Metabolic Panel (last 72 hours):  Recent Labs   Lab Result Units 07/25/23 2037 07/25/23 2243 07/26/23  0349   Sodium mmol/L 140  --  139   Potassium mmol/L 5.5*  --  4.7   Chloride mmol/L 109  --  107   CO2  mmol/L 23  --  23   Glucose mg/dL 68*  --  86   Glucose, UA   --  Negative  --    BUN mg/dL 23  --  22   Creatinine mg/dL 0.9  --  0.8   Albumin g/dL 2.3*  --  1.9*   Total Bilirubin mg/dL 0.3  --  0.2   Alkaline Phosphatase U/L 99  --  77   AST U/L 31  --  29   ALT U/L 18  --  18   Magnesium mg/dL  --   --  1.8   Phosphorus mg/dL  --   --  2.9       Vancomycin Administrations:  vancomycin given in the last 96 hours                     vancomycin 2 g in dextrose 5 % 500 mL IVPB (mg) 2,000 mg New Bag 07/25/23 2320                    Microbiologic Results:  Microbiology Results (last 7 days)       Procedure Component Value Units Date/Time    Blood culture x two cultures. Draw prior to antibiotics. [[id number]] Collected: 07/25/23 2241    Order Status: Completed Specimen: Blood from Peripheral, Forearm, Right Updated: 07/26/23 2322     Blood Culture, Routine No Growth to date      No Growth to date    Narrative:      Aerobic and anaerobic    Blood culture x two cultures. Draw prior to antibiotics. [[id number]] Collected: 07/25/23 2241    Order Status: Completed Specimen: Blood from Peripheral, Forearm, Right Updated: 07/26/23 2322     Blood Culture, Routine No Growth to date      No Growth to date    Narrative:      Aerobic and anaerobic    Culture, Respiratory with Gram Stain [[id number]]     Order Status: No result Specimen: Respiratory     Urine culture [[id number]] Collected: 07/25/23 2243    Order Status: No result Specimen: Urine Updated: 07/26/23 0049

## 2023-07-27 NOTE — PLAN OF CARE
Recommendations    1. Continue Regular, recommend adding High-Calorie, High-Protein Diet. Continue to encourage PO intake and adequate fluid intake. Offer pleasure foods caloric/nutrient-dense options-- small, frequent meals/snacks.   2. Recommend SLP evaluation to determine if texture modification is appropriate.   3. Recommend continue Boost Plus with all meals.   4. Recommend MVI once daily.   5. Recommend continue routine measurements to assess for any acute weight changes.   6. RD to monitor and follow-up.    Goals: Meet % EEN/EPN by follow-up date.  Nutrition Goal Status: new  Communication of RD Recs: other (comment) (POC)

## 2023-07-27 NOTE — PROGRESS NOTES
"[facility] Medicine  Progress Note    Patient Name: Giselle Lemus  MRN: [id number]  Patient Class: IP- Inpatient   Admission Date: 7/25/2023  Length of Stay: 2 days  Attending Physician: Escobar Simon MD  Primary Care Provider: Tl Torres MD        Subjective:     Principal Problem:Acute metabolic encephalopathy        HPI:  89 y.o. 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  Pt is acutely altered & so is poor historian; pt presents from [facility], so Hx was obtained mostly from NH records & brother at bedside.  At baseline, pt is able to communicate & ambulate w/ a walker.  He was at his normal baseline mentation on 07/23, however since then, he has not been eating or drinking much & has been sleeping more than usual.  No known h/o trauma, falls, recent med changes.  Noted subacute cough since COVID diagnosis.  Pt has some diarrhea at baseline since colectomy.  Denies fevers/chills, CP/palpitations, SOB, abdominal pain, nausea/vomiting, constipation, hematuria, melena/hematochezia, weakness/numbness/tingling, HA/presyncope/syncope.     ED course: AF, HR 82, /56, 95% on 2 L NC.  Labs unremarkable except for WBC 17.39, Hb 9.0 (at baseline), K 5.5, Glc 68, Alb 2.3, , .  LA, TSH unremarkable.  UA w/ 3+ leukocytes, 18 WBC.  CXR w/ "Findings suggesting interval increased small left pleural effusion with probable left basilar atelectasis/consolidation. Otherwise no change".      Overview/Hospital Course:  No notes on file    Interval Hx:  Patient more awake and alert today. Brother in law present at bedside. Reports that they would like the patient to go back to SNF when medically ready. Code status updated to DNR. No plans for PEG placement. Appreciate palliative care assistance. Patient noted to have hematuria last night, suspect traumatic due to monreal's insertion. Hb " stable. Speech rec regular diet.     Objective:     Vital Signs (Most Recent):  Temp: 98.9 °F (37.2 °C) (07/26/23 0256)  Pulse: 62 (07/26/23 0256)  Resp: 17 (07/26/23 0256)  BP: (!) 144/66 (07/26/23 0256)  SpO2: 95 % (07/26/23 0256) Vital Signs (24h Range):  Temp:  [98.5 °F (36.9 °C)-99.1 °F (37.3 °C)] 98.9 °F (37.2 °C)  Pulse:  [58-83] 62  Resp:  [11-19] 17  SpO2:  [93 %-100 %] 95 %  BP: (108-165)/() 144/66     Weight: 47.3 kg (104 lb 4.4 oz)  Body mass index is 16.33 kg/m².     Physical Exam  Vitals and nursing note reviewed.   Constitutional:       General: He is not in acute distress.     Appearance: He is cachectic. He is ill-appearing. He is not toxic-appearing or diaphoretic.      Interventions: Nasal cannula in place.   HENT:      Head: Normocephalic and atraumatic.      Right Ear: External ear normal.      Left Ear: External ear normal.      Mouth/Throat:      Mouth: Mucous membranes are dry.      Pharynx: No oropharyngeal exudate.   Eyes:      Extraocular Movements: Extraocular movements intact.      Pupils: Pupils are equal, round, and reactive to light.   Cardiovascular:      Rate and Rhythm: Normal rate and regular rhythm.      Pulses: Normal pulses.      Heart sounds: Normal heart sounds. No murmur heard.  Pulmonary:      Effort: Pulmonary effort is normal. No respiratory distress.      Breath sounds: Rales present. No wheezing.   Abdominal:      General: Abdomen is flat. Bowel sounds are normal. There is no distension.      Palpations: Abdomen is soft. There is no mass.      Tenderness: There is no abdominal tenderness.   Musculoskeletal:         General: No swelling or tenderness. Normal range of motion.      Cervical back: Normal range of motion and neck supple.      Right lower leg: No edema.      Left lower leg: No edema.   Skin:     General: Skin is warm and dry.      Capillary Refill: Capillary refill takes less than 2 seconds.   Neurological:      General: No focal deficit present.       Mental Status: He is lethargic.            CRANIAL NERVES     CN III, IV, VI   Pupils are equal, round, and reactive to light.           Assessment/Plan:      * Acute metabolic encephalopathy  Acute hypoxemic respiratory failure  COVID-19  Left lower lobe pneumonia  UTI (urinary tract infection)    H/o Parkinson dementia & recent COVID p/w acute-onset confusion, lethargy, & poor PO intake.  No known head trauma or falls.  No focal neuro deficits on Ex, however w/ tachypnea & new hypoxemia requiring supp O2.  No fever, but noted leukocytosis w/o lactic acidosis on labs.  CXR w/ LLL pleural effusion vs consolidation, suspicious for PNA, likely superimposed bacterial PNA on recent COVID.  UA infectious, suspicious for UTI.  Would classify as sepsis (leukocytosis + tachypnea + suspected LLL PNA vs UTI).  Suspect septic encephalopathy 2/2 PNA vs UTI.  DDx Parkinson dementia progression vs multifactorial delirium vs polypharmacy vs other infection vs malignancy.  COVID positive (suspected since pt was only recently treated for COVID); flu/RSV negative.    -  BCx, UCx, RCx  - CTH to r/o intracranial pathology  - Abx, tailor per Cx data: vanc (MRSA coverage since pt is NH resident), levofloxacin (GNR & atypical coverage)  - delirium precautions  - Mental status improving  - Speech rec regular diet       Dermatitis associated with moisture    - on sacrum, present on arrival  - wound care consulted, recommendations in place  - waffle mattress, pressure reduction, turn 2qh    Dementia due to Parkinson's disease  - home carbidopa-levodopa    Chronic anemia  Stable, chronic, normocytic.    - monitor    Hyperkalemia  On admission, K 5.5.  No ECG changes.    - monitor  - shift + furosemide/Lokelma p.r.n.    Impaired mobility and activities of daily living  - PT/OT consulted    Benign prostatic hyperplasia  - home finasteride    VTE Risk Mitigation (From admission, onward)         Ordered     IP VTE HIGH RISK PATIENT  Once          07/25/23 2307     Place sequential compression device  Until discontinued         07/25/23 2307                Discharge Planning   JOHAN: 7/31/2023     Code Status: DNR   Is the patient medically ready for discharge?: No    Reason for patient still in hospital (select all that apply): Patient trending condition                     Escobar Simon MD  Department of Hospital Medicine   [facility]

## 2023-07-27 NOTE — NURSING
10:03 PM - informed doctor that the patient is having gross hematuria and he is combative, 2 point restraints applied.

## 2023-07-27 NOTE — PT/OT/SLP EVAL
Occupational Therapy  Co-Evaluation and Treatment with PT    Name: Giselle Lemus  MRN: [id number]  Admitting Diagnosis: Acute metabolic encephalopathy  Recent Surgery: * No surgery found *      Recommendations:     Discharge Recommendations: other (see comments)  Discharge Equipment Recommendations:  hospital bed, lift device, wheelchair  Barriers to discharge:   (increased A required)    Assessment:     Giselle Lemus is a 89 y.o. male with a medical diagnosis of Acute metabolic encephalopathy.  He presents with performance deficits affecting function: weakness, impaired endurance, impaired self care skills, impaired functional mobility, gait instability, impaired balance, impaired cognition, decreased upper extremity function, decreased lower extremity function, decreased safety awareness, abnormal tone, decreased ROM, impaired coordination, impaired fine motor, impaired joint extensibility, impaired muscle length. RN ok'd session. Pt found in restraints, but not overly agitated. Pt was unable to state his name (disoriented x4) and did not follow commands. Pt participates with Total A and would benefit from acute skilled OT services to address these deficits and reach maximum level of function.      -Co-tx with PT performed due to need for education and assistance from two skilled therapy disciplines at pt's current functional level.     Rehab Prognosis: Poor    Plan:     Patient to be seen 1 x/week to address the above listed problems via self-care/home management, therapeutic activities, therapeutic exercises, neuromuscular re-education, cognitive retraining  Plan of Care Expires: 08/27/23  Plan of Care Reviewed with: caregiver    Subjective     Chief Complaint: None  Patient/Family Comments/goals: Get well    Occupational Profile:  Living Environment: Pt arrived from a SNF  Previous level of function: Assistance  Roles and Routines: Pt unable to state; pt is .  Equipment Used at Home: rollator  Assistance upon  Discharge: Available    Pain/Comfort:  Pain Rating 1: 0/10  Pain Rating Post-Intervention 1: 0/10    Patients cultural, spiritual, Sabianism conflicts given the current situation: no    Objective:     Communicated with: RN prior to session.  Patient found HOB elevated with monreal catheter, peripheral IV, restraints upon OT entry to room.    General Precautions: Standard, airborne, aspiration, contact, droplet, fall  Orthopedic Precautions: N/A  Braces: N/A  Respiratory Status: Nasal cannula, flow 2 L/min    Occupational Performance:    Bed Mobility:    Patient completed Scooting/Bridging with total assistance and 2 persons  Patient completed Supine to Sit with total assistance and 2 persons  Patient completed Sit to Supine with total assistance and 2 persons    Functional Mobility/Transfers:  Did not occur    Activities of Daily Living:  Upper Body Dressing: total assistance    Lower Body Dressing: total assistance    Toileting: total assistance      Cognitive/Visual Perceptual:  Cognitive/Psychosocial Skills:     -       Oriented to: none   -       Follows Commands/attention:does not follow commands  -       Safety awareness/insight to disability: impaired     Physical Exam:  Balance:    -       Poor; Total A seated EOB for posterior lean  Upper Extremity Range of Motion:     -       Right Upper Extremity: 80 degrees at shoulder; pt becomes more resistive at increased ROMs  -       Left Upper Extremity: same  Upper Extremity Strength:    -       Right Upper Extremity: 3-/5  -       Left Upper Extremity: 3-/5   Strength:    -       Right Upper Extremity: Fair-  -       Left Upper Extremity: Fair-  Fine Motor Coordination:    -       Impaired  Left hand, finger to nose  , Right hand, finger to nose  , Left hand thumb/finger opposition skills  , Right hand thumb/finger opposition skills  , Left hand, manipulation of objects  , and Right hand, manipulation of objects    Gross motor coordination:   impaired      AMPA 6 Click ADL:  AMPAC Total Score: 6    Treatment & Education:  Pt unable to receive edu re OT role, POC and safety.  Pt worked on EOB sitting, MMT and ADLs, with Total A.    Patient left HOB elevated with all lines intact, call button in reach, bed alarm on, and restraints reapplied at end of session    GOALS:   Multidisciplinary Problems       Occupational Therapy Goals          Problem: Occupational Therapy    Goal Priority Disciplines Outcome Interventions   Occupational Therapy Goal     OT, PT/OT Ongoing, Progressing    Description: Goals to be met by: 8/27/23     Patient will increase functional independence with ADLs by performing:    UE Dressing with Maximum Assistance.  LE Dressing with Maximum Assistance.  Grooming while seated with Maximum Assistance.  Toileting from bedside commode with Maximum Assistance for hygiene and clothing management.   Supine to sit with Moderate Assistance.  Step transfer with Moderate Assistance.                         History:     Past Medical History:   Diagnosis Date    BPH (benign prostatic hyperplasia)     Parkinsons 09/2019    R hand tremor starting in 2017, diagnosed Sept 2019 by Dr. Angeles at     Ulcerative colitis     s/p colectomy    Unspecified chronic bronchitis          Past Surgical History:   Procedure Laterality Date    TOTAL COLECTOMY         Time Tracking:     OT Date of Treatment: 07/27/23  OT Start Time: 1347  OT Stop Time: 1410  OT Total Time (min): 23 min    Billable Minutes:Evaluation 15 minutes  Therapeutic Activity 8 minutes    ZINA Ward  7/27/2023  Pager: [phone]

## 2023-07-27 NOTE — PLAN OF CARE
Problem: Infection  Goal: Absence of Infection Signs and Symptoms  Outcome: Ongoing, Not Progressing     Problem: Adult Inpatient Plan of Care  Goal: Plan of Care Review  Outcome: Ongoing, Not Progressing  Goal: Patient-Specific Goal (Individualized)  Outcome: Ongoing, Not Progressing  Goal: Absence of Hospital-Acquired Illness or Injury  Outcome: Ongoing, Not Progressing  Goal: Optimal Comfort and Wellbeing  Outcome: Ongoing, Not Progressing  Goal: Readiness for Transition of Care  Outcome: Ongoing, Not Progressing     Problem: Fall Injury Risk  Goal: Absence of Fall and Fall-Related Injury  Outcome: Ongoing, Not Progressing     Problem: Coping Ineffective  Goal: Effective Coping  Outcome: Ongoing, Not Progressing     Problem: Adjustment to Illness (Sepsis/Septic Shock)  Goal: Optimal Coping  Outcome: Ongoing, Not Progressing     Problem: Bleeding (Sepsis/Septic Shock)  Goal: Absence of Bleeding  Outcome: Ongoing, Not Progressing     Problem: Glycemic Control Impaired (Sepsis/Septic Shock)  Goal: Blood Glucose Level Within Desired Range  Outcome: Ongoing, Not Progressing     Problem: Infection Progression (Sepsis/Septic Shock)  Goal: Absence of Infection Signs and Symptoms  Outcome: Ongoing, Not Progressing     Problem: Nutrition Impaired (Sepsis/Septic Shock)  Goal: Optimal Nutrition Intake  Outcome: Ongoing, Not Progressing     Problem: Fluid Imbalance (Pneumonia)  Goal: Fluid Balance  Outcome: Ongoing, Not Progressing     Problem: Infection (Pneumonia)  Goal: Resolution of Infection Signs and Symptoms  Outcome: Ongoing, Not Progressing     Problem: Respiratory Compromise (Pneumonia)  Goal: Effective Oxygenation and Ventilation  Outcome: Ongoing, Not Progressing     Problem: Impaired Wound Healing  Goal: Optimal Wound Healing  Outcome: Ongoing, Not Progressing     Problem: Skin Injury Risk Increased  Goal: Skin Health and Integrity  Outcome: Ongoing, Not Progressing     Problem: Confusion Acute  Goal: Optimal  Cognitive Function  Outcome: Ongoing, Not Progressing

## 2023-07-27 NOTE — ASSESSMENT & PLAN NOTE
Giselle Lemus is an 89-year-old man with a history of severe auditory impairment, ulcerative colitis s/p colectomy (2013), Parkinson's disease, dementia, chronic sacral pressure ulcer, severe protein-calorie malnutrition, recent admissions for aspiration pneumonia (June 2023) and recently admitted on July 15, 2023 for symptomatic COVID-19. He was discharged to SNF for severe deconditioning and returned to [facility] on July 25, 2023 for worsening encephalopathy and poor PO intake, found to have likely bacterial pneumonia due to either superimposed bacterial infection of prior viral pneumonia versus aspiration pneumonia. Palliative and Supportive Care was consulted to discuss end of life and hospice.    Advance Care Planning   Goals of Care:  - Code status: updated to DNAR/DNI  - Next of kin: wife Obi Lemus  - Patient does not have decision making capacity  - Prognosis: poor  - Family's understanding of prognosis: fair  - Goals: life prolongation  - Recommendations: please engage case management to explore return to [facility] in the same room as his wife Obi Lemus who is there for SNF. Eventually the hope is that they will ultimately be at home with hospice support and 24/7 caregivers, but family would have to arrange this and need time to do so. Family has very good understanding of his decline. I have recommended against PEG insertion as it will not decrease his risk of aspiration and only prolong his suffering and dying process. Will continue to follow along to provide support and clarify any concerns/questions that family may have after our emotional family meeting.    Goals of Care Conversation:  - 7/27/23: Met Dr. Lemus and his brother-in-law Lincoln Live at bedside. Mr. Live got the remainder of family (wife Obi and her other brothers) on the phone and we spoke via speakerphone. They were able to express their understanding that Dr. Lemus has been hospitalized four times in the last four weeks, and  that he's getting weaker. They acknowledge that he is getting older (almost 89 years old in August) and that in the setting of an incurable disease (Parkinson's) he will continue to decline over time. Agreed with family that he is getting weaker with each hospitalization despite everyone's earnest efforts to restore him back to baseline. I shared that I believe he is in the last chapter of his life. Recommended that we focus on his comfort as everything we've done to try to buy meaningful time and delay the inevitable is not working to buy him more quality time, but at best prolonging his suffering. Family does not want Dr. Lemus to be suffering and they would want him to be treated with dignity, respect and comfort for the remainder of his time. They believe that Isi Lemus's strong shyam and dedication to God brings peace to Dr. Lemus, knowing that when he dies he will go to [facility]. I asked Obi if she had any thoughts, concerns, questions, and she admits that she doesn't know. Family expressed support for Obi and reassured her that they can continue talking after our meeting and make any changes to honor their preferences. We did discuss code status and I recommended that in efforts to protect him from interventions that would cause more pain/suffering, to not do CPR/intubation when God calls him home to [facility]. Family agreed and expressed understanding. They asked if it was possible to get him to [facility] again so he can be with his wife. This will also buy family time to arrange and hire caregivers 24/7 to care for Dr. Lemus at home if possible and then take him out of the nursing home. I agreed with family that this is a very thoughtful plan and will be in communication with his primary team.  - 7/26/23: Met Mr. Lemus and his brother-in-law Lincoln Live at bedside. I have met Mr. Live last week when  And Mrs. Lemus were hospitalized at [facility] prior to their transfer to [facility]. Mr. Live shared that for  the last two days, Mr. Lemus had been sleeping significantly more and not eating/drinking. The doctor/nurse at the nursing home told Mr. Live that they were going to send Mr. Lemus to the hospital, and that likely they will have to discuss whether to pursue PEG or hospice support. Mr. Live told Mrs. Lemus that he is worried that Mr. Lemus is going to the hospital, and may not be able to make it back to SNF because of how sick he is. Mr. Live acknowledges that Mr. Lemus is very weak, and they are recognizing that in his age, he is getting weaker with each hospitalization and unlikely to recover back to baseline. Mr. Live admits that he would be accepting of pursuing PEG placement if it means that his brother-in-law does get better. Mr. Liev's brothers have disagreeing ideas about PEG - one believes that the PEG tube is necessary and the other worries that it won't be helpful and will only cause more harm than benefit. We agreed to discuss with the entire family tomorrow at 10:15 AM and plan to engage his wife Obi in the conversation about trajectory, prognosis and next steps.

## 2023-07-28 PROBLEM — Z51.5 COMFORT MEASURES ONLY STATUS: Status: ACTIVE | Noted: 2023-01-01

## 2023-07-28 NOTE — RESPIRATORY THERAPY
Respiratory called to beside for possible aspiration and desaturation event. Pt sats in the 70's with NRB. Pt placed on airvo [id number]%. Pt DNI/DNR. [place] nurses,hospital medicine and critical care at bedside. Providers had goal of care discussion with family. Pt transitioned to comfort measures.

## 2023-07-28 NOTE — HOSPITAL COURSE
H/o Parkinson dementia & recent COVID p/w acute-onset confusion, lethargy, & poor PO intake.  No known head trauma or falls.  No focal neuro deficits on Ex, however w/ tachypnea & new hypoxemia requiring supp O2.  No fever, but noted leukocytosis w/o lactic acidosis on labs.  CXR w/ LLL pleural effusion vs consolidation, suspicious for PNA, likely superimposed bacterial PNA on recent COVID.  UA infectious, suspicious for UTI.  Would classify as sepsis (leukocytosis + tachypnea + suspected LLL PNA vs UTI).  Suspect septic encephalopathy 2/2 PNA vs UTI. Started in empiric BSAB. Palliative care consulted. Had a family meeting. Code status changed to DNR. On armida of  patient had coffee ground emesis followed by rapid desaturation to the 40's.  rapid response called.  Patient appeared to have aspirated. He was started on non rebreather unable to get saturation up.  Attempted deep suction with copious amount of dark material out.  Patient was then put on high flow system, still unable to increase saturation greater than 80;s.  hypotensive.  Stat xray ordered.  Already on vancomycin and levaquin.  One time dose of solumedrol 60 given.  Started protonix 40mg BID.  Keep NPO. Critical care was called to bedside, they discussed with brother in law and relatives.  He is to remain DNR. Family eventually ended up wishing patient to be comfortable and agreed intubation and further invasive interventions would cause patient more suffering.  He was transitioned to comfort measures per family wish. Nocturnist pronounced patient  at 613am on 2023.

## 2023-07-28 NOTE — DISCHARGE SUMMARY
"[facility] Medicine  Discharge Summary      Patient Name: Giselle Lemus  MRN: [id number]  EMILE: [id number]  Patient Class: IP- Inpatient  Admission Date: 7/25/2023  Hospital Length of Stay: 3 days  Discharge Date and Time:  07/28/2023 4:14 PM  Attending Physician: No att. providers found   Discharging Provider: Escobar Simon MD  Primary Care Provider: Tl Torres MD  [facility] Medicine Team: [facility] Escobar Simon MD  Primary Care Team: [facility]    HPI:   89 y.o. 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  Pt is acutely altered & so is poor historian; pt presents from [facility], so Hx was obtained mostly from NH records & brother at bedside.  At baseline, pt is able to communicate & ambulate w/ a walker.  He was at his normal baseline mentation on 07/23, however since then, he has not been eating or drinking much & has been sleeping more than usual.  No known h/o trauma, falls, recent med changes.  Noted subacute cough since COVID diagnosis.  Pt has some diarrhea at baseline since colectomy.  Denies fevers/chills, CP/palpitations, SOB, abdominal pain, nausea/vomiting, constipation, hematuria, melena/hematochezia, weakness/numbness/tingling, HA/presyncope/syncope.     ED course: AF, HR 82, /56, 95% on 2 L NC.  Labs unremarkable except for WBC 17.39, Hb 9.0 (at baseline), K 5.5, Glc 68, Alb 2.3, , .  LA, TSH unremarkable.  UA w/ 3+ leukocytes, 18 WBC.  CXR w/ "Findings suggesting interval increased small left pleural effusion with probable left basilar atelectasis/consolidation. Otherwise no change".      * No surgery found *      Hospital Course:   H/o Parkinson dementia & recent COVID p/w acute-onset confusion, lethargy, & poor PO intake.  No known head trauma or falls.  No focal neuro deficits on Ex, however w/ tachypnea & new hypoxemia requiring supp " O2.  No fever, but noted leukocytosis w/o lactic acidosis on labs.  CXR w/ LLL pleural effusion vs consolidation, suspicious for PNA, likely superimposed bacterial PNA on recent COVID.  UA infectious, suspicious for UTI.  Would classify as sepsis (leukocytosis + tachypnea + suspected LLL PNA vs UTI).  Suspect septic encephalopathy 2/2 PNA vs UTI. Started in empiric BSAB. Palliative care consulted. Had a family meeting. Code status changed to DNR. On armida of  patient had coffee ground emesis followed by rapid desaturation to the 40's.  rapid response called.  Patient appeared to have aspirated. He was started on non rebreather unable to get saturation up.  Attempted deep suction with copious amount of dark material out.  Patient was then put on high flow system, still unable to increase saturation greater than 80;s.  hypotensive.  Stat xray ordered.  Already on vancomycin and levaquin.  One time dose of solumedrol 60 given.  Started protonix 40mg BID.  Keep NPO. Critical care was called to bedside, they discussed with brother in law and relatives.  He is to remain DNR. Family eventually ended up wishing patient to be comfortable and agreed intubation and further invasive interventions would cause patient more suffering.  He was transitioned to comfort measures per family wish. Nocturnist pronounced patient  at 613am on 2023.       Goals of Care Treatment Preferences:  Code Status: DNR          What is most important right now is to focus on comfort and QOL .  Accordingly, we have decided that the best plan to meet the patient's goals includes enrolling in hospice care.      Consults:   Consults (From admission, onward)        Status Ordering Provider     Inpatient consult to Palliative Care  Once        Provider:  (Not yet assigned)    Completed RUBNE DUMONT     Inpatient consult to Registered Dietitian/Nutritionist  Once        Provider:  (Not yet assigned)    Completed SHIVANI LOGAN  new Assessment & Plan notes have been filed under this hospital service since the last note was generated.  Service: Hospital Medicine    Final Active Diagnoses:    Diagnosis Date Noted POA    PRINCIPAL PROBLEM:  Acute metabolic encephalopathy [G93.41] 2022 Yes    Comfort measures only status [Z51.5] 2023 Not Applicable    Dermatitis associated with moisture [L30.8] 2023 Yes    Acute hypoxemic respiratory failure [J96.01] 2023 Yes    UTI (urinary tract infection) [N39.0] 2023 Yes    Left lower lobe pneumonia [J18.9] 2023 Yes    Dementia due to Parkinson's disease [G20, F02.80] 2023 Yes     Chronic    COVID-19 [U07.1] 07/15/2023 Yes    Chronic anemia [D64.9] 07/15/2023 Yes     Chronic    Hyperkalemia [E87.5] 2023 Yes    Sequelae of protein-calorie malnutrition  [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

## 2023-07-28 NOTE — NURSING
Patient had vomiting of coffee ground vomitus and informed the oncall doctor. Vital signs taken and patient saturation went to 40-50's. Called Rapid Response Team and they responded immediately. They suctioned oral secretions and placed patient on high flow oxygen inhalation. Oncall doctor called the family members and informed the patient's current condition. Family members came and at bedside right now. They want comfort measures for the patient.

## 2023-07-28 NOTE — SIGNIFICANT EVENT
Informed of patient passing away. Patient seen and examined. After 1 minute auscultation, no spontaneous heartbeat or respiration noted. No withdrawal to noxious stimulation. Pupils 7 and fixed bilaterally. Flatline noted in two EKG leads. Patient pronounced  at 613am on 2023.    Family at bedside     at bedside.    Discharge and certification of death to be completed by attending physician: Escobar Simon MD

## 2023-07-28 NOTE — PLAN OF CARE
[facility] - Med Surg  Discharge Final Note    Primary Care Provider: Tl Torres MD    Expected Discharge Date: 2023    Pt .     Final Discharge Note (most recent)       Final Note - 23 0914          Final Note    Assessment Type Final Discharge Note     Anticipated Discharge Disposition         Post-Acute Status    Post-Acute Authorization Other     Other Status See Comments   Pt .    Discharge Delays None known at this time                     Important Message from Medicare         Marie Page LMSW  PRN-  [facility]  Ext. [phone]

## 2023-07-28 NOTE — ACP (ADVANCE CARE PLANNING)
Advance Care Planning     Date: 07/28/2023    Code Status  In light of the patients advanced and life limiting illness,I engaged the the family in a voluntary conversation about the patient's preferences for care  at the very end of life. The patient wishes to have a natural, peaceful death.  Along those lines, the patient does not wish to have CPR or other invasive treatments performed when his heart and/or breathing stops. I communicated to the family that a DNR order would be placed in his medical record to reflect this preference.  I spent a total of 15 minutes engaging the patient in this advance care planning discussion.     Called to bedside by rapids nurse. Pt had aspiration event and worsening oxygen requirements. Pt currently on 60L/100% and a NRB. Pt hypotensive 80/50. Spoke with family about the patient's wishes and they wanted to reverse code status so they would have time to get to the hospital. Pt briefly placed on peripheral levophed. Given the patient's multiple medical problems, declining health, and new COVID diagnosis I explained to the family that at this time we should focus on the patient's comfort. I explained to the family that although this was a very difficult situation, I did not believe that we should move forward with aggressive or invasive interventions. Family agreed to comfort measures. Comfort measures only placed in the chart. Orders for Pain and anxiety placed.      notified.     Noemi Wilkinson [facility]-  Critical Care Medicine  07/28/2023 2:07 AM

## 2023-07-28 NOTE — CARE UPDATE
,RAPID RESPONSE NURSE NOTE        Admit Date: 2023  LOS: 3  Code Status: DNR   Date of Consult: 2023  : 1933  Age: 89 y.o.  Weight:   Wt Readings from Last 1 Encounters:   23 47.2 kg (104 lb)     Sex: male  Race: Other   Bed: [address] [place] [address] A:   MRN: [id number]  Time Rapid Response Team page Received: 0100  Time Rapid Response Team at Bedside: 0105  Time Rapid Response Team left Bedside: 0157  Was the patient discharged from an ICU this admission? No   Was the patient discharged from a PACU within last 24 hours? No   Did the patient receive conscious sedation/general anesthesia in last 24 hours? No  Was the patient in the ED within the past 24 hours? No  Was the patient on NIPPV within the past 24 hours? No   Did this progress into an ARC or CPA: No  Attending Physician: Escobar Simon MD  Primary Service: [facility]       SITUATION    Notified by bedside RN via phone call.  Reason for alert: hypoxia despite increase in O2 supplementation, and unresponsiveness.   Called to evaluate the patient for Respiratory    BACKGROUND     Why is the patient in the hospital?: Acute metabolic encephalopathy    Patient has a past medical history of BPH (benign prostatic hyperplasia), Parkinsons, Ulcerative colitis, and Unspecified chronic bronchitis.    Last Vitals:  Temp: 98 °F (36.7 °C) (2100)  Pulse: 106 (0)  Resp: 18 (222)  BP: 81/51 (0)  SpO2: 82 % (130)    24 Hours Vitals Range:  Temp:  [97.4 °F (36.3 °C)-98.2 °F (36.8 °C)]   Pulse:  []   Resp:  [17-20]   BP: ()/(51-86)   SpO2:  [81 %-98 %]     Labs:  Recent Labs     239 23  0543   WBC 17.39* 13.74* 14.55*   HGB 9.0* 8.1* 8.8*   HCT 29.2* 26.0* 28.4*    192 223       Recent Labs     23  0349 23  0543    139 138   K 5.5* 4.7 4.6    107 103   CO2 23 23 25   CREATININE 0.9 0.8 0.8   GLU 68* 86 93   PHOS  --  2.9 3.2   MG  --  1.8 2.2         No results for input(s): PH, PCO2, PO2, HCO3, POCSATURATED, BE in the last 72 hours.     ASSESSMENT    Physical Exam  Constitutional:       General: He is in acute distress.      Appearance: He is ill-appearing.   Cardiovascular:      Rate and Rhythm: Regular rhythm. Tachycardia present.      Pulses: Normal pulses.   Pulmonary:      Breath sounds: Transmitted upper airway sounds present. Rales present.   Abdominal:      General: Bowel sounds are decreased. There is distension.   Skin:     General: Skin is warm.      Coloration: Skin is mottled and pale.   Neurological:      Mental Status: He is lethargic.     Pt lying in bed with coffee ground emesis noted on gown and face. Oxygen saturations in the 50's on nasal cannula. Non-rebreather applied with minimal improvement in O2 sats. High-flow nasal cannula added, O2 sats increased to 80's, maxed on high flow with 15L non-rebreather additionally. Concerns for aspiration event. Pt subsequently became hypotensive and [facility] was consulted.    INTERVENTIONS    The patient was seen for Respiratory problem. Staff concerns included oxygen saturation < 90% despite supplemental oxygen and increased oxygen requirements. The following interventions were performed: supplemental oxygen, PRN suctioning, portable chest x-ray, continuous pulse ox monitoring, continuous cardiac monitoring, and hypotension.  Medical problem. Staff concerns included hypotension. The following interventions were performed: critical care consulted, continuous pulse ox monitoring , and continuous cardiac monitoring .    - Pt placed on non-rebreather.  - High flow nasal cannula applied.  - Oral suctioning.  - NT suctioning x3, nasal trumpet placed per RT.  - Chest Xray  - [facility] consult.  - Peripheral Levo while waiting on family to arrive as Pt's MAPs in the 50's - low 60's.  - GO conversation per [facility] NP.   - Pt transitioned to comfort measures, peripheral Levophed discontinued, and family allowed time to  visit.  - Pt cleaned and repositioned for comfort.   - Comfort measure orders in place.      RECOMMENDATIONS    We recommend: administration of comfort care medications as needed, contact primary team MD if Pt appears distressed or uncomfortable.    PROVIDER ESCALATION    Orders received and case discussed with Dr. Ji  and DANETTE Wilkinson.    Primary team arrival time at bedside.    Disposition: Remain in room 602, transition to comfort care..    FOLLOW UP    bedside RN, and Charge, KATELYNN Villa  updated on plan of care. Instructed to call the Rapid Response Nurse, Marilyn Currie RN at [phone] for additional questions or concerns.

## 2023-07-28 NOTE — SIGNIFICANT EVENT
Paged by patient's nurse patient had coffee ground emesis followed by rapid desaturation to the 40's.  Went to evaluate patient at bedside, rapid response called.  Patient appears to have aspirated.  Started on non rebreather unable to get saturation up.  Attempted deep suction with copious amount of dark material out.  Patient was then put on high flow system, still unable to increase saturation greater than 80;s. BP is hypotensive.  Stat xray ordered.  Already on vancomycin and levaquin.  One time dose of solumedrol 60 given.  Started protonix 40mg BID.  Keep NPO. Critical care was called to bedside, they discussed with brother in law and relatives.  He is to remain DNR. Family eventually ended up wishing patient to be comfortable and agreed intubation and further invasive interventions would cause patient more suffering.  He will be transitioned to comfort measures per family wish.

## 2023-07-28 NOTE — PROGRESS NOTES
Vancomycin order and consult has been discontinued. Pharmacy will sign off. Please re-consult if needed.     Thanks  Jannie Mcfadden, PharmD, [facility]  Clinical [facility] Pharmacist   Ext [phone]

## 2023-07-30 LAB
BACTERIA BLD CULT: NORMAL
BACTERIA BLD CULT: NORMAL

## 2023-07-31 NOTE — PHYSICIAN QUERY
PT Name: Giselle Lemus  MR #: [id number]     DOCUMENTATION CLARIFICATION     CDS: Sandy Bryson RN, CCDS   Contact Information: [email or web address]    This form is a permanent document in the medical record.     Query Date: July 31, 2023    By submitting this query, we are merely seeking further clarification of documentation.  Please utilize your independent clinical judgment when addressing the question(s) below.      The Medical Record contains the following:     Indicators   Supporting Clinical Findings Location in Medical Record   x Pneumonia documented ...In the ED chest xray was without infiltrates.    ...Diagnosed with COVID, left lower lobe bacterial pneumonia  Final Active Diagnoses:     Diagnosis Date Noted POA    Bacterial pneumonia 07/20/2023 Yes      7/15 H&P, Dr. Ji    7/21 DCS, Dr. Simon   x Chest X-Ray/CT Scan 7/15 CXR: Abnormal opacification, consistent with a combination of pleural fluid, scarring, and focal consolidation, remains at the left lung base.  Overall, the lungs are similar in appearance to the previous examination with no new superimposed lobar consolidation or alveolar edemaNo detrimental change since June 17, 2023.    7/17 CXR: There is left lower lobe compressive atelectasis Possible left-sided pneumonia with small pleural effusion.    7/17 CT Chest w/o Contrast: Lungs and pleura: Improving size of right upper lobe peripheral consolidation with small residual component measuring 2.3 x 0.8 cm.  Left lower lobe patchy consolidative areas.  Grossly unchanged left basal consolidation.  Left lower lobe volume loss.  Small left lower loculated effusion.  Right small pleural effusion slightly increased compared to prior study.  Substantial mucoid impaction in the left lower lobe segmental bronchi.  Left lower pleural thickening.  Improving right upper lobe and left lower lobe consolidations.   Radiology   x Treatment  Azithromycin 500mg PO x1  Azithromycin 500mg PO  Daily  Rocephin 1g IV Q24H 7/19 MAR  7/20-7/21 MAR  7/19-7/21 MAR   x Other He was admitting on 7/15 for fatigue and weakness and was found to be COVID+. Hospital course included steroids and 3-day course of remdesivir. Recent chest CT with improvement of some consolidations, while other opacities persist but have not worsened.  Multifocal pneumonia 7/19 Pulmonology, Dr. Guillory / Dr. Dewey     According to coding guidelines, Present on Admission is defined as present at the time the order for inpatient admission occurs. Conditions that develop during an outpatient encounter, including emergency department, observation, or outpatient surgery, are considered as present on admission.       Please clarify the Present on Admission (POA) status of the diagnosis: Bacterial Pneumonia    [ X ] Yes (Y)   [  ] No (N)   [  ] Documentation insufficient to determine if condition is POA (U)   [  ] Clinically Undetermined (W)     Reference:  ICD-10-CM Official Guidelines for Coding and Reporting FY 2021. (2020). Retrieved October 21, 2020, from https://www.cdc.gov/nchs/data/icd/10cmguidelines-FY2021.pdf?[email or web address]    Form No. [id number]

## 2023-08-02 NOTE — PHYSICIAN QUERY
PT Name: Giselle Lemus  MR #: [id number]    DOCUMENTATION CLARIFICATION     CDS/:  Pihllip Bazan RN, CDS                   Contact Information:  [email or web address]    This form is a permanent document in the medical record.     Query Date: 2023    By submitting this query, we are merely seeking further clarification of documentation.. Please utilize your independent clinical judgment when addressing the question(s) below.    The medical record contains the following:   Indicators  Supporting Clinical Findings Location in Medical Record     X Energy Intake less than 75% for greater than or equal to 3 months   RD Nutrition Consult      X Weight Loss 13% x 5 weeks   RD Nutrition Consult      X Fat Loss Severe Depletion   RD Nutrition Consult      X Muscle Loss Severe Depletion   RD Nutrition Consult       Edema/Fluid Accumulation     X Reduced  Strength (by dynamometer) Diminished   RD Nutrition Consult      X Weight, BMI, Usual Body Weight Weight: 47.2 kg (104 lb)    BMI (Calculated): 16.3    Usual Body Weight (UBW), k.54 kg   RD Nutrition Consult     Delayed Wound Healing     X Registered Dietician Diagnosis Severe Protein-Calorie Malnutrition        Malnutrition in the context of Chronic Illness/Injury   RD Nutrition Consult    X Acute or Chronic Illness Acute metabolic encephalopathy  Acute hypoxemic respiratory failure  COVID-19  Left lower lobe pneumonia  UTI (urinary tract infection)  Dementia due to Parkinson's disease   Impaired mobility and activities of daily living     PN     Social or Environmental Circumstances     X Treatment Recommendations:  1. Continue Regular, recommend adding High-Calorie, High-Protein Diet. Continue to encourage PO intake and adequate fluid intake. Offer pleasure foods caloric/nutrient-dense options-- small, frequent meals/snacks.   2. Recommend SLP evaluation to determine if texture modification is appropriate.    3. Recommend continue Boost Plus with all meals.   4. Recommend MVI once daily.   5. Recommend continue routine measurements to assess for any acute weight changes.   6. RD to monitor and follow-up.    No plans for PEG placement   RD Nutrition Consult 7/27                          HM PN 7/27   X Other Poor PO intake, cachetic    RD consulted for poor PO intake, cachetic, FTT and diet recommendations. Pt has not eaten/drank anything in 2 days. -125lbs; Wt loss noted 16lbs since last admit. Rd observed patient being fed by family member. NFPE completed 7/27 severe fat and muscle loss in the orbital, clavicle, and temple regions. Pt meets criteria for malnutrition.   HM PN 7/27    RD Nutrition Consult 7/27     Academy of Nutrition and Dietetics (Academy) and the American Society for Parenteral and Enteral Nutrition (A.S.P.E.N.) Clinical Characteristics to support Malnutrition   Malnutrition in the Context of Acute Illness or Injury Malnutrition in the Context of Chronic Illness or Injury Malnutrition in the Context of Social or Environmental Circumstances   Malnutrition Level Moderate Severe Moderate Severe   Moderate   Severe   Energy Intake <75%                   >7 days <50%                 >5 days <75%           >1 month <75%                      >1 month   <75% for >3 months   <50% for >1 month   Weight Loss   1-2% in 1 week >2% in 1 week 5% in 1 month >5% in 1 month 5% in 1 month >5% in 1 month    5% in 1 month >5% in 1 month 7.5% in 3 months >7.5% in 3 months 7.5% in 3 months >7.5% in 3 months    7.5% in 3 months >7.5% in 3 months 10% in 6 months >10% in 6 months 10% in 6 months >10% in 6 months        20% in 1 year                    >20% in 1 year                                                                  20% in 1 year                            >20% in 1 year                                                  Subcutaneous Fat Loss Mild  Moderate  Mild  Severe    Mild   Severe   Muscle Loss Mild   Moderate  Mild  Severe    Mild   Severe   Edema/Fluid Accumulation Mild Moderate to severe  Mild  Severe   Mild   Severe   Reduced  Strength         (based on standards supplied by  of dynamometer) N/A Measurably reduced N/A Measurably reduced N/A Measurably reduced     Criteria for mild malnutrition is defined as 1 characteristic outlined above within the established moderate or severe parameters.  A minimum of 2 out of the 6 characteristics noted above are recommended for a diagnosis of moderate or severe malnutrition.  Chronic illness/injury is a disease/condition lasting 3 months or longer.    The noted clinical guidelines are only system guidelines and do not replace the providers clinical judgment.    Provider, please clarify/confirm the nutrition diagnosis as noted above.    [  x] Severe Malnutrition - a minimum of 2 of the 6 severe malnutrition characteristics noted above      [  ] Other Nutritional Diagnosis (please specify): _______     [  ] Malnutrition ruled out       Please document in your progress notes daily for the duration of treatment until resolved and  include in your discharge summary.      References:    BROOKE Escobedo, & TAHMINA Vargas (2022, April). Assessment and management of anorexia and cachexia in palliative care. Retrieved May 23, 2022, from https://www.[employer]/contents/assessment-and-management-of-anorexia-and-cachexia-in-palliative-care?fzwadTbt=0426&source=see_link     JONG Toro, PhD, RD, Caterina DIANA P., PhD, RN, BIA Gonzalez MD, PhD, Eduardo DALEY A., MS, RD, [facility], KYUNG Pastrana, MS, RD, The Academy Malnutrition Work Group, The A.S.P.E.N. Board of Directors. (2012). Consensus Statement: Academy of Nutrition and Dietetics and American Society for Parenteral and Enteral Nutrition: Characteristics Recommended for the Identification and Documentation of Adult Malnutrition (Undernutrition). Journal of Parenteral and Enteral Nutrition, 36(3), 275-283.  doi:10.1177/6207151110981744     Form No. [id number]

## 2023-12-27 NOTE — NURSING
Messaged on call  To see if I could get a [place] and a rectal tube patient has breakdown to his scrotum levar area and sacrum that was present upon admission but is getting worse due to multiple watery bms. I received order for rectal tube only at this time. Inserted it w/o complications.  Did say try condom cath which we had already tried the night before but was unable to keep it in position. Patient was pulling it off. Patient is resting NAD at this time.  
Nurses Note -- 4 Eyes      6/18/2023   2:44 AM      Skin assessed during: Admit      [] No Altered Skin Integrity Present    []Prevention Measures Documented      [x] Yes- Altered Skin Integrity Present or Discovered   [] LDA Added if Not in Epic (Describe Wound)   [x] New Altered Skin Integrity was Present on Admit and Documented in LDA   [] Wound Image Taken    Wound Care Consulted? Yes    Attending Nurse:  Irene Miller RN     Second RN/Staff Member: Cammie      
Patient arrived to the floor. Brother in law accompanied him.  
Pt transported via [place], no distress noted upon departure.  
Receiving [facility] facility requesting to keep PIV in to [facility].   
Unknown